# Patient Record
Sex: MALE | Race: WHITE | NOT HISPANIC OR LATINO | Employment: OTHER | URBAN - METROPOLITAN AREA
[De-identification: names, ages, dates, MRNs, and addresses within clinical notes are randomized per-mention and may not be internally consistent; named-entity substitution may affect disease eponyms.]

---

## 2018-03-05 ENCOUNTER — OFFICE VISIT (OUTPATIENT)
Dept: INTERNAL MEDICINE | Facility: CLINIC | Age: 63
End: 2018-03-05
Payer: MEDICARE

## 2018-03-05 ENCOUNTER — HOSPITAL ENCOUNTER (OUTPATIENT)
Dept: RADIOLOGY | Facility: HOSPITAL | Age: 63
Discharge: HOME | End: 2018-03-05
Attending: INTERNAL MEDICINE
Payer: MEDICARE

## 2018-03-05 VITALS
SYSTOLIC BLOOD PRESSURE: 130 MMHG | HEART RATE: 80 BPM | BODY MASS INDEX: 42.41 KG/M2 | WEIGHT: 302.94 LBS | DIASTOLIC BLOOD PRESSURE: 74 MMHG | OXYGEN SATURATION: 95 % | HEIGHT: 71 IN

## 2018-03-05 DIAGNOSIS — Z12.5 SCREENING FOR PROSTATE CANCER: ICD-10-CM

## 2018-03-05 DIAGNOSIS — Z11.59 ENCOUNTER FOR HEPATITIS C SCREENING TEST FOR LOW RISK PATIENT: ICD-10-CM

## 2018-03-05 DIAGNOSIS — R03.0 BLOOD PRESSURE ELEVATED WITHOUT HISTORY OF HTN: ICD-10-CM

## 2018-03-05 DIAGNOSIS — G47.33 OSA (OBSTRUCTIVE SLEEP APNEA): ICD-10-CM

## 2018-03-05 DIAGNOSIS — M25.562 CHRONIC PAIN OF LEFT KNEE: ICD-10-CM

## 2018-03-05 DIAGNOSIS — G89.29 CHRONIC PAIN OF LEFT KNEE: ICD-10-CM

## 2018-03-05 DIAGNOSIS — Z00.00 ANNUAL PHYSICAL EXAM: Primary | ICD-10-CM

## 2018-03-05 LAB
BILIRUB UR QL STRIP: NEGATIVE
CLARITY UR REFRACT.AUTO: ABNORMAL
COLOR UR AUTO: YELLOW
GLUCOSE UR QL STRIP: NEGATIVE
HGB UR QL STRIP: NEGATIVE
KETONES UR QL STRIP: ABNORMAL
LEUKOCYTE ESTERASE UR QL STRIP: NEGATIVE
NITRITE UR QL STRIP: NEGATIVE
PH UR STRIP: 5 [PH] (ref 5–8)
PROT UR QL STRIP: NEGATIVE
SP GR UR STRIP: 1.02 (ref 1–1.03)
URN SPEC COLLECT METH UR: ABNORMAL
UROBILINOGEN UR STRIP-ACNC: NEGATIVE EU/DL

## 2018-03-05 PROCEDURE — 73562 X-RAY EXAM OF KNEE 3: CPT | Mod: TC,LT

## 2018-03-05 PROCEDURE — 81003 URINALYSIS AUTO W/O SCOPE: CPT

## 2018-03-05 PROCEDURE — 99396 PREV VISIT EST AGE 40-64: CPT | Mod: S$GLB,,, | Performed by: INTERNAL MEDICINE

## 2018-03-05 PROCEDURE — 73562 X-RAY EXAM OF KNEE 3: CPT | Mod: 26,LT,, | Performed by: RADIOLOGY

## 2018-03-05 PROCEDURE — 99999 PR PBB SHADOW E&M-EST. PATIENT-LVL IV: CPT | Mod: PBBFAC,,, | Performed by: INTERNAL MEDICINE
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
## 2018-03-24 NOTE — PROGRESS NOTES
Patient, Elías Gay (MRN #[id number]), presented with a recorded BMI of 42.25 kg/m^2 consistent with the definition of morbid obesity (ICD-10 E66.01). The patient's morbid obesity was monitored, evaluated, addressed and/or treated. This addendum to the medical record is made on 03/24/2018.

## 2018-04-09 ENCOUNTER — OFFICE VISIT (OUTPATIENT)
Dept: INTERNAL MEDICINE | Facility: CLINIC | Age: 63
End: 2018-04-09
Payer: MEDICARE

## 2018-04-09 VITALS
BODY MASS INDEX: 43.02 KG/M2 | HEART RATE: 83 BPM | WEIGHT: 300.5 LBS | HEIGHT: 70 IN | OXYGEN SATURATION: 97 % | DIASTOLIC BLOOD PRESSURE: 92 MMHG | SYSTOLIC BLOOD PRESSURE: 144 MMHG

## 2018-04-09 DIAGNOSIS — R03.0 BLOOD PRESSURE ELEVATED WITHOUT HISTORY OF HTN: Primary | ICD-10-CM

## 2018-04-09 PROCEDURE — 99213 OFFICE O/P EST LOW 20 MIN: CPT | Mod: S$GLB,,, | Performed by: INTERNAL MEDICINE

## 2018-04-09 PROCEDURE — 99999 PR PBB SHADOW E&M-EST. PATIENT-LVL III: CPT | Mod: PBBFAC,,, | Performed by: INTERNAL MEDICINE

## 2018-04-09 RX ORDER — GUAIFENESIN 600 MG/1
400 TABLET, EXTENDED RELEASE ORAL 2 TIMES DAILY
COMMUNITY
End: 2018-12-14

## 2018-04-09 NOTE — PROGRESS NOTES
CHIEF COMPLAINT:  Followup of blood pressure.    HISTORY OF PRESENT ILLNESS:  The patient is a 63-year-old gentleman who comes in   today for followup of blood pressure.  He did bring in his home blood pressure   monitor.  He has been checking his pressure almost on a daily basis.  The   readings have ranged anywhere from the 120s to the 140s with the diastolic in   the 70s to 90s.  The patient is losing weight.  He has modified his diet.  He   does report the past month was very difficult because of number of birthdays   involved.  He has not been exercising.    REVIEW OF SYSTEMS:  Please see the patient entered review of systems.  The   patient reports he does have problems with neck pain.  He has apparently seen a   chiropractor for this.  This started many years ago in the past.  He does   complain of knee pain as well.  He has been using Biofreeze, which does help.    In regard to his eyes, he has been doing lid washes as well as using sterile   saline and that has helped.  In regard to constipation, he reports that he hurt   his back after spending the day raking leaves.  During this time, he did not   have a bowel movement.  Now, he is back to baseline.  He is seeing masseuse who   did deep tissue massage, which did help with his back.  In regard to the joint   pain and arthralgias that is in regards to his knee.    PHYSICAL EXAMINATION:  The patient has his home blood pressure monitor with him.    His machine read 149/93 with a pulse of 83.  My reading was 144/92.  GENERAL APPEARANCE:  No acute distress.  HEENT:  Trachea was midline without JVD.  PULMONARY:  Good inspiratory and expiratory breath sounds are heard.  Lungs are   clear to auscultation.  CARDIOVASCULAR:  S1, S2.  EXTREMITIES:  With trace edema.  ABDOMEN:  Nontender, nondistended without hepatosplenomegaly.    COMMENTS:  I did discuss with the patient about diet, weight loss and exercise.    The patient still prefers not to start a medication at  this time.  He feels he   can get his weight down.  His goal is to get his weight to around 213 or   definitely under 220 in one year.  He is doing so by modifying his diet.    ASSESSMENT:  1.  Elevated blood pressure.  2.  Elevated BMI.    PLAN:  Diet, weight loss and exercise.  The patient is to follow up in four   months for reevaluation.      JALEXANDRE/HN  dd: 04/09/2018 07:41:09 (CDT)  td: 04/09/2018 17:17:24 (CDT)  Doc ID   #[id number]  Job ID #[id number]    CC:     Answers for HPI/ROS submitted by the patient on 4/2/2018   activity change: No  unexpected weight change: No  neck pain: Yes  hearing loss: No  rhinorrhea: No  trouble swallowing: No  eye discharge: Yes  visual disturbance: No  chest tightness: No  wheezing: No  chest pain: No  palpitations: No  blood in stool: No  constipation: Yes  vomiting: No  diarrhea: No  polydipsia: No  polyuria: No  difficulty urinating: No  urgency: Yes  hematuria: No  joint swelling: Yes  arthralgias: Yes  headaches: No  weakness: Yes  confusion: No  dysphoric mood: No

## 2018-04-24 ENCOUNTER — HOSPITAL ENCOUNTER (OUTPATIENT)
Dept: CARDIOLOGY | Facility: CLINIC | Age: 63
Discharge: HOME | End: 2018-04-24
Attending: NURSE PRACTITIONER
Payer: MEDICARE

## 2018-04-24 ENCOUNTER — TELEPHONE (OUTPATIENT)
Dept: ADMINISTRATIVE | Facility: HOSPITAL | Age: 63
End: 2018-04-24

## 2018-04-24 ENCOUNTER — OFFICE VISIT (OUTPATIENT)
Dept: SLEEP MEDICINE | Facility: CLINIC | Age: 63
End: 2018-04-24
Payer: MEDICARE

## 2018-04-24 VITALS
WEIGHT: 305.13 LBS | SYSTOLIC BLOOD PRESSURE: 140 MMHG | HEIGHT: 70 IN | BODY MASS INDEX: 43.68 KG/M2 | DIASTOLIC BLOOD PRESSURE: 80 MMHG | HEART RATE: 84 BPM

## 2018-04-24 DIAGNOSIS — I35.9 NONRHEUMATIC AORTIC VALVE DISORDER: ICD-10-CM

## 2018-04-24 DIAGNOSIS — G47.31 COMPLEX SLEEP APNEA SYNDROME: ICD-10-CM

## 2018-04-24 DIAGNOSIS — Z12.12 SCREENING FOR COLORECTAL CANCER: Primary | ICD-10-CM

## 2018-04-24 DIAGNOSIS — Z12.11 SCREENING FOR COLORECTAL CANCER: Primary | ICD-10-CM

## 2018-04-24 DIAGNOSIS — G47.31 COMPLEX SLEEP APNEA SYNDROME: Primary | ICD-10-CM

## 2018-04-24 LAB
DIASTOLIC DYSFUNCTION: NO
RETIRED EF AND QEF - SEE NOTES: 65 (ref 55–65)

## 2018-04-24 PROCEDURE — 93307 TTE W/O DOPPLER COMPLETE: CPT | Mod: S$GLB,,, | Performed by: INTERNAL MEDICINE

## 2018-04-24 PROCEDURE — 99999 PR PBB SHADOW E&M-EST. PATIENT-LVL III: CPT | Mod: PBBFAC,,, | Performed by: NURSE PRACTITIONER

## 2018-04-24 PROCEDURE — 99213 OFFICE O/P EST LOW 20 MIN: CPT | Mod: S$GLB,,, | Performed by: NURSE PRACTITIONER

## 2018-04-24 NOTE — LETTER
April 24, 2018      Kameron Diaz MD  [address]  [facility] [address]           [facility]  [address]  [facility] [address]  Phone: [phone]          Patient: Elías Gay   MR Number: [id number]   YOB: 1955   Date of Visit: 4/24/2018       Dear Dr. Kameron Diaz:    Thank you for referring Elías Gay to me for evaluation. Attached you will find relevant portions of my assessment and plan of care.    If you have questions, please do not hesitate to call me. I look forward to following Elías Gay along with you.    Sincerely,    Dottie Alvarez, NP    Enclosure  CC:  No Recipients    If you would like to receive this communication electronically, please contact [email or web address] or [phone] to request more information on [employer] Link access.    For providers and/or their staff who would like to refer a patient to [facility], please contact us through our one-stop-shop provider referral line, [facility], at [phone].    If you feel you have received this communication in error or would no longer like to receive these types of communications, please e-mail [email or web address]

## 2018-04-24 NOTE — PROGRESS NOTES
"Elías Gay  was seen as a f/u today     Since last seen 2016, he underwent a split-PSG which we reviewed together today. Continued air gasping, snoring. Wakes up q1hr. Sleeping now in chair. Company took over by crooked person. Lot of financial stress. 10# gain.         5/11/16: AHI was 123.2 with an oxygen diane of 77.0%. During the treatment portion of the study, CPAP was explored from 6 to 20 cm of water using a medium Quattro full face mask, chin strap, CFlex at 3, and heated humidification. Titration was started with Airfit nasal mask but was switched to full face due to observed mouth leak. Effective control of sleep disordered breathing was not observed with CPAP or BPAP. Obstructive apnea persisted with maximum CPAP of 20 cm H20 and BPAP of 25/15. Emergence of central apnea was also observed while on PAP.   HISTORY  2016  CHIEF COMPLAINT: Snoring, disrupted sleep    HISTORY OF PRESENT ILLNESS: Elías Gay a 63 y.o. male presents for the evaluation of possible obstructive sleep apnea. He has never had a sleep study. He report symptoms of disruptive snoring, un-refreshing sleep, and excessive daytime sleepiness. Wife reports he stops breathing during sleep, afraid she will find him dead. He eats his stress and gained ~ 140# the last 39 yrs. Nocturia 5-7x. Occasional sinus headaches upon awakening if has slept on left side. Best sleep he gets is sitting in his chair at nighttime. To avoid leg pain/swelling he has to go lie in his bed but after ~ 20min he is clogged up. Prefers to sleep on R side to help avoid this.     +chronic nasal congestion. Hx septoplasty/sinuplasty Dr. Sorensen.   Uses daily sinus flush, occasional Flonase NS, and Mucinex (works best to keep it moist/thin) +hot shower    Reviewed Dr. Diaz noted "He always has problems with his sinuses. He is taking Mucinex for this. He has a terrible snore. He sleeps about an  hour and a half each night before he wakes up, so he is waking up about 4 " "times  a night. He normally sees Dr. Odette Sorensen and ENT in the community."    Denies symptoms of restless legs or kicking during sleep.     On todays [place] Sleepiness Scale the patient scores a 1324.     Sleeps ~ 9p-3-4a    FAMILY HISTORY: Brother +TJ    SOCIAL HISTORY: . [employer] , owns [employer]  REVIEW OF SYSTEMS:  Sleep related symptoms as per HPI; +wgt gain  Sleep Clinic ROS  4/22/2018   Difficulty breathing through the nose?  Yes   Sore throat or dry mouth in the morning? Yes   Irregular or very fast heart beat?  No   Shortness of breath?  Yes   Acid reflux? No   Body aches and pains?  Yes   Morning headaches? Yes   Dizziness? No   Mood changes?  No   Do you exercise?  Sometimes   Do you feel like moving your legs a lot?  No           PHYSICAL EXAM:   BP (!) 140/80   Pulse 84   Ht 5' 10" (1.778 m)   Wt (!) 138.4 kg (305 lb 1.9 oz)   BMI 43.78 kg/m²   GENERAL: morbid obese body habitus, well groomed     No echo on file    ASSESSMENT:   Complex sleep apnea, severe. Needs ASV titration study. Effective pressure undetermined with CPAP and bipap  He has medical comorbidities of morbid obesity.     Nasal turbinate hypertrophy, could be limiting factor potential successful acclimation to PAP     PLAN:   1. OBTAIN Echo ensure EF>40% and OBTAIN ASV titration study, using FFM, plan myochnser results/setup with adherence monitoring   2. Discussed etiology of TJ/central and potential ramifications of untreated sleep apnea, including stroke, heart disease, HTN.  3. The patient was advised to abstain from driving should he feel sleepy or drowsy.   4.Continue sinus hygiene to reduce potential UARS.   5. Encouraged weight loss efforts for potential improvement of TJ and overall health benefits          "

## 2018-05-02 ENCOUNTER — TELEPHONE (OUTPATIENT)
Dept: SLEEP MEDICINE | Facility: OTHER | Age: 63
End: 2018-05-02

## 2018-05-08 ENCOUNTER — TELEPHONE (OUTPATIENT)
Dept: SLEEP MEDICINE | Facility: HOSPITAL | Age: 63
End: 2018-05-08

## 2018-05-14 ENCOUNTER — TELEPHONE (OUTPATIENT)
Dept: SLEEP MEDICINE | Facility: HOSPITAL | Age: 63
End: 2018-05-14

## 2018-05-15 ENCOUNTER — HOSPITAL ENCOUNTER (OUTPATIENT)
Dept: SLEEP MEDICINE | Facility: HOSPITAL | Age: 63
Discharge: HOME | End: 2018-05-15
Attending: NURSE PRACTITIONER
Payer: MEDICARE

## 2018-05-15 DIAGNOSIS — G47.31 COMPLEX SLEEP APNEA SYNDROME: ICD-10-CM

## 2018-05-15 PROCEDURE — 95811 PR POLYSOMNOGRAPHY W/CPAP: ICD-10-PCS | Mod: 26,,, | Performed by: PSYCHIATRY & NEUROLOGY

## 2018-05-15 PROCEDURE — 95811 POLYSOM 6/>YRS CPAP 4/> PARM: CPT | Mod: 26,,, | Performed by: PSYCHIATRY & NEUROLOGY

## 2018-05-15 PROCEDURE — 95811 POLYSOM 6/>YRS CPAP 4/> PARM: CPT

## 2018-06-06 ENCOUNTER — OFFICE VISIT (OUTPATIENT)
Dept: NEUROLOGY | Facility: CLINIC | Age: 63
End: 2018-06-06
Payer: MEDICARE

## 2018-06-06 VITALS
HEART RATE: 76 BPM | SYSTOLIC BLOOD PRESSURE: 140 MMHG | HEIGHT: 70 IN | BODY MASS INDEX: 43.27 KG/M2 | WEIGHT: 302.25 LBS | DIASTOLIC BLOOD PRESSURE: 90 MMHG

## 2018-06-06 DIAGNOSIS — G47.31 COMPLEX SLEEP APNEA SYNDROME: Primary | ICD-10-CM

## 2018-06-06 PROCEDURE — 99999 PR PBB SHADOW E&M-EST. PATIENT-LVL III: CPT | Mod: PBBFAC,,, | Performed by: NURSE PRACTITIONER

## 2018-06-06 PROCEDURE — 99499 UNLISTED E&M SERVICE: CPT | Mod: S$GLB,,, | Performed by: NURSE PRACTITIONER

## 2018-06-07 DIAGNOSIS — G47.31 COMPLEX SLEEP APNEA SYNDROME: Primary | ICD-10-CM

## 2018-06-11 ENCOUNTER — OFFICE VISIT (OUTPATIENT)
Dept: OPTOMETRY | Facility: CLINIC | Age: 63
End: 2018-06-11
Payer: MEDICARE

## 2018-06-11 DIAGNOSIS — H52.13 MYOPIA WITH ASTIGMATISM AND PRESBYOPIA, BILATERAL: ICD-10-CM

## 2018-06-11 DIAGNOSIS — H52.203 MYOPIA WITH ASTIGMATISM AND PRESBYOPIA, BILATERAL: ICD-10-CM

## 2018-06-11 DIAGNOSIS — H25.13 NUCLEAR SCLEROSIS OF BOTH EYES: ICD-10-CM

## 2018-06-11 DIAGNOSIS — Z01.00 ROUTINE EYE EXAM: Primary | ICD-10-CM

## 2018-06-11 DIAGNOSIS — H57.89 EYE DISCHARGE: ICD-10-CM

## 2018-06-11 DIAGNOSIS — H52.4 MYOPIA WITH ASTIGMATISM AND PRESBYOPIA, BILATERAL: ICD-10-CM

## 2018-06-11 PROCEDURE — 99999 PR PBB SHADOW E&M-EST. PATIENT-LVL II: CPT | Mod: PBBFAC,,, | Performed by: OPTOMETRIST

## 2018-06-11 PROCEDURE — 92004 COMPRE OPH EXAM NEW PT 1/>: CPT | Mod: S$GLB,,, | Performed by: OPTOMETRIST

## 2018-06-11 PROCEDURE — 92015 DETERMINE REFRACTIVE STATE: CPT | Mod: S$GLB,,, | Performed by: OPTOMETRIST

## 2018-06-11 NOTE — PROGRESS NOTES
HPI     Last eye exam was approximately 5-6 years ago.  Patient states never liked the trifocal/PAL. Would prefer bifocals or SV   distance. Currently takes glasses off to read.  Occasionally sees floaters OU. Constantly has debris in OU especially   after waking. Has to use warm water and baby shampoo to be able to open   OU.  Patient denies diplopia, headaches, flashes and pain.      Last edited by Genevieve Burch on 6/11/2018 10:32 AM. (History)            Assessment /Plan     For exam results, see Encounter Report.    Routine eye exam    Nuclear sclerosis of both eyes    Eye discharge    Myopia with astigmatism and presbyopia, bilateral            1.  Educated on cataracts and affects on vision.  Early-monitor.  2.  Patient reports discharge upon awakening.  Will start using CPAP machine soon.  Reassured patient there is no infection.  Recommend artificial tears before bed and in the morning.  3.  Bifocal rx given.  Eye health normal OU.          RTC 1 year for routine exam.

## 2018-07-16 ENCOUNTER — OFFICE VISIT (OUTPATIENT)
Dept: DERMATOLOGY | Facility: CLINIC | Age: 63
End: 2018-07-16
Payer: MEDICARE

## 2018-07-16 DIAGNOSIS — Z12.83 SCREENING EXAM FOR SKIN CANCER: ICD-10-CM

## 2018-07-16 DIAGNOSIS — D48.5 NEOPLASM OF UNCERTAIN BEHAVIOR OF SKIN: Primary | ICD-10-CM

## 2018-07-16 DIAGNOSIS — L73.8 SEBACEOUS GLAND HYPERPLASIA: ICD-10-CM

## 2018-07-16 DIAGNOSIS — D22.9 MULTIPLE BENIGN NEVI: ICD-10-CM

## 2018-07-16 DIAGNOSIS — L82.1 SEBORRHEIC KERATOSIS: ICD-10-CM

## 2018-07-16 PROCEDURE — 88305 TISSUE EXAM BY PATHOLOGIST: CPT | Performed by: PATHOLOGY

## 2018-07-16 PROCEDURE — 99999 PR PBB SHADOW E&M-EST. PATIENT-LVL III: CPT | Mod: PBBFAC,,, | Performed by: DERMATOLOGY

## 2018-07-16 PROCEDURE — 11101 PR BIOPSY, EACH ADDED LESION: CPT | Mod: S$GLB,,, | Performed by: DERMATOLOGY

## 2018-07-16 PROCEDURE — 88342 IMHCHEM/IMCYTCHM 1ST ANTB: CPT | Mod: 26,,, | Performed by: PATHOLOGY

## 2018-07-16 PROCEDURE — 99203 OFFICE O/P NEW LOW 30 MIN: CPT | Mod: 25,S$GLB,, | Performed by: DERMATOLOGY

## 2018-07-16 PROCEDURE — 11100 PR BIOPSY OF SKIN LESION: CPT | Mod: S$GLB,,, | Performed by: DERMATOLOGY

## 2018-07-16 NOTE — PATIENT INSTRUCTIONS
Shave Biopsy Wound Care    Your doctor has performed a shave biopsy today.  A band aid and vaseline ointment has been placed over the site.  This should remain in place for 24 hours.  It is recommended that you keep the area dry for the first 24 hours.  After 24 hours, you may remove the band aid and wash the area with warm soap and water and apply Vaseline jelly.  Many patients prefer to use Neosporin or Bacitracin ointment.  This is acceptable; however, know that you can develop an allergy to this medication even if you have used it safely for years.  It is important to keep the area moist.  Letting it dry out and get air slows healing time, and will worsen the scar.  Band aid is optional after first 24 hours.      If you notice increasing redness, tenderness, pain, or yellow drainage at the biopsy site, please notify your doctor.  These are signs of an infection.    If your biopsy site is bleeding, apply firm pressure for 15 minutes straight.  Repeat for another 15 minutes, if it is still bleeding.   If the surgical site continues to bleed, then please contact your doctor.      [facility] [place] [address]/ [phone] FAX/ [email or web address]

## 2018-07-16 NOTE — PROGRESS NOTES
Subjective:       Patient ID:  Elías Gay is a 63 y.o. male who presents for   Chief Complaint   Patient presents with    Skin Check     TBSE 10yrs since last Derm visit      Here for TBSE    No h/o nmsc or mm    Patient complains of lesion(s)  Location: right arm  Duration: 5 years  Symptoms: none - may be growing slightly  Relieving factors/Previous treatments: none          Review of Systems   Skin: Positive for wears hat (for activities only). Negative for daily sunscreen use, activity-related sunscreen use and recent sunburn.   Hematologic/Lymphatic: Does not bruise/bleed easily.        Objective:    Physical Exam   Constitutional: He appears well-developed and well-nourished. He is obese.  No distress.   Genitourinary:         Neurological: He is alert and oriented to person, place, and time. He is not disoriented.   Psychiatric: He has a normal mood and affect.   Skin:   Areas Examined (abnormalities noted in diagram):   Scalp / Hair Palpated and Inspected  Head / Face Inspection Performed  Neck Inspection Performed  Chest / Axilla Inspection Performed  Abdomen Inspection Performed  Genitals / Buttocks / Groin Inspection Performed  Back Inspection Performed  RUE Inspected  LUE Inspection Performed  RLE Inspected  LLE Inspection Performed  Nails and Digits Inspection Performed                   Diagram Legend     Erythematous scaling macule/papule c/w actinic keratosis       Vascular papule c/w angioma      Pigmented verrucoid papule/plaque c/w seborrheic keratosis      Yellow umbilicated papule c/w sebaceous hyperplasia      Irregularly shaped tan macule c/w lentigo     1-2 mm smooth white papules consistent with Milia      Movable subcutaneous cyst with punctum c/w epidermal inclusion cyst      Subcutaneous movable cyst c/w pilar cyst      Firm pink to brown papule c/w dermatofibroma      Pedunculated fleshy papule(s) c/w skin tag(s)      Evenly pigmented macule c/w junctional nevus     Mildly  variegated pigmented, slightly irregular-bordered macule c/w mildly atypical nevus      Flesh colored to evenly pigmented papule c/w intradermal nevus       Pink pearly papule/plaque c/w basal cell carcinoma      Erythematous hyperkeratotic cursted plaque c/w SCC      Surgical scar with no sign of skin cancer recurrence      Open and closed comedones      Inflammatory papules and pustules      Verrucoid papule consistent consistent with wart     Erythematous eczematous patches and plaques     Dystrophic onycholytic nail with subungual debris c/w onychomycosis     Umbilicated papule    Erythematous-base heme-crusted tan verrucoid plaque consistent with inflamed seborrheic keratosis     Erythematous Silvery Scaling Plaque c/w Psoriasis     See annotation                  Assessment / Plan:      Pathology Orders:     Normal Orders This Visit    Tissue Specimen To Pathology, Dermatology     Questions:    Directional Terms:  Other(comment)    Clinical information:  r/o bcc    Specific Site:  left ear helix    Tissue Specimen To Pathology, Dermatology     Questions:    Directional Terms:  Other(comment)    Clinical information:  r/o pigmented sk vs other    Specific Site:  right arm        Neoplasm of uncertain behavior of skin  -     Tissue Specimen To Pathology, Dermatology  -     Tissue Specimen To Pathology, Dermatology    Shave biopsy procedure note:    Shave biopsy x 2 performed after verbal consent including risk of infection, scar, recurrence, need for additional treatment of site. Area prepped with alcohol, anesthetized with approximately 1.0cc of 1% lidocaine with epinephrine. Lesional tissue shaved with razor blade. Hemostasis achieved with application of aluminum chloride followed by hyfrecation. No complications. Dressing applied. Wound care explained.    If ear biopsy positive for malignancy, refer to Dr. Dobbs for Mohs surgery consultation.    Seborrheic keratosis  These are benign inherited growths without  a malignant potential. Reassurance given to patient. No treatment is necessary.       Multiple benign nevi  total body skin examination performed today including at least 12 points as noted in physical examination. No lesions suspicious for malignancy noted.  Reassurance provided.  Instructed patient to observe lesion(s) for changes and follow up in clinic if changes are noted. Discussed ABCDE's of moles and brochure provided.      Sebaceous gland hyperplasia  This is a common condition representing benign enlargement of the sebaceous lobule. It typically occurs in adulthood. Reassurance given to patient.       Screening exam for skin cancer  Total body skin examination performed today including at least 12 points as noted in physical examination. Suspicious lesions noted.               Follow-up in about 6 months (around 1/16/2019).

## 2018-08-16 ENCOUNTER — OFFICE VISIT (OUTPATIENT)
Dept: SURGERY | Facility: CLINIC | Age: 63
End: 2018-08-16
Payer: MEDICARE

## 2018-08-16 VITALS
WEIGHT: 307.63 LBS | DIASTOLIC BLOOD PRESSURE: 60 MMHG | HEIGHT: 70 IN | BODY MASS INDEX: 44.04 KG/M2 | SYSTOLIC BLOOD PRESSURE: 124 MMHG | TEMPERATURE: 98 F | HEART RATE: 92 BPM

## 2018-08-16 DIAGNOSIS — C43.61: Primary | ICD-10-CM

## 2018-08-16 PROCEDURE — 99999 PR PBB SHADOW E&M-EST. PATIENT-LVL III: CPT | Mod: PBBFAC,,, | Performed by: SURGERY

## 2018-08-16 PROCEDURE — 99204 OFFICE O/P NEW MOD 45 MIN: CPT | Mod: S$GLB,,, | Performed by: SURGERY

## 2018-08-16 PROCEDURE — 3008F BODY MASS INDEX DOCD: CPT | Mod: S$GLB,,, | Performed by: SURGERY

## 2018-08-16 NOTE — LETTER
August 18, 2018      Alyson Avitia MD  [address]  [place] [address]           [facility]  [address]  [facility] [address]  Phone: [phone]          Patient: Elías Gay   MR Number: [id number]   YOB: 1955   Date of Visit: 8/16/2018       Dear Dr. Alyson Avitia:    Thank you for referring Elías Gay to me for evaluation. Attached you will find relevant portions of my assessment and plan of care.    If you have questions, please do not hesitate to call me. I look forward to following Elías Gay along with you.    Sincerely,    Mario Nicole MD    Enclosure  CC:  No Recipients    If you would like to receive this communication electronically, please contact [email or web address] or [phone] to request more information on [employer] Link access.    For providers and/or their staff who would like to refer a patient to [facility], please contact us through our one-stop-shop provider referral line, [facility], at [phone].    If you feel you have received this communication in error or would no longer like to receive these types of communications, please e-mail [email or web address]

## 2018-08-16 NOTE — PROGRESS NOTES
History & Physical    SUBJECTIVE:     History of Present Illness:  Patient is a 63 y.o. male with h/o sleep apnea who was referred by Dermatology for melanoma of right upper arm. Patient reports lesion was presents for several years unchanged prior to biopsy. Biopsy showed:  MALIGNANT MELANOMA.  CASE SUMMARY:-MELANOMA BIOPSY  HISTOLOGIC TYPE: Superficial spreading.  SURGICAL MARGIN STATUS: The lesion extends to within one rete ridge of a peripheral.  Edge of the biopsy.  MEASURED THICKNESS IN MM: 0.6 mm.  DERMAL MITOTIC RATE (PER SQUARE MM): No dermal mitoses identified.  ULCERATION: Not identified.  GROWTH PHASE: Vertical.  LEVEL OF INVASION: 3.  MICROSATELLITOSIS: Not identified.  PERINEURAL INVASION: Not identified.  LYMPH-VASCULAR INVASION: Not identified.  TUMOR INFILTRATING LYMPHOCYTES: Brisk.  TUMOR REGRESSION: Not identified.  ASSOCIATED NEVUS: Not identified.    Nonsmoker, nondiabetic. No blood thinners.     Family History:  Father prostate cancer    Chief Complaint   Patient presents with    Consult       Review of patient's allergies indicates:  No Known Allergies    Current Outpatient Medications   Medication Sig Dispense Refill    guaiFENesin (MUCINEX) 600 mg 12 hr tablet Take 400 mg by mouth 2 (two) times daily.       No current facility-administered medications for this visit.        Past Medical History:   Diagnosis Date    Foreign body in conjunctival sac     Hernia, inguinal, right 2002     Past Surgical History:   Procedure Laterality Date    HERNIA REPAIR Left     3 years of age    HERNIA REPAIR N/A     Ventral wall at 3 year of age.    KNEE SURGERY Right 1984    Arthroscopic    NASAL SEPTUM SURGERY N/A 2009     Family History   Problem Relation Age of Onset    Hypertension Mother     Stroke Mother     Aneurysm Mother     Cancer Father 72        Prostate    Cataracts Neg Hx     Glaucoma Neg Hx     Macular degeneration Neg Hx     Melanoma Neg Hx      Social History     Tobacco Use  "   Smoking status: Never Smoker    Smokeless tobacco: Never Used   Substance Use Topics    Alcohol use: Yes     Comment: Rare    Drug use: No        Review of Systems:  Review of Systems   Constitutional: Negative for chills and fever.   Respiratory: Negative for cough and shortness of breath.    Cardiovascular: Negative for chest pain and palpitations.   Gastrointestinal: Negative for abdominal distention and abdominal pain.   Musculoskeletal: Negative for myalgias.   Neurological: Negative for dizziness and headaches.   Psychiatric/Behavioral: Negative for agitation and confusion.       OBJECTIVE:     Vital Signs (Most Recent)  Temp: 98.1 °F (36.7 °C) (08/16/18 1548)  Pulse: 92 (08/16/18 1548)  BP: 124/60 (08/16/18 1548)  5' 10" (1.778 m)  (!) 139.5 kg (307 lb 10.4 oz)     Physical Exam:  Physical Exam   Constitutional: He is oriented to person, place, and time. He appears well-developed and well-nourished. No distress.   Cardiovascular: Normal rate.   Pulmonary/Chest: Effort normal.   Musculoskeletal:   0.5cm biopsy site. Photo in media.   No axillary lymphadenopathy   Neurological: He is alert and oriented to person, place, and time.   Skin: Skin is warm and dry.   Psychiatric: He has a normal mood and affect. His behavior is normal.       ASSESSMENT/PLAN:     Elías Gay is a 63 y.o. male with melanoma on the right upper extremity    PLAN:  Plan for WLE in minors room  No need for SLNB  Consent obtained    Trini Alan MD, PGY-3  General Surgery  [phone]  I have personally taken the history and examined this patient and agree with the resident's note as stated above.  Pt with a thin T1a 0.6 mm melanoma of the RUE just proximal to the lateral elbow region.  Consented and scheduled for a 1 cm WLE in the Minor Procdure room under straight local anesthesia on Friday 9-14-18 at 2 pm..  No role for SLNB.  Will excise so that long axis of ellipse is oriented toward elbow when elbow is flexed. Photo " taken.

## 2018-08-18 PROBLEM — C43.61: Status: ACTIVE | Noted: 2018-08-18

## 2018-08-29 ENCOUNTER — OFFICE VISIT (OUTPATIENT)
Dept: NEUROLOGY | Facility: CLINIC | Age: 63
End: 2018-08-29
Payer: MEDICARE

## 2018-08-29 VITALS
SYSTOLIC BLOOD PRESSURE: 122 MMHG | WEIGHT: 307.13 LBS | BODY MASS INDEX: 43 KG/M2 | DIASTOLIC BLOOD PRESSURE: 82 MMHG | HEIGHT: 71 IN | HEART RATE: 87 BPM

## 2018-08-29 DIAGNOSIS — G47.31 COMPLEX SLEEP APNEA SYNDROME: Primary | ICD-10-CM

## 2018-08-29 PROCEDURE — 99999 PR PBB SHADOW E&M-EST. PATIENT-LVL III: CPT | Mod: PBBFAC,,, | Performed by: NURSE PRACTITIONER

## 2018-08-29 PROCEDURE — 3008F BODY MASS INDEX DOCD: CPT | Mod: S$GLB,,, | Performed by: NURSE PRACTITIONER

## 2018-08-29 PROCEDURE — 99213 OFFICE O/P EST LOW 20 MIN: CPT | Mod: S$GLB,,, | Performed by: NURSE PRACTITIONER

## 2018-08-29 NOTE — PROGRESS NOTES
"Elías Gay  was seen as a f/u today     Since last seen he got setup ASV. Now sleeping in bed instead of chair. Gets burning hands at times. Snoring resolved. Still waking up some, but less often overall. Attributes to mask irritation/dry eyes. Using ASV has been life changing  Interrogation- ahi 22, avg use 6.1h/n. F20 mask. 90% tile epap 10cm. 4-8% periodic        5/11/16: AHI was 123.2 with an oxygen diane of 77.0%. During the treatment portion of the study, CPAP was explored from 6 to 20 cm of water using a medium Quattro full face mask, chin strap, CFlex at 3, and heated humidification. Titration was started with Airfit nasal mask but was switched to full face due to observed mouth leak. Effective control of sleep disordered breathing was not observed with CPAP or BPAP. Obstructive apnea persisted with maximum CPAP of 20 cm H20 and BPAP of 25/15. Emergence of central apnea was also observed while on PAP.   HISTORY  2016  CHIEF COMPLAINT: Snoring, disrupted sleep    HISTORY OF PRESENT ILLNESS: Elías Gay a 63 y.o. male presents for the evaluation of possible obstructive sleep apnea. He has never had a sleep study. He report symptoms of disruptive snoring, un-refreshing sleep, and excessive daytime sleepiness. Wife reports he stops breathing during sleep, afraid she will find him dead. He eats his stress and gained ~ 140# the last 39 yrs. Nocturia 5-7x. Occasional sinus headaches upon awakening if has slept on left side. Best sleep he gets is sitting in his chair at nighttime. To avoid leg pain/swelling he has to go lie in his bed but after ~ 20min he is clogged up. Prefers to sleep on R side to help avoid this.     +chronic nasal congestion. Hx septoplasty/sinuplasty Dr. Sorensen.   Uses daily sinus flush, occasional Flonase NS, and Mucinex (works best to keep it moist/thin) +hot shower    Reviewed Dr. Diaz noted "He always has problems with his sinuses. He is taking Mucinex for this. He has a terrible " "snore. He sleeps about an  hour and a half each night before he wakes up, so he is waking up about 4 times  a night. He normally sees Dr. Odette Sorensen and ENT in the community."    Denies symptoms of restless legs or kicking during sleep.     On todays [place] Sleepiness Scale the patient scores a 1324.     Sleeps ~ 9p-3-4a    FAMILY HISTORY: Brother +TJ    SOCIAL HISTORY: . [employer] , owns [employer]  REVIEW OF SYSTEMS:  Sleep related symptoms as per HPI; wgt stable  Sleep Clinic ROS  4/22/2018   Difficulty breathing through the nose?  Yes   Sore throat or dry mouth in the morning? Yes   Irregular or very fast heart beat?  No   Shortness of breath?  Yes   Acid reflux? No   Body aches and pains?  Yes   Morning headaches? Yes   Dizziness? No   Mood changes?  No   Do you exercise?  Sometimes   Do you feel like moving your legs a lot?  No       PHYSICAL EXAM:   /82   Pulse 87   Ht 5' 11" (1.803 m)   Wt (!) 139.3 kg (307 lb 1.6 oz)   BMI 42.83 kg/m²   GENERAL: morbid obese body habitus, well groomed     No echo on file    ASSESSMENT:   Complex sleep apnea, severe. Needs ASV titration study. Effective pressure undetermined with CPAP and bipap 8/29/18: excellent adherence with ASV, symptoms improved  He has medical comorbidities of morbid obesity.     Nasal turbinate hypertrophy, could be limiting factor potential successful acclimation to PAP     PLAN:   1. Continue ASV settings, increase min epap 6-->7 though   2. Discussed etiology of TJ/central and potential ramifications of untreated sleep apnea, including stroke, heart disease, HTN.  3. The patient was advised to abstain from driving should he feel sleepy or drowsy.   4.Continue sinus hygiene to reduce potential UARS.   5. Encouraged continued weight loss efforts for potential improvement of TJ and overall health benefits          "

## 2018-09-14 ENCOUNTER — PROCEDURE VISIT (OUTPATIENT)
Dept: SURGERY | Facility: CLINIC | Age: 63
End: 2018-09-14
Payer: MEDICARE

## 2018-09-14 VITALS
HEART RATE: 81 BPM | SYSTOLIC BLOOD PRESSURE: 155 MMHG | TEMPERATURE: 98 F | DIASTOLIC BLOOD PRESSURE: 74 MMHG | BODY MASS INDEX: 42.98 KG/M2 | WEIGHT: 307 LBS | HEIGHT: 71 IN

## 2018-09-14 DIAGNOSIS — C43.61 MELANOMA OF UPPER ARM, RIGHT: Primary | ICD-10-CM

## 2018-09-14 DIAGNOSIS — C43.61: ICD-10-CM

## 2018-09-14 PROCEDURE — 88342 IMHCHEM/IMCYTCHM 1ST ANTB: CPT | Performed by: PATHOLOGY

## 2018-09-14 PROCEDURE — 11604 EXC TR-EXT MAL+MARG 3.1-4 CM: CPT | Mod: S$PBB,,, | Performed by: SURGERY

## 2018-09-14 PROCEDURE — 12034 INTMD RPR S/TR/EXT 7.6-12.5: CPT | Mod: 51,S$PBB,, | Performed by: SURGERY

## 2018-09-14 PROCEDURE — 12044 INTMD RPR N-HF/GENIT7.6-12.5: CPT | Mod: PBBFAC | Performed by: SURGERY

## 2018-09-14 PROCEDURE — 88342 IMHCHEM/IMCYTCHM 1ST ANTB: CPT | Mod: 26,,, | Performed by: PATHOLOGY

## 2018-09-14 PROCEDURE — 88305 TISSUE EXAM BY PATHOLOGIST: CPT | Performed by: PATHOLOGY

## 2018-09-14 PROCEDURE — 11604 EXC TR-EXT MAL+MARG 3.1-4 CM: CPT | Mod: PBBFAC | Performed by: SURGERY

## 2018-09-14 PROCEDURE — 88305 TISSUE EXAM BY PATHOLOGIST: CPT | Mod: 26,,, | Performed by: PATHOLOGY

## 2018-09-15 NOTE — PROCEDURES
DATE OF PROCEDURE:  9/14/2018    PREOPERATIVE DIAGNOSIS:  Thin T1a 0.60 mm Breslow depth melanoma of the right   upper extremity.    POSTOPERATIVE DIAGNOSIS:  Thin T1a 0.60 mm Breslow depth melanoma of the right   upper extremity.    PROCEDURE:  A 1-cm wide local excision of T1a melanoma of the right upper   extremity with specimen of resection measuring 3.2 x 8 cm in dimension with   primary closure of defect in multiple layers of sutures with incision length 8   cm.    PROCEDURE IN DETAIL:  The patient underwent informed consent.  The site was   confirmed with the patient and marked on his right lateral elbow region, just   proximal to the elbow.  He was in the minor procedure room.  The area was   prepped and draped in a sterile fashion.  The prior biopsy site diameter   measured approximately 1.2 cm.  We marked a 1 cm margin of resection along this   and this made the width of the anticipated resection 3.2 cm.  The length of the   ellipse to allow for appropriate cosmetic closure measured 8 cm in length.  This   was a longitudinal oblique ellipse just proximal to the elbow running superior   lateral to inferior medial toward the elbow crease.  Local anesthesia was   infiltrated.  The skin was incised sharply.  Full-thickness skin and   subcutaneous tissue was taken down to the underlying muscular fascia, which was   preserved.  The specimen was removed and oriented with a short stitch on the   superior proximal aspect long axis of the ellipse and a long stitch   posterolaterally on the short axis of the ellipse.  The specimen was sent to   Pathology for permanent sectioning.  Hemostasis was achieved.  We were able to   reapproximate the deep dermal and subcutaneous layers with interrupted 2-0   Vicryl suture with intermittent deep three-point fixation down to the posterior   deep fascial layer to obliterate any potential dead space.  The deep dermal   layer was then further reapproximated with interrupted 3-0  Vicryl sutures to   take any tension off the skin.  With no tension on the skin, the skin itself was   then closed with a series of 3-0 nylon interrupted vertical mattress sutures.    Sterile dressing was applied.  Postop wound care and analgesic instructions were   provided.  The patient will follow up in clinic in approximately two weeks for   wound check, pathology review and suture removal.  Estimated blood loss was   minimal.  All needle, instrument and sponge counts were correct.  No drains were   placed.  He tolerated the procedure well without complication.      KEYON/KEITH  dd: 09/15/2018 08:41:24 (CDT)  td: 09/16/2018 00:54:15 (CDT)  Doc ID   #[id number]  Job ID #[id number]    CC:     Job # [id number].

## 2018-09-21 ENCOUNTER — TELEPHONE (OUTPATIENT)
Dept: SURGERY | Facility: CLINIC | Age: 63
End: 2018-09-21

## 2018-09-21 ENCOUNTER — NURSE TRIAGE (OUTPATIENT)
Dept: ADMINISTRATIVE | Facility: CLINIC | Age: 63
End: 2018-09-21

## 2018-09-21 NOTE — TELEPHONE ENCOUNTER
----- Message from Juliane Roberts sent at 9/21/2018  3:34 PM CDT -----  Contact: Self   Pt is requesting a call back in regards to blood coming out of his incision. Pt is very concerned and would like to speak with someone at their earliest convenience.         Pt can be contacted at [phone]

## 2018-09-21 NOTE — TELEPHONE ENCOUNTER
"  Reason for Disposition   Caller has URGENT question and triager unable to answer question    Answer Assessment - Initial Assessment Questions  1. SYMPTOM: "What's the main symptom you're concerned about?" (e.g., redness, pain, drainage)     surg 9/14- sl bleeding from one or two incisions.   2. ONSET: "When did ________  start?"     When over extending arm - bleeding since surg.   3. SURGERY: "What surgery was performed?"     Arm surg   4. DATE of SURGERY: "When was surgery performed?"      9/14  5. INCISION SITE: "Where is the incision located?"     Bend of elbow   6. REDNESS: "Is there any redness at the incision site?" If yes, ask: "How wide across is the redness?" (Inches, centimeters)      No streaking , some redness at spots of blood, using bacitracin on it once a day   7. PAIN: "Is there any pain?" If so, ask: "How bad is it?"  (Scale 1-10; or mild, moderate, severe)     Only when scratching face it hurts other wise nothing. Can extend arm cant retract all the way   8. BLEEDING: "Is there any bleeding?" If so, ask: "How much?" and "Where?"     Couple gtts blood today   9. DRAINAGE: "Is there any drainage from the incision site?" If yes, ask: "What color and how much?" (e.g., red, cloudy, pus; drops, teaspoon)     Stitches look good   10. FEVER: "Do you have a fever?" If so, ask: "What is your temperature, how was it measured, and when did it start?"       afeb   11. OTHER SYMPTOMS: "Do you have any other symptoms?" (e.g., shaking chills, weakness, rash elsewhere on body)      Pt in car now , no rash    Protocols used: ST POST-OP INCISION SYMPTOMS-A-AH    "

## 2018-09-25 ENCOUNTER — TELEPHONE (OUTPATIENT)
Dept: SURGERY | Facility: CLINIC | Age: 63
End: 2018-09-25

## 2018-09-27 ENCOUNTER — OFFICE VISIT (OUTPATIENT)
Dept: SURGERY | Facility: CLINIC | Age: 63
End: 2018-09-27
Payer: MEDICARE

## 2018-09-27 VITALS
TEMPERATURE: 98 F | HEART RATE: 83 BPM | SYSTOLIC BLOOD PRESSURE: 138 MMHG | WEIGHT: 307 LBS | DIASTOLIC BLOOD PRESSURE: 88 MMHG | BODY MASS INDEX: 42.98 KG/M2 | HEIGHT: 71 IN

## 2018-09-27 DIAGNOSIS — C43.61: Primary | ICD-10-CM

## 2018-09-27 PROCEDURE — 99024 POSTOP FOLLOW-UP VISIT: CPT | Mod: ,,, | Performed by: SURGERY

## 2018-09-27 PROCEDURE — 99999 PR PBB SHADOW E&M-EST. PATIENT-LVL III: CPT | Mod: PBBFAC,,, | Performed by: SURGERY

## 2018-09-27 PROCEDURE — 99213 OFFICE O/P EST LOW 20 MIN: CPT | Mod: PBBFAC | Performed by: SURGERY

## 2018-09-27 NOTE — PROGRESS NOTES
GENERAL SURGERY CLINIC NOTE    Patient seen today after wide local excision of T1 melanoma of the LUE.  No residual melanoma on path.  Healing well with nylon sutures in place.      Nylon sutures removed in clinic   F/u PRN      Samuel Soto M.D.  General Surgery PGY3  [phone]     I have personally taken the history and examined this patient and agree with the resident's note as stated above.  S/P 1 cm WLE for T1a 0.60 mm without mitoses with a Breslow depth melanoma at lateral area of RUE just proximal to antecubital fossa.  No residual melanoma on path with the 1 cm WLE.  Nylon sutures removed.  F/U with me prn.  No signs of infection.  Pt will f/u with dermatology with Dr. Avitia as scheduled for routine whole body skin screening and surveillance exams.

## 2018-10-17 ENCOUNTER — OFFICE VISIT (OUTPATIENT)
Dept: INTERNAL MEDICINE | Facility: CLINIC | Age: 63
End: 2018-10-17
Payer: MEDICARE

## 2018-10-17 ENCOUNTER — IMMUNIZATION (OUTPATIENT)
Dept: INTERNAL MEDICINE | Facility: CLINIC | Age: 63
End: 2018-10-17
Payer: MEDICARE

## 2018-10-17 VITALS
SYSTOLIC BLOOD PRESSURE: 124 MMHG | HEIGHT: 71 IN | BODY MASS INDEX: 42.62 KG/M2 | TEMPERATURE: 98 F | WEIGHT: 304.44 LBS | HEART RATE: 78 BPM | DIASTOLIC BLOOD PRESSURE: 86 MMHG | OXYGEN SATURATION: 97 %

## 2018-10-17 DIAGNOSIS — R03.0 TRANSIENT ELEVATED BLOOD PRESSURE: Primary | ICD-10-CM

## 2018-10-17 DIAGNOSIS — Z12.11 ENCOUNTER FOR SCREENING COLONOSCOPY: ICD-10-CM

## 2018-10-17 DIAGNOSIS — N39.41 URGENCY INCONTINENCE: ICD-10-CM

## 2018-10-17 DIAGNOSIS — G47.33 OSA (OBSTRUCTIVE SLEEP APNEA): ICD-10-CM

## 2018-10-17 DIAGNOSIS — N39.41 URGE INCONTINENCE OF URINE: Primary | ICD-10-CM

## 2018-10-17 DIAGNOSIS — R60.0 LOWER EXTREMITY EDEMA: ICD-10-CM

## 2018-10-17 LAB
BILIRUB UR QL STRIP: NEGATIVE
CLARITY UR REFRACT.AUTO: CLEAR
COLOR UR AUTO: YELLOW
GLUCOSE UR QL STRIP: NEGATIVE
HGB UR QL STRIP: NEGATIVE
KETONES UR QL STRIP: NEGATIVE
LEUKOCYTE ESTERASE UR QL STRIP: NEGATIVE
NITRITE UR QL STRIP: NEGATIVE
PH UR STRIP: 7 [PH] (ref 5–8)
PROT UR QL STRIP: NEGATIVE
SP GR UR STRIP: 1.01 (ref 1–1.03)
URN SPEC COLLECT METH UR: NORMAL
UROBILINOGEN UR STRIP-ACNC: NEGATIVE EU/DL

## 2018-10-17 PROCEDURE — 99214 OFFICE O/P EST MOD 30 MIN: CPT | Mod: PBBFAC | Performed by: INTERNAL MEDICINE

## 2018-10-17 PROCEDURE — 81003 URINALYSIS AUTO W/O SCOPE: CPT

## 2018-10-17 PROCEDURE — 99214 OFFICE O/P EST MOD 30 MIN: CPT | Mod: S$PBB,,, | Performed by: INTERNAL MEDICINE

## 2018-10-17 PROCEDURE — 90662 IIV NO PRSV INCREASED AG IM: CPT | Mod: PBBFAC

## 2018-10-17 PROCEDURE — 87086 URINE CULTURE/COLONY COUNT: CPT

## 2018-10-17 PROCEDURE — 3008F BODY MASS INDEX DOCD: CPT | Mod: ,,, | Performed by: INTERNAL MEDICINE

## 2018-10-17 PROCEDURE — 1101F PT FALLS ASSESS-DOCD LE1/YR: CPT | Mod: ,,, | Performed by: INTERNAL MEDICINE

## 2018-10-17 PROCEDURE — 99999 PR PBB SHADOW E&M-EST. PATIENT-LVL IV: CPT | Mod: PBBFAC,,, | Performed by: INTERNAL MEDICINE

## 2018-10-17 NOTE — PROGRESS NOTES
CHIEF COMPLAINT:  Followup.    HISTORY OF PRESENT ILLNESS:  The patient is a 63-year-old gentleman who we see   today for followup of hypertension.  Since we last saw the patient, the patient   did start using his CPAP machine.  He is now resting better.  He is using it for   at least five and half hours at night.  He is feeling much better since   starting CPAP and now that he is sleeping in bed.  He notes that the swelling in   his legs is going down.    REVIEW OF SYSTEMS:  Please see the patient entered review of systems.  The   patient reports having some neck pain.  He is primarily seeing a chiropractor   for this.  He is actually getting better.  He also reports getting a lot of oil   coming out of his eyes in the morning when he first gets up.  He did see   Optometry here as well as an ophthalmologist in the community.  The patient also   reports having some plantar fasciitis.  Symptoms started after he walks on a   treadmill.  It took about three days for it to resolve.  He does have urinary   urgency, has been going on for years.  He did have a PSA done in March.    PHYSICAL EXAMINATION:  GENERAL APPEARANCE:  No acute distress.  PULMONARY:  Good inspiratory and expiratory breath sounds are heard.  Lungs are   clear to auscultation.  CARDIOVASCULAR:  S1 and S2.  EXTREMITIES:  1+ edema.  ABDOMEN:  Nontender, nondistended, without hepatosplenomegaly.    COMMENTS:  Did discuss with the patient about continued weight loss plus   exercise.  Also, discussed with him and would like to check a urine sample   today.  If the urine sample is normal, then refer him to Urology for evaluation   of his urgency.    ASSESSMENT:  1.  History of elevated blood pressure without a diagnosis of hypertension.    Blood pressure today is currently better.  2.  Urinary urgency.  3.  Elevated BMI.  4.  Sleep apnea.  5.  Lower extremity edema.    PLAN:  We will put the patient in for a UA and culture, also put him in for a    colonoscopy as well as a flu shot.  He is to follow up pending results.      JDS/HN  dd: 10/17/2018 12:02:26 (CDT)  td: 10/18/2018 07:25:04 (CDT)  Doc ID   #[id number]  Job ID #[id number]    CC:     Answers for HPI/ROS submitted by the patient on 10/15/2018   activity change: No  unexpected weight change: No  neck pain: Yes  hearing loss: No  rhinorrhea: Yes  trouble swallowing: No  eye discharge: Yes  visual disturbance: No  chest tightness: No  wheezing: No  chest pain: No  palpitations: No  blood in stool: No  constipation: No  vomiting: No  diarrhea: No  polydipsia: No  polyuria: No  difficulty urinating: No  urgency: Yes  hematuria: No  joint swelling: No  arthralgias: No  headaches: No  weakness: No  confusion: No  dysphoric mood: No

## 2018-10-18 ENCOUNTER — TELEPHONE (OUTPATIENT)
Dept: INTERNAL MEDICINE | Facility: CLINIC | Age: 63
End: 2018-10-18

## 2018-10-18 DIAGNOSIS — N39.41 URGE URINARY INCONTINENCE: Primary | ICD-10-CM

## 2018-10-18 LAB — BACTERIA UR CULT: NO GROWTH

## 2018-10-18 NOTE — TELEPHONE ENCOUNTER
----- Message from Kameron Diaz MD sent at 10/17/2018  7:16 PM CDT -----  Please contact patient. His urine sample was normal. I would like for him to see Urology. Referral is in.

## 2018-10-31 ENCOUNTER — OFFICE VISIT (OUTPATIENT)
Dept: UROLOGY | Facility: CLINIC | Age: 63
End: 2018-10-31
Payer: MEDICARE

## 2018-10-31 VITALS
DIASTOLIC BLOOD PRESSURE: 87 MMHG | HEIGHT: 71 IN | WEIGHT: 302 LBS | HEART RATE: 77 BPM | BODY MASS INDEX: 42.28 KG/M2 | SYSTOLIC BLOOD PRESSURE: 143 MMHG

## 2018-10-31 DIAGNOSIS — N13.8 BPH WITH URINARY OBSTRUCTION: Primary | ICD-10-CM

## 2018-10-31 DIAGNOSIS — N40.1 BPH WITH URINARY OBSTRUCTION: Primary | ICD-10-CM

## 2018-10-31 PROCEDURE — 99213 OFFICE O/P EST LOW 20 MIN: CPT | Mod: PBBFAC | Performed by: UROLOGY

## 2018-10-31 PROCEDURE — 1101F PT FALLS ASSESS-DOCD LE1/YR: CPT | Mod: ,,, | Performed by: UROLOGY

## 2018-10-31 PROCEDURE — 99999 PR PBB SHADOW E&M-EST. PATIENT-LVL III: CPT | Mod: PBBFAC,,, | Performed by: UROLOGY

## 2018-10-31 PROCEDURE — 99204 OFFICE O/P NEW MOD 45 MIN: CPT | Mod: S$PBB,,, | Performed by: UROLOGY

## 2018-10-31 PROCEDURE — 3008F BODY MASS INDEX DOCD: CPT | Mod: ,,, | Performed by: UROLOGY

## 2018-10-31 RX ORDER — TAMSULOSIN HYDROCHLORIDE 0.4 MG/1
0.4 CAPSULE ORAL DAILY
Qty: 30 CAPSULE | Refills: 11 | Status: SHIPPED | OUTPATIENT
Start: 2018-10-31 | End: 2018-11-27

## 2018-10-31 NOTE — PROGRESS NOTES
CHIEF COMPLAINT:    Mr. Gay is a 63 y.o. male presenting for a consultation at the request of Dr. Diaz. Patient presents with LUTS.    PRESENTING ILLNESS:    Elías Gay is a 63 y.o. male who c/o LUTS. He has + frequency, + urgency, + intermittency.  He drinks a lot of caffeine in the form of coffee and tea.    He denies ED.    No hematuria.  No dysuria.    REVIEW OF SYSTEMS:    Elías Gay denies headache, blurred vision, fever, nausea, vomiting, chills, abdominal pain, bleeding per rectum, cough, SOB, recent loss of consciousness, recent mental status changes, seizures, dizziness, or upper or lower extremity weakness.    NGUYEN  1. 2  2. 2  3. 4  4. 3  5. 2      PATIENT HISTORY:    Past Medical History:   Diagnosis Date    Foreign body in conjunctival sac     Hernia, inguinal, right 2002       Past Surgical History:   Procedure Laterality Date    HERNIA REPAIR Left     3 years of age    HERNIA REPAIR N/A     Ventral wall at 3 year of age.    KNEE SURGERY Right 1984    Arthroscopic    NASAL SEPTUM SURGERY N/A 2009       Family History   Problem Relation Age of Onset    Hypertension Mother     Stroke Mother     Aneurysm Mother     Cancer Father 72        Prostate    Cataracts Neg Hx     Glaucoma Neg Hx     Macular degeneration Neg Hx     Melanoma Neg Hx        Social History     Socioeconomic History    Marital status:      Spouse name: Not on file    Number of children: Not on file    Years of education: Not on file    Highest education level: Not on file   Social Needs    Financial resource strain: Not on file    Food insecurity - worry: Not on file    Food insecurity - inability: Not on file    Transportation needs - medical: Not on file    Transportation needs - non-medical: Not on file   Occupational History    Not on file   Tobacco Use    Smoking status: Never Smoker    Smokeless tobacco: Never Used   Substance and Sexual Activity    Alcohol use: Yes     Comment: Rare     Drug use: No    Sexual activity: Yes     Partners: Female     Birth control/protection: None   Other Topics Concern    Not on file   Social History Narrative    Not on file       Allergies:  Patient has no known allergies.    Medications:    Current Outpatient Medications:     guaiFENesin (MUCINEX) 600 mg 12 hr tablet, Take 400 mg by mouth 2 (two) times daily., Disp: , Rfl:     PHYSICAL EXAMINATION:    The patient generally appears in good health, is appropriately interactive, and is in no apparent distress.     Eyes: anicteric sclerae, moist conjunctivae; no lid-lag; PERRLA     HENT: Atraumatic; oropharynx clear with moist mucous membranes and no mucosal ulcerations;normal hard and soft palate.  No evidence of lymphadenopathy.    Neck: Trachea midline.  No thyromegaly.    Musculoskeletal: No abnormal gait.    Skin: No lesions.    Mental: Cooperative with normal affect.  Is oriented to time, place, and person.    Neuro: Grossly intact.    Chest: Normal inspiratory effort.   No accessory muscles.  No audible wheezes.  Respirations symmetric on inspiration and expiration.    Heart: Regular rhythm.      Abdomen:  Soft, non-tender. No masses or organomegaly. Bladder is not palpable. No evidence of flank discomfort. No evidence of inguinal hernia.    Genitourinary: The penis has no evidence of plaques or induration. The urethral meatus is normal. The testes, epididymides, and cord structures are normal in size and contour bilaterally. The scrotum is normal in size and contour.    Normal anal sphincter tone. No rectal mass.    The prostate is 30 g. Normal landmarks. Lateral sulci. Median furrow intact.  No nodularity or induration. Seminal vesicles are normal.    Extremities: No clubbing, cyanosis, or edema      LABS:    PVR done immediately after voiding by my nurse is 60 cc.     Lab Results   Component Value Date    PSA 1.5 03/05/2018    PSA 1.3 02/12/2016       IMPRESSION:    Encounter Diagnoses   Name Primary?     BPH with urinary obstruction Yes         PLAN:    1. Recommend he eliminate his caffeine intake to help with his LUTS.  2. Will start flomax as well. Side effects discussed.  A new Rx was given  3. RTC 3 months to re-evaluate.    Copy to:

## 2018-11-06 ENCOUNTER — OFFICE VISIT (OUTPATIENT)
Dept: DERMATOLOGY | Facility: CLINIC | Age: 63
End: 2018-11-06
Payer: MEDICARE

## 2018-11-06 DIAGNOSIS — D48.5 NEOPLASM OF UNCERTAIN BEHAVIOR OF SKIN: Primary | ICD-10-CM

## 2018-11-06 DIAGNOSIS — L73.8 SEBACEOUS GLAND HYPERPLASIA: ICD-10-CM

## 2018-11-06 DIAGNOSIS — D22.9 MULTIPLE BENIGN NEVI: ICD-10-CM

## 2018-11-06 DIAGNOSIS — Z85.820 HISTORY OF MALIGNANT MELANOMA OF SKIN: ICD-10-CM

## 2018-11-06 DIAGNOSIS — L82.1 SEBORRHEIC KERATOSIS: ICD-10-CM

## 2018-11-06 PROCEDURE — 1101F PT FALLS ASSESS-DOCD LE1/YR: CPT | Mod: S$GLB,,, | Performed by: DERMATOLOGY

## 2018-11-06 PROCEDURE — 88305 TISSUE EXAM BY PATHOLOGIST: CPT | Performed by: PATHOLOGY

## 2018-11-06 PROCEDURE — 99999 PR PBB SHADOW E&M-EST. PATIENT-LVL II: CPT | Mod: PBBFAC,,, | Performed by: DERMATOLOGY

## 2018-11-06 PROCEDURE — 99214 OFFICE O/P EST MOD 30 MIN: CPT | Mod: 25,S$GLB,, | Performed by: DERMATOLOGY

## 2018-11-06 NOTE — PATIENT INSTRUCTIONS

## 2018-11-06 NOTE — PROGRESS NOTES
Subjective:       Patient ID:  Elías Gay is a 63 y.o. male who presents for   Chief Complaint   Patient presents with    Skin Check     TBSE     History of Present Illness: The patient presents for follow up of skin check.    The patient was last seen on: 7/16/18 for bx x 2  - left ear helix c/w SK and right arm c/w MM 0.6mm - excised by Corey  This is a high risk patient here to check for the development of new lesions.    Other skin complaints: none          Review of Systems   Constitutional: Negative for fever, chills, weight loss, fatigue, night sweats and malaise.   HENT: Negative for headaches.    Respiratory: Negative for cough and shortness of breath.    Gastrointestinal: Negative for indigestion.   Skin: Negative for daily sunscreen use, activity-related sunscreen use, recent sunburn and wears hat.   Neurological: Negative for headaches.   Hematologic/Lymphatic: Negative for adenopathy. Does not bruise/bleed easily.        Objective:    Physical Exam   Constitutional: He appears well-developed and well-nourished. He is obese.  No distress.   Genitourinary:         Neurological: He is alert and oriented to person, place, and time. He is not disoriented.   Psychiatric: He has a normal mood and affect.   Skin:   Areas Examined (abnormalities noted in diagram):   Scalp / Hair Palpated and Inspected  Head / Face Inspection Performed  Neck Inspection Performed  Chest / Axilla Inspection Performed  Abdomen Inspection Performed  Genitals / Buttocks / Groin Inspection Performed  Back Inspection Performed  RUE Inspected  LUE Inspection Performed  RLE Inspected  LLE Inspection Performed  Nails and Digits Inspection Performed                   Diagram Legend     Erythematous scaling macule/papule c/w actinic keratosis       Vascular papule c/w angioma      Pigmented verrucoid papule/plaque c/w seborrheic keratosis      Yellow umbilicated papule c/w sebaceous hyperplasia      Irregularly shaped tan macule  c/w lentigo     1-2 mm smooth white papules consistent with Milia      Movable subcutaneous cyst with punctum c/w epidermal inclusion cyst      Subcutaneous movable cyst c/w pilar cyst      Firm pink to brown papule c/w dermatofibroma      Pedunculated fleshy papule(s) c/w skin tag(s)      Evenly pigmented macule c/w junctional nevus     Mildly variegated pigmented, slightly irregular-bordered macule c/w mildly atypical nevus      Flesh colored to evenly pigmented papule c/w intradermal nevus       Pink pearly papule/plaque c/w basal cell carcinoma      Erythematous hyperkeratotic cursted plaque c/w SCC      Surgical scar with no sign of skin cancer recurrence      Open and closed comedones      Inflammatory papules and pustules      Verrucoid papule consistent consistent with wart     Erythematous eczematous patches and plaques     Dystrophic onycholytic nail with subungual debris c/w onychomycosis     Umbilicated papule    Erythematous-base heme-crusted tan verrucoid plaque consistent with inflamed seborrheic keratosis     Erythematous Silvery Scaling Plaque c/w Psoriasis     See annotation          Assessment / Plan:      Pathology Orders:     Normal Orders This Visit    Tissue Specimen To Pathology, Dermatology     Questions:    Directional Terms:  Other(comment)    Clinical information:  r/o bcc (prev bx 7/2018 c/w pigmented sk) -- non healing bx site    Specific Site:  left ear helix        Neoplasm of uncertain behavior of skin  -     Tissue Specimen To Pathology, Dermatology    Shave biopsy procedure note:    Shave biopsy performed after verbal consent including risk of infection, scar, recurrence, need for additional treatment of site. Area prepped with alcohol, anesthetized with approximately 1.0cc of 1% lidocaine with epinephrine. Lesional tissue shaved with razor blade. Hemostasis achieved with application of aluminum chloride followed by hyfrecation. No complications. Dressing applied. Wound care  explained.    If biopsy positive for malignancy, refer to Dr. Dobbs for Mohs surgery consultation.      Multiple benign nevi   - stable and chronic      Seborrheic keratosis  These are benign inherited growths without a malignant potential. Reassurance given to patient. No treatment is necessary.       Sebaceous gland hyperplasia   - stable and chronic      History of malignant melanoma of skin  Area of previous melanoma examined. Site well healed with no signs of recurrence.    Total body skin examination performed today including at least 12 points as noted in physical examination. Suspicious lesions noted.               Follow-up in about 3 months (around 2/6/2019). will send to [facility] MM clinic in 6 months

## 2018-11-09 ENCOUNTER — PES CALL (OUTPATIENT)
Dept: ADMINISTRATIVE | Facility: CLINIC | Age: 63
End: 2018-11-09

## 2018-11-19 ENCOUNTER — TELEPHONE (OUTPATIENT)
Dept: DERMATOLOGY | Facility: CLINIC | Age: 63
End: 2018-11-19

## 2018-11-19 NOTE — TELEPHONE ENCOUNTER
----- Message from Avril Gamboa sent at 11/19/2018  2:59 PM CST -----  Contact: patient  Please call above patient wants sooner appointment call [phone]

## 2018-11-27 ENCOUNTER — INITIAL CONSULT (OUTPATIENT)
Dept: DERMATOLOGY | Facility: CLINIC | Age: 63
End: 2018-11-27
Payer: MEDICARE

## 2018-11-27 VITALS
HEART RATE: 82 BPM | DIASTOLIC BLOOD PRESSURE: 101 MMHG | HEIGHT: 71 IN | WEIGHT: 302 LBS | BODY MASS INDEX: 42.28 KG/M2 | SYSTOLIC BLOOD PRESSURE: 163 MMHG

## 2018-11-27 DIAGNOSIS — C44.219 BASAL CELL CARCINOMA (BCC) OF SKIN OF LEFT EAR: Primary | ICD-10-CM

## 2018-11-27 PROCEDURE — 99999 PR PBB SHADOW E&M-EST. PATIENT-LVL III: CPT | Mod: PBBFAC,,, | Performed by: DERMATOLOGY

## 2018-11-27 PROCEDURE — 3008F BODY MASS INDEX DOCD: CPT | Mod: S$GLB,,, | Performed by: DERMATOLOGY

## 2018-11-27 PROCEDURE — 1101F PT FALLS ASSESS-DOCD LE1/YR: CPT | Mod: S$GLB,,, | Performed by: DERMATOLOGY

## 2018-11-27 PROCEDURE — 99214 OFFICE O/P EST MOD 30 MIN: CPT | Mod: S$GLB,,, | Performed by: DERMATOLOGY

## 2018-11-27 NOTE — PROGRESS NOTES
REFERRING MD:  Alyson Avitia M.D.    CHIEF COMPLAINT:  New patient being consulted for Mohs' surgery evaluation.    HISTORY OF PRESENT ILLNESS:  63 y.o. male presents with a 2-4 year(s) history of growth on the L ear helix.  Due to insurance issues, he wasn't able to get it treated earlier.     Negative for scabbing.  Negative for crusting.  Negative for bleeding.  Negative for itching.    Biopsy consistent with basal cell carcinoma.     No prior treatment.    Pacemaker: No  Defibrillator: No  Artificial joints: No  Artificial heart valves: No    PAST MEDICAL HISTORY:  Past Medical History:   Diagnosis Date    Basal cell carcinoma     Foreign body in conjunctival sac     Hernia, inguinal, right 2002    Melanoma 09/14/2018    Right Arm 0.6mm       PAST SURGICAL HISTORY:  Past Surgical History:   Procedure Laterality Date    HERNIA REPAIR Left     3 years of age    HERNIA REPAIR N/A     Ventral wall at 3 year of age.    KNEE SURGERY Right 1984    Arthroscopic    NASAL SEPTUM SURGERY N/A 2009        SOCIAL HISTORY:  Dependencies:  never smoked    PERTINENT MEDICATIONS:  See medications list.    ALLERGIES:  Patient has no known allergies.    ROS:  Skin: See HPI  Constitutional: No fatigue, fever, malaise, weight loss, or night sweats.  Cardiovascular: No chest pain, palpitations, or edema.  Respiratory: No coughing, wheezing, SOB, or sputum production.    Physical Exam   HENT:   Ears:          General: Mood and affect normal. Alert and orient X3. Normal appearance.  Eyelids: no suspicious lesions  Head/Face: L superior helix with a 6 x 8 mm pink pearly papule located 3 cm laterally from the left superior ear attachment and 8 cm superiorly from the left inferior lobe attachment.   Lips/Teeth/Gums:  no suspicious lesions     IMPRESSION:  Biopsy proven superficial and nodular basal cell carcinoma, L ear helix, path# [id number].    PLAN:  The diagnosis and the pathology report were discussed in detail with the  patient. Treatment options were reviewed, including Mohs Micrographic Surgery, radiation, topical therapy, and standard excision.  After careful review of patient's history and physical exam, and after discussion of treatment options, the decision was made to perform Mohs micrographic surgery.    Scheduled patient for Mohs Micrographic Surgery. Risks, benefits, and alternatives of Mohs' surgery discussed with the patient. Discussed repair options including complex closure, skin flap, skin graft and second intention healing with the patient. Pre-operative instructions provided to the patient.     Spent 30 minutes in coordination of care and/or consultation with patient discussing diagnosis, treatment options, risks and benefits of each. All questions answered.  Pt would like to wait until after the new year when his insurance switches to [employer].

## 2018-12-10 ENCOUNTER — OFFICE VISIT (OUTPATIENT)
Dept: INTERNAL MEDICINE | Facility: CLINIC | Age: 63
End: 2018-12-10
Payer: MEDICARE

## 2018-12-10 ENCOUNTER — TELEPHONE (OUTPATIENT)
Dept: INTERNAL MEDICINE | Facility: CLINIC | Age: 63
End: 2018-12-10

## 2018-12-10 VITALS
TEMPERATURE: 98 F | BODY MASS INDEX: 43.24 KG/M2 | DIASTOLIC BLOOD PRESSURE: 80 MMHG | WEIGHT: 308.88 LBS | OXYGEN SATURATION: 97 % | HEIGHT: 71 IN | SYSTOLIC BLOOD PRESSURE: 132 MMHG | HEART RATE: 81 BPM

## 2018-12-10 DIAGNOSIS — N40.1 BPH WITH URINARY OBSTRUCTION: ICD-10-CM

## 2018-12-10 DIAGNOSIS — K40.90 HERNIA, INGUINAL, RIGHT: ICD-10-CM

## 2018-12-10 DIAGNOSIS — N13.8 BPH WITH URINARY OBSTRUCTION: ICD-10-CM

## 2018-12-10 DIAGNOSIS — G47.31 COMPLEX SLEEP APNEA SYNDROME: ICD-10-CM

## 2018-12-10 DIAGNOSIS — E66.01 MORBID OBESITY: ICD-10-CM

## 2018-12-10 DIAGNOSIS — Z85.820 PERSONAL HISTORY OF MALIGNANT MELANOMA: ICD-10-CM

## 2018-12-10 DIAGNOSIS — Z00.00 ENCOUNTER FOR PREVENTIVE HEALTH EXAMINATION: Primary | ICD-10-CM

## 2018-12-10 PROCEDURE — G0402 INITIAL PREVENTIVE EXAM: HCPCS | Mod: S$GLB,,, | Performed by: NURSE PRACTITIONER

## 2018-12-10 PROCEDURE — 99999 PR PBB SHADOW E&M-EST. PATIENT-LVL IV: CPT | Mod: PBBFAC,,, | Performed by: NURSE PRACTITIONER

## 2018-12-10 RX ORDER — CYCLOBENZAPRINE HCL 10 MG
10 TABLET ORAL 3 TIMES DAILY PRN
COMMUNITY
End: 2018-12-14 | Stop reason: SDUPTHER

## 2018-12-10 RX ORDER — METHOCARBAMOL 500 MG/1
500 TABLET, FILM COATED ORAL 4 TIMES DAILY
COMMUNITY
End: 2018-12-14 | Stop reason: ALTCHOICE

## 2018-12-10 NOTE — PROGRESS NOTES
"Elías Gay presented for a  Medicare AWV and comprehensive Health Risk Assessment today. The following components were reviewed and updated:    · Medical history  · Family History  · Social history  · Allergies and Current Medications  · Health Risk Assessment  · Health Maintenance  · Care Team     ** See Completed Assessments for Annual Wellness Visit within the encounter summary.**       The following assessments were completed:  · Living Situation  · CAGE  · Depression Screening  · Timed Get Up and Go  · Whisper Test  · Cognitive Function Screening  ·   ·   · Nutrition Screening  · ADL Screening  · PAQ Screening    Vitals:    12/10/18 1312 12/10/18 1351   BP: (!) 142/78 132/80   Pulse: 81    Temp: 98.2 °F (36.8 °C)    SpO2: 97%    Weight: (!) 140.1 kg (308 lb 13.8 oz)    Height: 5' 11" (1.803 m)      Body mass index is 43.08 kg/m².  Physical Exam   Constitutional: He is oriented to person, place, and time. He appears well-developed and well-nourished.   HENT:   Head: Normocephalic and atraumatic.   Nose: Nose normal.   Eyes: Conjunctivae and EOM are normal.   Cardiovascular: Normal rate, regular rhythm, normal heart sounds and intact distal pulses.   Pulmonary/Chest: Effort normal and breath sounds normal.   Neurological: He is alert and oriented to person, place, and time.   Skin: Skin is warm and dry.   Psychiatric: He has a normal mood and affect. His behavior is normal. Judgment and thought content normal.   Nursing note and vitals reviewed.        Diagnoses and health risks identified today and associated recommendations/orders:    1. Encounter for preventive health examination  Assessment performed. Health maintenance updated. Chart review completed.    2. BPH with urinary obstruction  Chronic. Stable. Followed by Urology.    3. Morbid obesity  Chronic. Discussed exercise. Does report some right side muscle tightness. Was seen by a chiropractor and masseuse in the past. Followed by PCP.    4. Complex " sleep apnea syndrome  Chronic. Stable with CPAP. Followed by Sleep Medicine.    5. Hernia, inguinal, right  Stable.    6. Personal history of malignant melanoma  Stable.  S/P excision.  Followed regularly by Dermatology.      Provided Elías with a 5-10 year written screening schedule and personal prevention plan. Recommendations were developed using the USPSTF age appropriate recommendations. Education, counseling, and referrals were provided as needed. After Visit Summary printed and given to patient which includes a list of additional screenings\tests needed.    Follow-up for Annual Wellness Visit in 1 year, follow up with Primary Care Provider as instructed, ;sooner if prob.    GEOVANNA Gonsales

## 2018-12-10 NOTE — PATIENT INSTRUCTIONS
Counseling and Referral of Other Preventative  (Italic type indicates deductible and co-insurance are waived)    Patient Name: Elías Gay  Today's Date: 12/10/2018    Health Maintenance       Date Due Completion Date    Colonoscopy 03/11/2003 ---    Pneumococcal Vaccine (65+ High/Highest Risk) (2 of 2 - PPSV23) 11/03/2019 1/12/2017    Lipid Panel 03/05/2023 3/5/2018    TETANUS VACCINE 01/11/2027 1/11/2017        No orders of the defined types were placed in this encounter.    The following information is provided to all patients.  This information is to help you find resources for any of the problems found today that may be affecting your health:                Living healthy guide: www.[facility].[place].gov      Understanding Diabetes: www.diabetes.org      Eating healthy: www.cdc.gov/healthyweight      [facility] home safety checklist: www.cdc.gov/steadi/patient.html      Agency on Aging: www.goea.[place].[facility]      Alcoholics anonymous (AA): www.aa.org      Physical Activity: www.[place].nih.gov/wm3ploo      Tobacco use: www.quitwithusla.org

## 2018-12-11 NOTE — TELEPHONE ENCOUNTER
----- Message from Amber Chappell sent at 12/11/2018  8:37 AM CST -----  Contact: [phone]   Patient is returning a phone call.  Who left a message for the patient: Sanna  Does patient know what this is regarding:  no  Comments: please advise, thanks

## 2018-12-11 NOTE — TELEPHONE ENCOUNTER
Patient has not been seen by myself since 2016. He will need an appointment with PCP for this.     Thanks!  ERNA Iraheta

## 2018-12-14 ENCOUNTER — OFFICE VISIT (OUTPATIENT)
Dept: INTERNAL MEDICINE | Facility: CLINIC | Age: 63
End: 2018-12-14
Payer: MEDICARE

## 2018-12-14 VITALS
BODY MASS INDEX: 43.71 KG/M2 | TEMPERATURE: 98 F | OXYGEN SATURATION: 96 % | HEIGHT: 70 IN | SYSTOLIC BLOOD PRESSURE: 134 MMHG | DIASTOLIC BLOOD PRESSURE: 84 MMHG | WEIGHT: 305.31 LBS | HEART RATE: 81 BPM

## 2018-12-14 DIAGNOSIS — M54.50 CHRONIC RIGHT-SIDED LOW BACK PAIN WITHOUT SCIATICA: ICD-10-CM

## 2018-12-14 DIAGNOSIS — G89.29 CHRONIC RIGHT-SIDED LOW BACK PAIN WITHOUT SCIATICA: ICD-10-CM

## 2018-12-14 DIAGNOSIS — R60.0 LOWER EXTREMITY EDEMA: ICD-10-CM

## 2018-12-14 DIAGNOSIS — G47.33 OSA (OBSTRUCTIVE SLEEP APNEA): Primary | ICD-10-CM

## 2018-12-14 PROCEDURE — 3008F BODY MASS INDEX DOCD: CPT | Mod: S$GLB,,, | Performed by: INTERNAL MEDICINE

## 2018-12-14 PROCEDURE — 99213 OFFICE O/P EST LOW 20 MIN: CPT | Mod: S$GLB,,, | Performed by: INTERNAL MEDICINE

## 2018-12-14 PROCEDURE — 99999 PR PBB SHADOW E&M-EST. PATIENT-LVL IV: CPT | Mod: PBBFAC,,, | Performed by: INTERNAL MEDICINE

## 2018-12-14 PROCEDURE — 1101F PT FALLS ASSESS-DOCD LE1/YR: CPT | Mod: S$GLB,,, | Performed by: INTERNAL MEDICINE

## 2018-12-14 RX ORDER — CYCLOBENZAPRINE HCL 10 MG
10 TABLET ORAL 2 TIMES DAILY PRN
Qty: 60 TABLET | Refills: 1 | Status: SHIPPED | OUTPATIENT
Start: 2018-12-14 | End: 2019-04-02
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
## 2019-01-02 ENCOUNTER — INITIAL CONSULT (OUTPATIENT)
Dept: OTOLARYNGOLOGY | Facility: CLINIC | Age: 64
End: 2019-01-02
Payer: MEDICARE

## 2019-01-02 ENCOUNTER — ANESTHESIA EVENT (OUTPATIENT)
Dept: SURGERY | Facility: HOSPITAL | Age: 64
End: 2019-01-02
Payer: MEDICARE

## 2019-01-02 ENCOUNTER — PROCEDURE VISIT (OUTPATIENT)
Dept: DERMATOLOGY | Facility: CLINIC | Age: 64
End: 2019-01-02
Payer: MEDICARE

## 2019-01-02 VITALS
BODY MASS INDEX: 44.1 KG/M2 | TEMPERATURE: 98 F | WEIGHT: 307.31 LBS | SYSTOLIC BLOOD PRESSURE: 131 MMHG | DIASTOLIC BLOOD PRESSURE: 61 MMHG | HEART RATE: 90 BPM

## 2019-01-02 VITALS
WEIGHT: 305 LBS | HEIGHT: 70 IN | DIASTOLIC BLOOD PRESSURE: 86 MMHG | HEART RATE: 91 BPM | SYSTOLIC BLOOD PRESSURE: 141 MMHG | BODY MASS INDEX: 43.67 KG/M2

## 2019-01-02 DIAGNOSIS — H61.112 MOHS DEFECT OF AURICLE OF LEFT EAR: Primary | ICD-10-CM

## 2019-01-02 DIAGNOSIS — C44.219 BASAL CELL CARCINOMA (BCC) OF SKIN OF LEFT EAR: Primary | ICD-10-CM

## 2019-01-02 PROCEDURE — 99999 PR PBB SHADOW E&M-EST. PATIENT-LVL III: CPT | Mod: PBBFAC,,, | Performed by: OTOLARYNGOLOGY

## 2019-01-02 PROCEDURE — 99499 NO LOS: ICD-10-PCS | Mod: S$GLB,,, | Performed by: DERMATOLOGY

## 2019-01-02 PROCEDURE — 17312: ICD-10-PCS | Mod: S$GLB,,, | Performed by: DERMATOLOGY

## 2019-01-02 PROCEDURE — 3008F BODY MASS INDEX DOCD: CPT | Mod: CPTII,S$GLB,, | Performed by: OTOLARYNGOLOGY

## 2019-01-02 PROCEDURE — 3008F PR BODY MASS INDEX (BMI) DOCUMENTED: ICD-10-PCS | Mod: CPTII,S$GLB,, | Performed by: OTOLARYNGOLOGY

## 2019-01-02 PROCEDURE — 99499 UNLISTED E&M SERVICE: CPT | Mod: S$GLB,,, | Performed by: DERMATOLOGY

## 2019-01-02 PROCEDURE — 99204 PR OFFICE/OUTPT VISIT, NEW, LEVL IV, 45-59 MIN: ICD-10-PCS | Mod: S$GLB,,, | Performed by: OTOLARYNGOLOGY

## 2019-01-02 PROCEDURE — 17311 MOHS 1 STAGE H/N/HF/G: CPT | Mod: S$GLB,,, | Performed by: DERMATOLOGY

## 2019-01-02 PROCEDURE — 17311: ICD-10-PCS | Mod: S$GLB,,, | Performed by: DERMATOLOGY

## 2019-01-02 PROCEDURE — 1101F PR PT FALLS ASSESS DOC 0-1 FALLS W/OUT INJ PAST YR: ICD-10-PCS | Mod: CPTII,S$GLB,, | Performed by: OTOLARYNGOLOGY

## 2019-01-02 PROCEDURE — 99999 PR PBB SHADOW E&M-EST. PATIENT-LVL III: ICD-10-PCS | Mod: PBBFAC,,, | Performed by: OTOLARYNGOLOGY

## 2019-01-02 PROCEDURE — 17312 MOHS ADDL STAGE: CPT | Mod: S$GLB,,, | Performed by: DERMATOLOGY

## 2019-01-02 PROCEDURE — 1101F PT FALLS ASSESS-DOCD LE1/YR: CPT | Mod: CPTII,S$GLB,, | Performed by: OTOLARYNGOLOGY

## 2019-01-02 PROCEDURE — 99204 OFFICE O/P NEW MOD 45 MIN: CPT | Mod: S$GLB,,, | Performed by: OTOLARYNGOLOGY

## 2019-01-02 NOTE — PROGRESS NOTES
Head and Neck Surgery Consult    Seen in consultation from Dr. Dobbs    HPI: Elías Gay is a 63 y.o. male presenting with BCC of L helix, S/P Mohs resection today by Dr. Dobbs. He presents here to discuss reconstructive options. He has no prior skin cancers, though does have severe TJ. He has had multiple sinus surgeries in the past and was noted to have extremely labile BP in his last one. He is better in a reclining/beachchair position and has trouble lying flat.    Past Medical History:   Diagnosis Date    Basal cell carcinoma     Foreign body in conjunctival sac     Hernia, inguinal, right 2002    Melanoma 09/14/2018    Right Arm 0.6mm       Past Surgical History:   Procedure Laterality Date    HERNIA REPAIR Left     3 years of age    HERNIA REPAIR N/A     Ventral wall at 3 year of age.    KNEE SURGERY Right 1984    Arthroscopic    NASAL SEPTUM SURGERY N/A 2009         Current Outpatient Medications:     cyclobenzaprine (FLEXERIL) 10 MG tablet, Take 1 tablet (10 mg total) by mouth 2 (two) times daily as needed for Muscle spasms., Disp: 60 tablet, Rfl: 1    Review of patient's allergies indicates:  No Known Allergies    Family History   Problem Relation Age of Onset    Hypertension Mother     Stroke Mother     Aneurysm Mother     Cancer Father 72        Prostate and liver    No Known Problems Daughter     No Known Problems Son     Gout Brother     No Known Problems Daughter     No Known Problems Son     Cataracts Neg Hx     Glaucoma Neg Hx     Macular degeneration Neg Hx     Melanoma Neg Hx        Social History     Socioeconomic History    Marital status:      Spouse name: Not on file    Number of children: Not on file    Years of education: Not on file    Highest education level: Not on file   Social Needs    Financial resource strain: Not on file    Food insecurity - worry: Not on file    Food insecurity - inability: Not on file    Transportation needs - medical: Not  on file    Transportation needs - non-medical: Not on file   Occupational History    Not on file   Tobacco Use    Smoking status: Never Smoker    Smokeless tobacco: Never Used   Substance and Sexual Activity    Alcohol use: Yes     Comment: Rare    Drug use: No    Sexual activity: Yes     Partners: Female     Birth control/protection: None   Other Topics Concern    Not on file   Social History Narrative    Not on file       Review of Systems -  Constitutional: Denies having night sweats, constant fatigue, loss of appetite or recent substantial weight loss.  Eyes: Denies blurred vision or double vision.  Respiratory: Denies symptoms of shortness of breath, noisy breathing, hoarseness or chronic cough.  GI: Denies symptoms of heartburn, acid regurgitation, or the known presence of a hiatal hernia.  The remainder of a 10-point review of systems is negative    REVIEW OF RADIOLOGICAL FILMS AND RECORDS (PERSONALLY REVIEWED):  Photographs reviewed    REVIEW OF LABS (PERSONALLY REVIEWED)  Noncontributory    PHYSICAL EXAM:  Vitals - /61   Pulse 90   Temp 98.2 °F (36.8 °C)   Wt (!) 139.4 kg (307 lb 5.1 oz)   BMI 44.10 kg/m²   Constitutional -      General Appearance: well developed, well nourished, without obvious deformities     Communication: speaks with a normal voice without hoarseness  Head & Face -     Overall: no obvious scars, lesions or masses     Parotid and submandibular glands: no masses or tenderness     Facial strength: normal and equal bilaterally  Eyes -      EOM intact  Ear, Nose, Mouth & Throat -     Ears: both left and right external auditory canals and TM's are normal, no external deformities except the 2x0.5cm helical rim defect     Nasal exam: mucosa is pink, septum is midline, visible turbinates are normal on anterior rhinoscopy     Mastication: teeth appear present     Oral Cavity and oropharynx: mucosa, hard and soft palates, tongue, posterior pharyngeal wall, lips and gums are  without lesions. Tonsils appear minimal  Respiratory:     Breathing unlabored, no stridor  Cardiovascular:     No JVD, capillary refill normal  Larynx: using the mirror for indirect laryngoscopy, the epiglottic, false cords, true cords, and pyriform sinuses are without lesions and the true vocal cords move normally  Neck: appears symmetric, and on palpation is without masses   Endocrine:     Thyroid: no asymmetry, thyromegaly, or thyroid nodules on palpation  Lymphatic:     No cervical lymphadenopathy  Cranial Nerves:      II: Pupillary reflexes normal     III, IV, VI: EOM normal     V: 1,2,3: normal sensation     VII: Normal strength in all divisions     IX, X: Normal voice, palatal elevation and sensation     XI: Shoulder strength normal       XII: Tongue mobility normal  Psychiatric:     Appropriate affect    ASSESSMENT: BCC S/P Mohs excision of L helix    PLAN: Discussed reconstructive options. I will plan on Local/MAC anesthesia given his severe TJ and BP issues for his last general anesthesia. Will plan on repair via helical advancement flaps. This will restore the curve of the auricle but have the net effect of shrinking the overall size of his ear. He agrees and wishes to proceed.       Jeramy Meek

## 2019-01-02 NOTE — ANESTHESIA PREPROCEDURE EVALUATION
01/02/2019  Elías Gay is a 63 y.o., male.    Pre-operative evaluation for Procedure(s) (LRB):  CLOSURE, MOHS PROCEDURE DEFECT LEFT EAR (Left)    Elías Gay is a 63 y.o. male w/ h/o malignant melanoma going for the above procedure.     Prev airway: none in system    Patient Active Problem List   Diagnosis    Hernia, inguinal, right    Complex sleep apnea syndrome    Personal history of malignant melanoma    BPH with urinary obstruction    Morbid obesity       Review of patient's allergies indicates:  No Known Allergies     No current facility-administered medications on file prior to encounter.      Current Outpatient Medications on File Prior to Encounter   Medication Sig Dispense Refill    cyclobenzaprine (FLEXERIL) 10 MG tablet Take 1 tablet (10 mg total) by mouth 2 (two) times daily as needed for Muscle spasms. 60 tablet 1       Past Surgical History:   Procedure Laterality Date    HERNIA REPAIR Left     3 years of age    HERNIA REPAIR N/A     Ventral wall at 3 year of age.    KNEE SURGERY Right 1984    Arthroscopic    NASAL SEPTUM SURGERY N/A 2009       Social History     Socioeconomic History    Marital status:      Spouse name: Not on file    Number of children: Not on file    Years of education: Not on file    Highest education level: Not on file   Social Needs    Financial resource strain: Not on file    Food insecurity - worry: Not on file    Food insecurity - inability: Not on file    Transportation needs - medical: Not on file    Transportation needs - non-medical: Not on file   Occupational History    Not on file   Tobacco Use    Smoking status: Never Smoker    Smokeless tobacco: Never Used   Substance and Sexual Activity    Alcohol use: Yes     Comment: Rare    Drug use: No    Sexual activity: Yes     Partners: Female     Birth control/protection: None    Other Topics Concern    Not on file   Social History Narrative    Not on file         Vital Signs Range (Last 24H):  Pulse:  [91]   BP: (137-141)/(84-86)         Diagnostic Studies:        2D Echo:  CONCLUSIONS     1 - No wall motion abnormalities.     2 - Normal left ventricular systolic function (EF 60-65%).     3 - Concentric remodeling.     4 - Normal left ventricular diastolic function.     5 - Right atrial enlargement.     6 - Normal right ventricular systolic function .             This document has been electronically    SIGNED BY: Sanna Wilkins MD On: 04/24/2018 13:22        Anesthesia Evaluation    I have reviewed the Patient Summary Reports.    I have reviewed the Nursing Notes.   I have reviewed the Medications.     Review of Systems  Anesthesia Hx:  No problems with previous Anesthesia  History of prior surgery of interest to airway management or planning: Previous anesthesia: General Denies Family Hx of Anesthesia complications.   Denies Personal Hx of Anesthesia complications.   Social:  Non-Smoker    Hematology/Oncology:  Hematology Normal   Oncology Normal     EENT/Dental:EENT/Dental Normal   Cardiovascular:  Cardiovascular Normal Exercise tolerance: good     Pulmonary:   Sleep Apnea, CPAP Pt has severe TJ. Cannot sleep flat on back.   Renal/:  Renal/ Normal     Hepatic/GI:  Hepatic/GI Normal    Musculoskeletal:  Musculoskeletal Normal    Neurological:  Neurology Normal    Endocrine:  Endocrine Normal    Dermatological:   Basal cell carcinoma/melanoma   Psych:  Psychiatric Normal           Physical Exam  General:  Well nourished, Morbid Obesity    Airway/Jaw/Neck:  Airway Findings: Mouth Opening: Small, but > 3cm Tongue: Large  General Airway Assessment: Adult, Average  Mallampati: III  Improves to III with phonation.  TM Distance: Normal, at least 6 cm        Eyes/Ears/Nose:  EYES/EARS/NOSE FINDINGS: Normal   Dental:  Dental Findings: In tact, molar caps   Chest/Lungs:  Chest/Lungs  Findings: Clear to auscultation, Normal Respiratory Rate     Heart/Vascular:  Heart Findings: Rate: Normal  Rhythm: Regular Rhythm  Sounds: Normal  Heart murmur: negative Vascular Findings: Normal    Abdomen:  Abdomen Findings: Normal    Musculoskeletal:  Musculoskeletal Findings: Normal   Skin:  Skin Findings: Normal    Mental Status:  Mental Status Findings:  Cooperative, Alert and Oriented         Anesthesia Plan  Type of Anesthesia, risks & benefits discussed:  Anesthesia Type:  MAC  Patient's Preference:   Intra-op Monitoring Plan: standard ASA monitors  Intra-op Monitoring Plan Comments:   Post Op Pain Control Plan:   Post Op Pain Control Plan Comments:   Induction:   IV  Beta Blocker:  Patient is not currently on a Beta-Blocker (No further documentation required).       Informed Consent: Patient understands risks and agrees with Anesthesia plan.  Questions answered. Anesthesia consent signed with patient.  ASA Score: 3     Day of Surgery Review of History & Physical:        Anesthesia Plan Notes: Due to significant TJ issues, will minimize sedatives and will keep pt in beach chair position.        Ready For Surgery From Anesthesia Perspective.

## 2019-01-02 NOTE — LETTER
January 2, 2019      Juana Dobbs MD  [address]  [facility] [address]           [facility]  [address]  [facility] [address]  Phone: [phone]  Fax: [phone]          Patient: Elías Gay   MR Number: [id number]   YOB: 1955   Date of Visit: 1/2/2019       Dear Dr. Juana Dobbs:    Thank you for referring Elías Gay to me for evaluation. Attached you will find relevant portions of my assessment and plan of care.    If you have questions, please do not hesitate to call me. I look forward to following Elías Gay along with you.    Sincerely,    Jeramy Meek MD    Enclosure  CC:  No Recipients    If you would like to receive this communication electronically, please contact [email or web address] or [phone] to request more information on [employer] access.    For providers and/or their staff who would like to refer a patient to [facility], please contact us through our one-stop-shop provider referral line, [facility], at [phone].    If you feel you have received this communication in error or would no longer like to receive these types of communications, please e-mail [email or web address]

## 2019-01-02 NOTE — PROGRESS NOTES
PROCEDURE: Mohs' Micrographic Surgery    INDICATION: Location in mask areas of face including central face, nose, eyelids, eyebrows, lips, chin, preauricular, temple, and ear. Biopsy-proven skin cancer of cosmetically and functionally important areas, including head, neck, genital, hand, foot, or areas known for having difficulty in healing, such as the lower anterior legs. Tumor with ill-defined borders. In patient with proven history of difficult or aggressive skin cancer.    REFERRING MD: Alyson Avitia M.D.    CASE NUMBER:     ANESTHETIC: 6 cc 0.5% Lidocaine with Epi 1:200,000 mixed 1:1 with 0.5% Bupivacaine    SURGICAL PREP: Betadine    SURGEON: Juana Dobbs MD    ASSISTANTS: Jillian Holloway PA-C and Fritz Bell, Surg Tech    PREOPERATIVE DIAGNOSIS: basal cell carcinoma    POSTOPERATIVE DIAGNOSIS: basal cell carcinoma    PATHOLOGIC DIAGNOSIS: basal cell carcinoma- superficial, nodular, infiltrating    HISTOLOGY OF SPECIMENS IN FIRST STAGE:   Tumor Type: Tumor seen. Nodular basal cell carcinoma: Nodular tumor in dermis composed of basaloid cells exhibiting peripheral palisading and retraction artifact.  Infiltrative basal cell carcinoma: Basaloid tumor in dermis characterized by thin elongated strands of basaloid cells infiltrating between dermal collagen bundles.   Depth of Invasion: epidermis, dermis and subcutaneous tissue  Perineural Invasion: No    HISTOLOGY OF SPECIMENS IN SUBSEQUENT STAGES:  Tumor Type: Tumor seen. Same as in first stage.  Superficial basal cell carcinoma: Foci of basaloid cells with peripheral palisading and focal retraction artifact arising along the dermoepidermal junction and extending into the papillary dermis.   Depth of Invasion: epidermis, dermis, subcutaneous tissue and cartilage  Perineural Invasion: No    STAGES OF MOHS' SURGERY PERFORMED: 5    TUMOR-FREE PLANE ACHIEVED: Yes- with cartilage removal.    HEMOSTASIS: electrocoagulation     SPECIMENS: 13 (2 in stage A, 3  "in stage B, 4 in stage C, 2 in stage D and 2 in stage E)    LOCATION: left (superior) ear helix. Patient verified location with hand held mirror.    INITIAL LESION SIZE: 0.4 x 0.5 cm    FINAL DEFECT SIZE: 1.5 x 2.1 cm    WOUND REPAIR/DISPOSITION: The patient tolerated Mohs' Micrographic Surgery for a basal cell carcinoma very well. When the tumor was completely removed, the patient was referred to Dr. Meek in [facility] for repair of the surgical defect. Patient already has pain medication at home.    Vitals:    01/02/19 0733 01/02/19 1322   BP: 137/84 (!) 141/86   BP Location: Right arm Right arm   Patient Position: Sitting Sitting   Pulse: 91    Weight: (!) 138.3 kg (305 lb) (!) 138.3 kg (305 lb)   Height: 5' 10" (1.778 m) 5' 10" (1.778 m)                 "

## 2019-01-03 ENCOUNTER — ANESTHESIA (OUTPATIENT)
Dept: SURGERY | Facility: HOSPITAL | Age: 64
End: 2019-01-03
Payer: MEDICARE

## 2019-01-03 ENCOUNTER — HOSPITAL ENCOUNTER (OUTPATIENT)
Facility: HOSPITAL | Age: 64
Discharge: HOME | End: 2019-01-03
Attending: OTOLARYNGOLOGY | Admitting: OTOLARYNGOLOGY
Payer: MEDICARE

## 2019-01-03 VITALS
BODY MASS INDEX: 42.98 KG/M2 | TEMPERATURE: 98 F | SYSTOLIC BLOOD PRESSURE: 121 MMHG | HEART RATE: 79 BPM | WEIGHT: 307 LBS | HEIGHT: 71 IN | OXYGEN SATURATION: 96 % | RESPIRATION RATE: 16 BRPM | DIASTOLIC BLOOD PRESSURE: 61 MMHG

## 2019-01-03 DIAGNOSIS — H61.112 MOHS DEFECT OF AURICLE OF LEFT EAR: Primary | ICD-10-CM

## 2019-01-03 PROCEDURE — 36000707: Performed by: OTOLARYNGOLOGY

## 2019-01-03 PROCEDURE — 37000009 HC ANESTHESIA EA ADD 15 MINS: Performed by: OTOLARYNGOLOGY

## 2019-01-03 PROCEDURE — 63600175 PHARM REV CODE 636 W HCPCS: Performed by: STUDENT IN AN ORGANIZED HEALTH CARE EDUCATION/TRAINING PROGRAM

## 2019-01-03 PROCEDURE — 15004 PR WND PREP PED, FACE/NCK/HND/FT/GEN 1ST 100 CM: ICD-10-PCS | Mod: ,,, | Performed by: OTOLARYNGOLOGY

## 2019-01-03 PROCEDURE — 15004 WOUND PREP F/N/HF/G: CPT | Mod: ,,, | Performed by: OTOLARYNGOLOGY

## 2019-01-03 PROCEDURE — 71000015 HC POSTOP RECOV 1ST HR: Performed by: OTOLARYNGOLOGY

## 2019-01-03 PROCEDURE — 14061 PR ADJ TISS XFER LID,NOS,EAR 10.1-30 SQCM: ICD-10-PCS | Mod: ,,, | Performed by: OTOLARYNGOLOGY

## 2019-01-03 PROCEDURE — D9220A PRA ANESTHESIA: ICD-10-PCS | Mod: ,,, | Performed by: ANESTHESIOLOGY

## 2019-01-03 PROCEDURE — D9220A PRA ANESTHESIA: Mod: ,,, | Performed by: ANESTHESIOLOGY

## 2019-01-03 PROCEDURE — 37000008 HC ANESTHESIA 1ST 15 MINUTES: Performed by: OTOLARYNGOLOGY

## 2019-01-03 PROCEDURE — 25000003 PHARM REV CODE 250: Performed by: OTOLARYNGOLOGY

## 2019-01-03 PROCEDURE — 25000003 PHARM REV CODE 250: Performed by: STUDENT IN AN ORGANIZED HEALTH CARE EDUCATION/TRAINING PROGRAM

## 2019-01-03 PROCEDURE — 36000706: Performed by: OTOLARYNGOLOGY

## 2019-01-03 PROCEDURE — 14061 TIS TRNFR E/N/E/L10.1-30SQCM: CPT | Mod: ,,, | Performed by: OTOLARYNGOLOGY

## 2019-01-03 RX ORDER — AMOXICILLIN AND CLAVULANATE POTASSIUM 875; 125 MG/1; MG/1
1 TABLET, FILM COATED ORAL 2 TIMES DAILY
COMMUNITY
End: 2019-02-06

## 2019-01-03 RX ORDER — TRAMADOL HYDROCHLORIDE 50 MG/1
50 TABLET ORAL EVERY 8 HOURS PRN
Qty: 20 TABLET | Refills: 0 | Status: SHIPPED | OUTPATIENT
Start: 2019-01-03 | End: 2019-04-02

## 2019-01-03 RX ORDER — ONDANSETRON HYDROCHLORIDE 8 MG/1
8 TABLET, FILM COATED ORAL EVERY 8 HOURS PRN
Qty: 10 TABLET | Refills: 0 | Status: SHIPPED | OUTPATIENT
Start: 2019-01-03 | End: 2019-02-06

## 2019-01-03 RX ORDER — SODIUM CHLORIDE 0.9 % (FLUSH) 0.9 %
3 SYRINGE (ML) INJECTION
Status: DISCONTINUED | OUTPATIENT
Start: 2019-01-03 | End: 2019-01-03 | Stop reason: HOSPADM

## 2019-01-03 RX ORDER — SODIUM CHLORIDE 9 MG/ML
INJECTION, SOLUTION INTRAVENOUS CONTINUOUS PRN
Status: DISCONTINUED | OUTPATIENT
Start: 2019-01-03 | End: 2019-01-04

## 2019-01-03 RX ORDER — LIDOCAINE HCL/PF 100 MG/5ML
SYRINGE (ML) INTRAVENOUS
Status: DISCONTINUED | OUTPATIENT
Start: 2019-01-03 | End: 2019-01-04

## 2019-01-03 RX ORDER — PROPOFOL 10 MG/ML
VIAL (ML) INTRAVENOUS
Status: DISCONTINUED | OUTPATIENT
Start: 2019-01-03 | End: 2019-01-04

## 2019-01-03 RX ORDER — FENTANYL CITRATE 50 UG/ML
INJECTION, SOLUTION INTRAMUSCULAR; INTRAVENOUS
Status: DISCONTINUED | OUTPATIENT
Start: 2019-01-03 | End: 2019-01-04

## 2019-01-03 RX ORDER — ONDANSETRON 2 MG/ML
INJECTION INTRAMUSCULAR; INTRAVENOUS
Status: DISCONTINUED | OUTPATIENT
Start: 2019-01-03 | End: 2019-01-04

## 2019-01-03 RX ORDER — HYDROCODONE BITARTRATE AND ACETAMINOPHEN 7.5; 325 MG/1; MG/1
1 TABLET ORAL EVERY 6 HOURS PRN
Qty: 10 TABLET | Refills: 0 | Status: SHIPPED | OUTPATIENT
Start: 2019-01-03 | End: 2019-01-03 | Stop reason: HOSPADM

## 2019-01-03 RX ORDER — MIDAZOLAM HYDROCHLORIDE 1 MG/ML
INJECTION, SOLUTION INTRAMUSCULAR; INTRAVENOUS
Status: DISCONTINUED | OUTPATIENT
Start: 2019-01-03 | End: 2019-01-04

## 2019-01-03 RX ORDER — LIDOCAINE HYDROCHLORIDE AND EPINEPHRINE 10; 10 MG/ML; UG/ML
INJECTION, SOLUTION INFILTRATION; PERINEURAL
Status: DISCONTINUED | OUTPATIENT
Start: 2019-01-03 | End: 2019-01-03 | Stop reason: HOSPADM

## 2019-01-03 RX ORDER — CEFAZOLIN SODIUM 1 G/3ML
2 INJECTION, POWDER, FOR SOLUTION INTRAMUSCULAR; INTRAVENOUS ONCE
Status: COMPLETED | OUTPATIENT
Start: 2019-01-03 | End: 2019-01-03

## 2019-01-03 RX ORDER — FENTANYL CITRATE 50 UG/ML
25 INJECTION, SOLUTION INTRAMUSCULAR; INTRAVENOUS EVERY 5 MIN PRN
Status: DISCONTINUED | OUTPATIENT
Start: 2019-01-03 | End: 2019-01-03 | Stop reason: HOSPADM

## 2019-01-03 RX ORDER — CIPROFLOXACIN 750 MG/1
750 TABLET, FILM COATED ORAL 2 TIMES DAILY
Qty: 14 TABLET | Refills: 0 | Status: SHIPPED | OUTPATIENT
Start: 2019-01-03 | End: 2019-01-10

## 2019-01-03 RX ORDER — ACETAMINOPHEN 10 MG/ML
1000 INJECTION, SOLUTION INTRAVENOUS ONCE
Status: DISCONTINUED | OUTPATIENT
Start: 2019-01-03 | End: 2019-01-03 | Stop reason: HOSPADM

## 2019-01-03 RX ADMIN — PROPOFOL 20 MG: 10 INJECTION, EMULSION INTRAVENOUS at 12:01

## 2019-01-03 RX ADMIN — ONDANSETRON 4 MG: 2 INJECTION INTRAMUSCULAR; INTRAVENOUS at 01:01

## 2019-01-03 RX ADMIN — LIDOCAINE HYDROCHLORIDE 80 MG: 20 INJECTION, SOLUTION INTRAVENOUS at 12:01

## 2019-01-03 RX ADMIN — FENTANYL CITRATE 25 MCG: 50 INJECTION, SOLUTION INTRAMUSCULAR; INTRAVENOUS at 12:01

## 2019-01-03 RX ADMIN — PROPOFOL 20 MG: 10 INJECTION, EMULSION INTRAVENOUS at 01:01

## 2019-01-03 RX ADMIN — SODIUM CHLORIDE: 0.9 INJECTION, SOLUTION INTRAVENOUS at 12:01

## 2019-01-03 RX ADMIN — CEFAZOLIN 3 G: 330 INJECTION, POWDER, FOR SOLUTION INTRAMUSCULAR; INTRAVENOUS at 12:01

## 2019-01-03 RX ADMIN — MIDAZOLAM HYDROCHLORIDE 2 MG: 1 INJECTION, SOLUTION INTRAMUSCULAR; INTRAVENOUS at 12:01

## 2019-01-03 NOTE — DISCHARGE INSTRUCTIONS
You may shower. Avoid getting the surgical site wet. Keep the surgical area clean and dry. You may place bacitracin (over the counter antibacterial ointment) on the surgical incisions for 5 days. Take pain and nausea medicine as needed. Follow up in clinic in 1 week for suture removal.

## 2019-01-03 NOTE — PLAN OF CARE
Awake and alert. VSS. Denies  nausea. Tolerating liquids well.Pain is tolerable.  DC instructions given to patient and family and they verbalize understanding.

## 2019-01-03 NOTE — BRIEF OP NOTE
[facility]  Brief Operative Note     SUMMARY     Surgery Date: 1/3/2019     Surgeon(s) and Role:     * Jeramy Meek MD - Primary     * Padmini Fermin MD - Resident - Assisting    Pre-op Diagnosis:  Mohs defect of auricle of left ear [H61.112]    Post-op Diagnosis:  Post-Op Diagnosis Codes:     * Mohs defect of auricle of left ear [H61.112]    Procedure(s) (LRB):  CLOSURE, MOHS PROCEDURE DEFECT LEFT EAR (Left)    Anesthesia: General    Description of the findings of the procedure: see op note, advancement flap of helix of left auricle including site prep    Estimated Blood Loss: 3 cc         Specimens:   Specimen (12h ago, onward)    None          Discharge Note    SUMMARY     Admit Date: 1/3/2019    Discharge Date and Time:  01/03/2019 1:32 PM    Hospital Course (synopsis of major diagnoses, care, treatment, and services provided during the course of the hospital stay): 63 y.o. male admitted for above procedures, tolerated well. Patient was transferred to PACU for recovery. After an appropriate period under direct observation, patient will be discharged home with pain medication, zofran, and antibiotics.       Final Diagnosis: Post-Op Diagnosis Codes:     * Mohs defect of auricle of left ear [H61.112]    Disposition: Home or Self Care    Follow Up/Patient Instructions:     Medications:  Reconciled Home Medications:      Medication List      START taking these medications    ciprofloxacin HCl 750 MG tablet  Commonly known as:  CIPRO  Take 1 tablet (750 mg total) by mouth 2 (two) times daily. for 7 days     HYDROcodone-acetaminophen 7.5-325 mg per tablet  Commonly known as:  NORCO  Take 1 tablet by mouth every 6 (six) hours as needed for Pain.     ondansetron 8 MG tablet  Commonly known as:  ZOFRAN  Take 1 tablet (8 mg total) by mouth every 8 (eight) hours as needed for Nausea.        CONTINUE taking these medications    cyclobenzaprine 10 MG tablet  Commonly known as:  FLEXERIL  Take 1 tablet  (10 mg total) by mouth 2 (two) times daily as needed for Muscle spasms.        ASK your doctor about these medications    amoxicillin-clavulanate 875-125mg 875-125 mg per tablet  Commonly known as:  AUGMENTIN  Take 1 tablet by mouth 2 (two) times daily.          Discharge Procedure Orders   Diet Adult Regular     Notify your health care provider if you experience any of the following:  severe uncontrolled pain     Notify your health care provider if you experience any of the following:  redness, tenderness, or signs of infection (pain, swelling, redness, odor or green/yellow discharge around incision site)     Activity as tolerated   Scheduling Instructions: Light activity. No stooping, straining, or lifting > 10 lb for 5 days. No strenuous exercise, but walking is encouraged.     Follow-up Information     Jeramy Meek MD. Schedule an appointment as soon as possible for a visit in 1 week.    Specialty:  Otolaryngology  Why:  Post-op visit, For suture removal  Contact information:  [address]  [facility] [id number] [phone]

## 2019-01-03 NOTE — OP NOTE
DATE OF PROCEDURE:  01/03/2019    SURGEON:  Jeramy Meek M.D.    ASSISTANT SURGEON:  Padmini Fermin.    ANESTHESIA:  Local MAC anesthesia.    PROCEDURES PERFORMED:  1.  Helical advancement flap of the left ear measuring 6.5 x 2.5 cm.  2.  Site preparation for flap inset.    INDICATIONS FOR PROCEDURE AND PREOPERATIVE DIAGNOSES:  This is a 63-year-old   gentleman with severe obstructive sleep apnea who presents with a basal cell   carcinoma of the left helix.  This was resected via Mohs micrographic technique   yesterday and he presents now for definitive reconstruction.    POSTOPERATIVE DIAGNOSES:  This is a 63-year-old gentleman with severe   obstructive sleep apnea who presents with a basal cell carcinoma of the left   helix.  This was resected via Mohs micrographic technique yesterday and he   presents now for definitive reconstruction.    PROCEDURE IN DETAIL:  After obtaining informed consent, the patient was taken to   the Operating Room in the supine position.  Local MAC anesthesia was induced   without difficulty.  The area was prepped and draped in the standard sterile   fashion.  Then, 1% lidocaine with 1:100,000 epinephrine was injected into the   planned bilateral helical rim advancement flaps and sufficient time was allowed   for this to take effect.  Attention was first turned to the site preparation   where the remaining questionably vital cartilage was removed from the defect.  A   transverse wedge excision was then performed in order to freshen the anterior   scaphoid fossa section of the flap and defect.  The postauricular skin was then   back elevated with tenotomy scissors.  A #15 blade was then used to incise in a   through-and-through fashion the superior and inferior helical rims.  The   postauricular skin was then reflected posteriorly.  The flaps were advanced and   secured with 5-0 Monocryl to hold the cartilage in place and prevent cup ear   deformity.  The anterior auricular skin edges  were reapproximated with simple   interrupted 5-0 Monocryl sutures.  The helical rim skin was reapproximated with   simple interrupted and horizontal mattress 5-0 Prolene sutures and the   postauricular skin incisions were closed with simple interrupted 5-0 Prolene   sutures.  This concluded the procedure.  The patient was then rotated back to   anesthesia and awakened in the Operating Room without difficulty.  There were no   complications and estimated blood loss was 7 mL.      RDW/IN  dd: 01/03/2019 13:40:05 (CST)  td: 01/03/2019 15:04:23 (CST)  Doc ID   #[id number]  Job ID #[id number]    CC:

## 2019-01-04 NOTE — ANESTHESIA POSTPROCEDURE EVALUATION
"Anesthesia Post Evaluation    Patient: Elías Gay    Procedure(s) Performed: Procedure(s) (LRB):  CLOSURE, MOHS PROCEDURE DEFECT LEFT EAR (Left)    Final Anesthesia Type: MAC  Patient location during evaluation: PACU  Patient participation: Yes- Able to Participate  Level of consciousness: awake and alert and oriented  Post-procedure vital signs: reviewed and stable  Pain management: adequate  Airway patency: patent  PONV status at discharge: No PONV  Anesthetic complications: no      Cardiovascular status: hemodynamically stable  Respiratory status: unassisted, spontaneous ventilation and room air  Hydration status: euvolemic  Follow-up not needed.        Visit Vitals  /61   Pulse 79   Temp 36.6 °C (97.9 °F) (Temporal)   Resp 16   Ht 5' 11" (1.803 m)   Wt (!) 139.3 kg (307 lb)   SpO2 96%   BMI 42.82 kg/m²       Pain/Karrie Score: No Data Recorded      "

## 2019-01-04 NOTE — TRANSFER OF CARE
"Anesthesia Transfer of Care Note    Patient: Elías Gay    Procedure(s) Performed: Procedure(s) (LRB):  CLOSURE, MOHS PROCEDURE DEFECT LEFT EAR (Left)    Patient location: [facility]    Anesthesia Type: MAC    Transport from OR: Transported from OR on 6-10 L/min O2 by face mask with adequate spontaneous ventilation    Post pain: adequate analgesia    Post assessment: no apparent anesthetic complications    Post vital signs: stable    Level of consciousness: awake, alert and oriented    Nausea/Vomiting: no nausea/vomiting    Complications: none    Transfer of care protocol was followed      Last vitals:   Visit Vitals  /61   Pulse 79   Temp 36.6 °C (97.9 °F) (Temporal)   Resp 16   Ht 5' 11" (1.803 m)   Wt (!) 139.3 kg (307 lb)   SpO2 96%   BMI 42.82 kg/m²     "

## 2019-01-09 ENCOUNTER — OFFICE VISIT (OUTPATIENT)
Dept: OTOLARYNGOLOGY | Facility: CLINIC | Age: 64
End: 2019-01-09
Payer: MEDICARE

## 2019-01-09 VITALS
HEART RATE: 83 BPM | DIASTOLIC BLOOD PRESSURE: 84 MMHG | TEMPERATURE: 99 F | BODY MASS INDEX: 42.68 KG/M2 | WEIGHT: 306 LBS | SYSTOLIC BLOOD PRESSURE: 150 MMHG

## 2019-01-09 DIAGNOSIS — H61.112 MOHS DEFECT OF AURICLE OF LEFT EAR: Primary | ICD-10-CM

## 2019-01-09 PROCEDURE — 99999 PR PBB SHADOW E&M-EST. PATIENT-LVL III: CPT | Mod: PBBFAC,,, | Performed by: OTOLARYNGOLOGY

## 2019-01-09 PROCEDURE — 99024 POSTOP FOLLOW-UP VISIT: CPT | Mod: S$GLB,,, | Performed by: OTOLARYNGOLOGY

## 2019-01-09 PROCEDURE — 99024 PR POST-OP FOLLOW-UP VISIT: ICD-10-PCS | Mod: S$GLB,,, | Performed by: OTOLARYNGOLOGY

## 2019-01-09 PROCEDURE — 99999 PR PBB SHADOW E&M-EST. PATIENT-LVL III: ICD-10-PCS | Mod: PBBFAC,,, | Performed by: OTOLARYNGOLOGY

## 2019-01-09 NOTE — PHYSICIAN QUERY
PT Name: Elías Gay  MR #: [id number]     Physician Query Form - Documentation Clarification      CDS/: Chelsy Gomez               Contact information:[email or web address]    This form is a permanent document in the medical record.     Query Date: January 9, 2019    By submitting this query, we are merely seeking further clarification of documentation. Please utilize your independent clinical judgment when addressing the question(s) below.    The Medical record reflects the following:    Supporting Clinical Findings Location in Medical Record    PROCEDURES PERFORMED:  1.  Helical advancement flap of the left ear  2.  Site preparation for flap inset.               Op report                                                                                      Doctor, Please specify the measurements of the site preparation for flap inset in sq cm's associated with above clinical findings.    Provider Use Only    3.5 x 4.5cm                                                                                                                             [  ] Clinically Undetermined

## 2019-01-09 NOTE — PROGRESS NOTES
FACIAL P[facility] SURGERY CLINIC NOTE    CC: F/U Mohs defect repair    TREATMENT HISTORY:  1. Repair of Mohs defect with helical advancement flaps    INTERVAL HISTORY:Elías Gay returns to the [facility] for follow-up of Mohs repair. No complaints today.Denies dysphagia, odynophagia, throat pain and otalgia.Voice is stable. Does not experience dry mouth. Denies fevers, chills, and nightsweats. There has not been any redness or drainage from the wound.    Exam:  Flap with 100% take  Sutures removed    A/P: Discussed scar care with BID antibiotic ointment for 1 week more, I will remove his final 2 helical sutures next week.  Sunscreen to incision whenever outdoors for the next year

## 2019-01-16 ENCOUNTER — OFFICE VISIT (OUTPATIENT)
Dept: OTOLARYNGOLOGY | Facility: CLINIC | Age: 64
End: 2019-01-16
Payer: MEDICARE

## 2019-01-16 VITALS
WEIGHT: 305.75 LBS | SYSTOLIC BLOOD PRESSURE: 121 MMHG | DIASTOLIC BLOOD PRESSURE: 69 MMHG | BODY MASS INDEX: 42.65 KG/M2 | HEART RATE: 70 BPM

## 2019-01-16 DIAGNOSIS — H61.112 MOHS DEFECT OF AURICLE OF LEFT EAR: Primary | ICD-10-CM

## 2019-01-16 PROCEDURE — 99024 POSTOP FOLLOW-UP VISIT: CPT | Mod: S$GLB,,, | Performed by: OTOLARYNGOLOGY

## 2019-01-16 PROCEDURE — 99024 PR POST-OP FOLLOW-UP VISIT: ICD-10-PCS | Mod: S$GLB,,, | Performed by: OTOLARYNGOLOGY

## 2019-01-16 PROCEDURE — 99999 PR PBB SHADOW E&M-EST. PATIENT-LVL III: CPT | Mod: PBBFAC,,, | Performed by: OTOLARYNGOLOGY

## 2019-01-16 PROCEDURE — 99999 PR PBB SHADOW E&M-EST. PATIENT-LVL III: ICD-10-PCS | Mod: PBBFAC,,, | Performed by: OTOLARYNGOLOGY

## 2019-01-16 NOTE — PROGRESS NOTES
FACIAL PLASTIC SURGERY CLINIC NOTE     CC: F/U Mohs defect repair     TREATMENT HISTORY:  1. Repair of Mohs defect with helical advancement flaps     INTERVAL HISTORY:Elías Gay returns to the [facility] Surgery Clinic for follow-up of Mohs repair. No complaints today.Denies dysphagia, odynophagia, throat pain and otalgia.Voice is stable. Does not experience dry mouth. Denies fevers, chills, and nightsweats. There has not been any redness or drainage from the wound.     Exam:  Flap with 100% take  Final sutures removed     A/P: Doing very well. Continue BID ointment for the next few days. Sunscreen whenever outside for the next year.

## 2019-02-06 ENCOUNTER — OFFICE VISIT (OUTPATIENT)
Dept: DERMATOLOGY | Facility: CLINIC | Age: 64
End: 2019-02-06
Payer: MEDICARE

## 2019-02-06 DIAGNOSIS — C44.219 BASAL CELL CARCINOMA (BCC) OF SKIN OF LEFT EAR: Primary | ICD-10-CM

## 2019-02-06 PROCEDURE — 1101F PR PT FALLS ASSESS DOC 0-1 FALLS W/OUT INJ PAST YR: ICD-10-PCS | Mod: CPTII,S$GLB,, | Performed by: DERMATOLOGY

## 2019-02-06 PROCEDURE — 99999 PR PBB SHADOW E&M-EST. PATIENT-LVL II: ICD-10-PCS | Mod: PBBFAC,,, | Performed by: DERMATOLOGY

## 2019-02-06 PROCEDURE — 99999 PR PBB SHADOW E&M-EST. PATIENT-LVL II: CPT | Mod: PBBFAC,,, | Performed by: DERMATOLOGY

## 2019-02-06 PROCEDURE — 1101F PT FALLS ASSESS-DOCD LE1/YR: CPT | Mod: CPTII,S$GLB,, | Performed by: DERMATOLOGY

## 2019-02-06 PROCEDURE — 99212 PR OFFICE/OUTPT VISIT, EST, LEVL II, 10-19 MIN: ICD-10-PCS | Mod: S$GLB,,, | Performed by: DERMATOLOGY

## 2019-02-06 PROCEDURE — 99212 OFFICE O/P EST SF 10 MIN: CPT | Mod: S$GLB,,, | Performed by: DERMATOLOGY

## 2019-02-06 NOTE — PROGRESS NOTES
63 y.o. male patient is here for wound check after surgery.    Patient reports no problems.    WOUND PE:  The L ear helix flap is well healed. Mild firmness and erythema of scar. No nodularity.  (+)  Volume loss centrally.      IMPRESSION:  Healing operative site from Mohs' surgery, BCC L ear helix s/p Mohs and repair by Dr. Meek with helical advancement flap, postop week #5.    PLAN:    Reassured patient that redness and firmness will fade with time.  Daily SPF.  Regular skin checks.    RTC:  In 3-6 months with Alyson Avitia M.D. for skin check or sooner if new concern arises.

## 2019-03-12 ENCOUNTER — OFFICE VISIT (OUTPATIENT)
Dept: DERMATOLOGY | Facility: CLINIC | Age: 64
End: 2019-03-12
Payer: MEDICARE

## 2019-03-12 DIAGNOSIS — L73.8 SEBACEOUS GLAND HYPERPLASIA: ICD-10-CM

## 2019-03-12 DIAGNOSIS — Z85.820 HISTORY OF MALIGNANT MELANOMA OF SKIN: ICD-10-CM

## 2019-03-12 DIAGNOSIS — L82.1 SEBORRHEIC KERATOSIS: ICD-10-CM

## 2019-03-12 DIAGNOSIS — D22.9 MULTIPLE BENIGN NEVI: Primary | ICD-10-CM

## 2019-03-12 PROCEDURE — 99214 OFFICE O/P EST MOD 30 MIN: CPT | Mod: S$GLB,,, | Performed by: DERMATOLOGY

## 2019-03-12 PROCEDURE — 1101F PT FALLS ASSESS-DOCD LE1/YR: CPT | Mod: CPTII,S$GLB,, | Performed by: DERMATOLOGY

## 2019-03-12 PROCEDURE — 99214 PR OFFICE/OUTPT VISIT, EST, LEVL IV, 30-39 MIN: ICD-10-PCS | Mod: S$GLB,,, | Performed by: DERMATOLOGY

## 2019-03-12 PROCEDURE — 99999 PR PBB SHADOW E&M-EST. PATIENT-LVL II: CPT | Mod: PBBFAC,,, | Performed by: DERMATOLOGY

## 2019-03-12 PROCEDURE — 99999 PR PBB SHADOW E&M-EST. PATIENT-LVL II: ICD-10-PCS | Mod: PBBFAC,,, | Performed by: DERMATOLOGY

## 2019-03-12 PROCEDURE — 1101F PR PT FALLS ASSESS DOC 0-1 FALLS W/OUT INJ PAST YR: ICD-10-PCS | Mod: CPTII,S$GLB,, | Performed by: DERMATOLOGY

## 2019-03-12 NOTE — PROGRESS NOTES
Subjective:       Patient ID:  Elías Gay is a 64 y.o. male who presents for   Chief Complaint   Patient presents with    Skin Check     TBSE, no concerns at this time     History of Present Illness: The patient presents for follow up of skin check.    The patient was last seen on: 11/6/18 for TBSE and bx of bcc left ear helix - excised by SSW  This is a high risk patient here to check for the development of new lesions.    Also with h/o MM - Melanoma 09/14/2018 Right Arm 0.6mm     Other skin complaints: none          Review of Systems   Constitutional: Negative for fever, chills, weight loss, fatigue, night sweats and malaise.   HENT: Negative for headaches.    Respiratory: Negative for cough and shortness of breath.    Gastrointestinal: Negative for indigestion.   Skin: Negative for daily sunscreen use, activity-related sunscreen use, recent sunburn and wears hat.   Neurological: Negative for headaches.   Hematologic/Lymphatic: Negative for adenopathy. Does not bruise/bleed easily.        Objective:    Physical Exam   Constitutional: He appears well-developed and well-nourished. He is obese.  No distress.   Genitourinary:         Neurological: He is alert and oriented to person, place, and time. He is not disoriented.   Psychiatric: He has a normal mood and affect.   Skin:   Areas Examined (abnormalities noted in diagram):   Scalp / Hair Palpated and Inspected  Head / Face Inspection Performed  Neck Inspection Performed  Chest / Axilla Inspection Performed  Abdomen Inspection Performed  Genitals / Buttocks / Groin Inspection Performed  Back Inspection Performed  RUE Inspected  LUE Inspection Performed  RLE Inspected  LLE Inspection Performed  Nails and Digits Inspection Performed                   Diagram Legend     Erythematous scaling macule/papule c/w actinic keratosis       Vascular papule c/w angioma      Pigmented verrucoid papule/plaque c/w seborrheic keratosis      Yellow umbilicated papule c/w  sebaceous hyperplasia      Irregularly shaped tan macule c/w lentigo     1-2 mm smooth white papules consistent with Milia      Movable subcutaneous cyst with punctum c/w epidermal inclusion cyst      Subcutaneous movable cyst c/w pilar cyst      Firm pink to brown papule c/w dermatofibroma      Pedunculated fleshy papule(s) c/w skin tag(s)      Evenly pigmented macule c/w junctional nevus     Mildly variegated pigmented, slightly irregular-bordered macule c/w mildly atypical nevus      Flesh colored to evenly pigmented papule c/w intradermal nevus       Pink pearly papule/plaque c/w basal cell carcinoma      Erythematous hyperkeratotic cursted plaque c/w SCC      Surgical scar with no sign of skin cancer recurrence      Open and closed comedones      Inflammatory papules and pustules      Verrucoid papule consistent consistent with wart     Erythematous eczematous patches and plaques     Dystrophic onycholytic nail with subungual debris c/w onychomycosis     Umbilicated papule    Erythematous-base heme-crusted tan verrucoid plaque consistent with inflamed seborrheic keratosis     Erythematous Silvery Scaling Plaque c/w Psoriasis     See annotation      Assessment / Plan:        Multiple benign nevi   - stable and chronic      Seborrheic keratosis   - stable and chronic      Sebaceous gland hyperplasia   - stable and chronic      History of malignant melanoma of skin  Area of previous melanoma examined. Site well healed with no signs of recurrence.    Total body skin examination performed today including at least 12 points as noted in physical examination. No lesions suspicious for malignancy noted.               Follow-up in about 3 months (around 6/12/2019) for for MM clinic. f/u with me in 6 months

## 2019-04-02 ENCOUNTER — OFFICE VISIT (OUTPATIENT)
Dept: INTERNAL MEDICINE | Facility: CLINIC | Age: 64
End: 2019-04-02
Payer: MEDICARE

## 2019-04-02 VITALS
HEART RATE: 95 BPM | OXYGEN SATURATION: 96 % | HEIGHT: 71 IN | SYSTOLIC BLOOD PRESSURE: 150 MMHG | DIASTOLIC BLOOD PRESSURE: 90 MMHG | TEMPERATURE: 99 F | BODY MASS INDEX: 43.21 KG/M2 | WEIGHT: 308.63 LBS

## 2019-04-02 DIAGNOSIS — E66.01 MORBID OBESITY: ICD-10-CM

## 2019-04-02 DIAGNOSIS — B96.89 ACUTE BACTERIAL BRONCHITIS: Primary | ICD-10-CM

## 2019-04-02 DIAGNOSIS — J20.8 ACUTE BACTERIAL BRONCHITIS: Primary | ICD-10-CM

## 2019-04-02 DIAGNOSIS — Z85.820 PERSONAL HISTORY OF MALIGNANT MELANOMA: ICD-10-CM

## 2019-04-02 PROBLEM — C43.61: Status: ACTIVE | Noted: 2019-04-02

## 2019-04-02 PROCEDURE — 99999 PR PBB SHADOW E&M-EST. PATIENT-LVL III: CPT | Mod: PBBFAC,,, | Performed by: FAMILY MEDICINE

## 2019-04-02 PROCEDURE — 99499 RISK ADDL DX/OHS AUDIT: ICD-10-PCS | Mod: S$GLB,,, | Performed by: FAMILY MEDICINE

## 2019-04-02 PROCEDURE — 99214 PR OFFICE/OUTPT VISIT, EST, LEVL IV, 30-39 MIN: ICD-10-PCS | Mod: 25,S$GLB,, | Performed by: FAMILY MEDICINE

## 2019-04-02 PROCEDURE — 1101F PT FALLS ASSESS-DOCD LE1/YR: CPT | Mod: CPTII,S$GLB,, | Performed by: FAMILY MEDICINE

## 2019-04-02 PROCEDURE — 99499 UNLISTED E&M SERVICE: CPT | Mod: S$GLB,,, | Performed by: FAMILY MEDICINE

## 2019-04-02 PROCEDURE — 99214 OFFICE O/P EST MOD 30 MIN: CPT | Mod: 25,S$GLB,, | Performed by: FAMILY MEDICINE

## 2019-04-02 PROCEDURE — 96372 THER/PROPH/DIAG INJ SC/IM: CPT | Mod: S$GLB,,, | Performed by: FAMILY MEDICINE

## 2019-04-02 PROCEDURE — 1101F PR PT FALLS ASSESS DOC 0-1 FALLS W/OUT INJ PAST YR: ICD-10-PCS | Mod: CPTII,S$GLB,, | Performed by: FAMILY MEDICINE

## 2019-04-02 PROCEDURE — 96372 PR INJECTION,THERAP/PROPH/DIAG2ST, IM OR SUBCUT: ICD-10-PCS | Mod: S$GLB,,, | Performed by: FAMILY MEDICINE

## 2019-04-02 PROCEDURE — 99999 PR PBB SHADOW E&M-EST. PATIENT-LVL III: ICD-10-PCS | Mod: PBBFAC,,, | Performed by: FAMILY MEDICINE

## 2019-04-02 RX ORDER — TRIAMCINOLONE ACETONIDE 40 MG/ML
40 INJECTION, SUSPENSION INTRA-ARTICULAR; INTRAMUSCULAR
Status: COMPLETED | OUTPATIENT
Start: 2019-04-02 | End: 2019-04-02

## 2019-04-02 RX ORDER — CODEINE PHOSPHATE AND GUAIFENESIN 10; 100 MG/5ML; MG/5ML
5 SOLUTION ORAL 3 TIMES DAILY PRN
COMMUNITY
End: 2019-04-02

## 2019-04-02 RX ORDER — FLUTICASONE PROPIONATE 50 MCG
1 SPRAY, SUSPENSION (ML) NASAL DAILY
COMMUNITY
End: 2019-06-18 | Stop reason: ALTCHOICE

## 2019-04-02 RX ORDER — AZITHROMYCIN 250 MG/1
TABLET, FILM COATED ORAL
Qty: 6 TABLET | Refills: 0 | Status: SHIPPED | OUTPATIENT
Start: 2019-04-02 | End: 2019-04-07

## 2019-04-02 RX ORDER — PROMETHAZINE HYDROCHLORIDE AND DEXTROMETHORPHAN HYDROBROMIDE 6.25; 15 MG/5ML; MG/5ML
5 SYRUP ORAL NIGHTLY
Qty: 180 ML | Refills: 1 | Status: SHIPPED | OUTPATIENT
Start: 2019-04-02 | End: 2021-05-17

## 2019-04-02 RX ORDER — PROMETHAZINE HYDROCHLORIDE AND DEXTROMETHORPHAN HYDROBROMIDE 6.25; 15 MG/5ML; MG/5ML
SYRUP ORAL
COMMUNITY
End: 2019-04-02 | Stop reason: SDUPTHER

## 2019-04-02 RX ORDER — METHYLPREDNISOLONE 4 MG/1
TABLET ORAL
Qty: 1 PACKAGE | Refills: 0 | Status: SHIPPED | OUTPATIENT
Start: 2019-04-02 | End: 2019-06-19

## 2019-04-02 RX ORDER — AZELASTINE 1 MG/ML
1 SPRAY, METERED NASAL 2 TIMES DAILY
Qty: 30 ML | Refills: 11 | Status: SHIPPED | OUTPATIENT
Start: 2019-04-02 | End: 2022-07-26 | Stop reason: SDUPTHER

## 2019-04-02 RX ADMIN — TRIAMCINOLONE ACETONIDE 40 MG: 40 INJECTION, SUSPENSION INTRA-ARTICULAR; INTRAMUSCULAR at 02:04

## 2019-04-03 NOTE — PROGRESS NOTES
Subjective:   Patient ID: Elías Gay is a 64 y.o. male.    Chief Complaint: Bronchitis      Cough   This is a new problem. The current episode started 1 to 4 weeks ago. The problem has been gradually worsening. The cough is productive of brown sputum, productive of sputum and productive of purulent sputum. Associated symptoms include chest pain, chills, ear congestion, ear pain, a fever and headaches. Pertinent negatives include no postnasal drip, rash, rhinorrhea, sore throat, shortness of breath or wheezing. The symptoms are aggravated by dust. He has tried nothing for the symptoms. The treatment provided no relief. There is no history of asthma.     63 yo male here for cough  Patient queried and denies any further complaints.    ALLERGIES AND MEDICATIONS: updated and reviewed.  Review of patient's allergies indicates:  No Known Allergies    Current Outpatient Medications:     fluticasone (FLONASE) 50 mcg/actuation nasal spray, 1 spray by Each Nare route once daily., Disp: , Rfl:     promethazine-dextromethorphan (PROMETHAZINE-DM) 6.25-15 mg/5 mL Syrp, Take 5 mLs by mouth every evening., Disp: 180 mL, Rfl: 1    azelastine (ASTELIN) 137 mcg (0.1 %) nasal spray, 1 spray (137 mcg total) by Nasal route 2 (two) times daily., Disp: 30 mL, Rfl: 11    azithromycin (Z-YAHIR) 250 MG tablet, Take 2 tablets by mouth on day 1; Take 1 tablet by mouth on days 2-5, Disp: 6 tablet, Rfl: 0    methylPREDNISolone (MEDROL DOSEPACK) 4 mg tablet, use as directed, Disp: 1 Package, Rfl: 0  No current facility-administered medications for this visit.     Review of Systems   Constitutional: Positive for chills and fever. Negative for activity change, appetite change, diaphoresis, fatigue and unexpected weight change.   HENT: Positive for ear pain. Negative for congestion, ear discharge, postnasal drip, rhinorrhea, sneezing and sore throat.    Eyes: Negative for photophobia and discharge.   Respiratory: Positive for cough. Negative  "for chest tightness, shortness of breath and wheezing.    Cardiovascular: Positive for chest pain. Negative for palpitations.   Gastrointestinal: Negative for abdominal distention, abdominal pain, diarrhea, nausea and vomiting.   Genitourinary: Negative for dysuria.   Musculoskeletal: Negative for arthralgias and neck pain.   Skin: Negative for rash.   Neurological: Positive for headaches.       Objective:     Vitals:    04/02/19 1430   BP: (!) 150/90   Pulse: 95   Temp: 99.2 °F (37.3 °C)   TempSrc: Oral   SpO2: 96%   Weight: (!) 140 kg (308 lb 10.3 oz)   Height: 5' 11" (1.803 m)   PainSc:   2   PainLoc: Throat     Body mass index is 43.05 kg/m².    Physical Exam   Constitutional: He appears well-developed and well-nourished.   HENT:   Head: Normocephalic and atraumatic.   Right Ear: Hearing, tympanic membrane, external ear and ear canal normal. No drainage or swelling. No decreased hearing is noted.   Left Ear: Hearing, tympanic membrane, external ear and ear canal normal. No drainage or swelling. No decreased hearing is noted.   Nose: Nose normal. No rhinorrhea.   Mouth/Throat: Oropharynx is clear and moist. No oropharyngeal exudate, posterior oropharyngeal edema or posterior oropharyngeal erythema.   Eyes: Pupils are equal, round, and reactive to light. Conjunctivae, EOM and lids are normal. Right eye exhibits no discharge and no exudate. Left eye exhibits no discharge and no exudate. Right conjunctiva is not injected. Left conjunctiva is not injected.   Neck: Trachea normal and full passive range of motion without pain. Normal carotid pulses, no hepatojugular reflux and no JVD present. Carotid bruit is not present. No neck rigidity. No edema and no erythema present. No thyroid mass and no thyromegaly present.   Cardiovascular: Normal rate, regular rhythm and normal heart sounds.   Pulmonary/Chest: Effort normal. No respiratory distress.   Abdominal: Soft. Normal appearance and bowel sounds are normal. There is " no tenderness. There is negative Lees's sign.   Lymphadenopathy:     He has no cervical adenopathy.   Neurological: He is alert.   Skin: Skin is warm and dry.   Psychiatric: He has a normal mood and affect. His speech is normal and behavior is normal.   Nursing note and vitals reviewed.      Assessment and Plan:   Elías was seen today for bronchitis.    Diagnoses and all orders for this visit:    Acute bacterial bronchitis    Morbid obesity    Personal history of malignant melanoma    Other orders  -     promethazine-dextromethorphan (PROMETHAZINE-DM) 6.25-15 mg/5 mL Syrp; Take 5 mLs by mouth every evening.  -     azelastine (ASTELIN) 137 mcg (0.1 %) nasal spray; 1 spray (137 mcg total) by Nasal route 2 (two) times daily.  -     methylPREDNISolone (MEDROL DOSEPACK) 4 mg tablet; use as directed  -     azithromycin (Z-YAHIR) 250 MG tablet; Take 2 tablets by mouth on day 1; Take 1 tablet by mouth on days 2-5  -     triamcinolone acetonide injection 40 mg    Hydrate, rest, OTC Mucinex Expectorant as directed, Nasal saline as needed.  OTC Zyrtec as directed.      Follow up in about 2 weeks (around 4/16/2019), or if symptoms worsen or fail to improve.    THIS NOTE WILL BE SHARED WITH THE PATIENT.

## 2019-04-04 ENCOUNTER — TELEPHONE (OUTPATIENT)
Dept: DERMATOLOGY | Facility: CLINIC | Age: 64
End: 2019-04-04

## 2019-06-04 ENCOUNTER — PATIENT MESSAGE (OUTPATIENT)
Dept: ENDOSCOPY | Facility: HOSPITAL | Age: 64
End: 2019-06-04

## 2019-06-05 ENCOUNTER — TELEPHONE (OUTPATIENT)
Dept: ADMINISTRATIVE | Facility: HOSPITAL | Age: 64
End: 2019-06-05

## 2019-06-05 ENCOUNTER — PATIENT OUTREACH (OUTPATIENT)
Dept: ADMINISTRATIVE | Facility: HOSPITAL | Age: 64
End: 2019-06-05

## 2019-06-05 DIAGNOSIS — Z12.5 SCREENING FOR PROSTATE CANCER: ICD-10-CM

## 2019-06-05 DIAGNOSIS — Z00.00 ANNUAL PHYSICAL EXAM: Primary | ICD-10-CM

## 2019-06-05 DIAGNOSIS — E78.00 ELEVATED LDL CHOLESTEROL LEVEL: ICD-10-CM

## 2019-06-05 NOTE — PROGRESS NOTES
Health Maintenance Due   Topic Date Due    Colonoscopy  03/11/2003     Pre-visit chart check complete.    Attempted to contact pt to inquire if he is agreeable to colonoscopy or fit kit.  Voice message left requesting a return call.

## 2019-06-07 ENCOUNTER — LAB VISIT (OUTPATIENT)
Dept: LAB | Facility: HOSPITAL | Age: 64
End: 2019-06-07
Attending: INTERNAL MEDICINE
Payer: MEDICARE

## 2019-06-07 DIAGNOSIS — E78.00 ELEVATED LDL CHOLESTEROL LEVEL: ICD-10-CM

## 2019-06-07 DIAGNOSIS — Z00.00 ANNUAL PHYSICAL EXAM: ICD-10-CM

## 2019-06-07 DIAGNOSIS — Z12.5 SCREENING FOR PROSTATE CANCER: ICD-10-CM

## 2019-06-07 LAB
BASOPHILS # BLD AUTO: 0.03 K/UL (ref 0–0.2)
BASOPHILS NFR BLD: 0.4 % (ref 0–1.9)
CHOLEST SERPL-MCNC: 179 MG/DL (ref 120–199)
CHOLEST/HDLC SERPL: 3.7 {RATIO} (ref 2–5)
COMPLEXED PSA SERPL-MCNC: 1.3 NG/ML (ref 0–4)
DIFFERENTIAL METHOD: ABNORMAL
EOSINOPHIL # BLD AUTO: 0.2 K/UL (ref 0–0.5)
EOSINOPHIL NFR BLD: 2.4 % (ref 0–8)
ERYTHROCYTE [DISTWIDTH] IN BLOOD BY AUTOMATED COUNT: 13.8 % (ref 11.5–14.5)
HCT VFR BLD AUTO: 46.8 % (ref 40–54)
HDLC SERPL-MCNC: 49 MG/DL (ref 40–75)
HDLC SERPL: 27.4 % (ref 20–50)
HGB BLD-MCNC: 15.1 G/DL (ref 14–18)
LDLC SERPL CALC-MCNC: 115.2 MG/DL (ref 63–159)
LYMPHOCYTES # BLD AUTO: 1.7 K/UL (ref 1–4.8)
LYMPHOCYTES NFR BLD: 22.7 % (ref 18–48)
MCH RBC QN AUTO: 32 PG (ref 27–31)
MCHC RBC AUTO-ENTMCNC: 32.3 G/DL (ref 32–36)
MCV RBC AUTO: 99 FL (ref 82–98)
MONOCYTES # BLD AUTO: 0.7 K/UL (ref 0.3–1)
MONOCYTES NFR BLD: 8.7 % (ref 4–15)
NEUTROPHILS # BLD AUTO: 5 K/UL (ref 1.8–7.7)
NEUTROPHILS NFR BLD: 65.7 % (ref 38–73)
NONHDLC SERPL-MCNC: 130 MG/DL
PLATELET # BLD AUTO: 279 K/UL (ref 150–350)
PMV BLD AUTO: 9.6 FL (ref 9.2–12.9)
RBC # BLD AUTO: 4.72 M/UL (ref 4.6–6.2)
TRIGL SERPL-MCNC: 74 MG/DL (ref 30–150)
WBC # BLD AUTO: 7.58 K/UL (ref 3.9–12.7)

## 2019-06-07 PROCEDURE — 80061 LIPID PANEL: CPT

## 2019-06-07 PROCEDURE — 84153 ASSAY OF PSA TOTAL: CPT

## 2019-06-07 PROCEDURE — 85025 COMPLETE CBC W/AUTO DIFF WBC: CPT

## 2019-06-07 PROCEDURE — 36415 COLL VENOUS BLD VENIPUNCTURE: CPT

## 2019-06-13 ENCOUNTER — LAB VISIT (OUTPATIENT)
Dept: LAB | Facility: HOSPITAL | Age: 64
End: 2019-06-13
Attending: INTERNAL MEDICINE
Payer: MEDICARE

## 2019-06-13 DIAGNOSIS — Z00.00 ANNUAL PHYSICAL EXAM: ICD-10-CM

## 2019-06-13 LAB
ALBUMIN SERPL BCP-MCNC: 3.6 G/DL (ref 3.5–5.2)
ALP SERPL-CCNC: 94 U/L (ref 55–135)
ALT SERPL W/O P-5'-P-CCNC: 44 U/L (ref 10–44)
ANION GAP SERPL CALC-SCNC: 11 MMOL/L (ref 8–16)
AST SERPL-CCNC: 40 U/L (ref 10–40)
BILIRUB SERPL-MCNC: 0.4 MG/DL (ref 0.1–1)
BUN SERPL-MCNC: 13 MG/DL (ref 8–23)
CALCIUM SERPL-MCNC: 9.8 MG/DL (ref 8.7–10.5)
CHLORIDE SERPL-SCNC: 106 MMOL/L (ref 95–110)
CO2 SERPL-SCNC: 25 MMOL/L (ref 23–29)
CREAT SERPL-MCNC: 0.8 MG/DL (ref 0.5–1.4)
EST. GFR  (AFRICAN AMERICAN): >60 ML/MIN/1.73 M^2
EST. GFR  (NON AFRICAN AMERICAN): >60 ML/MIN/1.73 M^2
GLUCOSE SERPL-MCNC: 85 MG/DL (ref 70–110)
POTASSIUM SERPL-SCNC: 4.3 MMOL/L (ref 3.5–5.1)
PROT SERPL-MCNC: 7.1 G/DL (ref 6–8.4)
SODIUM SERPL-SCNC: 142 MMOL/L (ref 136–145)

## 2019-06-13 PROCEDURE — 80053 COMPREHEN METABOLIC PANEL: CPT

## 2019-06-13 PROCEDURE — 36415 COLL VENOUS BLD VENIPUNCTURE: CPT | Mod: PO

## 2019-06-18 ENCOUNTER — OFFICE VISIT (OUTPATIENT)
Dept: INTERNAL MEDICINE | Facility: CLINIC | Age: 64
End: 2019-06-18
Payer: MEDICARE

## 2019-06-18 VITALS
SYSTOLIC BLOOD PRESSURE: 130 MMHG | TEMPERATURE: 98 F | DIASTOLIC BLOOD PRESSURE: 80 MMHG | WEIGHT: 304.44 LBS | HEIGHT: 71 IN | BODY MASS INDEX: 42.62 KG/M2 | OXYGEN SATURATION: 96 % | HEART RATE: 78 BPM

## 2019-06-18 DIAGNOSIS — C43.61: ICD-10-CM

## 2019-06-18 DIAGNOSIS — Z00.00 ANNUAL PHYSICAL EXAM: Primary | ICD-10-CM

## 2019-06-18 DIAGNOSIS — Z12.11 COLON CANCER SCREENING: ICD-10-CM

## 2019-06-18 DIAGNOSIS — R60.0 LOWER EXTREMITY EDEMA: ICD-10-CM

## 2019-06-18 DIAGNOSIS — G47.33 OSA (OBSTRUCTIVE SLEEP APNEA): ICD-10-CM

## 2019-06-18 PROCEDURE — 99999 PR PBB SHADOW E&M-EST. PATIENT-LVL IV: ICD-10-PCS | Mod: PBBFAC,,, | Performed by: INTERNAL MEDICINE

## 2019-06-18 PROCEDURE — 99999 PR PBB SHADOW E&M-EST. PATIENT-LVL IV: CPT | Mod: PBBFAC,,, | Performed by: INTERNAL MEDICINE

## 2019-06-18 PROCEDURE — 99213 OFFICE O/P EST LOW 20 MIN: CPT | Mod: S$GLB,,, | Performed by: INTERNAL MEDICINE

## 2019-06-18 PROCEDURE — 99213 PR OFFICE/OUTPT VISIT, EST, LEVL III, 20-29 MIN: ICD-10-PCS | Mod: S$GLB,,, | Performed by: INTERNAL MEDICINE

## 2019-06-18 NOTE — PROGRESS NOTES
CC:  Annual exam.    HPI:  The patient is a 64-year-old gentleman with a history of basal cell cancer removed from the left ear, melanoma removed from the right arm and obstructive sleep apnea who comes in today for annual physical exam.  Patient reports he is doing much better since he started auto E Pap.  This is the best he has felt in 40 years.  He is going to follow up with Dr. Alvarez next month as [facility].  Patient also reports having eye discharge that has cleared up.    ROS:Answers for HPI/ROS submitted by the patient on 6/15/2019   activity change: No  unexpected weight change: No  neck pain: Yes  hearing loss: No  rhinorrhea: No  trouble swallowing: No  eye discharge: Yes  visual disturbance: No  chest tightness: No  wheezing: No  chest pain: No  palpitations: No  blood in stool: No  constipation: No  vomiting: No  diarrhea: No  polydipsia: No  polyuria: No  difficulty urinating: No  urgency: Yes  hematuria: No  joint swelling: No  arthralgias: Yes  headaches: No  weakness: No  confusion: No  dysphoric mood: No  In addition the patient reports he goes to [facility] under regular basis.  He does 0.5 hr of exercise machines, 0.5 hr of swimming, and 0.5 hr in the sauna.  He does report having some back pain. This is really more the sides of his back.  Aside seems to help with this the best .Patient also reports never having a colonoscopy and would like to have 1.  He reports multiple family members with colon polyps.  Did have a prostate exam with Dr. Calderon in October 2018.    Physical exam:  HEENT:  No acute distress.  Conjunctiva is clear.  He does have bilateral cataracts.  TMs are clear.  Nasal septum is midline without discharge. Oropharynx is without erythema.  Trachea is midline without JVD. Trachea without thyromegaly.  Pulmonary:  Good inspiratory, expiratory breath sounds are heard.  Lungs are clear to auscultation.  Cardiovascular:  S1-S2.  2+ carotid pulses without  bruits.  Extremities:  1+ edema.  GI:  Abdomen was protuberant but soft.  He had no hepatosplenomegaly.  He had no rebound or guarding.  Comments:  The patient reports having nail fungus.  It sounds like he did try Lamisil as well as Penlac without success.  I did discuss with him about seeing Podiatry.    Assessment:  1.  Obstructive sleep apnea currently doing well on auto E-PAP  2.  Elevated BMI  3.  History of melanoma and basal cell cancer.    Plan:  1.  Will schedule screening colonoscopy  2.  Patient has an appointment to see Dr. Eddy for a skin check.

## 2019-06-19 ENCOUNTER — OFFICE VISIT (OUTPATIENT)
Dept: DERMATOLOGY | Facility: CLINIC | Age: 64
End: 2019-06-19
Payer: MEDICARE

## 2019-06-19 DIAGNOSIS — Z85.820 HISTORY OF MALIGNANT MELANOMA: ICD-10-CM

## 2019-06-19 DIAGNOSIS — L72.0 MILIA: ICD-10-CM

## 2019-06-19 DIAGNOSIS — D22.9 NEVUS: ICD-10-CM

## 2019-06-19 DIAGNOSIS — L57.0 AK (ACTINIC KERATOSIS): ICD-10-CM

## 2019-06-19 DIAGNOSIS — L91.8 SKIN TAG: ICD-10-CM

## 2019-06-19 DIAGNOSIS — L81.4 LENTIGO: Primary | ICD-10-CM

## 2019-06-19 DIAGNOSIS — L82.1 SK (SEBORRHEIC KERATOSIS): ICD-10-CM

## 2019-06-19 DIAGNOSIS — L90.5 SCAR: ICD-10-CM

## 2019-06-19 DIAGNOSIS — D18.00 ANGIOMA: ICD-10-CM

## 2019-06-19 DIAGNOSIS — C43.61: ICD-10-CM

## 2019-06-19 PROCEDURE — 1101F PR PT FALLS ASSESS DOC 0-1 FALLS W/OUT INJ PAST YR: ICD-10-PCS | Mod: CPTII,S$GLB,, | Performed by: DERMATOLOGY

## 2019-06-19 PROCEDURE — 17000 PR DESTRUCTION(LASER SURGERY,CRYOSURGERY,CHEMOSURGERY),PREMALIGNANT LESIONS,FIRST LESION: ICD-10-PCS | Mod: S$GLB,,, | Performed by: DERMATOLOGY

## 2019-06-19 PROCEDURE — 17000 DESTRUCT PREMALG LESION: CPT | Mod: S$GLB,,, | Performed by: DERMATOLOGY

## 2019-06-19 PROCEDURE — 1101F PT FALLS ASSESS-DOCD LE1/YR: CPT | Mod: CPTII,S$GLB,, | Performed by: DERMATOLOGY

## 2019-06-19 PROCEDURE — 99214 OFFICE O/P EST MOD 30 MIN: CPT | Mod: 25,S$GLB,, | Performed by: DERMATOLOGY

## 2019-06-19 PROCEDURE — 99999 PR PBB SHADOW E&M-EST. PATIENT-LVL II: CPT | Mod: PBBFAC,,, | Performed by: DERMATOLOGY

## 2019-06-19 PROCEDURE — 99214 PR OFFICE/OUTPT VISIT, EST, LEVL IV, 30-39 MIN: ICD-10-PCS | Mod: 25,S$GLB,, | Performed by: DERMATOLOGY

## 2019-06-19 PROCEDURE — 99999 PR PBB SHADOW E&M-EST. PATIENT-LVL II: ICD-10-PCS | Mod: PBBFAC,,, | Performed by: DERMATOLOGY

## 2019-06-19 RX ORDER — GUAIFENESIN 600 MG/1
1200 TABLET, EXTENDED RELEASE ORAL 2 TIMES DAILY
COMMUNITY

## 2019-06-19 NOTE — PROGRESS NOTES
Subjective:       Patient ID:  Elías Gay is a 64 y.o. male who presents for   Chief Complaint   Patient presents with    Skin Check     Date of biopsy: 7-16-18  Martha test  n/a  Location: right upper arm   Depth: 0.6 mm  LN: neg  Date of excision: 9-14-18    Pt has a history of  significant sun exposure in the past.   Pt recalls several blistering sunburns in the past:  yes  Pt has history of tanning bed use: no  Pt has had atypical moles removed in the past: yes  Pt has personal history of breast cancer: no  Pt has history of melanoma in first degree relatives:  no  Pt has family history of pancreatic cancer: no  Pt is currently immunosuppressed: no    Swain Type: 1    Last dental exam: 4-2018  Last eye exam: 6-2018    C/o lesion on inside of nose. Present for years.  No tx.  Not tender.  Bleeds with manipulation. Did have nose surgery in the past.   This is a high risk patient here to check for the development of new lesions.        Review of Systems   Constitutional: Negative for fever, chills, weight loss, fatigue, night sweats and malaise.   HENT: Negative for headaches.    Respiratory: Negative for cough and shortness of breath.    Gastrointestinal: Negative for indigestion.   Skin: Positive for activity-related sunscreen use. Negative for daily sunscreen use, recent sunburn and wears hat.   Neurological: Negative for headaches.   Hematologic/Lymphatic: Negative for adenopathy. Does not bruise/bleed easily.        Objective:    Physical Exam   Constitutional: He appears well-developed and well-nourished. He is obese.  No distress.   Genitourinary:         Lymphadenopathy: No supraclavicular adenopathy is present.     He has no cervical adenopathy.     He has no axillary adenopathy.   Neurological: He is alert and oriented to person, place, and time. He is not disoriented.   Psychiatric: He has a normal mood and affect.   Skin:   Areas Examined (abnormalities noted in diagram):   Scalp / Hair  Palpated and Inspected  Head / Face Inspection Performed  Neck Inspection Performed  Chest / Axilla Inspection Performed  Abdomen Inspection Performed  Genitals / Buttocks / Groin Inspection Performed  Back Inspection Performed  RUE Inspected  LUE Inspection Performed  RLE Inspected  LLE Inspection Performed  Nails and Digits Inspection Performed                       Diagram Legend     Erythematous scaling macule/papule c/w actinic keratosis       Vascular papule c/w angioma      Pigmented verrucoid papule/plaque c/w seborrheic keratosis      Yellow umbilicated papule c/w sebaceous hyperplasia      Irregularly shaped tan macule c/w lentigo     1-2 mm smooth white papules consistent with Milia      Movable subcutaneous cyst with punctum c/w epidermal inclusion cyst      Subcutaneous movable cyst c/w pilar cyst      Firm pink to brown papule c/w dermatofibroma      Pedunculated fleshy papule(s) c/w skin tag(s)      Evenly pigmented macule c/w junctional nevus     Mildly variegated pigmented, slightly irregular-bordered macule c/w mildly atypical nevus      Flesh colored to evenly pigmented papule c/w intradermal nevus       Pink pearly papule/plaque c/w basal cell carcinoma      Erythematous hyperkeratotic cursted plaque c/w SCC      Surgical scar with no sign of skin cancer recurrence      Open and closed comedones      Inflammatory papules and pustules      Verrucoid papule consistent consistent with wart     Erythematous eczematous patches and plaques     Dystrophic onycholytic nail with subungual debris c/w onychomycosis     Umbilicated papule    Erythematous-base heme-crusted tan verrucoid plaque consistent with inflamed seborrheic keratosis     Erythematous Silvery Scaling Plaque c/w Psoriasis     See annotation      Assessment / Plan:        Lentigo  This is a benign hyperpigmented sun induced lesion. Daily sun protection will reduce the number of new lesions. Treatment of these benign lesions are considered  cosmetic.  The nature of sun-induced photo-aging and skin cancers is discussed.  Sun avoidance, protective clothing, and the use of 30-SPF sunscreens is advised. Observe closely for skin damage/changes, and call if such occurs.    Angioma  These are benign vascular lesions that are inherited.  Treatment is not necessary.    Nevus  Discussed ABCDE's of nevi.  Monitor for new mole or moles that are becoming bigger, darker, irritated, or developing irregular borders. Brochure provided.    SK (seborrheic keratosis)  These are benign inherited growths without a malignant potential. Reassurance given to patient. No treatment is necessary.     Skin tag  Reassurance given to patient. No treatment is necessary.   Treatment of benign, asymptomatic lesions may be considered cosmetic.    History of malignant melanoma- 0.6 mm 7/2018  Scar    Area of previous melanoma examined. Site well healed with no signs of recurrence.  Stage 1A     Total body skin examination performed today including at least 12 points as noted in physical examination. No lesions suspicious for malignancy noted.      Patient health maintenance:     Monthly Self Skin examinations   Regular dermatologic examinations   Need for sunscreen and sun protection- SPF 30 +, sun protective clothing,           regular use of broad brim hat for outdoor exposure, avoidance of sun in peak     hours   Family member screening/risk for 1st degree relatives   Need for yearly checks by dentist and ophthamology   Need for radiologic follow up no      Referral to : no  Referral to hematology/oncology: no      F/U for TBSE 3 months    Milia  Reassurance given to patient. No treatment is necessary.   Treatment of benign, asymptomatic lesions may be considered cosmetic.    AK (actinic keratosis)  Cryosurgery Procedure Note    Verbal consent from the patient is obtained including, but not limited to, risk of hypopigmentation/hyperpigmentation, scar, recurrence of  lesion. The patient is aware of the precancerous quality and need for treatment of these lesions. Liquid nitrogen cryosurgery is applied to the 1 actinic keratoses, as detailed in the physical exam, to produce a freeze injury. The patient is aware that blisters may form and is instructed on wound care with gentle cleansing and use of vaseline ointment to keep moist until healed. The patient is supplied a handout on cryosurgery and is instructed to call if lesions do not completely resolve.             Follow up in about 3 months (around 9/19/2019).

## 2019-06-29 ENCOUNTER — PATIENT MESSAGE (OUTPATIENT)
Dept: ENDOSCOPY | Facility: HOSPITAL | Age: 64
End: 2019-06-29

## 2019-07-03 ENCOUNTER — TELEPHONE (OUTPATIENT)
Dept: DERMATOLOGY | Facility: CLINIC | Age: 64
End: 2019-07-03

## 2019-07-03 ENCOUNTER — TELEPHONE (OUTPATIENT)
Dept: ENDOSCOPY | Facility: HOSPITAL | Age: 64
End: 2019-07-03

## 2019-07-03 DIAGNOSIS — Z12.11 SPECIAL SCREENING FOR MALIGNANT NEOPLASMS, COLON: Primary | ICD-10-CM

## 2019-07-03 RX ORDER — POLYETHYLENE GLYCOL 3350, SODIUM SULFATE ANHYDROUS, SODIUM BICARBONATE, SODIUM CHLORIDE, POTASSIUM CHLORIDE 236; 22.74; 6.74; 5.86; 2.97 G/4L; G/4L; G/4L; G/4L; G/4L
4 POWDER, FOR SOLUTION ORAL ONCE
Qty: 4000 ML | Refills: 0 | Status: SHIPPED | OUTPATIENT
Start: 2019-07-03 | End: 2019-07-03

## 2019-07-09 ENCOUNTER — PATIENT OUTREACH (OUTPATIENT)
Dept: ADMINISTRATIVE | Facility: OTHER | Age: 64
End: 2019-07-09

## 2019-08-09 ENCOUNTER — TELEPHONE (OUTPATIENT)
Dept: SPORTS MEDICINE | Facility: CLINIC | Age: 64
End: 2019-08-09

## 2019-08-09 DIAGNOSIS — M25.551 RIGHT HIP PAIN: Primary | ICD-10-CM

## 2019-08-09 NOTE — TELEPHONE ENCOUNTER
Spoke with patient to let him know to arrive at 11 for x-rays prior to appt with Dr. White on 8/15.

## 2019-08-14 ENCOUNTER — TELEPHONE (OUTPATIENT)
Dept: ORTHOPEDICS | Facility: CLINIC | Age: 64
End: 2019-08-14

## 2019-08-14 NOTE — TELEPHONE ENCOUNTER
LM with pt to see if he knows his current body weight. He is scheduled for an xray at the [place] location which has a weight limit of 300 lbs. Last weight was 304 on 6/2019.If pt currently weighs under 300lbs he can have the xray at [place]. If not, we can set him up to have the XR at [place].     Juliane Griggs  Clinical Assistant to Dr. Abigail White

## 2019-08-14 NOTE — TELEPHONE ENCOUNTER
----- Message from [employer]ida sent at 8/14/2019  4:39 PM CDT -----  Contact: Self  Needs Advice    Reason for call: Pt returning call from Juliane gomez current weight is 296.          Communication Preference: [phone] (home)      Additional Information:

## 2019-08-14 NOTE — TELEPHONE ENCOUNTER
Confirmed pt's weight and he is good to go for XR at [place] tomorrow.    Juliane Griggs  Clinical Assistant to Dr. Abigail White

## 2019-08-15 ENCOUNTER — HOSPITAL ENCOUNTER (OUTPATIENT)
Dept: RADIOLOGY | Facility: HOSPITAL | Age: 64
Discharge: HOME | End: 2019-08-15
Attending: NEUROMUSCULOSKELETAL MEDICINE & OMM
Payer: MEDICARE

## 2019-08-15 ENCOUNTER — OFFICE VISIT (OUTPATIENT)
Dept: ORTHOPEDICS | Facility: CLINIC | Age: 64
End: 2019-08-15
Payer: MEDICARE

## 2019-08-15 VITALS
BODY MASS INDEX: 41.44 KG/M2 | DIASTOLIC BLOOD PRESSURE: 92 MMHG | SYSTOLIC BLOOD PRESSURE: 150 MMHG | WEIGHT: 296 LBS | HEIGHT: 71 IN

## 2019-08-15 DIAGNOSIS — M99.05 SOMATIC DYSFUNCTION OF PELVIC REGION: ICD-10-CM

## 2019-08-15 DIAGNOSIS — M21.42 PES PLANUS OF BOTH FEET: ICD-10-CM

## 2019-08-15 DIAGNOSIS — M21.41 PES PLANUS OF BOTH FEET: ICD-10-CM

## 2019-08-15 DIAGNOSIS — M99.04 SACRAL REGION SOMATIC DYSFUNCTION: ICD-10-CM

## 2019-08-15 DIAGNOSIS — M99.03 SOMATIC DYSFUNCTION OF LUMBAR REGION: ICD-10-CM

## 2019-08-15 DIAGNOSIS — M99.08 SOMATIC DYSFUNCTION OF RIB CAGE REGION: ICD-10-CM

## 2019-08-15 DIAGNOSIS — M25.551 POSTERIOR PAIN OF HIP, RIGHT: ICD-10-CM

## 2019-08-15 DIAGNOSIS — M25.551 RIGHT HIP PAIN: ICD-10-CM

## 2019-08-15 DIAGNOSIS — M54.50 ACUTE RIGHT-SIDED LOW BACK PAIN WITHOUT SCIATICA: Primary | ICD-10-CM

## 2019-08-15 DIAGNOSIS — M79.10 MYALGIA: ICD-10-CM

## 2019-08-15 PROCEDURE — 1101F PT FALLS ASSESS-DOCD LE1/YR: CPT | Mod: CPTII,S$GLB,, | Performed by: NEUROMUSCULOSKELETAL MEDICINE & OMM

## 2019-08-15 PROCEDURE — 73502 X-RAY EXAM HIP UNI 2-3 VIEWS: CPT | Mod: TC,PO,RT

## 2019-08-15 PROCEDURE — 98926 OSTEOPATH MANJ 3-4 REGIONS: CPT | Mod: S$GLB,,, | Performed by: NEUROMUSCULOSKELETAL MEDICINE & OMM

## 2019-08-15 PROCEDURE — 99204 OFFICE O/P NEW MOD 45 MIN: CPT | Mod: 25,S$GLB,, | Performed by: NEUROMUSCULOSKELETAL MEDICINE & OMM

## 2019-08-15 PROCEDURE — 99204 PR OFFICE/OUTPT VISIT, NEW, LEVL IV, 45-59 MIN: ICD-10-PCS | Mod: 25,S$GLB,, | Performed by: NEUROMUSCULOSKELETAL MEDICINE & OMM

## 2019-08-15 PROCEDURE — 99999 PR PBB SHADOW E&M-EST. PATIENT-LVL III: ICD-10-PCS | Mod: PBBFAC,,, | Performed by: NEUROMUSCULOSKELETAL MEDICINE & OMM

## 2019-08-15 PROCEDURE — 73502 X-RAY EXAM HIP UNI 2-3 VIEWS: CPT | Mod: 26,RT,, | Performed by: RADIOLOGY

## 2019-08-15 PROCEDURE — 99999 PR PBB SHADOW E&M-EST. PATIENT-LVL III: CPT | Mod: PBBFAC,,, | Performed by: NEUROMUSCULOSKELETAL MEDICINE & OMM

## 2019-08-15 PROCEDURE — 1101F PR PT FALLS ASSESS DOC 0-1 FALLS W/OUT INJ PAST YR: ICD-10-PCS | Mod: CPTII,S$GLB,, | Performed by: NEUROMUSCULOSKELETAL MEDICINE & OMM

## 2019-08-15 PROCEDURE — 73502 XR HIP 2 VIEW RIGHT: ICD-10-PCS | Mod: 26,RT,, | Performed by: RADIOLOGY

## 2019-08-15 PROCEDURE — 98926 PR OSTEOPATHIC MANIP,3-4 BODY REGN: ICD-10-PCS | Mod: S$GLB,,, | Performed by: NEUROMUSCULOSKELETAL MEDICINE & OMM

## 2019-08-15 NOTE — PROGRESS NOTES
"Subjective:     Elías Gay     Chief Complaint   Patient presents with    Right Hip - Pain    Spine - Pain       HPI    Elías is a 64 y.o. male coming in today for right hip pain that began "years" ago, referred by self. Pt. describes the pain as a 6-8/10 achy pain that does radiate. There was not a fall/injury/ or trauma associated with the onset of symptoms. He has had pain in his posterior right hip and back for years, with intermittent flare ups. The most recent flare up occurred when pt was installing his  1 week ago. The pain is better with heat, vibrating massager and worse with bending (putting on shoes, etc). Pt is working on losing weight and is a member at the [employer]. Pt. Denies any other musculoskeletal complaints at this time. He did PT years ago for sciatica and found this very helpful- still does his exercises at home prn.     Joint instability? no  Mechanical locking/clicking? no  Affecting ADL's? yes  Affecting sleep? yes    Occupation: retired, does a lot of home projects    Review of Systems   Constitutional: Negative for chills and fever.   HENT: Negative for hearing loss and tinnitus.    Eyes: Negative for blurred vision and photophobia.   Respiratory: Negative for cough and shortness of breath.    Cardiovascular: Negative for chest pain and leg swelling.   Gastrointestinal: Negative for abdominal pain, heartburn, nausea and vomiting.   Genitourinary: Negative for dysuria and hematuria.   Musculoskeletal: Positive for back pain, joint pain and myalgias. Negative for falls and neck pain.   Skin: Negative for rash.   Neurological: Negative for dizziness, tingling, focal weakness, weakness and headaches.   Endo/Heme/Allergies: Positive for environmental allergies. Does not bruise/bleed easily.   Psychiatric/Behavioral: Negative for depression. The patient is not nervous/anxious.        PAST MEDICAL HISTORY:   Past Medical History:   Diagnosis Date    Basal cell " carcinoma 11/06/2018    left ear helix    Foreign body in conjunctival sac     Hernia, inguinal, right 2002    Melanoma 09/14/2018    Right Arm 0.6mm    Severe sleep apnea      PAST SURGICAL HISTORY:   Past Surgical History:   Procedure Laterality Date    CLOSURE, MOHS PROCEDURE DEFECT LEFT EAR Left 1/3/2019    [facility] at [facility]    HERNIA REPAIR Left     3 years of age    HERNIA REPAIR N/A     Ventral wall at 3 year of age.    KNEE SURGERY Right 1984    Arthroscopic    NASAL SEPTUM SURGERY N/A 2009     FAMILY HISTORY:   Family History   Problem Relation Age of Onset    Hypertension Mother     Stroke Mother     Aneurysm Mother     Cancer Father 72        Prostate and liver    No Known Problems Daughter     No Known Problems Son     Gout Brother     No Known Problems Daughter     No Known Problems Son     Cataracts Neg Hx     Glaucoma Neg Hx     Macular degeneration Neg Hx     Melanoma Neg Hx     Eczema Neg Hx     Lupus Neg Hx     Psoriasis Neg Hx      SOCIAL HISTORY:   Social History     Socioeconomic History    Marital status:      Spouse name: Not on file    Number of children: Not on file    Years of education: Not on file    Highest education level: Not on file   Occupational History    Not on file   Social Needs    Financial resource strain: Not very hard    Food insecurity:     Worry: Never true     Inability: Never true    Transportation needs:     Medical: No     Non-medical: No   Tobacco Use    Smoking status: Never Smoker    Smokeless tobacco: Never Used   Substance and Sexual Activity    Alcohol use: Yes     Frequency: Monthly or less     Drinks per session: 1 or 2     Binge frequency: Never     Comment: Rare    Drug use: No    Sexual activity: Yes     Partners: Female     Birth control/protection: None   Lifestyle    Physical activity:     Days per week: 3 days     Minutes per session: 120 min    Stress: Not at all  "  Relationships    Social connections:     Talks on phone: More than three times a week     Gets together: Three times a week     Attends [employer]ism service: Not on file     Active member of club or organization: Yes     Attends meetings of clubs or organizations: More than 4 times per year     Relationship status:    Other Topics Concern    Not on file   Social History Narrative    Not on file       MEDICATIONS:   Current Outpatient Medications:     guaiFENesin (MUCINEX) 600 mg 12 hr tablet, Take 1,200 mg by mouth 2 (two) times daily., Disp: , Rfl:     azelastine (ASTELIN) 137 mcg (0.1 %) nasal spray, 1 spray (137 mcg total) by Nasal route 2 (two) times daily., Disp: 30 mL, Rfl: 11    promethazine-dextromethorphan (PROMETHAZINE-DM) 6.25-15 mg/5 mL Syrp, Take 5 mLs by mouth every evening., Disp: 180 mL, Rfl: 1  ALLERGIES: Review of patient's allergies indicates:  No Known Allergies    Objective:     VITAL SIGNS: BP (!) 150/92 (BP Location: Left arm, Patient Position: Sitting, BP Method: Large (Manual))   Ht 5' 11" (1.803 m)   Wt 134.3 kg (296 lb)   BMI 41.28 kg/m²    General    Nursing note and vitals reviewed.  Constitutional: He is oriented to person, place, and time. He appears well-developed and well-nourished.   HENT:   Head: Normocephalic and atraumatic.   no nasal discharge, no external ear redness or discharge   Eyes:   EOM is full and smooth  No eye redness or discharge   Neck: Neck supple. No tracheal deviation present.   Cardiovascular: Normal rate.    2+ Radial pulse bilaterally  2+ Dorsalis Pedis pulse bilaterally  No LE edema appreciated   Pulmonary/Chest: Effort normal. No respiratory distress.   Abdominal: He exhibits no distension.   No rigidity   Neurological: He is alert and oriented to person, place, and time. He exhibits normal muscle tone. Coordination normal.   See details below   Psychiatric: He has a normal mood and affect. His behavior is normal.               MUSCULOSKELETAL " EXAM  HIP: right HIP  The affected hip is compared to the contralateral hip.    Observation:    There is no edema, erythema, or ecchymosis in the lumbosacral region.   There is no Trendelenburg sign on either side  No obvious pelvic obliquity while standing.    No thoracolumbar scoliosis observed.    No midline skin abnormalities.  No atrophy noted in the lower limbs.  + pes planus bilaterally  + posterior pelvic tilt with increased thoracic kyphosis  Poor bilateral pelvic stability with bilateral hip hiking compensatory pattern noted with single leg raise      ROM (* = with pain):  Passive hip flexion to 120° on left and 120° on right  Passive hip internal rotation to 45° on left and 45° on right  Passive hip external rotation to 45° on left and 45° on right* (posterior hip pain)   Passive hip abduction to 45° on left and 45° on right    Tenderness To Palpation:  No tenderness at the ASIS, AIIS, PSIS, PIIS, iliac crest, pubic bones, ischial tuberosity.  No tenderness throughout the lumbar spine, iliolumbar region, or posterior pelvis.  + tenderness at the thoracolumbar junction paraspinal musculature  No tenderness throughout the sacrum or piriformis  No tenderness over the greater trochanteric bursa or greater/lesser trochanters.  No tenderness at the glut attachments on the greater trochanter  No tenderness over proximal IT band or hip flexor musculature.    Strength Testing (* = with pain):  Hip flexion - 5/5 on left and 5/5 on right  Hip extension - 5/5 on left and 5/5 on right  Hip adduction - 5/5 on left and 5/5 on right  Hip abduction - 5/5 on left and 5/5 on right  Knee flexion - 5/5 on left and 5/5 on right  Knee extension - 5/5 on left and 5/5 on right  Glutaeus medius - 4-/5 on left and 4-/5 on right    Special Tests:  Standing Trendelenburg test - negative    Seated straight leg raise - negative  Supine straight leg raise - negative  Hamstring flexibility symmetric    Log roll - negative  VALENCIA -  positive for posterior hip pain  FADIR - negative  Scour test - negative  No pain with posterior hip capsule compression    ASIS compression test - negative  SI drawer test - negative   Thigh thrust test - negative     Piriformis test (Bonnet's) - negative  Quadriceps flexibility symmetric.  Jaylon test - positive bilaterally  Jerry compression test - negative    Leg lengths symmetric.     Neurovascular Exam:  Normal gait without Trendelenburg or antalgia.  2+ femoral, DP, and PT pulses BL.  No skin changes, no abnormal hair distribution.  Sensation intact to light touch throughout the obturator and medial/lateral/posterior femoral cutaneous nerves.  2+/4 reflexes at L4 and S1 dermatomes  Capillary refill intact to <2 seconds in all lower limb digits.    TART (Tissue texture abnormality, Asymmetry,  Restriction of motion and/or Tenderness) changes:     Thoracic Spine   T1 Neutral   T2 Neutral   T3 Neutral   T4 Neutral   T5 Neutral   T6 Neutral   T7 Neutral   T8 Neutral   T9 Neutral   T10 FRS LEFT   T11 Neutral   T12 TTA RIGHT     Rib cage: Neutral     Lumbar Spine   L1 TTA RIGHT   L2 Neutral   L3 Neutral   L4 FRS LEFT   L5 FRS LEFT       Pelvis:  · Innominate:Right anterior rotation  · Pubic bone:Right inferior pubic shear    Sacrum:Right on Left sacral torsion      Key   F= Flexed   E = Extended   R = Rotated   S = Sidebent   TTA = tissue texture abnormality     IMAGIN. X-ray ordered due to right hip pain. (AP pelvis and frogleg lateral  right views) taken today.   2. X-ray images were reviewed personally by me and then directly with patient.  3. FINDINGS: X-ray images obtained demonstrate Mild DJD with hip joint space narrowing bilaterally.  No fracture or dislocation.  No bone destruction identified.  4. IMPRESSION: Mild DJD with hip joint space narrowing bilaterally.      Assessment:      Encounter Diagnoses   Name Primary?    Acute right-sided low back pain without sciatica Yes    Posterior pain of hip,  right     Pes planus of both feet     Myalgia     Somatic dysfunction of rib cage region     Somatic dysfunction of lumbar region     Sacral region somatic dysfunction     Somatic dysfunction of pelvic region           Plan:   1.  Acute on chronic posterior right hip and back pain secondary to poor pelvic stability and compensatory muscle firing patterns.  Associated bilateral pes planus also contributing to biomechanical restrictions of the lower kinetic chain.   - OMT performed today to address associated biomechanical restrictions  and HEP started.   - Recommend OTC NSAIDs prn for pain control  - Referral to outpatient PT ([facility] ) for increased pelvic stability and NM retraining. Emphasized importance of maintaining HEP after completion of PT.   - OTC medial arch support recommended for bilateral pes planus. Contact information for [employer] [facility] given.   - X-ray images of right hip taken today (AP pelvis and frogleg lateral  right views) showed mild DJD with hip joint space narrowing bilaterally. Images were personally reviewed with patient.    2. OMT 3-4 regions. Oral consent obtained.  Reviewed benefits and potential side effects.   - OMT indicated today due to signs and symptoms as well as local and remote somatic dysfunction findings and their related neurokinetic, lymphatic, fascial and/or arteriovenous body connections.   - OMT techniques used: Myofascial Release and Muscle Energy   - Treatment was tolerated well. Improvement noted in segmental mobility post-treatment in dysfunctional regions. There were no adverse events and no complications immediately following treatment.     3. Pt. Given the following HEP:  A)  Pelvic clock exercises given to do from the 6-12 o'clock positions:10-15 reps, twice daily. Hand out of exercise also given.     The patient was taught a homegoing physical therapy regimen as described above. The patient demonstrated understanding of the exercises and  proper technique of their execution.     4. Follow-up upon completion of PT if pain persist or deteriorates    5. Patient agreeable to today's plan and all questions were answered    This note is dictated using the M*Modal Fluency Direct word recognition program. There are word recognition mistakes that are occasionally missed on review.

## 2019-08-21 ENCOUNTER — OFFICE VISIT (OUTPATIENT)
Dept: ORTHOPEDICS | Facility: CLINIC | Age: 64
End: 2019-08-21
Payer: MEDICARE

## 2019-08-21 VITALS
SYSTOLIC BLOOD PRESSURE: 130 MMHG | HEIGHT: 71 IN | DIASTOLIC BLOOD PRESSURE: 78 MMHG | WEIGHT: 296.06 LBS | BODY MASS INDEX: 41.45 KG/M2

## 2019-08-21 DIAGNOSIS — M99.04 SACRAL REGION SOMATIC DYSFUNCTION: ICD-10-CM

## 2019-08-21 DIAGNOSIS — M79.10 MYALGIA: ICD-10-CM

## 2019-08-21 DIAGNOSIS — M21.42 PES PLANUS OF BOTH FEET: ICD-10-CM

## 2019-08-21 DIAGNOSIS — M21.41 PES PLANUS OF BOTH FEET: ICD-10-CM

## 2019-08-21 DIAGNOSIS — M25.551 POSTERIOR PAIN OF HIP, RIGHT: ICD-10-CM

## 2019-08-21 DIAGNOSIS — M99.03 SOMATIC DYSFUNCTION OF LUMBAR REGION: ICD-10-CM

## 2019-08-21 DIAGNOSIS — M54.50 ACUTE RIGHT-SIDED LOW BACK PAIN WITHOUT SCIATICA: Primary | ICD-10-CM

## 2019-08-21 DIAGNOSIS — M99.05 SOMATIC DYSFUNCTION OF PELVIC REGION: ICD-10-CM

## 2019-08-21 PROCEDURE — 1101F PT FALLS ASSESS-DOCD LE1/YR: CPT | Mod: CPTII,S$GLB,, | Performed by: NEUROMUSCULOSKELETAL MEDICINE & OMM

## 2019-08-21 PROCEDURE — 1101F PR PT FALLS ASSESS DOC 0-1 FALLS W/OUT INJ PAST YR: ICD-10-PCS | Mod: CPTII,S$GLB,, | Performed by: NEUROMUSCULOSKELETAL MEDICINE & OMM

## 2019-08-21 PROCEDURE — 99213 PR OFFICE/OUTPT VISIT, EST, LEVL III, 20-29 MIN: ICD-10-PCS | Mod: 25,S$GLB,, | Performed by: NEUROMUSCULOSKELETAL MEDICINE & OMM

## 2019-08-21 PROCEDURE — 98926 OSTEOPATH MANJ 3-4 REGIONS: CPT | Mod: S$GLB,,, | Performed by: NEUROMUSCULOSKELETAL MEDICINE & OMM

## 2019-08-21 PROCEDURE — 99999 PR PBB SHADOW E&M-EST. PATIENT-LVL III: ICD-10-PCS | Mod: PBBFAC,,, | Performed by: NEUROMUSCULOSKELETAL MEDICINE & OMM

## 2019-08-21 PROCEDURE — 99213 OFFICE O/P EST LOW 20 MIN: CPT | Mod: 25,S$GLB,, | Performed by: NEUROMUSCULOSKELETAL MEDICINE & OMM

## 2019-08-21 PROCEDURE — 98926 PR OSTEOPATHIC MANIP,3-4 BODY REGN: ICD-10-PCS | Mod: S$GLB,,, | Performed by: NEUROMUSCULOSKELETAL MEDICINE & OMM

## 2019-08-21 PROCEDURE — 99999 PR PBB SHADOW E&M-EST. PATIENT-LVL III: CPT | Mod: PBBFAC,,, | Performed by: NEUROMUSCULOSKELETAL MEDICINE & OMM

## 2019-08-21 RX ORDER — CYCLOBENZAPRINE HCL 10 MG
10 TABLET ORAL 3 TIMES DAILY PRN
COMMUNITY
End: 2021-05-17

## 2019-08-21 NOTE — PROGRESS NOTES
Subjective:     Elías Gay     Chief Complaint   Patient presents with    Follow-up     R hip/low back       HPI      Elías is a 64 y.o. male coming in today for R hip pain. Since last visit the pain has Improved. He did have a flare up of right posterior hip pain Thursday night when he attempted to get off the floor after his HEP. He took a flexeril that night and felt better. Feeling well today. The pain is better with rest, HEP, OMT and worse with activity, sitting. Pt notes he is losing weight with weight watchers and would like to get back into the gym as well. Pt. describes the pain as a 2/10 dull pain that does not radiate. There has not been any new a fall/injury/ or traumas since last visit.  Pt. denies any new musculoskeletal complaints at this time.     Office note from 8/15/19 reviewed    Review of Systems   Constitutional: Negative for chills and fever.   Musculoskeletal: Positive for joint pain. Negative for back pain, falls, myalgias and neck pain.   Neurological: Negative for dizziness, tingling, focal weakness, weakness and headaches.       PAST MEDICAL HISTORY:   Past Medical History:   Diagnosis Date    Basal cell carcinoma 11/06/2018    left ear helix    Foreign body in conjunctival sac     Hernia, inguinal, right 2002    Melanoma 09/14/2018    Right Arm 0.6mm    Severe sleep apnea      PAST SURGICAL HISTORY:   Past Surgical History:   Procedure Laterality Date    CLOSURE, MOHS PROCEDURE DEFECT LEFT EAR Left 1/3/2019    [facility]    HERNIA REPAIR Left     3 years of age    HERNIA REPAIR N/A     Ventral wall at 3 year of age.    KNEE SURGERY Right 1984    Arthroscopic    NASAL SEPTUM SURGERY N/A 2009     FAMILY HISTORY:   Family History   Problem Relation Age of Onset    Hypertension Mother     Stroke Mother     Aneurysm Mother     Cancer Father 72        Prostate and liver    No Known Problems Daughter     No Known Problems Son      Gout Brother     No Known Problems Daughter     No Known Problems Son     Cataracts Neg Hx     Glaucoma Neg Hx     Macular degeneration Neg Hx     Melanoma Neg Hx     Eczema Neg Hx     Lupus Neg Hx     Psoriasis Neg Hx      SOCIAL HISTORY:   Social History     Socioeconomic History    Marital status:      Spouse name: Not on file    Number of children: Not on file    Years of education: Not on file    Highest education level: Not on file   Occupational History    Not on file   Social Needs    Financial resource strain: Not very hard    Food insecurity:     Worry: Never true     Inability: Never true    Transportation needs:     Medical: No     Non-medical: No   Tobacco Use    Smoking status: Never Smoker    Smokeless tobacco: Never Used   Substance and Sexual Activity    Alcohol use: Yes     Frequency: Monthly or less     Drinks per session: 1 or 2     Binge frequency: Never     Comment: Rare    Drug use: No    Sexual activity: Yes     Partners: Female     Birth control/protection: None   Lifestyle    Physical activity:     Days per week: 3 days     Minutes per session: 120 min    Stress: Not at all   Relationships    Social connections:     Talks on phone: More than three times a week     Gets together: Three times a week     Attends Sikh service: Not on file     Active member of club or organization: Yes     Attends meetings of clubs or organizations: More than 4 times per year     Relationship status:    Other Topics Concern    Not on file   Social History Narrative    Not on file       MEDICATIONS:   Current Outpatient Medications:     azelastine (ASTELIN) 137 mcg (0.1 %) nasal spray, 1 spray (137 mcg total) by Nasal route 2 (two) times daily., Disp: 30 mL, Rfl: 11    cyclobenzaprine (FLEXERIL) 10 MG tablet, Take 10 mg by mouth 3 (three) times daily as needed for Muscle spasms., Disp: , Rfl:     guaiFENesin (MUCINEX) 600 mg 12 hr tablet, Take 1,200 mg by mouth 2  "(two) times daily., Disp: , Rfl:     promethazine-dextromethorphan (PROMETHAZINE-DM) 6.25-15 mg/5 mL Syrp, Take 5 mLs by mouth every evening., Disp: 180 mL, Rfl: 1  ALLERGIES: Review of patient's allergies indicates:  No Known Allergies     IMAGIN. X-ray ordered due to right hip pain. (AP pelvis and frogleg lateral  right views) taken 8/15/19.   2. X-ray images were reviewed personally by me  3. FINDINGS: X-ray images obtained demonstrate Mild DJD with hip joint space narrowing bilaterally.  No fracture or dislocation.  No bone destruction identified.  4. IMPRESSION: Mild DJD with hip joint space narrowing bilaterally.    Objective:     VITAL SIGNS: /78 (BP Location: Right arm, Patient Position: Sitting, BP Method: Large (Manual))   Ht 5' 11" (1.803 m)   Wt 134.3 kg (296 lb 1.2 oz)   BMI 41.29 kg/m²    General    Vitals reviewed.  Constitutional: He is oriented to person, place, and time. He appears well-developed and well-nourished.   Neurological: He is alert and oriented to person, place, and time.   Psychiatric: He has a normal mood and affect. His behavior is normal.               MUSCULOSKELETAL EXAM  HIP: right HIP  The affected hip is compared to the contralateral hip.    Observation:    There is no edema, erythema, or ecchymosis in the lumbosacral region.   There is no Trendelenburg sign on either side  No obvious pelvic obliquity while standing.    No thoracolumbar scoliosis observed.    No midline skin abnormalities.  No atrophy noted in the lower limbs.  + pes planus bilaterally  + posterior pelvic tilt with increased thoracic kyphosis  Poor bilateral pelvic stability with bilateral hip hiking compensatory pattern noted with single leg raise      ROM (* = with pain):  Passive hip flexion to 120° on left and 120° on right  Passive hip internal rotation to 45° on left and 45° on right  Passive hip external rotation to 45° on left and 45° on right  Passive hip abduction to 45° on left and " 45° on right    Tenderness To Palpation:  No tenderness at the ASIS, AIIS, PSIS, PIIS, iliac crest, pubic bones, ischial tuberosity.  No tenderness throughout the lumbar spine, iliolumbar region, or posterior pelvis.  No tenderness at the thoracolumbar junction paraspinal musculature  No tenderness throughout the sacrum or piriformis  No tenderness over the greater trochanteric bursa or greater/lesser trochanters.  No tenderness at the glut attachments on the greater trochanter  No tenderness over proximal IT band or hip flexor musculature.    Strength Testing (* = with pain):  Hip flexion - 5/5 on left and 5/5 on right  Hip extension - 5/5 on left and 5/5 on right  Hip adduction - 5/5 on left and 5/5 on right  Hip abduction - 5/5 on left and 5/5 on right  Knee flexion - 5/5 on left and 5/5 on right  Knee extension - 5/5 on left and 5/5 on right  Glutaeus medius - 4-/5 on left and 4-/5 on right    Special Tests:  Standing Trendelenburg test - negative    Seated straight leg raise - negative  Supine straight leg raise - negative  Hamstring flexibility symmetric    Log roll - negative  VALENCIA - negative  FADIR - negative  Scour test - negative  No pain with posterior hip capsule compression    ASIS compression test - negative  SI drawer test - negative   Thigh thrust test - negative     Piriformis test (Bonnet's) - negative  Quadriceps flexibility symmetric.  Jaylon test - positive bilaterally  Jerry compression test - negative    Leg lengths symmetric.     Neurovascular Exam:  Normal gait without Trendelenburg or antalgia.  2+ femoral, DP, and PT pulses BL.  No skin changes, no abnormal hair distribution.  Sensation intact to light touch throughout the obturator and medial/lateral/posterior femoral cutaneous nerves.  2+/4 reflexes at L4 and S1 dermatomes  Capillary refill intact to <2 seconds in all lower limb digits.    TART (Tissue texture abnormality, Asymmetry,  Restriction of motion and/or Tenderness) changes:      Thoracic Spine   T1 Neutral   T2 Neutral   T3 Neutral   T4 Neutral   T5 Neutral   T6 Neutral   T7 Neutral   T8 Neutral   T9 Neutral   T10 Neutral   T11 Neutral   T12 Neutral     Rib cage: Neutral     Lumbar Spine   L1 Neutral   L2 Neutral   L3 Neutral   L4 FRS RIGHT   L5 FRS RIGHT       Pelvis:  · Innominate:Neutral  · Pubic bone:Right inferior pubic shear    Sacrum:Right unilateral extension      Key   F= Flexed   E = Extended   R = Rotated   S = Sidebent   TTA = tissue texture abnormality           Assessment:      Encounter Diagnoses   Name Primary?    Acute right-sided low back pain without sciatica Yes    Posterior pain of hip, right     Pes planus of both feet     BMI 40.0-44.9, adult     Myalgia     Somatic dysfunction of lumbar region     Sacral region somatic dysfunction     Somatic dysfunction of pelvic region           Plan:   1.  Acute on chronic posterior right hip and back pain secondary to poor pelvic stability and compensatory muscle firing patterns- improved after recent deterioration.  Associated bilateral pes planus also contributing to biomechanical restrictions of the lower kinetic chain.   - OMT performed again today to address associated biomechanical restrictions and HEP reviewed   - Recommend OTC NSAIDs prn for pain control  - Referral to outpatient PT ([facility] ) for increased pelvic stability and NM retraining. Emphasized importance of maintaining HEP after completion of PT.   - Continue OTC medial arch support wear for bilateral pes planus.    - X-ray images of right hip taken 8/15/19 (AP pelvis and frogleg lateral  right views) showed mild DJD with hip joint space narrowing bilaterally. Images were personally reviewed with patient.    2.  High BMI at 41.29:  Discussed aerobic fitness exercises for weight loss including low impact cardio exercises and circuit training weightlifting exercises    3. OMT 3-4 regions. Oral consent obtained.  Reviewed benefits and potential side  effects.   - OMT indicated today due to signs and symptoms as well as local and remote somatic dysfunction findings and their related neurokinetic, lymphatic, fascial and/or arteriovenous body connections.   - OMT techniques used: Myofascial Release and Muscle Energy   - Treatment was tolerated well. Improvement noted in segmental mobility post-treatment in dysfunctional regions. There were no adverse events and no complications immediately following treatment.     4. Pt. Given the following HEP:  A)  Pelvic clock exercises given to do from the 6-12 o'clock positions:10-15 reps, twice daily. Hand out of exercise also given.     The patient was taught a homegoing physical therapy regimen as described above. The patient demonstrated understanding of the exercises and proper technique of their execution.     5. Follow-up upon completion of PT if pain persist or deteriorates    6. Patient agreeable to today's plan and all questions were answered    This note is dictated using the M*Modal Fluency Direct word recognition program. There are word recognition mistakes that are occasionally missed on review.

## 2019-08-27 ENCOUNTER — PATIENT OUTREACH (OUTPATIENT)
Dept: ADMINISTRATIVE | Facility: OTHER | Age: 64
End: 2019-08-27

## 2019-08-28 ENCOUNTER — OFFICE VISIT (OUTPATIENT)
Dept: NEUROLOGY | Facility: CLINIC | Age: 64
End: 2019-08-28
Payer: MEDICARE

## 2019-08-28 VITALS
WEIGHT: 294.75 LBS | HEIGHT: 71 IN | BODY MASS INDEX: 41.27 KG/M2 | HEART RATE: 69 BPM | SYSTOLIC BLOOD PRESSURE: 136 MMHG | DIASTOLIC BLOOD PRESSURE: 86 MMHG

## 2019-08-28 DIAGNOSIS — G47.31 COMPLEX SLEEP APNEA SYNDROME: Primary | ICD-10-CM

## 2019-08-28 PROCEDURE — 99999 PR PBB SHADOW E&M-EST. PATIENT-LVL III: CPT | Mod: PBBFAC,,, | Performed by: NURSE PRACTITIONER

## 2019-08-28 PROCEDURE — 1101F PR PT FALLS ASSESS DOC 0-1 FALLS W/OUT INJ PAST YR: ICD-10-PCS | Mod: CPTII,S$GLB,, | Performed by: NURSE PRACTITIONER

## 2019-08-28 PROCEDURE — 99213 PR OFFICE/OUTPT VISIT, EST, LEVL III, 20-29 MIN: ICD-10-PCS | Mod: S$GLB,,, | Performed by: NURSE PRACTITIONER

## 2019-08-28 PROCEDURE — 99999 PR PBB SHADOW E&M-EST. PATIENT-LVL III: ICD-10-PCS | Mod: PBBFAC,,, | Performed by: NURSE PRACTITIONER

## 2019-08-28 PROCEDURE — 99213 OFFICE O/P EST LOW 20 MIN: CPT | Mod: S$GLB,,, | Performed by: NURSE PRACTITIONER

## 2019-08-28 PROCEDURE — 1101F PT FALLS ASSESS-DOCD LE1/YR: CPT | Mod: CPTII,S$GLB,, | Performed by: NURSE PRACTITIONER

## 2019-08-28 NOTE — PROGRESS NOTES
"Elías Gay  was seen as a f/u today mgt of complex sleep apnea    Continues to use ASV therapy. 13# loss on [employer]  90% epap 9.4, avg use 6-7.3h/night. [id number]. ahi 8-13, 2-3% periodic. 93% mask fit  Sleeping mostly undisturbed, bad past 2-mos due to back pain/attending PT next 8 wks, snoring and air gasping resolved. Daytime sleepiness remains improved overall      HISTORY  2016  CHIEF COMPLAINT: Snoring, disrupted sleep    HISTORY OF PRESENT ILLNESS: Elías Gay a 64 y.o. male presents for the evaluation of possible obstructive sleep apnea. He has never had a sleep study. He report symptoms of disruptive snoring, un-refreshing sleep, and excessive daytime sleepiness. Wife reports he stops breathing during sleep, afraid she will find him dead. He eats his stress and gained ~ 140# the last 39 yrs. Nocturia 5-7x. Occasional sinus headaches upon awakening if has slept on left side. Best sleep he gets is sitting in his chair at nighttime. To avoid leg pain/swelling he has to go lie in his bed but after ~ 20min he is clogged up. Prefers to sleep on R side to help avoid this.     +chronic nasal congestion. Hx septoplasty/sinuplasty Dr. Sorensen.   Uses daily sinus flush, occasional Flonase NS, and Mucinex (works best to keep it moist/thin) +hot shower    Reviewed Dr. Diaz noted "He always has problems with his sinuses. He is taking Mucinex for this. He has a terrible snore. He sleeps about an  hour and a half each night before he wakes up, so he is waking up about 4 times  a night. He normally sees Dr. Odette Sorensen and ENT in the community."    Denies symptoms of restless legs or kicking during sleep.     On todays [place] Sleepiness Scale the patient scores a 1324.     Sleeps ~ 9p-3-4a    8/2018:  Since last seen he got setup ASV. Now sleeping in bed instead of chair. Gets burning hands at times. Snoring resolved. Still waking up some, but less often overall. Attributes to mask irritation/dry eyes. Using ASV " "has been life changing  Interrogation- ahi 22, avg use 6.1h/n. F20 mask. 90% tile epap 10cm. 4-8% periodic        5/11/16: AHI was 123.2 with an oxygen diane of 77.0%. During the treatment portion of the study, CPAP was explored from 6 to 20 cm of water using a medium Quattro full face mask, chin strap, CFlex at 3, and heated humidification. Titration was started with Airfit nasal mask but was switched to full face due to observed mouth leak. Effective control of sleep disordered breathing was not observed with CPAP or BPAP. Obstructive apnea persisted with maximum CPAP of 20 cm H20 and BPAP of 25/15. Emergence of central apnea was also observed while on PAP.     FAMILY HISTORY: Brother +TJ  SOCIAL HISTORY: . [employer] , owns [employer]      REVIEW OF SYSTEMS: 8/28/19: Sleep related symptoms as per HPI, lost wgt  Difficulty breathing through the nose?  Yes   Sore throat or dry mouth in the morning? Yes   Irregular or very fast heart beat?  No   Shortness of breath?  Yes   Acid reflux? No   Body aches and pains?  Yes   Morning headaches? Yes   Dizziness? No   Mood changes?  No   Do you exercise?  Sometimes   Do you feel like moving your legs a lot?  No       PHYSICAL EXAM:   /86 (BP Location: Right arm, Patient Position: Sitting, BP Method: Large (Automatic))   Pulse 69   Ht 5' 11" (1.803 m)   Wt 133.7 kg (294 lb 12.1 oz)   BMI 41.11 kg/m²   GENERAL: morbid obese body habitus, well groomed     2018 EF 60-65% Echo    ASSESSMENT:   Complex sleep apnea, severe. Needs ASV titration study. Effective pressure undetermined with CPAP and bipap 8/29/18 and 8/28/19: Excellent adherence with ASV, symptoms improved  He has medical comorbidities of morbid obesity.     Nasal turbinate hypertrophy, could be limiting factor potential successful acclimation to PAP     PLAN:   1. Continue ASV settings, epap at 7   2. Discussed etiology of TJ/central and potential ramifications of untreated sleep apnea, " including stroke, heart disease, HTN.  3. The patient was advised to abstain from driving should he feel sleepy or drowsy.   4. Continue sinus hygiene to reduce potential UARS/Astelin  5. Encouraged continued weight loss efforts for potential improvement of TJ and overall health benefits

## 2019-09-12 ENCOUNTER — OFFICE VISIT (OUTPATIENT)
Dept: FAMILY MEDICINE | Facility: CLINIC | Age: 64
End: 2019-09-12
Payer: MEDICARE

## 2019-09-12 VITALS
DIASTOLIC BLOOD PRESSURE: 80 MMHG | OXYGEN SATURATION: 98 % | BODY MASS INDEX: 40.43 KG/M2 | HEIGHT: 71 IN | SYSTOLIC BLOOD PRESSURE: 140 MMHG | HEART RATE: 88 BPM | TEMPERATURE: 99 F | WEIGHT: 288.81 LBS

## 2019-09-12 DIAGNOSIS — R03.0 ELEVATED BLOOD PRESSURE READING WITHOUT DIAGNOSIS OF HYPERTENSION: ICD-10-CM

## 2019-09-12 DIAGNOSIS — N39.0 COMPLICATED UTI (URINARY TRACT INFECTION): Primary | ICD-10-CM

## 2019-09-12 DIAGNOSIS — N13.8 BPH WITH URINARY OBSTRUCTION: ICD-10-CM

## 2019-09-12 DIAGNOSIS — N40.1 BPH WITH URINARY OBSTRUCTION: ICD-10-CM

## 2019-09-12 LAB
BILIRUB SERPL-MCNC: NORMAL MG/DL
BLOOD URINE, POC: 250
COLOR, POC UA: NORMAL
GLUCOSE UR QL STRIP: NORMAL
KETONES UR QL STRIP: NORMAL
LEUKOCYTE ESTERASE URINE, POC: NORMAL
NITRITE, POC UA: NORMAL
PH, POC UA: 9
PROTEIN, POC: NORMAL
SPECIFIC GRAVITY, POC UA: 1000
UROBILINOGEN, POC UA: NORMAL

## 2019-09-12 PROCEDURE — 1101F PR PT FALLS ASSESS DOC 0-1 FALLS W/OUT INJ PAST YR: ICD-10-PCS | Mod: CPTII,S$GLB,, | Performed by: INTERNAL MEDICINE

## 2019-09-12 PROCEDURE — 81001 POCT URINALYSIS, DIPSTICK OR TABLET REAGENT, AUTOMATED, WITH MICROSCOP: ICD-10-PCS | Mod: S$GLB,,, | Performed by: INTERNAL MEDICINE

## 2019-09-12 PROCEDURE — 99999 PR PBB SHADOW E&M-EST. PATIENT-LVL III: ICD-10-PCS | Mod: PBBFAC,,, | Performed by: INTERNAL MEDICINE

## 2019-09-12 PROCEDURE — 99999 PR PBB SHADOW E&M-EST. PATIENT-LVL III: CPT | Mod: PBBFAC,,, | Performed by: INTERNAL MEDICINE

## 2019-09-12 PROCEDURE — 1101F PT FALLS ASSESS-DOCD LE1/YR: CPT | Mod: CPTII,S$GLB,, | Performed by: INTERNAL MEDICINE

## 2019-09-12 PROCEDURE — 81001 URINALYSIS AUTO W/SCOPE: CPT | Mod: S$GLB,,, | Performed by: INTERNAL MEDICINE

## 2019-09-12 PROCEDURE — 99214 OFFICE O/P EST MOD 30 MIN: CPT | Mod: 25,S$GLB,, | Performed by: INTERNAL MEDICINE

## 2019-09-12 PROCEDURE — 99214 PR OFFICE/OUTPT VISIT, EST, LEVL IV, 30-39 MIN: ICD-10-PCS | Mod: 25,S$GLB,, | Performed by: INTERNAL MEDICINE

## 2019-09-12 RX ORDER — TAMSULOSIN HYDROCHLORIDE 0.4 MG/1
0.4 CAPSULE ORAL DAILY
Qty: 90 CAPSULE | Refills: 0 | Status: SHIPPED | OUTPATIENT
Start: 2019-09-12 | End: 2021-05-17

## 2019-09-12 RX ORDER — SULFAMETHOXAZOLE AND TRIMETHOPRIM 800; 160 MG/1; MG/1
1 TABLET ORAL 2 TIMES DAILY
Qty: 28 TABLET | Refills: 0 | Status: SHIPPED | OUTPATIENT
Start: 2019-09-12 | End: 2019-09-26

## 2019-09-12 NOTE — ASSESSMENT & PLAN NOTE
Counseled on Flomax for prevention.  I have discussed the common side effects of this medication and black box warnings (if applicable to this medication) with the patient and answered all of the questions they had at the time of this visit regarding this medication.

## 2019-09-12 NOTE — PROGRESS NOTES
Assessment & Plan (all problems are new to me)  Problem List Items Addressed This Visit        Renal/    BPH with urinary obstruction    Current Assessment & Plan     Counseled on Flomax for prevention.  I have discussed the common side effects of this medication and black box warnings (if applicable to this medication) with the patient and answered all of the questions they had at the time of this visit regarding this medication.            Other Visit Diagnoses     Complicated UTI    -  Primary  -    Complicated since male patient.  Start tamsulosin and Bactrim.  Will attempt 2 weeks of Bactrim and stop.  May need to extend treatment.     Relevant Medications    sulfamethoxazole-trimethoprim 800-160mg (BACTRIM DS) 800-160 mg Tab    tamsulosin (FLOMAX) 0.4 mg Cap    Other Relevant Orders    POCT urinalysis, dipstick or tablet reag (Completed)    Elevated blood pressure reading without diagnosis of hypertension    -  Monitor             Health Maintenance reviewed, deferred.    Follow-up: Follow up if symptoms worsen or fail to improve.    _____________________________________________________________________    Chief Complaint  Chief Complaint   Patient presents with    Dysuria       HPI  Elías Gay is a 64 y.o. male with multiple medical diagnoses as listed in the medical history and problem list that presents for dysuria x 1-2 days.  Pt is new to me; followed primarily by Simon Cordero PCP.      Has some urgency with his BPH at baseline.  Now having dysuria, obstructive symptoms, urgency.  No hematuria.  Feels feverish but no temp.  No chills, night seats.  He had severe symptoms yesterday tried to hydrate which increased urination but still lots of dysuria.  No new CVA symptoms.  He seen by urology 11 months ago and was prescribed Flomax but never took it.        PAST MEDICAL HISTORY:  Past Medical History:   Diagnosis Date    Basal cell carcinoma 11/06/2018    left ear helix    Foreign body in conjunctival  sac     Hernia, inguinal, right 2002    Melanoma 09/14/2018    Right Arm 0.6mm    Severe sleep apnea        PAST SURGICAL HISTORY:  Past Surgical History:   Procedure Laterality Date    CLOSURE, MOHS PROCEDURE DEFECT LEFT EAR Left 1/3/2019    [facility]    HERNIA REPAIR Left     3 years of age    HERNIA REPAIR N/A     Ventral wall at 3 year of age.    KNEE SURGERY Right 1984    Arthroscopic    NASAL SEPTUM SURGERY N/A 2009       SOCIAL HISTORY:  Social History     Socioeconomic History    Marital status:      Spouse name: Not on file    Number of children: Not on file    Years of education: Not on file    Highest education level: Not on file   Occupational History    Not on file   Social Needs    Financial resource strain: Not very hard    Food insecurity:     Worry: Never true     Inability: Never true    Transportation needs:     Medical: No     Non-medical: No   Tobacco Use    Smoking status: Never Smoker    Smokeless tobacco: Never Used   Substance and Sexual Activity    Alcohol use: Yes     Frequency: Monthly or less     Drinks per session: 1 or 2     Binge frequency: Never     Comment: Rare    Drug use: No    Sexual activity: Yes     Partners: Female     Birth control/protection: None   Lifestyle    Physical activity:     Days per week: 3 days     Minutes per session: 120 min    Stress: Not at all   Relationships    Social connections:     Talks on phone: More than three times a week     Gets together: Three times a week     Attends [employer] service: Not on file     Active member of club or organization: Yes     Attends meetings of clubs or organizations: More than 4 times per year     Relationship status:    Other Topics Concern    Not on file   Social History Narrative    Not on file       FAMILY HISTORY:  Family History   Problem Relation Age of Onset    Hypertension Mother     Stroke Mother     Aneurysm Mother     Cancer  Father 72        Prostate and liver    No Known Problems Daughter     No Known Problems Son     Gout Brother     No Known Problems Daughter     No Known Problems Son     Cataracts Neg Hx     Glaucoma Neg Hx     Macular degeneration Neg Hx     Melanoma Neg Hx     Eczema Neg Hx     Lupus Neg Hx     Psoriasis Neg Hx        ALLERGIES AND MEDICATIONS: updated and reviewed.  Review of patient's allergies indicates:  No Known Allergies  Current Outpatient Medications   Medication Sig Dispense Refill    guaiFENesin (MUCINEX) 600 mg 12 hr tablet Take 1,200 mg by mouth 2 (two) times daily.      azelastine (ASTELIN) 137 mcg (0.1 %) nasal spray 1 spray (137 mcg total) by Nasal route 2 (two) times daily. 30 mL 11    cyclobenzaprine (FLEXERIL) 10 MG tablet Take 10 mg by mouth 3 (three) times daily as needed for Muscle spasms.      promethazine-dextromethorphan (PROMETHAZINE-DM) 6.25-15 mg/5 mL Syrp Take 5 mLs by mouth every evening. 180 mL 1    sulfamethoxazole-trimethoprim 800-160mg (BACTRIM DS) 800-160 mg Tab Take 1 tablet by mouth 2 (two) times daily. for 14 days 28 tablet 0    tamsulosin (FLOMAX) 0.4 mg Cap Take 1 capsule (0.4 mg total) by mouth once daily. 90 capsule 0     No current facility-administered medications for this visit.          ROS  Review of Systems   Constitutional: Negative for chills, fever and unexpected weight change.   Respiratory: Negative for cough, chest tightness, shortness of breath and wheezing.    Cardiovascular: Negative for chest pain, palpitations and leg swelling.   Gastrointestinal: Negative for abdominal pain, constipation, diarrhea, nausea and vomiting.   Genitourinary: Positive for difficulty urinating, dysuria, frequency and urgency. Negative for decreased urine volume, flank pain and hematuria.   Neurological: Negative for dizziness, weakness, light-headedness and headaches.         Physical Exam  Vitals:    09/12/19 0856   BP: (!) 140/80   Pulse: 88   Temp: 98.7 °F  "(37.1 °C)   SpO2: 98%   Weight: 131 kg (288 lb 12.8 oz)   Height: 5' 11" (1.803 m)    Body mass index is 40.28 kg/m².  Weight: 131 kg (288 lb 12.8 oz)   Height: 5' 11" (180.3 cm)   Physical Exam   Constitutional: He is oriented to person, place, and time. He appears well-developed and well-nourished. No distress.   HENT:   Head: Normocephalic and atraumatic.   Eyes: Pupils are equal, round, and reactive to light. Conjunctivae, EOM and lids are normal. No scleral icterus.   Neck: Full passive range of motion without pain. Carotid bruit is not present.   Cardiovascular: Normal rate, regular rhythm, normal heart sounds and intact distal pulses. Exam reveals no S3, no S4 and no friction rub.   No murmur heard.  Pulmonary/Chest: Effort normal and breath sounds normal. He has no wheezes. He has no rhonchi. He has no rales.   Abdominal: Soft. Bowel sounds are normal. There is no tenderness.   Genitourinary: Prostate is enlarged. Prostate is not tender.   Genitourinary Comments: Patient was offered a chaperone for this exam but declined   Musculoskeletal: He exhibits no edema or tenderness.   Neurological: He is alert and oriented to person, place, and time.   Motor grossly intact.  Sensory grossly intact.  Symmetric facial movements palate elevated symmetrically tongue midline   Skin: Skin is warm and dry. No rash noted.   Psychiatric: He has a normal mood and affect. His speech is normal and behavior is normal. Thought content normal.           Health Maintenance       Date Due Completion Date    Colonoscopy 03/11/2003 ---    Shingles Vaccine (2 of 3) 12/29/2014 11/3/2014    Influenza Vaccine (1) 09/01/2019 10/17/2018    Pneumococcal Vaccine (65+ High/Highest Risk) (2 of 2 - PPSV23) 11/03/2019 1/12/2017    Lipid Panel 06/07/2024 6/7/2019    TETANUS VACCINE 01/11/2027 1/11/2017            "

## 2019-09-12 NOTE — PATIENT INSTRUCTIONS
We are going to try for a shorter course of antibiotics since we are catching this early.  If your symptoms have not resolved at 2 weeks, please let me know.     Your urine results confirmed infection.     You can also take over the counter AZO as needed for bladder spasms.  This will cause your urine to turn orange just FYI

## 2019-09-27 ENCOUNTER — PATIENT OUTREACH (OUTPATIENT)
Dept: ADMINISTRATIVE | Facility: OTHER | Age: 64
End: 2019-09-27

## 2019-09-27 DIAGNOSIS — Z12.11 SCREEN FOR COLON CANCER: Primary | ICD-10-CM

## 2019-09-30 ENCOUNTER — OFFICE VISIT (OUTPATIENT)
Dept: DERMATOLOGY | Facility: CLINIC | Age: 64
End: 2019-09-30
Payer: MEDICARE

## 2019-09-30 DIAGNOSIS — D18.00 ANGIOMA: ICD-10-CM

## 2019-09-30 DIAGNOSIS — L02.93 CARBUNCLE: Primary | ICD-10-CM

## 2019-09-30 DIAGNOSIS — Z85.820 HISTORY OF MALIGNANT MELANOMA: ICD-10-CM

## 2019-09-30 DIAGNOSIS — L81.4 LENTIGO: ICD-10-CM

## 2019-09-30 DIAGNOSIS — L82.1 SK (SEBORRHEIC KERATOSIS): ICD-10-CM

## 2019-09-30 PROCEDURE — 99214 OFFICE O/P EST MOD 30 MIN: CPT | Mod: 25,S$GLB,, | Performed by: DERMATOLOGY

## 2019-09-30 PROCEDURE — 1101F PR PT FALLS ASSESS DOC 0-1 FALLS W/OUT INJ PAST YR: ICD-10-PCS | Mod: CPTII,S$GLB,, | Performed by: DERMATOLOGY

## 2019-09-30 PROCEDURE — 99999 PR PBB SHADOW E&M-EST. PATIENT-LVL II: CPT | Mod: PBBFAC,,, | Performed by: DERMATOLOGY

## 2019-09-30 PROCEDURE — 1101F PT FALLS ASSESS-DOCD LE1/YR: CPT | Mod: CPTII,S$GLB,, | Performed by: DERMATOLOGY

## 2019-09-30 PROCEDURE — 99214 PR OFFICE/OUTPT VISIT, EST, LEVL IV, 30-39 MIN: ICD-10-PCS | Mod: 25,S$GLB,, | Performed by: DERMATOLOGY

## 2019-09-30 PROCEDURE — 10060 PR DRAIN SKIN ABSCESS SIMPLE: ICD-10-PCS | Mod: S$GLB,,, | Performed by: DERMATOLOGY

## 2019-09-30 PROCEDURE — 99999 PR PBB SHADOW E&M-EST. PATIENT-LVL II: ICD-10-PCS | Mod: PBBFAC,,, | Performed by: DERMATOLOGY

## 2019-09-30 PROCEDURE — 87070 CULTURE OTHR SPECIMN AEROBIC: CPT

## 2019-09-30 PROCEDURE — 10060 I&D ABSCESS SIMPLE/SINGLE: CPT | Mod: S$GLB,,, | Performed by: DERMATOLOGY

## 2019-09-30 RX ORDER — DOXYCYCLINE 100 MG/1
TABLET ORAL
Qty: 14 TABLET | Refills: 0 | Status: SHIPPED | OUTPATIENT
Start: 2019-09-30 | End: 2021-05-17

## 2019-09-30 NOTE — PROGRESS NOTES
Subjective:       Patient ID:  Elías Gay is a 64 y.o. male who presents for   Chief Complaint   Patient presents with    Skin Check     TBSE      Here for TBSE    History of Present Illness: The patient presents for follow up of skin check.    The patient was last seen on: 6/19/19 by MANDO for TBSE ([facility]) and for cryosurgery to actinic keratoses which have resolved.     H/o MM 7/16/18 0.6mm right upper arm neg LN and h/o nmsc    This is a high risk patient here to check for the development of new lesions.      Other skin complaints:   Patient complains of lesion(s)  Location: right lower abdomen  Duration: 4 days  Symptoms: tender and red  Relieving factors/Previous treatments: none          Review of Systems   Constitutional: Positive for weight loss (30# over 3 months (intentional)). Negative for fever, chills, fatigue, night sweats and malaise.   HENT: Negative for headaches.    Respiratory: Negative for cough and shortness of breath.    Gastrointestinal: Negative for indigestion.   Skin: Positive for activity-related sunscreen use. Negative for daily sunscreen use, recent sunburn and wears hat.   Neurological: Negative for headaches.   Hematologic/Lymphatic: Negative for adenopathy. Does not bruise/bleed easily.        Objective:    Physical Exam   Constitutional: He appears well-developed and well-nourished. He is obese.  No distress.   Genitourinary:         Lymphadenopathy: No supraclavicular adenopathy is present.     He has no cervical adenopathy.     He has no axillary adenopathy.   Neurological: He is alert and oriented to person, place, and time. He is not disoriented.   Psychiatric: He has a normal mood and affect.   Skin:   Areas Examined (abnormalities noted in diagram):   Scalp / Hair Palpated and Inspected  Head / Face Inspection Performed  Neck Inspection Performed  Chest / Axilla Inspection Performed  Abdomen Inspection Performed  Genitals / Buttocks / Groin Inspection Performed  Back  Inspection Performed  RUE Inspected  LUE Inspection Performed  RLE Inspected  LLE Inspection Performed  Nails and Digits Inspection Performed                           Diagram Legend     Erythematous scaling macule/papule c/w actinic keratosis       Vascular papule c/w angioma      Pigmented verrucoid papule/plaque c/w seborrheic keratosis      Yellow umbilicated papule c/w sebaceous hyperplasia      Irregularly shaped tan macule c/w lentigo     1-2 mm smooth white papules consistent with Milia      Movable subcutaneous cyst with punctum c/w epidermal inclusion cyst      Subcutaneous movable cyst c/w pilar cyst      Firm pink to brown papule c/w dermatofibroma      Pedunculated fleshy papule(s) c/w skin tag(s)      Evenly pigmented macule c/w junctional nevus     Mildly variegated pigmented, slightly irregular-bordered macule c/w mildly atypical nevus      Flesh colored to evenly pigmented papule c/w intradermal nevus       Pink pearly papule/plaque c/w basal cell carcinoma      Erythematous hyperkeratotic cursted plaque c/w SCC      Surgical scar with no sign of skin cancer recurrence      Open and closed comedones      Inflammatory papules and pustules      Verrucoid papule consistent consistent with wart     Erythematous eczematous patches and plaques     Dystrophic onycholytic nail with subungual debris c/w onychomycosis     Umbilicated papule    Erythematous-base heme-crusted tan verrucoid plaque consistent with inflamed seborrheic keratosis     Erythematous Silvery Scaling Plaque c/w Psoriasis     See annotation      Assessment / Plan:        Carbuncle - right lower abdomen  Lesion incised with #11 blade and drained on today's date. Bacterial culture performed.    Patient instructed to perform warm compresses 2 - 3 times/daily.    -     Aerobic culture  -     doxycycline monohydrate 100 mg Tab; Take 1 po bid  Dispense: 14 tablet; Refill: 0    SK (seborrheic keratosis)  These are benign inherited growths  without a malignant potential. Reassurance given to patient. No treatment is necessary.       Angiomas  This is a benign vascular lesion. Reassurance given. No treatment required.       Lentigo   - stable and chronic      History of malignant melanoma  Area of previous melanoma examined. Site well healed with no signs of recurrence.    Total body skin examination performed today including at least 12 points as noted in physical examination. No lesions suspicious for malignancy noted.               Follow up in about 6 months (around 3/30/2020).

## 2019-10-03 LAB — BACTERIA SPEC AEROBE CULT: NORMAL

## 2019-10-06 ENCOUNTER — LAB VISIT (OUTPATIENT)
Dept: LAB | Facility: HOSPITAL | Age: 64
End: 2019-10-06
Attending: INTERNAL MEDICINE
Payer: MEDICARE

## 2019-10-06 DIAGNOSIS — Z12.11 SCREEN FOR COLON CANCER: ICD-10-CM

## 2019-10-06 PROCEDURE — 82274 ASSAY TEST FOR BLOOD FECAL: CPT

## 2019-10-11 LAB — HEMOCCULT STL QL IA: NEGATIVE

## 2019-11-06 ENCOUNTER — TELEPHONE (OUTPATIENT)
Dept: ENDOSCOPY | Facility: HOSPITAL | Age: 64
End: 2019-11-06

## 2020-01-02 ENCOUNTER — TELEPHONE (OUTPATIENT)
Dept: ENDOSCOPY | Facility: HOSPITAL | Age: 65
End: 2020-01-02

## 2020-02-11 ENCOUNTER — PATIENT OUTREACH (OUTPATIENT)
Dept: ADMINISTRATIVE | Facility: OTHER | Age: 65
End: 2020-02-11

## 2020-02-13 ENCOUNTER — OFFICE VISIT (OUTPATIENT)
Dept: DERMATOLOGY | Facility: CLINIC | Age: 65
End: 2020-02-13
Payer: MEDICARE

## 2020-02-13 ENCOUNTER — OFFICE VISIT (OUTPATIENT)
Dept: ORTHOPEDICS | Facility: CLINIC | Age: 65
End: 2020-02-13
Payer: MEDICARE

## 2020-02-13 VITALS
BODY MASS INDEX: 40.43 KG/M2 | DIASTOLIC BLOOD PRESSURE: 88 MMHG | WEIGHT: 288.81 LBS | HEIGHT: 71 IN | SYSTOLIC BLOOD PRESSURE: 150 MMHG

## 2020-02-13 DIAGNOSIS — B35.3 TINEA PEDIS OF BOTH FEET: ICD-10-CM

## 2020-02-13 DIAGNOSIS — M99.07 UPPER EXTREMITY SOMATIC DYSFUNCTION: ICD-10-CM

## 2020-02-13 DIAGNOSIS — L90.5 SCAR: ICD-10-CM

## 2020-02-13 DIAGNOSIS — M99.08 SOMATIC DYSFUNCTION OF RIB CAGE REGION: ICD-10-CM

## 2020-02-13 DIAGNOSIS — M99.02 SOMATIC DYSFUNCTION OF THORACIC REGION: ICD-10-CM

## 2020-02-13 DIAGNOSIS — G89.29 CHRONIC BILATERAL THORACIC BACK PAIN: Primary | ICD-10-CM

## 2020-02-13 DIAGNOSIS — B07.8 COMMON WART: ICD-10-CM

## 2020-02-13 DIAGNOSIS — M79.10 MYALGIA: ICD-10-CM

## 2020-02-13 DIAGNOSIS — R29.3 POOR POSTURE: ICD-10-CM

## 2020-02-13 DIAGNOSIS — Z85.820 HISTORY OF MALIGNANT MELANOMA: ICD-10-CM

## 2020-02-13 DIAGNOSIS — L82.1 SK (SEBORRHEIC KERATOSIS): ICD-10-CM

## 2020-02-13 DIAGNOSIS — L91.8 SKIN TAG: ICD-10-CM

## 2020-02-13 DIAGNOSIS — M99.00 SOMATIC DYSFUNCTION OF HEAD REGION: ICD-10-CM

## 2020-02-13 DIAGNOSIS — D22.9 NEVUS: ICD-10-CM

## 2020-02-13 DIAGNOSIS — M99.01 CERVICAL (NECK) REGION SOMATIC DYSFUNCTION: ICD-10-CM

## 2020-02-13 DIAGNOSIS — M54.6 CHRONIC BILATERAL THORACIC BACK PAIN: Primary | ICD-10-CM

## 2020-02-13 DIAGNOSIS — B35.1 ONYCHOMYCOSIS: ICD-10-CM

## 2020-02-13 DIAGNOSIS — L81.4 LENTIGO: ICD-10-CM

## 2020-02-13 DIAGNOSIS — Z85.828 PERSONAL HISTORY OF SKIN CANCER: Primary | ICD-10-CM

## 2020-02-13 PROCEDURE — 1125F PR PAIN SEVERITY QUANTIFIED, PAIN PRESENT: ICD-10-PCS | Mod: S$GLB,,, | Performed by: DERMATOLOGY

## 2020-02-13 PROCEDURE — 1159F PR MEDICATION LIST DOCUMENTED IN MEDICAL RECORD: ICD-10-PCS | Mod: S$GLB,,, | Performed by: NEUROMUSCULOSKELETAL MEDICINE & OMM

## 2020-02-13 PROCEDURE — 17110 DESTRUCTION B9 LES UP TO 14: CPT | Mod: S$GLB,,, | Performed by: DERMATOLOGY

## 2020-02-13 PROCEDURE — 99999 PR PBB SHADOW E&M-EST. PATIENT-LVL III: ICD-10-PCS | Mod: PBBFAC,,, | Performed by: NEUROMUSCULOSKELETAL MEDICINE & OMM

## 2020-02-13 PROCEDURE — 99213 OFFICE O/P EST LOW 20 MIN: CPT | Mod: 25,S$GLB,, | Performed by: NEUROMUSCULOSKELETAL MEDICINE & OMM

## 2020-02-13 PROCEDURE — 98927 PR OSTEOPATHIC MANIP,5-6 BODY REGN: ICD-10-PCS | Mod: S$GLB,,, | Performed by: NEUROMUSCULOSKELETAL MEDICINE & OMM

## 2020-02-13 PROCEDURE — 1159F PR MEDICATION LIST DOCUMENTED IN MEDICAL RECORD: ICD-10-PCS | Mod: S$GLB,,, | Performed by: DERMATOLOGY

## 2020-02-13 PROCEDURE — 1101F PT FALLS ASSESS-DOCD LE1/YR: CPT | Mod: CPTII,S$GLB,, | Performed by: DERMATOLOGY

## 2020-02-13 PROCEDURE — 1101F PR PT FALLS ASSESS DOC 0-1 FALLS W/OUT INJ PAST YR: ICD-10-PCS | Mod: CPTII,S$GLB,, | Performed by: DERMATOLOGY

## 2020-02-13 PROCEDURE — 98927 OSTEOPATH MANJ 5-6 REGIONS: CPT | Mod: S$GLB,,, | Performed by: NEUROMUSCULOSKELETAL MEDICINE & OMM

## 2020-02-13 PROCEDURE — 99999 PR PBB SHADOW E&M-EST. PATIENT-LVL II: CPT | Mod: PBBFAC,,, | Performed by: DERMATOLOGY

## 2020-02-13 PROCEDURE — 1159F MED LIST DOCD IN RCRD: CPT | Mod: S$GLB,,, | Performed by: NEUROMUSCULOSKELETAL MEDICINE & OMM

## 2020-02-13 PROCEDURE — 1101F PR PT FALLS ASSESS DOC 0-1 FALLS W/OUT INJ PAST YR: ICD-10-PCS | Mod: CPTII,S$GLB,, | Performed by: NEUROMUSCULOSKELETAL MEDICINE & OMM

## 2020-02-13 PROCEDURE — 99214 PR OFFICE/OUTPT VISIT, EST, LEVL IV, 30-39 MIN: ICD-10-PCS | Mod: 25,S$GLB,, | Performed by: DERMATOLOGY

## 2020-02-13 PROCEDURE — 99214 OFFICE O/P EST MOD 30 MIN: CPT | Mod: 25,S$GLB,, | Performed by: DERMATOLOGY

## 2020-02-13 PROCEDURE — 99999 PR PBB SHADOW E&M-EST. PATIENT-LVL III: CPT | Mod: PBBFAC,,, | Performed by: NEUROMUSCULOSKELETAL MEDICINE & OMM

## 2020-02-13 PROCEDURE — 1159F MED LIST DOCD IN RCRD: CPT | Mod: S$GLB,,, | Performed by: DERMATOLOGY

## 2020-02-13 PROCEDURE — 17110 PR DESTRUCTION BENIGN LESIONS UP TO 14: ICD-10-PCS | Mod: S$GLB,,, | Performed by: DERMATOLOGY

## 2020-02-13 PROCEDURE — 99999 PR PBB SHADOW E&M-EST. PATIENT-LVL II: ICD-10-PCS | Mod: PBBFAC,,, | Performed by: DERMATOLOGY

## 2020-02-13 PROCEDURE — 99213 PR OFFICE/OUTPT VISIT, EST, LEVL III, 20-29 MIN: ICD-10-PCS | Mod: 25,S$GLB,, | Performed by: NEUROMUSCULOSKELETAL MEDICINE & OMM

## 2020-02-13 PROCEDURE — 1125F AMNT PAIN NOTED PAIN PRSNT: CPT | Mod: S$GLB,,, | Performed by: DERMATOLOGY

## 2020-02-13 PROCEDURE — 1101F PT FALLS ASSESS-DOCD LE1/YR: CPT | Mod: CPTII,S$GLB,, | Performed by: NEUROMUSCULOSKELETAL MEDICINE & OMM

## 2020-02-13 RX ORDER — CICLOPIROX 80 MG/ML
SOLUTION TOPICAL
Qty: 1 BOTTLE | Refills: 5 | Status: SHIPPED | OUTPATIENT
Start: 2020-02-13 | End: 2021-08-16 | Stop reason: SDUPTHER

## 2020-02-13 RX ORDER — KETOCONAZOLE 20 MG/G
CREAM TOPICAL
Qty: 60 G | Refills: 3 | Status: SHIPPED | OUTPATIENT
Start: 2020-02-13 | End: 2021-08-16 | Stop reason: SDUPTHER

## 2020-02-13 NOTE — PROGRESS NOTES
"Subjective:     Elías Gay     Chief Complaint   Patient presents with    Cervical Spine Pain (C-spine)       HPI      Elías is a 64 y.o. male coming in today for back pain. Since last visit the pain has Improved. His lower back is feeling great following OMT but he would like to address upper back pain he has had since he was in his 20's. The pain is better with rest, HEP, OMT and worse with activity, sitting. His wife notes a spot on his back is always cold. Pt c/o his upper back pain preventing him from sleeping- he wakes up and "my hands are on fire". Notes trigger finger in digits 3-5 on the left. Pt. describes the pain as a 3/10 dull pain that does radiate. Pt c/o numbness in his hands. There has not been any new a fall/injury/ or traumas since last visit.  Pt. denies any new musculoskeletal complaints at this time.     Office note from 8/21/19 reviewed    Review of Systems   Constitutional: Negative for chills and fever.   Musculoskeletal: Positive for back pain, joint pain, myalgias and neck pain. Negative for falls.   Neurological: Positive for tingling. Negative for dizziness, focal weakness, weakness and headaches.       PAST MEDICAL HISTORY:   Past Medical History:   Diagnosis Date    Arthritis 1971    Bone Spurs in feet    Basal cell carcinoma 11/06/2018    left ear helix    Foreign body in conjunctival sac     Hernia, inguinal, right 2002    Joint pain 1971    Bones of feet    Keloid cicatrix 1958 2010 2018 2019    Hernia, Knee, Arm, Ear    Melanoma 09/14/2018    Right Arm 0.6mm    Severe sleep apnea      PAST SURGICAL HISTORY:   Past Surgical History:   Procedure Laterality Date    CLOSURE OF DEFECT OF MOHS PROCEDURE Left 1/3/2019    Procedure: CLOSURE, MOHS PROCEDURE DEFECT LEFT EAR;  Surgeon: Jeramy Meek MD;  Location: [facility] OR [address] [place] [address];  Service: Plastics;  Laterality: Left;  plastics set    HERNIA REPAIR Left     3 years of age    HERNIA REPAIR N/A     Ventral wall at 3 year of " age.    KNEE SURGERY Right 1984    Arthroscopic    NASAL SEPTUM SURGERY N/A 2009    SKIN BIOPSY  several over time     FAMILY HISTORY:   Family History   Problem Relation Age of Onset    Hypertension Mother     Stroke Mother     Aneurysm Mother     Cancer Father 72        Prostate and liver    No Known Problems Daughter     No Known Problems Son     Gout Brother     Eczema Brother     Psoriasis Brother     No Known Problems Daughter     No Known Problems Son     Cataracts Neg Hx     Glaucoma Neg Hx     Macular degeneration Neg Hx     Melanoma Neg Hx     Eczema Neg Hx     Lupus Neg Hx     Psoriasis Neg Hx      SOCIAL HISTORY:   Social History     Socioeconomic History    Marital status:      Spouse name: Not on file    Number of children: Not on file    Years of education: Not on file    Highest education level: Not on file   Occupational History    Not on file   Social Needs    Financial resource strain: Not very hard    Food insecurity:     Worry: Never true     Inability: Never true    Transportation needs:     Medical: No     Non-medical: No   Tobacco Use    Smoking status: Never Smoker    Smokeless tobacco: Never Used    Tobacco comment: Second hand smoke from mother. Cigarrette smoke inflames my sinuses.   Substance and Sexual Activity    Alcohol use: Not Currently     Alcohol/week: 1.0 standard drinks     Types: 1 Glasses of wine per week     Frequency: Monthly or less     Drinks per session: 1 or 2     Binge frequency: Never     Comment: Rare    Drug use: No    Sexual activity: Yes     Partners: Female     Birth control/protection: None   Lifestyle    Physical activity:     Days per week: 3 days     Minutes per session: 120 min    Stress: Not at all   Relationships    Social connections:     Talks on phone: More than three times a week     Gets together: Three times a week     Attends Yazidism service: Not on file     Active member of club or organization: Yes      "Attends meetings of clubs or organizations: More than 4 times per year     Relationship status:    Other Topics Concern    Not on file   Social History Narrative    Not on file       MEDICATIONS:   Current Outpatient Medications:     azelastine (ASTELIN) 137 mcg (0.1 %) nasal spray, 1 spray (137 mcg total) by Nasal route 2 (two) times daily., Disp: 30 mL, Rfl: 11    ciclopirox (PENLAC) 8 % Soln, Apply daily to affected nail. Must remove and restart weekly, Disp: 1 Bottle, Rfl: 5    cyclobenzaprine (FLEXERIL) 10 MG tablet, Take 10 mg by mouth 3 (three) times daily as needed for Muscle spasms., Disp: , Rfl:     doxycycline monohydrate 100 mg Tab, Take 1 po bid, Disp: 14 tablet, Rfl: 0    guaiFENesin (MUCINEX) 600 mg 12 hr tablet, Take 1,200 mg by mouth 2 (two) times daily., Disp: , Rfl:     ketoconazole (NIZORAL) 2 % cream, AAA bid to both feet x 3 weeks, Disp: 60 g, Rfl: 3    promethazine-dextromethorphan (PROMETHAZINE-DM) 6.25-15 mg/5 mL Syrp, Take 5 mLs by mouth every evening., Disp: 180 mL, Rfl: 1    tamsulosin (FLOMAX) 0.4 mg Cap, Take 1 capsule (0.4 mg total) by mouth once daily., Disp: 90 capsule, Rfl: 0  ALLERGIES: Review of patient's allergies indicates:  No Known Allergies     IMAGIN. X-ray ordered due to right hip pain. (AP pelvis and frogleg lateral  right views) taken 8/15/19.   2. X-ray images were reviewed personally by me  3. FINDINGS: X-ray images obtained demonstrate Mild DJD with hip joint space narrowing bilaterally.  No fracture or dislocation.  No bone destruction identified.  4. IMPRESSION: Mild DJD with hip joint space narrowing bilaterally.    Objective:     VITAL SIGNS: BP (!) 150/88   Ht 5' 11" (1.803 m)   Wt 131 kg (288 lb 12.8 oz)   BMI 40.28 kg/m²    General    Vitals reviewed.  Constitutional: He is oriented to person, place, and time. He appears well-developed and well-nourished.   Neurological: He is alert and oriented to person, place, and time. "   Psychiatric: He has a normal mood and affect. His behavior is normal.               MUSCULOSKELETAL EXAM  Cervical Spine: bilateral cervical region    Observation:    Posture:  Increased thoracic kyphosis and Posterior pelvis tilt with loss of lumbar lordosis  No obvious shoulder un-leveling while standing.    No edema, erythema, or ecchymosis noted in cervical and thoraic spine regions.    No midline skin abnormalities.    No atrophy of upper limb musculature.  Gait: Non-antalgic     Tenderness:  No tenderness throughout the cervical spine upon spinous process palpation  No tenderness over the base of the occiput  No bony deformities or step-offs palpated.   No trigger point tenderness of levator scapulae, upper trapezius, or parascapular musculature     Range of Motion (* = with pain):  CERVICAL SPINE  Decreased AROM in flexion, extension, sidebending, and rotation     SHOULDER  Active flexion to 180° on left and 180° on right.  Active abduction to 180° on left and 180° on right.  No scapular dyskinesia or winging.    Strength Testing (* = with pain):  Sternocleidomastoid - 5/5 on left and 5/5 on right  Upper trapezius-  5/5 on left and 5/5 on right  Deltoid - 5/5 on left and 5/5 on right  Biceps - 5/5 on left and 5/5 on right  Triceps - 5/5 on left and 5/5 on right  Wrist extension - 5/5 on left and 5/5 on right  Wrist flexion - 5/5 on left and 5/5 on right   - 5/5 on left and 5/5 on right  Finger extension - 5/5 on left and 5/5 on right  Finger abduction - 5/5 on left and 5/5 on right    Structural Exam:  TART (Tissue texture abnormality, Asymmetry,  Restriction of motion and/or Tenderness) changes:    Head: Occipitoatlantal (OA) Joint ES-left, R-right     Cervical Spine   C1 TTA RIGHT   C2 Neutral   C3 Neutral   C4 Neutral   C5 FRS LEFT   C6 Neutral   C7 FRS LEFT      Thoracic Spine   T1 FRS RIGHT   T2 Neutral   T3 Neutral   T4 Neutral   T5 FRS RIGHT   T6 FRS RIGHT   T7 FRS RIGHT   T8 FRS RIGHT   T9 FRS  RIGHT   T10 Neutral   T11 Neutral   T12 Neutral     Rib cage: R1-2 inhaled on left, R5 external torsion on right    Upper extremity: left thoracic outlet TTA    Key   F= Flexed   E = Extended   R = Rotated   S = Sidebent   TTA = tissue texture abnormality       Neurovascular Exam:  Spurling's - negative on left and negative on right  Seated straight leg raise - negative on left and negative on right  Reflexes +2/4 and symmetric at the biceps, brachioradialis, and triceps bilaterally   Sensation intact to light touch in the C5-T1 dermatomes bilaterally.   Intact and symmetric radial pulses bilaterally.      Assessment:      Encounter Diagnoses   Name Primary?    Chronic bilateral thoracic back pain Yes    Myalgia     Somatic dysfunction of head region     Cervical (neck) region somatic dysfunction     Somatic dysfunction of thoracic region     Somatic dysfunction of rib cage region     Upper extremity somatic dysfunction           Plan:   1.  Chronic bilateral upper back pain likely secondary to poor posture with associated biomechanical restrictions.  No cervical radicular symptoms appreciated on physical exam today.   - OMT performed today to address associated biomechanical restrictions  and HEP started.     2. OMT 5-6 regions. Oral consent obtained.  Reviewed benefits and potential side effects.   - OMT indicated today due to signs and symptoms as well as local and remote somatic dysfunction findings and their related neurokinetic, lymphatic, fascial and/or arteriovenous body connections.   - OMT techniques used: Soft Tissue, Myofascial Release, Muscle Energy and Still's Technique   - Treatment was tolerated well. Improvement noted in segmental mobility post-treatment in dysfunctional regions. There were no adverse events and no complications immediately following treatment.     3. Pt. Given the following HEP:  A)   Supine Thoracic extension exercise: 10-15 reps, twice daily. Handout also given.   B)  Cervicothoracic junction mobilization exercise: 10-15 reps, twice daily. Handout also given.   C) Scalene, upper trapezius, and levator scapulae stretches : hold neck sidebending stretch for 30 seconds in each plane, bilaterally, twice daily. Hand out given.   D)  Seated anterior pelvic tilt exercise: rotate pelvis forward to sit on ischial tuberosities while maintaining neutral shoulder positioning. Repeat frequently throughout the day.     The patient was taught a homegoing physical therapy regimen as described above. The patient demonstrated understanding of the exercises and proper technique of their execution.     4. Follow-up in 2-3 weeks for reevaluation    5. Patient agreeable to today's plan and all questions were answered    This note is dictated using the M*Modal Fluency Direct word recognition program. There are word recognition mistakes that are occasionally missed on review.

## 2020-02-13 NOTE — PROGRESS NOTES
Subjective:       Patient ID:  Elías Gay is a 64 y.o. male who presents for   Chief Complaint   Patient presents with    Skin Check    Lesion     Pt here today for tbse. Pt with hx of melanoma 7/16/2018 0.6mm r upper arm      Pt c/o lesion left middle finger; present for years.  Small.  Painful.  No tx.  Has several others on right index finger.  Getting irritated with friction.    C/o new freckle on left 4th toe. Present for a few weeks. Not tender or painful. No tx.   Pt c/o scaling on B feet and abnormal toenails for years. Nails are getting worse and feet are occ itching. He would like topical treatment for nails.        Review of Systems   Constitutional: Negative for fever, chills, fatigue and malaise.   Skin: Positive for daily sunscreen use, activity-related sunscreen use and wears hat. Negative for sun sensitivity and recent sunburn.   Hematologic/Lymphatic: Does not bruise/bleed easily.        Objective:    Physical Exam   Constitutional: He appears well-developed and well-nourished. He is obese.  No distress.   Lymphadenopathy: No supraclavicular adenopathy is present.     He has no cervical adenopathy.     He has no axillary adenopathy.   Neurological: He is alert and oriented to person, place, and time. He is not disoriented.   Psychiatric: He has a normal mood and affect.   Skin:   Areas Examined (abnormalities noted in diagram):   Scalp / Hair Palpated and Inspected  Head / Face Inspection Performed  Neck Inspection Performed  Chest / Axilla Inspection Performed  Abdomen Inspection Performed  Genitals / Buttocks / Groin Inspection Performed  Back Inspection Performed  RUE Inspected  LUE Inspection Performed  RLE Inspected  LLE Inspection Performed  Nails and Digits Inspection Performed                           Diagram Legend     Erythematous scaling macule/papule c/w actinic keratosis       Vascular papule c/w angioma      Pigmented verrucoid papule/plaque c/w seborrheic keratosis       Yellow umbilicated papule c/w sebaceous hyperplasia      Irregularly shaped tan macule c/w lentigo     1-2 mm smooth white papules consistent with Milia      Movable subcutaneous cyst with punctum c/w epidermal inclusion cyst      Subcutaneous movable cyst c/w pilar cyst      Firm pink to brown papule c/w dermatofibroma      Pedunculated fleshy papule(s) c/w skin tag(s)      Evenly pigmented macule c/w junctional nevus     Mildly variegated pigmented, slightly irregular-bordered macule c/w mildly atypical nevus      Flesh colored to evenly pigmented papule c/w intradermal nevus       Pink pearly papule/plaque c/w basal cell carcinoma      Erythematous hyperkeratotic cursted plaque c/w SCC      Surgical scar with no sign of skin cancer recurrence      Open and closed comedones      Inflammatory papules and pustules      Verrucoid papule consistent consistent with wart     Erythematous eczematous patches and plaques     Dystrophic onycholytic nail with subungual debris c/w onychomycosis     Umbilicated papule    Erythematous-base heme-crusted tan verrucoid plaque consistent with inflamed seborrheic keratosis     Erythematous Silvery Scaling Plaque c/w Psoriasis     See annotation      Assessment / Plan:        Personal history of skin cancer  History of malignant melanoma- r upper arm 0.6 mm 7/2018  Scar    Onychomycosis  -     ciclopirox (PENLAC) 8 % Soln; Apply daily to affected nail. Must remove and restart weekly  Dispense: 1 Bottle; Refill: 5    Tinea pedis of both feet  -     ketoconazole (NIZORAL) 2 % cream; AAA bid to both feet x 3 weeks  Dispense: 60 g; Refill: 3    SK (seborrheic keratosis)  These are benign inherited growths without a malignant potential. Reassurance given to patient. No treatment is necessary.     Common wart  Cryosurgery procedure note:    Verbal consent from the patient is obtained including, but not limited to, risk of hypopigmentation/hyperpigmentation, scar, recurrence of lesion.  Liquid nitrogen cryosurgery is applied to 3 lesions to produce a freeze injury. The patient is aware that blisters may form and is instructed on wound care with gentle cleansing and use of vaseline ointment to keep moist until healed. The patient is supplied a handout on cryosurgery and is instructed to call if lesions do not completely resolve.    Lentigo  This is a benign hyperpigmented sun induced lesion. Daily sun protection will reduce the number of new lesions. Treatment of these benign lesions are considered cosmetic.  The nature of sun-induced photo-aging and skin cancers is discussed.  Sun avoidance, protective clothing, and the use of 30-SPF sunscreens is advised. Observe closely for skin damage/changes, and call if such occurs.    Nevus  Discussed ABCDE's of nevi.  Monitor for new mole or moles that are becoming bigger, darker, irritated, or developing irregular borders. Brochure provided.      Skin tag  Reassurance given to patient. No treatment is necessary.   Treatment of benign, asymptomatic lesions may be considered cosmetic.             Follow up in about 6 months (around 8/13/2020). pt states he would like to alternate visits with dr frankel and [place]

## 2020-06-04 ENCOUNTER — PATIENT OUTREACH (OUTPATIENT)
Dept: ADMINISTRATIVE | Facility: HOSPITAL | Age: 65
End: 2020-06-04

## 2020-10-05 ENCOUNTER — PATIENT MESSAGE (OUTPATIENT)
Dept: ADMINISTRATIVE | Facility: HOSPITAL | Age: 65
End: 2020-10-05

## 2020-10-30 ENCOUNTER — PATIENT MESSAGE (OUTPATIENT)
Dept: ADMINISTRATIVE | Facility: HOSPITAL | Age: 65
End: 2020-10-30

## 2021-01-04 ENCOUNTER — PATIENT MESSAGE (OUTPATIENT)
Dept: ADMINISTRATIVE | Facility: HOSPITAL | Age: 66
End: 2021-01-04

## 2021-02-06 ENCOUNTER — PATIENT MESSAGE (OUTPATIENT)
Dept: ADMINISTRATIVE | Facility: HOSPITAL | Age: 66
End: 2021-02-06

## 2021-02-27 ENCOUNTER — PATIENT MESSAGE (OUTPATIENT)
Dept: INTERNAL MEDICINE | Facility: CLINIC | Age: 66
End: 2021-02-27

## 2021-02-27 DIAGNOSIS — Z00.00 ANNUAL PHYSICAL EXAM: Primary | ICD-10-CM

## 2021-02-27 DIAGNOSIS — Z12.5 PROSTATE CANCER SCREENING: ICD-10-CM

## 2021-02-27 DIAGNOSIS — E66.01 MORBID OBESITY: ICD-10-CM

## 2021-02-27 DIAGNOSIS — Z12.11 COLON CANCER SCREENING: ICD-10-CM

## 2021-02-27 DIAGNOSIS — G47.33 OSA (OBSTRUCTIVE SLEEP APNEA): ICD-10-CM

## 2021-03-05 ENCOUNTER — PATIENT MESSAGE (OUTPATIENT)
Dept: OPTOMETRY | Facility: CLINIC | Age: 66
End: 2021-03-05

## 2021-03-08 ENCOUNTER — LAB VISIT (OUTPATIENT)
Dept: LAB | Facility: HOSPITAL | Age: 66
End: 2021-03-08
Attending: INTERNAL MEDICINE
Payer: MEDICARE

## 2021-03-08 DIAGNOSIS — Z12.11 COLON CANCER SCREENING: ICD-10-CM

## 2021-03-08 PROCEDURE — 82274 ASSAY TEST FOR BLOOD FECAL: CPT | Performed by: INTERNAL MEDICINE

## 2021-03-15 ENCOUNTER — PATIENT OUTREACH (OUTPATIENT)
Dept: ADMINISTRATIVE | Facility: HOSPITAL | Age: 66
End: 2021-03-15

## 2021-03-18 ENCOUNTER — PATIENT MESSAGE (OUTPATIENT)
Dept: INTERNAL MEDICINE | Facility: CLINIC | Age: 66
End: 2021-03-18

## 2021-03-18 LAB — HEMOCCULT STL QL IA: NEGATIVE

## 2021-03-22 ENCOUNTER — LAB VISIT (OUTPATIENT)
Dept: LAB | Facility: HOSPITAL | Age: 66
End: 2021-03-22
Attending: INTERNAL MEDICINE
Payer: MEDICARE

## 2021-03-22 DIAGNOSIS — Z12.5 PROSTATE CANCER SCREENING: ICD-10-CM

## 2021-03-22 DIAGNOSIS — E66.01 MORBID OBESITY: ICD-10-CM

## 2021-03-22 DIAGNOSIS — Z00.00 ANNUAL PHYSICAL EXAM: ICD-10-CM

## 2021-03-22 DIAGNOSIS — G47.33 OSA (OBSTRUCTIVE SLEEP APNEA): ICD-10-CM

## 2021-03-22 LAB
ALBUMIN SERPL BCP-MCNC: 3.9 G/DL (ref 3.5–5.2)
ALP SERPL-CCNC: 102 U/L (ref 55–135)
ALT SERPL W/O P-5'-P-CCNC: 36 U/L (ref 10–44)
ANION GAP SERPL CALC-SCNC: 8 MMOL/L (ref 8–16)
AST SERPL-CCNC: 35 U/L (ref 10–40)
BASOPHILS # BLD AUTO: 0.05 K/UL (ref 0–0.2)
BASOPHILS NFR BLD: 0.6 % (ref 0–1.9)
BILIRUB SERPL-MCNC: 0.6 MG/DL (ref 0.1–1)
BUN SERPL-MCNC: 12 MG/DL (ref 8–23)
CALCIUM SERPL-MCNC: 8.8 MG/DL (ref 8.7–10.5)
CHLORIDE SERPL-SCNC: 106 MMOL/L (ref 95–110)
CHOLEST SERPL-MCNC: 152 MG/DL (ref 120–199)
CHOLEST/HDLC SERPL: 4.2 {RATIO} (ref 2–5)
CO2 SERPL-SCNC: 29 MMOL/L (ref 23–29)
COMPLEXED PSA SERPL-MCNC: 1.7 NG/ML (ref 0–4)
CREAT SERPL-MCNC: 0.9 MG/DL (ref 0.5–1.4)
DIFFERENTIAL METHOD: ABNORMAL
EOSINOPHIL # BLD AUTO: 0.2 K/UL (ref 0–0.5)
EOSINOPHIL NFR BLD: 1.9 % (ref 0–8)
ERYTHROCYTE [DISTWIDTH] IN BLOOD BY AUTOMATED COUNT: 13 % (ref 11.5–14.5)
EST. GFR  (AFRICAN AMERICAN): >60 ML/MIN/1.73 M^2
EST. GFR  (NON AFRICAN AMERICAN): >60 ML/MIN/1.73 M^2
GLUCOSE SERPL-MCNC: 84 MG/DL (ref 70–110)
HCT VFR BLD AUTO: 47.3 % (ref 40–54)
HDLC SERPL-MCNC: 36 MG/DL (ref 40–75)
HDLC SERPL: 23.7 % (ref 20–50)
HGB BLD-MCNC: 15.2 G/DL (ref 14–18)
IMM GRANULOCYTES # BLD AUTO: 0.03 K/UL (ref 0–0.04)
IMM GRANULOCYTES NFR BLD AUTO: 0.4 % (ref 0–0.5)
LDLC SERPL CALC-MCNC: 101 MG/DL (ref 63–159)
LYMPHOCYTES # BLD AUTO: 1.9 K/UL (ref 1–4.8)
LYMPHOCYTES NFR BLD: 21.8 % (ref 18–48)
MCH RBC QN AUTO: 31.3 PG (ref 27–31)
MCHC RBC AUTO-ENTMCNC: 32.1 G/DL (ref 32–36)
MCV RBC AUTO: 97 FL (ref 82–98)
MONOCYTES # BLD AUTO: 0.6 K/UL (ref 0.3–1)
MONOCYTES NFR BLD: 7.1 % (ref 4–15)
NEUTROPHILS # BLD AUTO: 5.9 K/UL (ref 1.8–7.7)
NEUTROPHILS NFR BLD: 68.2 % (ref 38–73)
NONHDLC SERPL-MCNC: 116 MG/DL
NRBC BLD-RTO: 0 /100 WBC
PLATELET # BLD AUTO: 280 K/UL (ref 150–350)
PMV BLD AUTO: 10.7 FL (ref 9.2–12.9)
POTASSIUM SERPL-SCNC: 4.2 MMOL/L (ref 3.5–5.1)
PROT SERPL-MCNC: 7.4 G/DL (ref 6–8.4)
RBC # BLD AUTO: 4.86 M/UL (ref 4.6–6.2)
SODIUM SERPL-SCNC: 143 MMOL/L (ref 136–145)
TRIGL SERPL-MCNC: 75 MG/DL (ref 30–150)
WBC # BLD AUTO: 8.57 K/UL (ref 3.9–12.7)

## 2021-03-22 PROCEDURE — 80053 COMPREHEN METABOLIC PANEL: CPT | Performed by: INTERNAL MEDICINE

## 2021-03-22 PROCEDURE — 85025 COMPLETE CBC W/AUTO DIFF WBC: CPT | Performed by: INTERNAL MEDICINE

## 2021-03-22 PROCEDURE — 84153 ASSAY OF PSA TOTAL: CPT | Performed by: INTERNAL MEDICINE

## 2021-03-22 PROCEDURE — 80061 LIPID PANEL: CPT | Performed by: INTERNAL MEDICINE

## 2021-03-22 PROCEDURE — 36415 COLL VENOUS BLD VENIPUNCTURE: CPT | Performed by: INTERNAL MEDICINE

## 2021-03-23 ENCOUNTER — PATIENT MESSAGE (OUTPATIENT)
Dept: INTERNAL MEDICINE | Facility: CLINIC | Age: 66
End: 2021-03-23

## 2021-03-23 ENCOUNTER — TELEPHONE (OUTPATIENT)
Dept: INTERNAL MEDICINE | Facility: CLINIC | Age: 66
End: 2021-03-23

## 2021-03-30 ENCOUNTER — OFFICE VISIT (OUTPATIENT)
Dept: DERMATOLOGY | Facility: CLINIC | Age: 66
End: 2021-03-30
Payer: MEDICARE

## 2021-03-30 DIAGNOSIS — L72.0 MILIA: ICD-10-CM

## 2021-03-30 DIAGNOSIS — L82.1 SK (SEBORRHEIC KERATOSIS): Primary | ICD-10-CM

## 2021-03-30 DIAGNOSIS — Z85.820 HISTORY OF MALIGNANT MELANOMA: ICD-10-CM

## 2021-03-30 DIAGNOSIS — L91.8 SKIN TAG: ICD-10-CM

## 2021-03-30 DIAGNOSIS — Z85.828 PERSONAL HISTORY OF SKIN CANCER: ICD-10-CM

## 2021-03-30 DIAGNOSIS — D22.9 MULTIPLE BENIGN NEVI: ICD-10-CM

## 2021-03-30 PROCEDURE — 3288F PR FALLS RISK ASSESSMENT DOCUMENTED: ICD-10-PCS | Mod: CPTII,S$GLB,, | Performed by: DERMATOLOGY

## 2021-03-30 PROCEDURE — 99999 PR PBB SHADOW E&M-EST. PATIENT-LVL III: ICD-10-PCS | Mod: PBBFAC,,, | Performed by: DERMATOLOGY

## 2021-03-30 PROCEDURE — 1126F PR PAIN SEVERITY QUANTIFIED, NO PAIN PRESENT: ICD-10-PCS | Mod: S$GLB,,, | Performed by: DERMATOLOGY

## 2021-03-30 PROCEDURE — 3288F FALL RISK ASSESSMENT DOCD: CPT | Mod: CPTII,S$GLB,, | Performed by: DERMATOLOGY

## 2021-03-30 PROCEDURE — 1101F PR PT FALLS ASSESS DOC 0-1 FALLS W/OUT INJ PAST YR: ICD-10-PCS | Mod: CPTII,S$GLB,, | Performed by: DERMATOLOGY

## 2021-03-30 PROCEDURE — 1159F MED LIST DOCD IN RCRD: CPT | Mod: S$GLB,,, | Performed by: DERMATOLOGY

## 2021-03-30 PROCEDURE — 1101F PT FALLS ASSESS-DOCD LE1/YR: CPT | Mod: CPTII,S$GLB,, | Performed by: DERMATOLOGY

## 2021-03-30 PROCEDURE — 1159F PR MEDICATION LIST DOCUMENTED IN MEDICAL RECORD: ICD-10-PCS | Mod: S$GLB,,, | Performed by: DERMATOLOGY

## 2021-03-30 PROCEDURE — 1126F AMNT PAIN NOTED NONE PRSNT: CPT | Mod: S$GLB,,, | Performed by: DERMATOLOGY

## 2021-03-30 PROCEDURE — 99999 PR PBB SHADOW E&M-EST. PATIENT-LVL III: CPT | Mod: PBBFAC,,, | Performed by: DERMATOLOGY

## 2021-03-30 PROCEDURE — 99213 OFFICE O/P EST LOW 20 MIN: CPT | Mod: S$GLB,,, | Performed by: DERMATOLOGY

## 2021-03-30 PROCEDURE — 99213 PR OFFICE/OUTPT VISIT, EST, LEVL III, 20-29 MIN: ICD-10-PCS | Mod: S$GLB,,, | Performed by: DERMATOLOGY

## 2021-03-31 ENCOUNTER — OFFICE VISIT (OUTPATIENT)
Dept: SPORTS MEDICINE | Facility: CLINIC | Age: 66
End: 2021-03-31
Payer: MEDICARE

## 2021-03-31 VITALS
BODY MASS INDEX: 42.98 KG/M2 | SYSTOLIC BLOOD PRESSURE: 130 MMHG | DIASTOLIC BLOOD PRESSURE: 80 MMHG | HEIGHT: 71 IN | WEIGHT: 307 LBS

## 2021-03-31 DIAGNOSIS — M16.0 BILATERAL PRIMARY OSTEOARTHRITIS OF HIP: ICD-10-CM

## 2021-03-31 DIAGNOSIS — M21.41 PES PLANUS OF BOTH FEET: ICD-10-CM

## 2021-03-31 DIAGNOSIS — M79.10 MYALGIA: ICD-10-CM

## 2021-03-31 DIAGNOSIS — M99.04 SACRAL REGION SOMATIC DYSFUNCTION: ICD-10-CM

## 2021-03-31 DIAGNOSIS — M99.02 SOMATIC DYSFUNCTION OF THORACIC REGION: ICD-10-CM

## 2021-03-31 DIAGNOSIS — M99.06 SOMATIC DYSFUNCTION OF LOWER EXTREMITY: ICD-10-CM

## 2021-03-31 DIAGNOSIS — M21.42 PES PLANUS OF BOTH FEET: ICD-10-CM

## 2021-03-31 DIAGNOSIS — M99.05 SOMATIC DYSFUNCTION OF PELVIC REGION: ICD-10-CM

## 2021-03-31 DIAGNOSIS — M54.59 MECHANICAL LOW BACK PAIN: Primary | ICD-10-CM

## 2021-03-31 DIAGNOSIS — M99.03 SOMATIC DYSFUNCTION OF LUMBAR REGION: ICD-10-CM

## 2021-03-31 PROCEDURE — 99499 RISK ADDL DX/OHS AUDIT: ICD-10-PCS | Mod: S$GLB,,, | Performed by: NEUROMUSCULOSKELETAL MEDICINE & OMM

## 2021-03-31 PROCEDURE — 3008F PR BODY MASS INDEX (BMI) DOCUMENTED: ICD-10-PCS | Mod: CPTII,S$GLB,, | Performed by: NEUROMUSCULOSKELETAL MEDICINE & OMM

## 2021-03-31 PROCEDURE — 1159F PR MEDICATION LIST DOCUMENTED IN MEDICAL RECORD: ICD-10-PCS | Mod: S$GLB,,, | Performed by: NEUROMUSCULOSKELETAL MEDICINE & OMM

## 2021-03-31 PROCEDURE — 97110 PR THERAPEUTIC EXERCISES: ICD-10-PCS | Mod: GP,S$GLB,, | Performed by: NEUROMUSCULOSKELETAL MEDICINE & OMM

## 2021-03-31 PROCEDURE — 3288F FALL RISK ASSESSMENT DOCD: CPT | Mod: CPTII,S$GLB,, | Performed by: NEUROMUSCULOSKELETAL MEDICINE & OMM

## 2021-03-31 PROCEDURE — 3288F PR FALLS RISK ASSESSMENT DOCUMENTED: ICD-10-PCS | Mod: CPTII,S$GLB,, | Performed by: NEUROMUSCULOSKELETAL MEDICINE & OMM

## 2021-03-31 PROCEDURE — 1159F MED LIST DOCD IN RCRD: CPT | Mod: S$GLB,,, | Performed by: NEUROMUSCULOSKELETAL MEDICINE & OMM

## 2021-03-31 PROCEDURE — 99214 OFFICE O/P EST MOD 30 MIN: CPT | Mod: 25,S$GLB,, | Performed by: NEUROMUSCULOSKELETAL MEDICINE & OMM

## 2021-03-31 PROCEDURE — 1101F PT FALLS ASSESS-DOCD LE1/YR: CPT | Mod: CPTII,S$GLB,, | Performed by: NEUROMUSCULOSKELETAL MEDICINE & OMM

## 2021-03-31 PROCEDURE — 99999 PR PBB SHADOW E&M-EST. PATIENT-LVL III: CPT | Mod: PBBFAC,,, | Performed by: NEUROMUSCULOSKELETAL MEDICINE & OMM

## 2021-03-31 PROCEDURE — 99999 PR PBB SHADOW E&M-EST. PATIENT-LVL III: ICD-10-PCS | Mod: PBBFAC,,, | Performed by: NEUROMUSCULOSKELETAL MEDICINE & OMM

## 2021-03-31 PROCEDURE — 97110 THERAPEUTIC EXERCISES: CPT | Mod: GP,S$GLB,, | Performed by: NEUROMUSCULOSKELETAL MEDICINE & OMM

## 2021-03-31 PROCEDURE — 99499 UNLISTED E&M SERVICE: CPT | Mod: S$GLB,,, | Performed by: NEUROMUSCULOSKELETAL MEDICINE & OMM

## 2021-03-31 PROCEDURE — 1101F PR PT FALLS ASSESS DOC 0-1 FALLS W/OUT INJ PAST YR: ICD-10-PCS | Mod: CPTII,S$GLB,, | Performed by: NEUROMUSCULOSKELETAL MEDICINE & OMM

## 2021-03-31 PROCEDURE — 3008F BODY MASS INDEX DOCD: CPT | Mod: CPTII,S$GLB,, | Performed by: NEUROMUSCULOSKELETAL MEDICINE & OMM

## 2021-03-31 PROCEDURE — 98927 PR OSTEOPATHIC MANIP,5-6 BODY REGN: ICD-10-PCS | Mod: S$GLB,,, | Performed by: NEUROMUSCULOSKELETAL MEDICINE & OMM

## 2021-03-31 PROCEDURE — 99214 PR OFFICE/OUTPT VISIT, EST, LEVL IV, 30-39 MIN: ICD-10-PCS | Mod: 25,S$GLB,, | Performed by: NEUROMUSCULOSKELETAL MEDICINE & OMM

## 2021-03-31 PROCEDURE — 98927 OSTEOPATH MANJ 5-6 REGIONS: CPT | Mod: S$GLB,,, | Performed by: NEUROMUSCULOSKELETAL MEDICINE & OMM

## 2021-04-06 ENCOUNTER — OFFICE VISIT (OUTPATIENT)
Dept: OPTOMETRY | Facility: CLINIC | Age: 66
End: 2021-04-06
Payer: MEDICARE

## 2021-04-06 DIAGNOSIS — H52.7 REFRACTIVE ERROR: ICD-10-CM

## 2021-04-06 DIAGNOSIS — H18.529 ABMD (ANTERIOR BASEMENT MEMBRANE DYSTROPHY): ICD-10-CM

## 2021-04-06 DIAGNOSIS — H25.13 NUCLEAR SCLEROSIS OF BOTH EYES: ICD-10-CM

## 2021-04-06 DIAGNOSIS — H35.363 RETINAL DRUSEN OF BOTH EYES: ICD-10-CM

## 2021-04-06 DIAGNOSIS — H02.889 MGD (MEIBOMIAN GLAND DYSFUNCTION): Primary | ICD-10-CM

## 2021-04-06 PROCEDURE — 1101F PT FALLS ASSESS-DOCD LE1/YR: CPT | Mod: CPTII,S$GLB,, | Performed by: OPTOMETRIST

## 2021-04-06 PROCEDURE — 92015 PR REFRACTION: ICD-10-PCS | Mod: S$GLB,,, | Performed by: OPTOMETRIST

## 2021-04-06 PROCEDURE — 1126F PR PAIN SEVERITY QUANTIFIED, NO PAIN PRESENT: ICD-10-PCS | Mod: S$GLB,,, | Performed by: OPTOMETRIST

## 2021-04-06 PROCEDURE — 92014 COMPRE OPH EXAM EST PT 1/>: CPT | Mod: S$GLB,,, | Performed by: OPTOMETRIST

## 2021-04-06 PROCEDURE — 3288F FALL RISK ASSESSMENT DOCD: CPT | Mod: CPTII,S$GLB,, | Performed by: OPTOMETRIST

## 2021-04-06 PROCEDURE — 1101F PR PT FALLS ASSESS DOC 0-1 FALLS W/OUT INJ PAST YR: ICD-10-PCS | Mod: CPTII,S$GLB,, | Performed by: OPTOMETRIST

## 2021-04-06 PROCEDURE — 3288F PR FALLS RISK ASSESSMENT DOCUMENTED: ICD-10-PCS | Mod: CPTII,S$GLB,, | Performed by: OPTOMETRIST

## 2021-04-06 PROCEDURE — 1126F AMNT PAIN NOTED NONE PRSNT: CPT | Mod: S$GLB,,, | Performed by: OPTOMETRIST

## 2021-04-06 PROCEDURE — 99999 PR PBB SHADOW E&M-EST. PATIENT-LVL III: CPT | Mod: PBBFAC,,, | Performed by: OPTOMETRIST

## 2021-04-06 PROCEDURE — 92015 DETERMINE REFRACTIVE STATE: CPT | Mod: S$GLB,,, | Performed by: OPTOMETRIST

## 2021-04-06 PROCEDURE — 99999 PR PBB SHADOW E&M-EST. PATIENT-LVL III: ICD-10-PCS | Mod: PBBFAC,,, | Performed by: OPTOMETRIST

## 2021-04-06 PROCEDURE — 92014 PR EYE EXAM, EST PATIENT,COMPREHESV: ICD-10-PCS | Mod: S$GLB,,, | Performed by: OPTOMETRIST

## 2021-05-07 ENCOUNTER — TELEPHONE (OUTPATIENT)
Dept: INTERNAL MEDICINE | Facility: CLINIC | Age: 66
End: 2021-05-07

## 2021-05-17 ENCOUNTER — OFFICE VISIT (OUTPATIENT)
Dept: INTERNAL MEDICINE | Facility: CLINIC | Age: 66
End: 2021-05-17
Payer: MEDICARE

## 2021-05-17 VITALS
SYSTOLIC BLOOD PRESSURE: 138 MMHG | OXYGEN SATURATION: 95 % | BODY MASS INDEX: 42.84 KG/M2 | HEIGHT: 71 IN | HEART RATE: 76 BPM | DIASTOLIC BLOOD PRESSURE: 76 MMHG | WEIGHT: 306 LBS

## 2021-05-17 DIAGNOSIS — C43.61: ICD-10-CM

## 2021-05-17 DIAGNOSIS — Z12.11 COLON CANCER SCREENING: ICD-10-CM

## 2021-05-17 DIAGNOSIS — H91.90 HEARING LOSS, UNSPECIFIED HEARING LOSS TYPE, UNSPECIFIED LATERALITY: ICD-10-CM

## 2021-05-17 DIAGNOSIS — R60.0 LOWER EXTREMITY EDEMA: ICD-10-CM

## 2021-05-17 DIAGNOSIS — G47.33 OSA (OBSTRUCTIVE SLEEP APNEA): ICD-10-CM

## 2021-05-17 DIAGNOSIS — Z00.00 ANNUAL PHYSICAL EXAM: Primary | ICD-10-CM

## 2021-05-17 PROCEDURE — 1125F PR PAIN SEVERITY QUANTIFIED, PAIN PRESENT: ICD-10-PCS | Mod: S$GLB,,, | Performed by: INTERNAL MEDICINE

## 2021-05-17 PROCEDURE — 3288F PR FALLS RISK ASSESSMENT DOCUMENTED: ICD-10-PCS | Mod: CPTII,S$GLB,, | Performed by: INTERNAL MEDICINE

## 2021-05-17 PROCEDURE — 99999 PR PBB SHADOW E&M-EST. PATIENT-LVL IV: ICD-10-PCS | Mod: PBBFAC,,, | Performed by: INTERNAL MEDICINE

## 2021-05-17 PROCEDURE — 3008F BODY MASS INDEX DOCD: CPT | Mod: CPTII,S$GLB,, | Performed by: INTERNAL MEDICINE

## 2021-05-17 PROCEDURE — 3008F PR BODY MASS INDEX (BMI) DOCUMENTED: ICD-10-PCS | Mod: CPTII,S$GLB,, | Performed by: INTERNAL MEDICINE

## 2021-05-17 PROCEDURE — 1101F PT FALLS ASSESS-DOCD LE1/YR: CPT | Mod: CPTII,S$GLB,, | Performed by: INTERNAL MEDICINE

## 2021-05-17 PROCEDURE — 1101F PR PT FALLS ASSESS DOC 0-1 FALLS W/OUT INJ PAST YR: ICD-10-PCS | Mod: CPTII,S$GLB,, | Performed by: INTERNAL MEDICINE

## 2021-05-17 PROCEDURE — 1125F AMNT PAIN NOTED PAIN PRSNT: CPT | Mod: S$GLB,,, | Performed by: INTERNAL MEDICINE

## 2021-05-17 PROCEDURE — 99999 PR PBB SHADOW E&M-EST. PATIENT-LVL IV: CPT | Mod: PBBFAC,,, | Performed by: INTERNAL MEDICINE

## 2021-05-17 PROCEDURE — 3288F FALL RISK ASSESSMENT DOCD: CPT | Mod: CPTII,S$GLB,, | Performed by: INTERNAL MEDICINE

## 2021-05-17 PROCEDURE — 99213 OFFICE O/P EST LOW 20 MIN: CPT | Mod: S$GLB,,, | Performed by: INTERNAL MEDICINE

## 2021-05-17 PROCEDURE — 99213 PR OFFICE/OUTPT VISIT, EST, LEVL III, 20-29 MIN: ICD-10-PCS | Mod: S$GLB,,, | Performed by: INTERNAL MEDICINE

## 2021-06-07 ENCOUNTER — TELEPHONE (OUTPATIENT)
Dept: ENDOSCOPY | Facility: HOSPITAL | Age: 66
End: 2021-06-07

## 2021-06-07 DIAGNOSIS — Z12.11 COLON CANCER SCREENING: Primary | ICD-10-CM

## 2021-06-07 RX ORDER — SODIUM, POTASSIUM,MAG SULFATES 17.5-3.13G
1 SOLUTION, RECONSTITUTED, ORAL ORAL ONCE
Qty: 1 BOTTLE | Refills: 0 | Status: SHIPPED | OUTPATIENT
Start: 2021-06-07 | End: 2021-06-07

## 2021-06-17 ENCOUNTER — OFFICE VISIT (OUTPATIENT)
Dept: OTOLARYNGOLOGY | Facility: CLINIC | Age: 66
End: 2021-06-17
Payer: MEDICARE

## 2021-06-17 ENCOUNTER — CLINICAL SUPPORT (OUTPATIENT)
Dept: AUDIOLOGY | Facility: CLINIC | Age: 66
End: 2021-06-17
Payer: MEDICARE

## 2021-06-17 VITALS
HEART RATE: 62 BPM | WEIGHT: 311.06 LBS | BODY MASS INDEX: 43.39 KG/M2 | DIASTOLIC BLOOD PRESSURE: 81 MMHG | SYSTOLIC BLOOD PRESSURE: 132 MMHG

## 2021-06-17 DIAGNOSIS — H90.42 SENSORINEURAL HEARING LOSS (SNHL) OF LEFT EAR WITH UNRESTRICTED HEARING OF RIGHT EAR: Primary | ICD-10-CM

## 2021-06-17 DIAGNOSIS — H91.90 HEARING LOSS, UNSPECIFIED HEARING LOSS TYPE, UNSPECIFIED LATERALITY: ICD-10-CM

## 2021-06-17 PROCEDURE — 99999 PR PBB SHADOW E&M-EST. PATIENT-LVL III: CPT | Mod: PBBFAC,,, | Performed by: OTOLARYNGOLOGY

## 2021-06-17 PROCEDURE — 1126F AMNT PAIN NOTED NONE PRSNT: CPT | Mod: S$GLB,,, | Performed by: OTOLARYNGOLOGY

## 2021-06-17 PROCEDURE — 99999 PR PBB SHADOW E&M-EST. PATIENT-LVL I: ICD-10-PCS | Mod: PBBFAC,,, | Performed by: AUDIOLOGIST

## 2021-06-17 PROCEDURE — 3288F PR FALLS RISK ASSESSMENT DOCUMENTED: ICD-10-PCS | Mod: CPTII,S$GLB,, | Performed by: OTOLARYNGOLOGY

## 2021-06-17 PROCEDURE — 1101F PT FALLS ASSESS-DOCD LE1/YR: CPT | Mod: CPTII,S$GLB,, | Performed by: OTOLARYNGOLOGY

## 2021-06-17 PROCEDURE — 3288F FALL RISK ASSESSMENT DOCD: CPT | Mod: CPTII,S$GLB,, | Performed by: OTOLARYNGOLOGY

## 2021-06-17 PROCEDURE — 1101F PR PT FALLS ASSESS DOC 0-1 FALLS W/OUT INJ PAST YR: ICD-10-PCS | Mod: CPTII,S$GLB,, | Performed by: OTOLARYNGOLOGY

## 2021-06-17 PROCEDURE — 92567 PR TYMPA2METRY: ICD-10-PCS | Mod: S$GLB,,, | Performed by: AUDIOLOGIST

## 2021-06-17 PROCEDURE — 3008F PR BODY MASS INDEX (BMI) DOCUMENTED: ICD-10-PCS | Mod: CPTII,S$GLB,, | Performed by: OTOLARYNGOLOGY

## 2021-06-17 PROCEDURE — 99213 PR OFFICE/OUTPT VISIT, EST, LEVL III, 20-29 MIN: ICD-10-PCS | Mod: S$GLB,,, | Performed by: OTOLARYNGOLOGY

## 2021-06-17 PROCEDURE — 99999 PR PBB SHADOW E&M-EST. PATIENT-LVL III: ICD-10-PCS | Mod: PBBFAC,,, | Performed by: OTOLARYNGOLOGY

## 2021-06-17 PROCEDURE — 1159F MED LIST DOCD IN RCRD: CPT | Mod: S$GLB,,, | Performed by: OTOLARYNGOLOGY

## 2021-06-17 PROCEDURE — 92567 TYMPANOMETRY: CPT | Mod: S$GLB,,, | Performed by: AUDIOLOGIST

## 2021-06-17 PROCEDURE — 99999 PR PBB SHADOW E&M-EST. PATIENT-LVL I: CPT | Mod: PBBFAC,,, | Performed by: AUDIOLOGIST

## 2021-06-17 PROCEDURE — 99213 OFFICE O/P EST LOW 20 MIN: CPT | Mod: S$GLB,,, | Performed by: OTOLARYNGOLOGY

## 2021-06-17 PROCEDURE — 92557 COMPREHENSIVE HEARING TEST: CPT | Mod: S$GLB,,, | Performed by: AUDIOLOGIST

## 2021-06-17 PROCEDURE — 92557 PR COMPREHENSIVE HEARING TEST: ICD-10-PCS | Mod: S$GLB,,, | Performed by: AUDIOLOGIST

## 2021-06-17 PROCEDURE — 3008F BODY MASS INDEX DOCD: CPT | Mod: CPTII,S$GLB,, | Performed by: OTOLARYNGOLOGY

## 2021-06-17 PROCEDURE — 1126F PR PAIN SEVERITY QUANTIFIED, NO PAIN PRESENT: ICD-10-PCS | Mod: S$GLB,,, | Performed by: OTOLARYNGOLOGY

## 2021-06-17 PROCEDURE — 1159F PR MEDICATION LIST DOCUMENTED IN MEDICAL RECORD: ICD-10-PCS | Mod: S$GLB,,, | Performed by: OTOLARYNGOLOGY

## 2021-06-29 ENCOUNTER — ANESTHESIA EVENT (OUTPATIENT)
Dept: ENDOSCOPY | Facility: HOSPITAL | Age: 66
End: 2021-06-29
Payer: MEDICARE

## 2021-06-30 ENCOUNTER — ANESTHESIA (OUTPATIENT)
Dept: ENDOSCOPY | Facility: HOSPITAL | Age: 66
End: 2021-06-30
Payer: MEDICARE

## 2021-06-30 ENCOUNTER — HOSPITAL ENCOUNTER (OUTPATIENT)
Facility: HOSPITAL | Age: 66
Discharge: HOME | End: 2021-06-30
Attending: INTERNAL MEDICINE | Admitting: INTERNAL MEDICINE
Payer: MEDICARE

## 2021-06-30 VITALS
HEART RATE: 71 BPM | TEMPERATURE: 98 F | BODY MASS INDEX: 42.42 KG/M2 | DIASTOLIC BLOOD PRESSURE: 72 MMHG | OXYGEN SATURATION: 96 % | RESPIRATION RATE: 18 BRPM | HEIGHT: 71 IN | SYSTOLIC BLOOD PRESSURE: 144 MMHG | WEIGHT: 303 LBS

## 2021-06-30 DIAGNOSIS — Z12.11 COLON CANCER SCREENING: Primary | ICD-10-CM

## 2021-06-30 PROCEDURE — D9220A PRA ANESTHESIA: ICD-10-PCS | Mod: PT,ANES,, | Performed by: STUDENT IN AN ORGANIZED HEALTH CARE EDUCATION/TRAINING PROGRAM

## 2021-06-30 PROCEDURE — 45385 COLONOSCOPY W/LESION REMOVAL: CPT | Mod: PT,,, | Performed by: INTERNAL MEDICINE

## 2021-06-30 PROCEDURE — 37000009 HC ANESTHESIA EA ADD 15 MINS: Performed by: INTERNAL MEDICINE

## 2021-06-30 PROCEDURE — 88305 TISSUE EXAM BY PATHOLOGIST: CPT | Mod: 59 | Performed by: PATHOLOGY

## 2021-06-30 PROCEDURE — 63600175 PHARM REV CODE 636 W HCPCS: Performed by: NURSE ANESTHETIST, CERTIFIED REGISTERED

## 2021-06-30 PROCEDURE — 27201089 HC SNARE, DISP (ANY): Performed by: INTERNAL MEDICINE

## 2021-06-30 PROCEDURE — 88305 TISSUE EXAM BY PATHOLOGIST: CPT | Mod: 26,,, | Performed by: PATHOLOGY

## 2021-06-30 PROCEDURE — 37000008 HC ANESTHESIA 1ST 15 MINUTES: Performed by: INTERNAL MEDICINE

## 2021-06-30 PROCEDURE — 88305 TISSUE EXAM BY PATHOLOGIST: ICD-10-PCS | Mod: 26,,, | Performed by: PATHOLOGY

## 2021-06-30 PROCEDURE — 45385 COLONOSCOPY W/LESION REMOVAL: CPT | Performed by: INTERNAL MEDICINE

## 2021-06-30 PROCEDURE — 25000003 PHARM REV CODE 250: Performed by: INTERNAL MEDICINE

## 2021-06-30 PROCEDURE — 45385 PR COLONOSCOPY,REMV LESN,SNARE: ICD-10-PCS | Mod: PT,,, | Performed by: INTERNAL MEDICINE

## 2021-06-30 PROCEDURE — D9220A PRA ANESTHESIA: Mod: PT,CRNA,, | Performed by: NURSE ANESTHETIST, CERTIFIED REGISTERED

## 2021-06-30 PROCEDURE — D9220A PRA ANESTHESIA: ICD-10-PCS | Mod: PT,CRNA,, | Performed by: NURSE ANESTHETIST, CERTIFIED REGISTERED

## 2021-06-30 PROCEDURE — D9220A PRA ANESTHESIA: Mod: PT,ANES,, | Performed by: STUDENT IN AN ORGANIZED HEALTH CARE EDUCATION/TRAINING PROGRAM

## 2021-06-30 PROCEDURE — 25000003 PHARM REV CODE 250: Performed by: NURSE ANESTHETIST, CERTIFIED REGISTERED

## 2021-06-30 RX ORDER — PROPOFOL 10 MG/ML
VIAL (ML) INTRAVENOUS CONTINUOUS PRN
Status: DISCONTINUED | OUTPATIENT
Start: 2021-06-30 | End: 2021-06-30

## 2021-06-30 RX ORDER — SODIUM CHLORIDE 0.9 % (FLUSH) 0.9 %
10 SYRINGE (ML) INJECTION
Status: DISCONTINUED | OUTPATIENT
Start: 2021-06-30 | End: 2021-06-30 | Stop reason: HOSPADM

## 2021-06-30 RX ORDER — LIDOCAINE HYDROCHLORIDE 20 MG/ML
INJECTION, SOLUTION EPIDURAL; INFILTRATION; INTRACAUDAL; PERINEURAL
Status: DISCONTINUED | OUTPATIENT
Start: 2021-06-30 | End: 2021-06-30

## 2021-06-30 RX ORDER — PROPOFOL 10 MG/ML
VIAL (ML) INTRAVENOUS
Status: DISCONTINUED | OUTPATIENT
Start: 2021-06-30 | End: 2021-06-30

## 2021-06-30 RX ORDER — KETAMINE HCL IN 0.9 % NACL 50 MG/5 ML
SYRINGE (ML) INTRAVENOUS
Status: DISCONTINUED | OUTPATIENT
Start: 2021-06-30 | End: 2021-06-30

## 2021-06-30 RX ORDER — SODIUM CHLORIDE 9 MG/ML
INJECTION, SOLUTION INTRAVENOUS CONTINUOUS
Status: DISCONTINUED | OUTPATIENT
Start: 2021-06-30 | End: 2021-06-30 | Stop reason: HOSPADM

## 2021-06-30 RX ORDER — SODIUM CHLORIDE 0.9 % (FLUSH) 0.9 %
10 SYRINGE (ML) INJECTION
Status: CANCELLED | OUTPATIENT
Start: 2021-06-30

## 2021-06-30 RX ORDER — MIDAZOLAM HYDROCHLORIDE 1 MG/ML
INJECTION, SOLUTION INTRAMUSCULAR; INTRAVENOUS
Status: DISCONTINUED | OUTPATIENT
Start: 2021-06-30 | End: 2021-06-30

## 2021-06-30 RX ADMIN — SODIUM CHLORIDE: 0.9 INJECTION, SOLUTION INTRAVENOUS at 12:06

## 2021-06-30 RX ADMIN — MIDAZOLAM 2 MG: 1 INJECTION INTRAMUSCULAR; INTRAVENOUS at 01:06

## 2021-06-30 RX ADMIN — Medication 20 MG: at 01:06

## 2021-06-30 RX ADMIN — LIDOCAINE HYDROCHLORIDE 100 MG: 20 INJECTION, SOLUTION EPIDURAL; INFILTRATION; INTRACAUDAL at 01:06

## 2021-06-30 RX ADMIN — PROPOFOL 50 MG: 10 INJECTION, EMULSION INTRAVENOUS at 01:06

## 2021-06-30 RX ADMIN — PROPOFOL 75 MCG/KG/MIN: 10 INJECTION, EMULSION INTRAVENOUS at 01:06

## 2021-06-30 NOTE — TELEPHONE ENCOUNTER
----- Message from Rhea Mccullough sent at 7/3/2019 10:52 AM CDT -----  Contact: pt at [phone]  Patient Returning Call from [facility]    Who Left Message for Patient: Myah  Communication Preference:call  Additional Information:  Missed a call in ref to an appt   Occupational Therapy  Facility/Department: [address]  Daily Treatment Note  NAME: Alana Velazquez  : 1965  MRN: [id number]    Date of Service: 2021    Discharge Recommendations:  Alana Velazquez scored a 14/24 on the AM-PAC ADL Inpatient form. Current research shows that an AM-PAC score of 17 or less is typically not associated with a discharge to the patient's home setting. Based on the patient's AM-PAC score and their current ADL deficits, it is recommended that the patient have 3-5 sessions per week of Occupational Therapy at d/c to increase the patient's independence. Please see assessment section for further patient specific details. If patient discharges prior to next session this note will serve as a discharge summary. Please see below for the latest assessment towards goals. OT Equipment Recommendations  Other: defer to next level of care    Assessment   Performance deficits / Impairments: Decreased functional mobility ; Decreased ADL status; Decreased endurance;Decreased safe awareness  Assessment: Pt participated in transfer, bed mobility, and simple grooming/hygiene tasks in bed. She is unable to adhere to L LE NWB with transfers/mobility, and would be unable to pivot transfer. Maxi move is safest transfer for nursing at this time. Recommend ongoing inpatient OT at discharge to maximize function. Treatment Diagnosis: decreased independence with ADLs and fx mobility secondary to NWB status on LLE  OT Education: Precautions;Transfer Training;OT Role  Patient Education: needs reinforcement  REQUIRES OT FOLLOW UP: Yes  Activity Tolerance  Activity Tolerance: unable to adhere to L LE NWB prec  Safety Devices  Safety Devices in place: Yes  Type of devices: Nurse notified; Bed alarm in place;Call light within reach; Left in bed         Patient Diagnosis(es): There were no encounter diagnoses.       has a past medical history of Acute blood loss anemia, Acute kidney injury ([address] Acute kidney injury superimposed on CKD ([address] Acute on chronic combined systolic and diastolic congestive heart failure ([address] Acute on chronic right-sided heart failure ([address] Alcohol dependence ([address] Arthritis, Asthma, CAD (coronary artery disease), Cellulitis, COPD (chronic obstructive pulmonary disease) ([address] Diabetic ulcer of left foot associated with type 2 diabetes mellitus, limited to breakdown of skin ([address] Diabetic ulcer of toe of right foot associated with type 2 diabetes mellitus ([address] Left renal artery stenosis ([address] MI (myocardial infarction) ([address] Positive FIT (fecal immunochemical test), Stenosis of left subclavian artery with coronary steal syndrome, and Traumatic perinephric hematoma, left, initial encounter. has a past surgical history that includes Tonsillectomy; Cholecystectomy; tumor excision; Upper gastrointestinal endoscopy (4/25/2013); Upper gastrointestinal endoscopy (12/10/2015); Upper gastrointestinal endoscopy (01/06/2017); Arterial bypass surgry (Left, 01/06/2016); Cardiac catheterization (03/27/2018); Coronary angioplasty with stent (03/16/2018); Rotator cuff repair (Left, 04/22/2016); Coronary angioplasty with stent (01/03/2018); Insertable Cardiac Monitor (02/14/2019); femoral bypass (Right, 5/16/2019); transluminal angioplasty (Left, 10/08/2019); Cardiac catheterization (01/20/2020); Coronary artery bypass graft (N/A, 1/27/2020); vascular surgery (Left, 12/08/2015); transluminal angioplasty (Left, 04/29/2020); vascular surgery (Bilateral, 07/07/2020); Coronary angioplasty with stent (10/07/2019); transesophageal echocardiogram (01/26/2020); vascular surgery (Left, 08/04/2020); vascular surgery (Left, 08/05/2020); and vascular surgery (Left, 09/01/2020).     Restrictions  Restrictions/Precautions  Restrictions/Precautions: Weight Bearing  Lower Extremity Weight Bearing Restrictions  Left Lower Extremity Weight Bearing: Non Weight Bearing  Position Activity Restriction  Other position/activity restrictions: NWB L leg; up with assist  Subjective   General  Chart Reviewed: Yes  Patient assessed for rehabilitation services?: Yes  Additional Pertinent Hx: 60 y.o. female with a PMHx of CAD s/p PCI to LAD and circumflex 2018, s/p CABG x1 vessel (LIMA to the LAD, 01/2020), [facility], s/p CardioMEMS implant (02/2019), moderate MR, PAD s/p RA stent, CKD, DM, COPD, chronic hyponatremia, chronic leg wounds. Pt now NWB on LLE. Family / Caregiver Present: No  Referring Practitioner: Funmilayo Alejandre DPM  Diagnosis: heart failure  Subjective  Subjective: Pt in chair, reporting she cannot keep weight off of her L foot. Orientation  Orientation  Overall Orientation Status: Within Functional Limits  Objective    ADL  Grooming: Setup; Independent (supine in bed with head of bed elevated, after set up:  pt indep brushing teeth, combing hair,applying deodorant,Diana washing L underarm)        Functional Mobility  Functional Mobility Comments: Functional mobility with rolling walker, cues for L LE NWB, Diana of 1 and mod A of 1 ~a few steps chair to bed. Pt unable to adhere to L LE NWB. Toilet Transfers  Toilet Transfers Comments: OT demonstrated to patient how to do pivot transfer to bedside commode-she stated she did not think she could do this method    Bed mobility  Sit to Supine: Minimal assistance (with LE, increased time/effort)  Scooting: Stand by assistance (scooting to head of bed in supine, with cues)  Comment: Pt unable to scoot back on bed when seated. She scooted to the L , unable to adhere to L LE NWB, increased time/effort. Transfers  Sit to stand: Moderate assistance (cues for L LE NWB and hand placement, with use of walker)  Stand to sit: Moderate assistance (with walker, cues for positioning, assist with safe walker placement)                       Cognition  Overall Cognitive Status: Exceptions  Following Commands:  Follows one step commands consistently  Safety Judgement: Decreased awareness of need for assistance;Decreased awareness of need for safety  Insights: Decreased awareness of deficits  Initiation: Requires cues for some  Sequencing: Requires cues for all  Cognition Comment: Pt intermittently referring to going home, cues to remind her of SNF d/c plan.                     Type of ROM/Therapeutic Exercise  Type of ROM/Therapeutic Exercise: AROM  Comment: supine in bed, head of bed elevated: 10-12 reps finger flex/ext, supination/pronation, elbow flex/ext, L shoulder flex, L overhead elbow flex/ext, R UE AAROM to ~30 degrees flexion                    Plan   Plan  Times per week: 2-5  Current Treatment Recommendations: Balance Training, Functional Mobility Training, Endurance Training, Self-Care / ADL, ROM  G-Code     OutComes Score                                                  AM-PAC Score        AM-[facility] Inpatient Daily Activity Raw Score: 14 (06/30/21 1108)  AM-PAC Inpatient ADL T-Scale Score : 33.39 (06/30/21 1108)  ADL Inpatient CMS 0-100% Score: 59.67 (06/30/21 1108)  ADL Inpatient CMS G-Code Modifier : CK (06/30/21 1108)    Goals  Short term goals  Time Frame for Short term goals: by discharge  Short term goal 1: Pt will perform sit-stand transfer while following NWB restriction on LLE with no more than CGA- 6/30 goal not met  Short term goal 2: Pt will perform LB dressing via adaptive technique while following NWB status with set-up- 6/30  goal not addressed  Short term goal 3: Pt will perform toileting assessment-6/30 goal not addressed  Short term goal 4: new goal 6/30-pt to perform 10-12 reps B UE AAROM/AROM exerc to increase activity tolerance/transfers  Patient Goals   Patient goals : return home       Therapy Time   Individual Concurrent Group Co-treatment   Time In 0930         Time Out 1008         Minutes 38              Timed Code Treatment Minutes:  38    Total Treatment Minutes:  VEDA Pollard/L [address]

## 2021-07-08 LAB
FINAL PATHOLOGIC DIAGNOSIS: NORMAL
Lab: NORMAL

## 2021-07-09 ENCOUNTER — TELEPHONE (OUTPATIENT)
Dept: ENDOSCOPY | Facility: HOSPITAL | Age: 66
End: 2021-07-09

## 2021-11-08 ENCOUNTER — PES CALL (OUTPATIENT)
Dept: ADMINISTRATIVE | Facility: CLINIC | Age: 66
End: 2021-11-08
Payer: MEDICARE

## 2021-11-10 ENCOUNTER — TELEPHONE (OUTPATIENT)
Dept: INTERNAL MEDICINE | Facility: CLINIC | Age: 66
End: 2021-11-10
Payer: MEDICARE

## 2021-11-11 ENCOUNTER — OFFICE VISIT (OUTPATIENT)
Dept: FAMILY MEDICINE | Facility: CLINIC | Age: 66
End: 2021-11-11
Payer: MEDICARE

## 2021-11-11 VITALS
OXYGEN SATURATION: 95 % | HEIGHT: 71 IN | DIASTOLIC BLOOD PRESSURE: 60 MMHG | HEART RATE: 118 BPM | SYSTOLIC BLOOD PRESSURE: 138 MMHG | TEMPERATURE: 98 F | BODY MASS INDEX: 44.1 KG/M2 | WEIGHT: 315 LBS

## 2021-11-11 DIAGNOSIS — N40.1 BPH WITH URINARY OBSTRUCTION: Chronic | ICD-10-CM

## 2021-11-11 DIAGNOSIS — N13.8 BPH WITH URINARY OBSTRUCTION: Chronic | ICD-10-CM

## 2021-11-11 DIAGNOSIS — R30.0 DYSURIA: Primary | ICD-10-CM

## 2021-11-11 PROCEDURE — 1160F RVW MEDS BY RX/DR IN RCRD: CPT | Mod: CPTII,S$GLB,, | Performed by: INTERNAL MEDICINE

## 2021-11-11 PROCEDURE — 99999 PR PBB SHADOW E&M-EST. PATIENT-LVL III: CPT | Mod: PBBFAC,,, | Performed by: INTERNAL MEDICINE

## 2021-11-11 PROCEDURE — 3075F PR MOST RECENT SYSTOLIC BLOOD PRESS GE 130-139MM HG: ICD-10-PCS | Mod: CPTII,S$GLB,, | Performed by: INTERNAL MEDICINE

## 2021-11-11 PROCEDURE — 99999 PR PBB SHADOW E&M-EST. PATIENT-LVL III: ICD-10-PCS | Mod: PBBFAC,,, | Performed by: INTERNAL MEDICINE

## 2021-11-11 PROCEDURE — 3288F FALL RISK ASSESSMENT DOCD: CPT | Mod: CPTII,S$GLB,, | Performed by: INTERNAL MEDICINE

## 2021-11-11 PROCEDURE — 3008F BODY MASS INDEX DOCD: CPT | Mod: CPTII,S$GLB,, | Performed by: INTERNAL MEDICINE

## 2021-11-11 PROCEDURE — 1125F PR PAIN SEVERITY QUANTIFIED, PAIN PRESENT: ICD-10-PCS | Mod: CPTII,S$GLB,, | Performed by: INTERNAL MEDICINE

## 2021-11-11 PROCEDURE — 3078F DIAST BP <80 MM HG: CPT | Mod: CPTII,S$GLB,, | Performed by: INTERNAL MEDICINE

## 2021-11-11 PROCEDURE — 1125F AMNT PAIN NOTED PAIN PRSNT: CPT | Mod: CPTII,S$GLB,, | Performed by: INTERNAL MEDICINE

## 2021-11-11 PROCEDURE — 3288F PR FALLS RISK ASSESSMENT DOCUMENTED: ICD-10-PCS | Mod: CPTII,S$GLB,, | Performed by: INTERNAL MEDICINE

## 2021-11-11 PROCEDURE — 1160F PR REVIEW ALL MEDS BY PRESCRIBER/CLIN PHARMACIST DOCUMENTED: ICD-10-PCS | Mod: CPTII,S$GLB,, | Performed by: INTERNAL MEDICINE

## 2021-11-11 PROCEDURE — 1159F PR MEDICATION LIST DOCUMENTED IN MEDICAL RECORD: ICD-10-PCS | Mod: CPTII,S$GLB,, | Performed by: INTERNAL MEDICINE

## 2021-11-11 PROCEDURE — 99214 PR OFFICE/OUTPT VISIT, EST, LEVL IV, 30-39 MIN: ICD-10-PCS | Mod: S$GLB,,, | Performed by: INTERNAL MEDICINE

## 2021-11-11 PROCEDURE — 3008F PR BODY MASS INDEX (BMI) DOCUMENTED: ICD-10-PCS | Mod: CPTII,S$GLB,, | Performed by: INTERNAL MEDICINE

## 2021-11-11 PROCEDURE — 1159F MED LIST DOCD IN RCRD: CPT | Mod: CPTII,S$GLB,, | Performed by: INTERNAL MEDICINE

## 2021-11-11 PROCEDURE — 3075F SYST BP GE 130 - 139MM HG: CPT | Mod: CPTII,S$GLB,, | Performed by: INTERNAL MEDICINE

## 2021-11-11 PROCEDURE — 1101F PR PT FALLS ASSESS DOC 0-1 FALLS W/OUT INJ PAST YR: ICD-10-PCS | Mod: CPTII,S$GLB,, | Performed by: INTERNAL MEDICINE

## 2021-11-11 PROCEDURE — 99214 OFFICE O/P EST MOD 30 MIN: CPT | Mod: S$GLB,,, | Performed by: INTERNAL MEDICINE

## 2021-11-11 PROCEDURE — 1101F PT FALLS ASSESS-DOCD LE1/YR: CPT | Mod: CPTII,S$GLB,, | Performed by: INTERNAL MEDICINE

## 2021-11-11 PROCEDURE — 3078F PR MOST RECENT DIASTOLIC BLOOD PRESSURE < 80 MM HG: ICD-10-PCS | Mod: CPTII,S$GLB,, | Performed by: INTERNAL MEDICINE

## 2021-11-11 RX ORDER — TAMSULOSIN HYDROCHLORIDE 0.4 MG/1
0.4 CAPSULE ORAL DAILY
Qty: 90 CAPSULE | Refills: 1 | Status: SHIPPED | OUTPATIENT
Start: 2021-11-11 | End: 2022-05-05

## 2021-11-11 RX ORDER — SULFAMETHOXAZOLE AND TRIMETHOPRIM 800; 160 MG/1; MG/1
1 TABLET ORAL 2 TIMES DAILY
Qty: 20 TABLET | Refills: 0 | Status: SHIPPED | OUTPATIENT
Start: 2021-11-11 | End: 2021-11-25

## 2021-11-24 ENCOUNTER — OFFICE VISIT (OUTPATIENT)
Dept: DERMATOLOGY | Facility: CLINIC | Age: 66
End: 2021-11-24
Payer: MEDICARE

## 2021-11-24 DIAGNOSIS — L82.1 SK (SEBORRHEIC KERATOSIS): ICD-10-CM

## 2021-11-24 DIAGNOSIS — D22.9 NEVUS: ICD-10-CM

## 2021-11-24 DIAGNOSIS — L91.8 SKIN TAG: ICD-10-CM

## 2021-11-24 DIAGNOSIS — D18.01 CHERRY ANGIOMA: ICD-10-CM

## 2021-11-24 DIAGNOSIS — Z85.828 PERSONAL HISTORY OF SKIN CANCER: ICD-10-CM

## 2021-11-24 DIAGNOSIS — C43.61: ICD-10-CM

## 2021-11-24 DIAGNOSIS — L90.5 SCAR: Primary | ICD-10-CM

## 2021-11-24 PROCEDURE — 99999 PR PBB SHADOW E&M-EST. PATIENT-LVL II: ICD-10-PCS | Mod: PBBFAC,,, | Performed by: DERMATOLOGY

## 2021-11-24 PROCEDURE — 99999 PR PBB SHADOW E&M-EST. PATIENT-LVL II: CPT | Mod: PBBFAC,,, | Performed by: DERMATOLOGY

## 2021-11-24 PROCEDURE — 99213 OFFICE O/P EST LOW 20 MIN: CPT | Mod: S$GLB,,, | Performed by: DERMATOLOGY

## 2021-11-24 PROCEDURE — 99213 PR OFFICE/OUTPT VISIT, EST, LEVL III, 20-29 MIN: ICD-10-PCS | Mod: S$GLB,,, | Performed by: DERMATOLOGY

## 2022-02-15 ENCOUNTER — PES CALL (OUTPATIENT)
Dept: ADMINISTRATIVE | Facility: CLINIC | Age: 67
End: 2022-02-15
Payer: MEDICARE

## 2022-03-16 ENCOUNTER — OFFICE VISIT (OUTPATIENT)
Dept: SPORTS MEDICINE | Facility: CLINIC | Age: 67
End: 2022-03-16
Payer: MEDICARE

## 2022-03-16 VITALS
WEIGHT: 305 LBS | SYSTOLIC BLOOD PRESSURE: 148 MMHG | HEIGHT: 71 IN | BODY MASS INDEX: 42.7 KG/M2 | DIASTOLIC BLOOD PRESSURE: 83 MMHG

## 2022-03-16 DIAGNOSIS — M54.59 MECHANICAL LOW BACK PAIN: Primary | ICD-10-CM

## 2022-03-16 DIAGNOSIS — E66.01 MORBID OBESITY: ICD-10-CM

## 2022-03-16 DIAGNOSIS — M99.04 SACRAL REGION SOMATIC DYSFUNCTION: ICD-10-CM

## 2022-03-16 DIAGNOSIS — M99.03 SOMATIC DYSFUNCTION OF LUMBAR REGION: ICD-10-CM

## 2022-03-16 DIAGNOSIS — M99.05 SOMATIC DYSFUNCTION OF PELVIC REGION: ICD-10-CM

## 2022-03-16 DIAGNOSIS — M79.10 MYALGIA: ICD-10-CM

## 2022-03-16 PROCEDURE — 99499 RISK ADDL DX/OHS AUDIT: ICD-10-PCS | Mod: S$GLB,,, | Performed by: NEUROMUSCULOSKELETAL MEDICINE & OMM

## 2022-03-16 PROCEDURE — 1159F PR MEDICATION LIST DOCUMENTED IN MEDICAL RECORD: ICD-10-PCS | Mod: CPTII,S$GLB,, | Performed by: NEUROMUSCULOSKELETAL MEDICINE & OMM

## 2022-03-16 PROCEDURE — 97110 PR THERAPEUTIC EXERCISES: ICD-10-PCS | Mod: GP,S$GLB,, | Performed by: NEUROMUSCULOSKELETAL MEDICINE & OMM

## 2022-03-16 PROCEDURE — 1101F PR PT FALLS ASSESS DOC 0-1 FALLS W/OUT INJ PAST YR: ICD-10-PCS | Mod: CPTII,S$GLB,, | Performed by: NEUROMUSCULOSKELETAL MEDICINE & OMM

## 2022-03-16 PROCEDURE — 1159F MED LIST DOCD IN RCRD: CPT | Mod: CPTII,S$GLB,, | Performed by: NEUROMUSCULOSKELETAL MEDICINE & OMM

## 2022-03-16 PROCEDURE — 3077F PR MOST RECENT SYSTOLIC BLOOD PRESSURE >= 140 MM HG: ICD-10-PCS | Mod: CPTII,S$GLB,, | Performed by: NEUROMUSCULOSKELETAL MEDICINE & OMM

## 2022-03-16 PROCEDURE — 3079F DIAST BP 80-89 MM HG: CPT | Mod: CPTII,S$GLB,, | Performed by: NEUROMUSCULOSKELETAL MEDICINE & OMM

## 2022-03-16 PROCEDURE — 3288F PR FALLS RISK ASSESSMENT DOCUMENTED: ICD-10-PCS | Mod: CPTII,S$GLB,, | Performed by: NEUROMUSCULOSKELETAL MEDICINE & OMM

## 2022-03-16 PROCEDURE — 1101F PT FALLS ASSESS-DOCD LE1/YR: CPT | Mod: CPTII,S$GLB,, | Performed by: NEUROMUSCULOSKELETAL MEDICINE & OMM

## 2022-03-16 PROCEDURE — 3079F PR MOST RECENT DIASTOLIC BLOOD PRESSURE 80-89 MM HG: ICD-10-PCS | Mod: CPTII,S$GLB,, | Performed by: NEUROMUSCULOSKELETAL MEDICINE & OMM

## 2022-03-16 PROCEDURE — 3008F BODY MASS INDEX DOCD: CPT | Mod: CPTII,S$GLB,, | Performed by: NEUROMUSCULOSKELETAL MEDICINE & OMM

## 2022-03-16 PROCEDURE — 3288F FALL RISK ASSESSMENT DOCD: CPT | Mod: CPTII,S$GLB,, | Performed by: NEUROMUSCULOSKELETAL MEDICINE & OMM

## 2022-03-16 PROCEDURE — 1160F RVW MEDS BY RX/DR IN RCRD: CPT | Mod: CPTII,S$GLB,, | Performed by: NEUROMUSCULOSKELETAL MEDICINE & OMM

## 2022-03-16 PROCEDURE — 99999 PR PBB SHADOW E&M-EST. PATIENT-LVL III: ICD-10-PCS | Mod: PBBFAC,,, | Performed by: NEUROMUSCULOSKELETAL MEDICINE & OMM

## 2022-03-16 PROCEDURE — 3077F SYST BP >= 140 MM HG: CPT | Mod: CPTII,S$GLB,, | Performed by: NEUROMUSCULOSKELETAL MEDICINE & OMM

## 2022-03-16 PROCEDURE — 98926 PR OSTEOPATHIC MANIP,3-4 BODY REGN: ICD-10-PCS | Mod: S$GLB,,, | Performed by: NEUROMUSCULOSKELETAL MEDICINE & OMM

## 2022-03-16 PROCEDURE — 99499 UNLISTED E&M SERVICE: CPT | Mod: S$GLB,,, | Performed by: NEUROMUSCULOSKELETAL MEDICINE & OMM

## 2022-03-16 PROCEDURE — 1160F PR REVIEW ALL MEDS BY PRESCRIBER/CLIN PHARMACIST DOCUMENTED: ICD-10-PCS | Mod: CPTII,S$GLB,, | Performed by: NEUROMUSCULOSKELETAL MEDICINE & OMM

## 2022-03-16 PROCEDURE — 97110 THERAPEUTIC EXERCISES: CPT | Mod: GP,S$GLB,, | Performed by: NEUROMUSCULOSKELETAL MEDICINE & OMM

## 2022-03-16 PROCEDURE — 99214 OFFICE O/P EST MOD 30 MIN: CPT | Mod: 25,S$GLB,, | Performed by: NEUROMUSCULOSKELETAL MEDICINE & OMM

## 2022-03-16 PROCEDURE — 99214 PR OFFICE/OUTPT VISIT, EST, LEVL IV, 30-39 MIN: ICD-10-PCS | Mod: 25,S$GLB,, | Performed by: NEUROMUSCULOSKELETAL MEDICINE & OMM

## 2022-03-16 PROCEDURE — 3008F PR BODY MASS INDEX (BMI) DOCUMENTED: ICD-10-PCS | Mod: CPTII,S$GLB,, | Performed by: NEUROMUSCULOSKELETAL MEDICINE & OMM

## 2022-03-16 PROCEDURE — 99999 PR PBB SHADOW E&M-EST. PATIENT-LVL III: CPT | Mod: PBBFAC,,, | Performed by: NEUROMUSCULOSKELETAL MEDICINE & OMM

## 2022-03-16 PROCEDURE — 98926 OSTEOPATH MANJ 3-4 REGIONS: CPT | Mod: S$GLB,,, | Performed by: NEUROMUSCULOSKELETAL MEDICINE & OMM

## 2022-03-17 ENCOUNTER — PES CALL (OUTPATIENT)
Dept: ADMINISTRATIVE | Facility: CLINIC | Age: 67
End: 2022-03-17
Payer: MEDICARE

## 2022-03-17 ENCOUNTER — TELEPHONE (OUTPATIENT)
Dept: BARIATRICS | Facility: CLINIC | Age: 67
End: 2022-03-17
Payer: MEDICARE

## 2022-03-18 ENCOUNTER — TELEPHONE (OUTPATIENT)
Dept: BARIATRICS | Facility: CLINIC | Age: 67
End: 2022-03-18
Payer: MEDICARE

## 2022-03-29 ENCOUNTER — TELEPHONE (OUTPATIENT)
Dept: BARIATRICS | Facility: CLINIC | Age: 67
End: 2022-03-29
Payer: MEDICARE

## 2022-03-31 ENCOUNTER — PATIENT MESSAGE (OUTPATIENT)
Dept: SPORTS MEDICINE | Facility: CLINIC | Age: 67
End: 2022-03-31
Payer: MEDICARE

## 2022-03-31 NOTE — PROGRESS NOTES
Subjective:     Elías Gay     Chief Complaint   Patient presents with    Lower Back - Pain       Follow-up  Associated symptoms include myalgias. Pertinent negatives include no chills, fever, headaches, neck pain or weakness.         Elías is a 67 y.o. male coming in today for back pain. Since last visit the pain has Improved. The pain is better with rest, HEP, OMT and worse with activity, sitting, lateral flexion. Pt. describes the pain as a 3/10 dull pain that does not radiate. He reports more pain on his right side.There has not been any new a fall/injury/ or traumas since last visit. Pt. denies any new musculoskeletal complaints at this time.      Office note from 3/16/22 reviewed     Review of Systems   Constitutional: Negative for chills and fever.   Musculoskeletal: Positive for back pain and myalgias. Negative for falls, joint pain and neck pain.   Neurological: Negative for dizziness, tingling, focal weakness, weakness and headaches.     Review of Systems   Constitutional: Negative for chills and fever.   Musculoskeletal: Positive for back pain and myalgias. Negative for falls, joint pain and neck pain.   Neurological: Negative for dizziness, tingling, focal weakness, weakness and headaches.       PAST MEDICAL HISTORY:   Past Medical History:   Diagnosis Date    Arthritis 1971    Bone Spurs in feet    Basal cell carcinoma 11/06/2018    left ear helix    Foreign body in conjunctival sac     Hernia, inguinal, right 2002    Joint pain 1971    Bones of feet    Keloid cicatrix 1958 2010 2018 2019    Hernia, Knee, Arm, Ear    Melanoma 09/14/2018    Right Arm 0.6mm    Severe sleep apnea      PAST SURGICAL HISTORY:   Past Surgical History:   Procedure Laterality Date    CLOSURE OF DEFECT OF MOHS PROCEDURE Left 1/3/2019    Procedure: CLOSURE, MOHS PROCEDURE DEFECT LEFT EAR;  Surgeon: Jeramy Meek MD;  Location: [facility] OR [address] [place] [address];  Service: Plastics;  Laterality: Left;  plastics set    COLONOSCOPY  N/A 6/30/2021    Procedure: COLONOSCOPY;  Surgeon: Juliano Santoyo MD;  Location: [facility] ([address] [place] [address]);  Service: Endoscopy;  Laterality: N/A;  severe sleep apnea     fully vaccinated-GT    HERNIA REPAIR Left     3 years of age    HERNIA REPAIR N/A     Ventral wall at 3 year of age.    KNEE SURGERY Right 1984    Arthroscopic    NASAL SEPTUM SURGERY N/A 2009    SKIN BIOPSY  several over time     FAMILY HISTORY:   Family History   Problem Relation Age of Onset    Hypertension Mother     Stroke Mother     Aneurysm Mother     Cancer Father 72        Prostate and liver    No Known Problems Daughter     No Known Problems Son     Gout Brother     Eczema Brother     Psoriasis Brother     No Known Problems Daughter     No Known Problems Son     Cataracts Neg Hx     Glaucoma Neg Hx     Macular degeneration Neg Hx     Melanoma Neg Hx     Eczema Neg Hx     Lupus Neg Hx     Psoriasis Neg Hx      SOCIAL HISTORY:   Social History     Socioeconomic History    Marital status:    Tobacco Use    Smoking status: Never Smoker    Smokeless tobacco: Never Used    Tobacco comment: Second hand smoke from mother. Cigarrette smoke inflames my sinuses.   Substance and Sexual Activity    Alcohol use: Not Currently     Alcohol/week: 1.0 standard drink     Types: 1 Glasses of wine per week     Comment: Rare    Drug use: No    Sexual activity: Yes     Partners: Female     Birth control/protection: None     Social Determinants of Health     Financial Resource Strain: Unknown    Difficulty of Paying Living Expenses: Patient refused   Food Insecurity: Unknown    Worried About Running Out of Food in the Last Year: Patient refused    Ran Out of Food in the Last Year: Patient refused   Transportation Needs: Unknown    Lack of Transportation (Medical): Patient refused    Lack of Transportation (Non-Medical): Patient refused   Physical Activity: Unknown    Days of Exercise per Week: Patient refused    Minutes  "of Exercise per Session: Patient refused   Stress: Unknown    Feeling of Stress : Patient refused   Social Connections: Unknown    Frequency of Communication with Friends and Family: Patient refused    Frequency of Social Gatherings with Friends and Family: Patient refused    Active Member of Clubs or Organizations: Patient refused    Attends Club or Organization Meetings: Patient refused    Marital Status: Patient refused   Housing Stability: Unknown    Unable to Pay for Housing in the Last Year: Patient refused    Number of Places Lived in the Last Year: 1    Unstable Housing in the Last Year: Patient refused       MEDICATIONS:   Current Outpatient Medications:     ciclopirox (PENLAC) 8 % Soln, Apply daily to affected nail. Must remove and restart weekly, Disp: 1 Bottle, Rfl: 5    guaiFENesin (MUCINEX) 600 mg 12 hr tablet, Take 1,200 mg by mouth 2 (two) times daily., Disp: , Rfl:     ketoconazole (NIZORAL) 2 % cream, AAA bid to both feet x 3 weeks, Disp: 60 g, Rfl: 3    tamsulosin (FLOMAX) 0.4 mg Cap, Take 1 capsule (0.4 mg total) by mouth once daily., Disp: 90 capsule, Rfl: 1    azelastine (ASTELIN) 137 mcg (0.1 %) nasal spray, 1 spray (137 mcg total) by Nasal route 2 (two) times daily., Disp: 30 mL, Rfl: 11  ALLERGIES: Review of patient's allergies indicates:  No Known Allergies       Objective:     VITAL SIGNS: BP (!) 148/92   Pulse 86   Ht 5' 11" (1.803 m)   Wt (!) 138.3 kg (305 lb)   BMI 42.54 kg/m²    General    Vitals reviewed.  Constitutional: He is oriented to person, place, and time. He appears well-developed and well-nourished.   Neurological: He is alert and oriented to person, place, and time.   Psychiatric: He has a normal mood and affect. His behavior is normal.             MSK Exam:  Lumbar Spine: bilateral lumbar region    Observation:    Normal cervicothoracolumbar curves.    No obvious pelvic obliquity while standing.    No edema, erythema, or ecchymosis noted in lumbosacral " region.    No midline skin abnormalities.    No atrophy of lower limb musculature.  Leg lengths symmetric.  Posture:  Posterior pelvis tilt with loss of lumbar lordosis    Tenderness:  + tenderness throughout the lumbar spine, iliolumbar region, posterior pelvis.  + right QL tenderness  No tenderness over the sacrum, piriformis, greater/lesser trochanters.  No bony deformities or step-offs palpated.     Range of Motion (* = with pain):  Active flexion to 60°.  Active extension to 25°.   Active rotation to 30° on left and 30° on right.  Active sidebending to 25° on left and 25° on right.   Passive hip flexion to 135° on left and 135° on right.    Passive hip internal rotation to 25° on left and 25° on right.   Passive hip external rotation to 45° on left and 45° on right.     Strength Testing (* = with pain):  Hip flexion - 5/5 on left and 5/5 on right  Hip extension - 5/5 on left and 5/5 on right  Knee flexion - 5/5 on left and 5/5 on right  Knee extension - 5/5 on left and 5/5 on right  Dorsiflexion - 5/5 on left and 5/5 on right  Plantarflexion - 5/5 on left and 5/5 on right  Great toe extension - 5/5 on left and 5/5 on right    Special Tests:  Seated straight leg raise - negative on left and negative on right  Supine straight leg raise - negative on left and negative on right   Slump test - negative on left and negative on right  Provocation maneuvers exhibit no worsening of symptoms.    VALENCIA test - negative  FADIR test - negative  Log roll test - negative    Jaylon's test- positive bilaterally    Structural Exam:  TART (Tissue texture abnormality, Asymmetry,  Restriction of motion and/or Tenderness) changes:     Thoracic Spine   T1 Neutral   T2 Neutral   T3 Neutral   T4 Neutral   T5 Neutral   T6 Neutral   T7 Neutral   T8 Neutral   T9 Neutral   T10 FRS LEFT   T11 FRS LEFT   T12 Neutral   + right QL TTA    Rib cage: neutral     Lumbar Spine   L1 Neutral   L2 Neutral   L3 Neutral   L4 FRS LEFT   L5 FRS LEFT   Right  iliolumbar Herniated trigger point (HTP) fascial distortion     Pelvis:  · Innominate:Right anterior rotation  · Pubic bone:Right inferior pubic shear    Sacrum:Right on Left sacral torsion     Key   F= Flexed   E = Extended   R = Rotated   S = Sidebent   TTA = tissue texture abnormality       Neurovascular Exam:  Sensation intact to light touch in the L2-S2 dermatomes bilaterally.   No pretibial edema or abnormal hair pattern of the shin.    Intact and symmetric DP and PT pulses bilaterally.  Normal gait without trendelenberg, heel walking, toe walking, and tandem walking.      Assessment:      Encounter Diagnoses   Name Primary?    Mechanical low back pain Yes    Strain of muscle, fascia and tendon of lower back, subsequent encounter     Myalgia     Morbid obesity     Somatic dysfunction of lumbar region     Sacral region somatic dysfunction     Somatic dysfunction of pelvic region     Somatic dysfunction of thoracic region           Plan:   1.  Acute flair of chronic bilateral low back pain likely secondary to poor pelvic stability and compensatory muscle firing patterns with associated QL strain- improved.  Associated bilateral pes planus, underlying mild hip DJD, and obesity also contributing to biomechanical restrictions of the lower kinetic chain.   - OMT performed again today to address associated biomechanical restrictions  and HEP restarted.   - Recommend OTC NSAIDs prn for pain control  - Recommend restarting low load aerobic exercise with stationary biking  -  continue OTC medial arch support recommended for bilateral pes planus   - encouraged continued weight loss with diet and low load exercise.  Referral previously placed to bariatric clinic for further evaluation.  - discussed proper lifting mechanics  -  Discussed option of formal physical therapy referral if symptoms persist  - X-ray images of right hip taken 08/15/2019 (AP pelvis and frogleg lateral  right views) showed mild DJD with hip  joint space narrowing bilaterally.     2. OMT 3-4 regions. Oral consent obtained.  Reviewed benefits and potential side effects.   - OMT indicated today due to signs and symptoms as well as local and remote somatic dysfunction findings and their related neurokinetic, lymphatic, fascial and/or arteriovenous body connections.   - OMT techniques used: Myofascial Release and Muscle Energy   - Treatment was tolerated well. Improvement noted in segmental mobility post-treatment in dysfunctional regions. There were no adverse events and no complications immediately following treatment.     3.  Reviewed with patient the following HEP:  Continue:  A)    Pelvic clock exercises given to do from the 6-12 o'clock positions:10-15 reps, twice daily. Hand out of exercise also given.   B) IT band rolling: recommend using rolling stick or foam roller to roll out IT band tension bilaterally, 1-2 times a day.   C) Quadratus lumborum self-stretch while standing: hold stretch for 30 seconds, repeating 2-3 times on each side. Do stretch twice daily. Hand-out also given.   ADD  D) Seated torso rotation exercise:  Gently rotate torso in the seated position, hold bind for 5-10 second, rotating further into stretch as tolerated with deep breath exhalation.  Repeat stretch bilaterally, 2-3 times a day.  Handout of exercise also given.    E) Pt. Given prone prop exercise to stretch psoas and anterior hip capsule. Hold stretch for 30 sec, repeat 2-3 times, twice daily. Handout given.     [id number] HOME EXERCISE PROGRAM (HEP):  The patient was taught a homegoing physical therapy regimen as described above. The patient demonstrated understanding of the exercises and proper technique of their execution. This interaction took 15 minutes.     4. Follow-up in 4 weeks for reevaluation    5. Patient agreeable to today's plan and all questions were answered    This note is dictated using the M*Modal Fluency Direct word recognition program. There are word  recognition mistakes that are occasionally missed on review.

## 2022-04-01 ENCOUNTER — OFFICE VISIT (OUTPATIENT)
Dept: SPORTS MEDICINE | Facility: CLINIC | Age: 67
End: 2022-04-01
Payer: MEDICARE

## 2022-04-01 VITALS
HEART RATE: 86 BPM | SYSTOLIC BLOOD PRESSURE: 148 MMHG | BODY MASS INDEX: 42.7 KG/M2 | HEIGHT: 71 IN | DIASTOLIC BLOOD PRESSURE: 92 MMHG | WEIGHT: 305 LBS

## 2022-04-01 DIAGNOSIS — S39.012D STRAIN OF MUSCLE, FASCIA AND TENDON OF LOWER BACK, SUBSEQUENT ENCOUNTER: ICD-10-CM

## 2022-04-01 DIAGNOSIS — M54.59 MECHANICAL LOW BACK PAIN: Primary | ICD-10-CM

## 2022-04-01 DIAGNOSIS — M99.05 SOMATIC DYSFUNCTION OF PELVIC REGION: ICD-10-CM

## 2022-04-01 DIAGNOSIS — M79.10 MYALGIA: ICD-10-CM

## 2022-04-01 DIAGNOSIS — M99.02 SOMATIC DYSFUNCTION OF THORACIC REGION: ICD-10-CM

## 2022-04-01 DIAGNOSIS — E66.01 MORBID OBESITY: ICD-10-CM

## 2022-04-01 DIAGNOSIS — M99.04 SACRAL REGION SOMATIC DYSFUNCTION: ICD-10-CM

## 2022-04-01 DIAGNOSIS — M99.03 SOMATIC DYSFUNCTION OF LUMBAR REGION: ICD-10-CM

## 2022-04-01 PROCEDURE — 1126F PR PAIN SEVERITY QUANTIFIED, NO PAIN PRESENT: ICD-10-PCS | Mod: CPTII,S$GLB,, | Performed by: NEUROMUSCULOSKELETAL MEDICINE & OMM

## 2022-04-01 PROCEDURE — 1159F PR MEDICATION LIST DOCUMENTED IN MEDICAL RECORD: ICD-10-PCS | Mod: CPTII,S$GLB,, | Performed by: NEUROMUSCULOSKELETAL MEDICINE & OMM

## 2022-04-01 PROCEDURE — 1159F MED LIST DOCD IN RCRD: CPT | Mod: CPTII,S$GLB,, | Performed by: NEUROMUSCULOSKELETAL MEDICINE & OMM

## 2022-04-01 PROCEDURE — 1160F RVW MEDS BY RX/DR IN RCRD: CPT | Mod: CPTII,S$GLB,, | Performed by: NEUROMUSCULOSKELETAL MEDICINE & OMM

## 2022-04-01 PROCEDURE — 1126F AMNT PAIN NOTED NONE PRSNT: CPT | Mod: CPTII,S$GLB,, | Performed by: NEUROMUSCULOSKELETAL MEDICINE & OMM

## 2022-04-01 PROCEDURE — 3008F BODY MASS INDEX DOCD: CPT | Mod: CPTII,S$GLB,, | Performed by: NEUROMUSCULOSKELETAL MEDICINE & OMM

## 2022-04-01 PROCEDURE — 97110 THERAPEUTIC EXERCISES: CPT | Mod: GP,S$GLB,, | Performed by: NEUROMUSCULOSKELETAL MEDICINE & OMM

## 2022-04-01 PROCEDURE — 99213 PR OFFICE/OUTPT VISIT, EST, LEVL III, 20-29 MIN: ICD-10-PCS | Mod: 25,S$GLB,, | Performed by: NEUROMUSCULOSKELETAL MEDICINE & OMM

## 2022-04-01 PROCEDURE — 99999 PR PBB SHADOW E&M-EST. PATIENT-LVL III: CPT | Mod: PBBFAC,,, | Performed by: NEUROMUSCULOSKELETAL MEDICINE & OMM

## 2022-04-01 PROCEDURE — 3080F DIAST BP >= 90 MM HG: CPT | Mod: CPTII,S$GLB,, | Performed by: NEUROMUSCULOSKELETAL MEDICINE & OMM

## 2022-04-01 PROCEDURE — 98926 PR OSTEOPATHIC MANIP,3-4 BODY REGN: ICD-10-PCS | Mod: S$GLB,,, | Performed by: NEUROMUSCULOSKELETAL MEDICINE & OMM

## 2022-04-01 PROCEDURE — 1101F PT FALLS ASSESS-DOCD LE1/YR: CPT | Mod: CPTII,S$GLB,, | Performed by: NEUROMUSCULOSKELETAL MEDICINE & OMM

## 2022-04-01 PROCEDURE — 1160F PR REVIEW ALL MEDS BY PRESCRIBER/CLIN PHARMACIST DOCUMENTED: ICD-10-PCS | Mod: CPTII,S$GLB,, | Performed by: NEUROMUSCULOSKELETAL MEDICINE & OMM

## 2022-04-01 PROCEDURE — 98926 OSTEOPATH MANJ 3-4 REGIONS: CPT | Mod: S$GLB,,, | Performed by: NEUROMUSCULOSKELETAL MEDICINE & OMM

## 2022-04-01 PROCEDURE — 1101F PR PT FALLS ASSESS DOC 0-1 FALLS W/OUT INJ PAST YR: ICD-10-PCS | Mod: CPTII,S$GLB,, | Performed by: NEUROMUSCULOSKELETAL MEDICINE & OMM

## 2022-04-01 PROCEDURE — 97110 PR THERAPEUTIC EXERCISES: ICD-10-PCS | Mod: GP,S$GLB,, | Performed by: NEUROMUSCULOSKELETAL MEDICINE & OMM

## 2022-04-01 PROCEDURE — 99999 PR PBB SHADOW E&M-EST. PATIENT-LVL III: ICD-10-PCS | Mod: PBBFAC,,, | Performed by: NEUROMUSCULOSKELETAL MEDICINE & OMM

## 2022-04-01 PROCEDURE — 3288F PR FALLS RISK ASSESSMENT DOCUMENTED: ICD-10-PCS | Mod: CPTII,S$GLB,, | Performed by: NEUROMUSCULOSKELETAL MEDICINE & OMM

## 2022-04-01 PROCEDURE — 3080F PR MOST RECENT DIASTOLIC BLOOD PRESSURE >= 90 MM HG: ICD-10-PCS | Mod: CPTII,S$GLB,, | Performed by: NEUROMUSCULOSKELETAL MEDICINE & OMM

## 2022-04-01 PROCEDURE — 3077F SYST BP >= 140 MM HG: CPT | Mod: CPTII,S$GLB,, | Performed by: NEUROMUSCULOSKELETAL MEDICINE & OMM

## 2022-04-01 PROCEDURE — 3008F PR BODY MASS INDEX (BMI) DOCUMENTED: ICD-10-PCS | Mod: CPTII,S$GLB,, | Performed by: NEUROMUSCULOSKELETAL MEDICINE & OMM

## 2022-04-01 PROCEDURE — 99213 OFFICE O/P EST LOW 20 MIN: CPT | Mod: 25,S$GLB,, | Performed by: NEUROMUSCULOSKELETAL MEDICINE & OMM

## 2022-04-01 PROCEDURE — 3077F PR MOST RECENT SYSTOLIC BLOOD PRESSURE >= 140 MM HG: ICD-10-PCS | Mod: CPTII,S$GLB,, | Performed by: NEUROMUSCULOSKELETAL MEDICINE & OMM

## 2022-04-01 PROCEDURE — 3288F FALL RISK ASSESSMENT DOCD: CPT | Mod: CPTII,S$GLB,, | Performed by: NEUROMUSCULOSKELETAL MEDICINE & OMM

## 2022-04-07 ENCOUNTER — IMMUNIZATION (OUTPATIENT)
Dept: PHARMACY | Facility: CLINIC | Age: 67
End: 2022-04-07
Payer: MEDICARE

## 2022-04-07 DIAGNOSIS — Z23 NEED FOR VACCINATION: Primary | ICD-10-CM

## 2022-04-29 ENCOUNTER — OFFICE VISIT (OUTPATIENT)
Dept: SPORTS MEDICINE | Facility: CLINIC | Age: 67
End: 2022-04-29
Payer: MEDICARE

## 2022-04-29 VITALS
DIASTOLIC BLOOD PRESSURE: 82 MMHG | HEIGHT: 71 IN | SYSTOLIC BLOOD PRESSURE: 131 MMHG | HEART RATE: 78 BPM | WEIGHT: 302 LBS | BODY MASS INDEX: 42.28 KG/M2

## 2022-04-29 DIAGNOSIS — M99.04 SACRAL REGION SOMATIC DYSFUNCTION: ICD-10-CM

## 2022-04-29 DIAGNOSIS — M79.10 MYALGIA: ICD-10-CM

## 2022-04-29 DIAGNOSIS — M99.03 SOMATIC DYSFUNCTION OF LUMBAR REGION: ICD-10-CM

## 2022-04-29 DIAGNOSIS — M99.06 SOMATIC DYSFUNCTION OF LOWER EXTREMITY: ICD-10-CM

## 2022-04-29 DIAGNOSIS — M99.05 SOMATIC DYSFUNCTION OF PELVIC REGION: ICD-10-CM

## 2022-04-29 DIAGNOSIS — M16.0 BILATERAL PRIMARY OSTEOARTHRITIS OF HIP: ICD-10-CM

## 2022-04-29 DIAGNOSIS — M62.838 MUSCLE SPASM: ICD-10-CM

## 2022-04-29 DIAGNOSIS — M99.02 SOMATIC DYSFUNCTION OF THORACIC REGION: ICD-10-CM

## 2022-04-29 DIAGNOSIS — M76.12 PSOAS TENDONITIS OF LEFT SIDE: Primary | ICD-10-CM

## 2022-04-29 PROCEDURE — 3288F PR FALLS RISK ASSESSMENT DOCUMENTED: ICD-10-PCS | Mod: CPTII,S$GLB,, | Performed by: NEUROMUSCULOSKELETAL MEDICINE & OMM

## 2022-04-29 PROCEDURE — 1125F PR PAIN SEVERITY QUANTIFIED, PAIN PRESENT: ICD-10-PCS | Mod: CPTII,S$GLB,, | Performed by: NEUROMUSCULOSKELETAL MEDICINE & OMM

## 2022-04-29 PROCEDURE — 1159F MED LIST DOCD IN RCRD: CPT | Mod: CPTII,S$GLB,, | Performed by: NEUROMUSCULOSKELETAL MEDICINE & OMM

## 2022-04-29 PROCEDURE — 99999 PR PBB SHADOW E&M-EST. PATIENT-LVL III: ICD-10-PCS | Mod: PBBFAC,,, | Performed by: NEUROMUSCULOSKELETAL MEDICINE & OMM

## 2022-04-29 PROCEDURE — 1160F RVW MEDS BY RX/DR IN RCRD: CPT | Mod: CPTII,S$GLB,, | Performed by: NEUROMUSCULOSKELETAL MEDICINE & OMM

## 2022-04-29 PROCEDURE — 1101F PT FALLS ASSESS-DOCD LE1/YR: CPT | Mod: CPTII,S$GLB,, | Performed by: NEUROMUSCULOSKELETAL MEDICINE & OMM

## 2022-04-29 PROCEDURE — 98927 OSTEOPATH MANJ 5-6 REGIONS: CPT | Mod: S$GLB,,, | Performed by: NEUROMUSCULOSKELETAL MEDICINE & OMM

## 2022-04-29 PROCEDURE — 97110 THERAPEUTIC EXERCISES: CPT | Mod: GP,S$GLB,, | Performed by: NEUROMUSCULOSKELETAL MEDICINE & OMM

## 2022-04-29 PROCEDURE — 98927 PR OSTEOPATHIC MANIP,5-6 BODY REGN: ICD-10-PCS | Mod: S$GLB,,, | Performed by: NEUROMUSCULOSKELETAL MEDICINE & OMM

## 2022-04-29 PROCEDURE — 3075F SYST BP GE 130 - 139MM HG: CPT | Mod: CPTII,S$GLB,, | Performed by: NEUROMUSCULOSKELETAL MEDICINE & OMM

## 2022-04-29 PROCEDURE — 3008F BODY MASS INDEX DOCD: CPT | Mod: CPTII,S$GLB,, | Performed by: NEUROMUSCULOSKELETAL MEDICINE & OMM

## 2022-04-29 PROCEDURE — 3079F PR MOST RECENT DIASTOLIC BLOOD PRESSURE 80-89 MM HG: ICD-10-PCS | Mod: CPTII,S$GLB,, | Performed by: NEUROMUSCULOSKELETAL MEDICINE & OMM

## 2022-04-29 PROCEDURE — 1159F PR MEDICATION LIST DOCUMENTED IN MEDICAL RECORD: ICD-10-PCS | Mod: CPTII,S$GLB,, | Performed by: NEUROMUSCULOSKELETAL MEDICINE & OMM

## 2022-04-29 PROCEDURE — 3008F PR BODY MASS INDEX (BMI) DOCUMENTED: ICD-10-PCS | Mod: CPTII,S$GLB,, | Performed by: NEUROMUSCULOSKELETAL MEDICINE & OMM

## 2022-04-29 PROCEDURE — 99214 OFFICE O/P EST MOD 30 MIN: CPT | Mod: 25,S$GLB,, | Performed by: NEUROMUSCULOSKELETAL MEDICINE & OMM

## 2022-04-29 PROCEDURE — 3288F FALL RISK ASSESSMENT DOCD: CPT | Mod: CPTII,S$GLB,, | Performed by: NEUROMUSCULOSKELETAL MEDICINE & OMM

## 2022-04-29 PROCEDURE — 97110 PR THERAPEUTIC EXERCISES: ICD-10-PCS | Mod: GP,S$GLB,, | Performed by: NEUROMUSCULOSKELETAL MEDICINE & OMM

## 2022-04-29 PROCEDURE — 1125F AMNT PAIN NOTED PAIN PRSNT: CPT | Mod: CPTII,S$GLB,, | Performed by: NEUROMUSCULOSKELETAL MEDICINE & OMM

## 2022-04-29 PROCEDURE — 3079F DIAST BP 80-89 MM HG: CPT | Mod: CPTII,S$GLB,, | Performed by: NEUROMUSCULOSKELETAL MEDICINE & OMM

## 2022-04-29 PROCEDURE — 3075F PR MOST RECENT SYSTOLIC BLOOD PRESS GE 130-139MM HG: ICD-10-PCS | Mod: CPTII,S$GLB,, | Performed by: NEUROMUSCULOSKELETAL MEDICINE & OMM

## 2022-04-29 PROCEDURE — 99999 PR PBB SHADOW E&M-EST. PATIENT-LVL III: CPT | Mod: PBBFAC,,, | Performed by: NEUROMUSCULOSKELETAL MEDICINE & OMM

## 2022-04-29 PROCEDURE — 99214 PR OFFICE/OUTPT VISIT, EST, LEVL IV, 30-39 MIN: ICD-10-PCS | Mod: 25,S$GLB,, | Performed by: NEUROMUSCULOSKELETAL MEDICINE & OMM

## 2022-04-29 PROCEDURE — 1101F PR PT FALLS ASSESS DOC 0-1 FALLS W/OUT INJ PAST YR: ICD-10-PCS | Mod: CPTII,S$GLB,, | Performed by: NEUROMUSCULOSKELETAL MEDICINE & OMM

## 2022-04-29 PROCEDURE — 1160F PR REVIEW ALL MEDS BY PRESCRIBER/CLIN PHARMACIST DOCUMENTED: ICD-10-PCS | Mod: CPTII,S$GLB,, | Performed by: NEUROMUSCULOSKELETAL MEDICINE & OMM

## 2022-04-29 RX ORDER — MELOXICAM 15 MG/1
15 TABLET ORAL DAILY
Qty: 30 TABLET | Refills: 0 | Status: SHIPPED | OUTPATIENT
Start: 2022-04-29 | End: 2022-08-30

## 2022-04-29 RX ORDER — TIZANIDINE 4 MG/1
4 TABLET ORAL NIGHTLY PRN
Qty: 10 TABLET | Refills: 0 | Status: SHIPPED | OUTPATIENT
Start: 2022-04-29 | End: 2022-05-09

## 2022-04-29 NOTE — PROGRESS NOTES
Subjective:     Elísa Gay     Chief Complaint   Patient presents with    Left Hip - Pain       Follow-up  Associated symptoms include myalgias. Pertinent negatives include no chills, fever, headaches, neck pain or weakness.   Pain  Associated symptoms include myalgias. Pertinent negatives include no chills, fever, headaches, neck pain or weakness.         Elías is a 67 y.o. male coming in today for left hip pain. Since last visit the pain in the lower back has Improved. Patient reports pain in the anterior and posterior left hip. This has occurred approximately over past two weeks with no known trauma.  Pt. describes the pain as a 3/10 dull pain that does not radiate. He reports more pain on his right side.There has not been any new a fall/injury/ or traumas since last visit. Pt. denies any new musculoskeletal complaints at this time.      Office note from 4/1/22 reviewed       Review of Systems   Constitutional: Negative for chills and fever.   Musculoskeletal: Positive for back pain and myalgias. Negative for falls, joint pain and neck pain.   Neurological: Negative for dizziness, tingling, focal weakness, weakness and headaches.       PAST MEDICAL HISTORY:   Past Medical History:   Diagnosis Date    Arthritis 1971    Bone Spurs in feet    Basal cell carcinoma 11/06/2018    left ear helix    Foreign body in conjunctival sac     Hernia, inguinal, right 2002    Joint pain 1971    Bones of feet    Keloid cicatrix 1958 2010 2018 2019    Hernia, Knee, Arm, Ear    Melanoma 09/14/2018    Right Arm 0.6mm    Severe sleep apnea      PAST SURGICAL HISTORY:   Past Surgical History:   Procedure Laterality Date    CLOSURE OF DEFECT OF MOHS PROCEDURE Left 1/3/2019    Procedure: CLOSURE, MOHS PROCEDURE DEFECT LEFT EAR;  Surgeon: Jeramy Meek MD;  Location: [facility] OR [address] [place] [address];  Service: Plastics;  Laterality: Left;  plastics set    COLONOSCOPY N/A 6/30/2021    Procedure: COLONOSCOPY;  Surgeon: Juliano Santoyo,  MD;  Location: [facility] ([address] [place] [address]);  Service: Endoscopy;  Laterality: N/A;  severe sleep apnea     fully vaccinated-GT    HERNIA REPAIR Left     3 years of age    HERNIA REPAIR N/A     Ventral wall at 3 year of age.    KNEE SURGERY Right 1984    Arthroscopic    NASAL SEPTUM SURGERY N/A 2009    SKIN BIOPSY  several over time     FAMILY HISTORY:   Family History   Problem Relation Age of Onset    Hypertension Mother     Stroke Mother     Aneurysm Mother     Cancer Father 72        Prostate and liver    No Known Problems Daughter     No Known Problems Son     Gout Brother     Eczema Brother     Psoriasis Brother     No Known Problems Daughter     No Known Problems Son     Cataracts Neg Hx     Glaucoma Neg Hx     Macular degeneration Neg Hx     Melanoma Neg Hx     Eczema Neg Hx     Lupus Neg Hx     Psoriasis Neg Hx      SOCIAL HISTORY:   Social History     Socioeconomic History    Marital status:    Tobacco Use    Smoking status: Never Smoker    Smokeless tobacco: Never Used    Tobacco comment: Second hand smoke from mother. Cigarrette smoke inflames my sinuses.   Substance and Sexual Activity    Alcohol use: Not Currently     Alcohol/week: 1.0 standard drink     Types: 1 Glasses of wine per week     Comment: Rare    Drug use: No    Sexual activity: Yes     Partners: Female     Birth control/protection: None     Social Determinants of Health     Financial Resource Strain: Unknown    Difficulty of Paying Living Expenses: Patient refused   Food Insecurity: Unknown    Worried About Running Out of Food in the Last Year: Patient refused    Ran Out of Food in the Last Year: Patient refused   Transportation Needs: Unknown    Lack of Transportation (Medical): Patient refused    Lack of Transportation (Non-Medical): Patient refused   Physical Activity: Unknown    Days of Exercise per Week: Patient refused    Minutes of Exercise per Session: Patient refused   Stress: Unknown    Feeling  "of Stress : Patient refused   Social Connections: Unknown    Frequency of Communication with Friends and Family: Patient refused    Frequency of Social Gatherings with Friends and Family: Patient refused    Active Member of Clubs or Organizations: Patient refused    Attends Club or Organization Meetings: Patient refused    Marital Status: Patient refused   Housing Stability: Unknown    Unable to Pay for Housing in the Last Year: Patient refused    Number of Places Lived in the Last Year: 1    Unstable Housing in the Last Year: Patient refused       MEDICATIONS:   Current Outpatient Medications:     ciclopirox (PENLAC) 8 % Soln, Apply daily to affected nail. Must remove and restart weekly, Disp: 1 Bottle, Rfl: 5    guaiFENesin (MUCINEX) 600 mg 12 hr tablet, Take 1,200 mg by mouth 2 (two) times daily., Disp: , Rfl:     ketoconazole (NIZORAL) 2 % cream, AAA bid to both feet x 3 weeks, Disp: 60 g, Rfl: 3    tamsulosin (FLOMAX) 0.4 mg Cap, Take 1 capsule (0.4 mg total) by mouth once daily., Disp: 90 capsule, Rfl: 1    azelastine (ASTELIN) 137 mcg (0.1 %) nasal spray, 1 spray (137 mcg total) by Nasal route 2 (two) times daily., Disp: 30 mL, Rfl: 11    meloxicam (MOBIC) 15 MG tablet, Take 1 tablet (15 mg total) by mouth once daily., Disp: 30 tablet, Rfl: 0    tiZANidine (ZANAFLEX) 4 MG tablet, Take 1 tablet (4 mg total) by mouth nightly as needed (muscle spams)., Disp: 10 tablet, Rfl: 0  ALLERGIES: Review of patient's allergies indicates:  No Known Allergies       Objective:     VITAL SIGNS: /82   Pulse 78   Ht 5' 11" (1.803 m)   Wt (!) 137 kg (302 lb)   BMI 42.12 kg/m²    General    Vitals reviewed.  Constitutional: He is oriented to person, place, and time. He appears well-developed and well-nourished.   Neurological: He is alert and oriented to person, place, and time.   Psychiatric: He has a normal mood and affect. His behavior is normal.             MSK Exam:  Lumbar Spine: bilateral lumbar " region    Observation:    Normal cervicothoracolumbar curves.    No obvious pelvic obliquity while standing.    No edema, erythema, or ecchymosis noted in lumbosacral region.    No midline skin abnormalities.    No atrophy of lower limb musculature.  Leg lengths symmetric.  Posture:  Posterior pelvis tilt with loss of lumbar lordosis    Tenderness:  No tenderness throughout the lumbar spine, iliolumbar region, posterior pelvis.  No tenderness over the sacrum, piriformis, greater/lesser trochanters.  No bony deformities or step-offs palpated.   + left psoas tendon tenderness    Range of Motion (* = with pain):  Active flexion to 60°.  Active extension to 25°.   Active rotation to 30° on left and 30° on right.  Active sidebending to 25° on left and 25° on right.   Passive hip flexion to 135° on left* and 135° on right.    Passive hip internal rotation to 25° on left* and 25° on right.   Passive hip external rotation to 45° on left and 45° on right.     Strength Testing (* = with pain):  Hip flexion - 5/5 on left* and 5/5 on right  Hip extension - 5/5 on left and 5/5 on right  Knee flexion - 5/5 on left and 5/5 on right  Knee extension - 5/5 on left and 5/5 on right  Dorsiflexion - 5/5 on left and 5/5 on right  Plantarflexion - 5/5 on left and 5/5 on right  Great toe extension - 5/5 on left and 5/5 on right    Special Tests:  Seated straight leg raise - negative on left and negative on right  Supine straight leg raise - negative on left and negative on right   Slump test - negative on left and negative on right  Provocation maneuvers exhibit no worsening of symptoms.    VALENCIA test - negative  FADIR test - positive  Log roll test - negative    Jaylon's test- positive bilaterally    Structural Exam:  TART (Tissue texture abnormality, Asymmetry,  Restriction of motion and/or Tenderness) changes:     Thoracic Spine   T1 Neutral   T2 Neutral   T3 Neutral   T4 Neutral   T5 Neutral   T6 Neutral   T7 Neutral   T8 Neutral   T9  Neutral   T10 Neutral   T11 FRS LEFT   T12 FRS LEFT     Rib cage: neutral     Lumbar Spine   L1 FRS LEFT   L2 Neutral   L3 Neutral   L4 FRS RIGHT   L5 FRS RIGHT     Pelvis:  · Innominate:Left superior shear  · Pubic bone:Left superior pubic shear    Sacrum:Left on Right sacral torsion     Lower extremity: left psoas anterior tender point    Key   F= Flexed   E = Extended   R = Rotated   S = Sidebent   TTA = tissue texture abnormality       Neurovascular Exam:  Sensation intact to light touch in the L2-S2 dermatomes bilaterally.   No pretibial edema or abnormal hair pattern of the shin.    Intact and symmetric DP and PT pulses bilaterally.  Normal gait without trendelenberg, heel walking, toe walking, and tandem walking.      Assessment:      Encounter Diagnoses   Name Primary?    Psoas tendonitis of left side Yes    Muscle spasm     Myalgia     Bilateral primary osteoarthritis of hip     Somatic dysfunction of lumbar region     Sacral region somatic dysfunction     Somatic dysfunction of pelvic region     Somatic dysfunction of thoracic region     Somatic dysfunction of lower extremity           Plan:   1.  Acute left psoas tendinitis with associated psoas muscle spasms.  Underlying bilateral hip DJD changes on previous x-ray as well.  - OMT performed  today to address associated biomechanical restrictions  and HEP rreviewed  - Rx given for Meloxicam 15 mg po qday x 14 days then prn for pain control. Pt. Advised to avoid all other NSAIDS while on this medication.  - prescription given for Zanaflex 4 mg p.o. q.h.s. as needed for associated muscle spasms  - Referral to outpatient PT ([facility]) for increase pelvic stability, neuromuscular retraining, and functional movement assessment with possible dry needling for left psoas musculature  -  continue shoe orthotic wear for bilateral pes planus   - encouraged continued weight loss with diet and low load exercise.  Referral previously placed to  bariatric clinic for further evaluation.  - discussed proper lifting mechanics  - X-ray images of right hip taken 08/15/2019 (AP pelvis and frogleg lateral  right views) showed mild to moderate DJD with hip joint space narrowing bilaterally.     2. OMT 5-6 regions. Oral consent obtained.  Reviewed benefits and potential side effects.   - OMT indicated today due to signs and symptoms as well as local and remote somatic dysfunction findings and their related neurokinetic, lymphatic, fascial and/or arteriovenous body connections.   - OMT techniques used: Myofascial Release, Muscle Energy, Counterstrain and Articulatory   - Treatment was tolerated well. Improvement noted in segmental mobility post-treatment in dysfunctional regions. There were no adverse events and no complications immediately following treatment.     3.  Reviewed with patient the following HEP:  Continue:   A)    Pelvic clock exercises given to do from the 6-12 o'clock positions:10-15 reps, twice daily. Hand out of exercise also given.   B) IT band rolling: recommend using rolling stick or foam roller to roll out IT band tension bilaterally, 1-2 times a day.   C) Quadratus lumborum self-stretch while standing: hold stretch for 30 seconds, repeating 2-3 times on each side. Do stretch twice daily. Hand-out also given.   D) Seated torso rotation exercise:  Gently rotate torso in the seated position, hold bind for 5-10 second, rotating further into stretch as tolerated with deep breath exhalation.  Repeat stretch bilaterally, 2-3 times a day.  Handout of exercise also given.    E) Pt. Given prone prop exercise to stretch psoas and anterior hip capsule. Hold stretch for 30 sec, repeat 2-3 times, twice daily. Handout given.   ADD  F) Bilateral Psoas lunge stretch: hold stretch for 30 seconds, repeat 2-3 times, twice daily    [address] HOME EXERCISE PROGRAM (HEP):  The patient was taught a homegoing physical therapy regimen as described above. The patient  demonstrated understanding of the exercises and proper technique of their execution. This interaction took 15 minutes.     4. Follow-up upon completion of PT if pain persist or deteriorates    5. Patient agreeable to today's plan and all questions were answered    This note is dictated using the M*Modal Fluency Direct word recognition program. There are word recognition mistakes that are occasionally missed on review.

## 2022-05-04 DIAGNOSIS — N40.1 BPH WITH URINARY OBSTRUCTION: Chronic | ICD-10-CM

## 2022-05-04 DIAGNOSIS — N13.8 BPH WITH URINARY OBSTRUCTION: Chronic | ICD-10-CM

## 2022-05-04 NOTE — TELEPHONE ENCOUNTER
No new care gaps identified.  [facility] Embedded Care Gaps. Reference number: [id number]. 5/04/2022   12:35:47 AM GAILT

## 2022-05-04 NOTE — TELEPHONE ENCOUNTER
Refill Routing Note   Medication(s) are not appropriate for processing by [facility] for the following reason(s):      - Patient has been seen in the ED/Hospital since the last PCP visit    [facility] action(s):  Defer          Medication reconciliation completed: No     Appointments  past 12m or future 3m with PCP    Date Provider   Last Visit   5/17/2021 Kameron Diaz MD   Next Visit   5/30/2022 Kameron Diaz MD   ED visits in past 90 days: 0        Note composed:7:31 AM 05/04/2022

## 2022-05-05 RX ORDER — TAMSULOSIN HYDROCHLORIDE 0.4 MG/1
CAPSULE ORAL
Qty: 90 CAPSULE | Refills: 1 | Status: ON HOLD | OUTPATIENT
Start: 2022-05-05 | End: 2022-10-29

## 2022-05-25 ENCOUNTER — CLINICAL SUPPORT (OUTPATIENT)
Dept: REHABILITATION | Facility: HOSPITAL | Age: 67
End: 2022-05-25
Payer: MEDICARE

## 2022-05-25 DIAGNOSIS — R26.89 ANTALGIC GAIT: ICD-10-CM

## 2022-05-25 DIAGNOSIS — M76.12 PSOAS TENDONITIS OF LEFT SIDE: ICD-10-CM

## 2022-05-25 DIAGNOSIS — R29.898 WEAKNESS OF BOTH LOWER EXTREMITIES: ICD-10-CM

## 2022-05-25 DIAGNOSIS — M25.652 DECREASED RANGE OF LEFT HIP MOVEMENT: ICD-10-CM

## 2022-05-25 PROBLEM — M25.659 DECREASED RANGE OF MOTION OF HIP: Status: ACTIVE | Noted: 2022-05-25

## 2022-05-25 PROCEDURE — 97161 PT EVAL LOW COMPLEX 20 MIN: CPT | Mod: PN

## 2022-05-25 PROCEDURE — 97110 THERAPEUTIC EXERCISES: CPT | Mod: PN
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
## 2022-06-01 NOTE — PROGRESS NOTES
"  Physical Therapy Daily Treatment Note     Name: Elías SALTER [facility] Number: [id number]    Therapy Diagnosis:   Encounter Diagnoses   Name Primary?    Decreased range of hip movement, unspecified laterality Yes    Antalgic gait     Weakness of lower extremity, unspecified laterality      Physician: Abigail White DO    Visit Date: 6/2/2022    Physician Orders: PT Eval and Treat  (Increased pelvic stability, NM retraining, functional movement assessment, and HEP needed with possible left psoas dry needling)  Medical Diagnosis from Referral: Psoas tendonitis of left side   Evaluation Date: 5/25/2022  Authorization Period Expiration: 06/22/2022  Plan of Care Expiration: 08/03/2022  Progress Note Due: 05/25/2022  Visit # / Visits authorized: 1/ 1   FOTO: 1/3     Precautions: Standard       Time In: 1046AM  Time Out: 1131PM   Total Billable Time: 45 minutes    Subjective     Pt reports: He is very tight. Everything is very tight. Last night was a rough night because of pain.   He was somewhat  compliant with home exercise program.  Response to previous treatment: Eval  Functional change: Ongoing    Pain: 0/10  Location: bilateral hip flexors      Patients goals: To be able to move better and improve his mobility     Objective     Elías received the treatments listed below:       therapeutic exercises to develop ROM and flexibility for 45 minutes including:    Nustep lvl 2 x 6'  SKTC stretch w/ towel 3x30 sec   Supine Hip ER stretch (figure 4) 3x30 sec  Lower trunk rotations x 3 minutes  Windshield wipers 1x20  +Bridges 3 x 10  +Standing extension 10 x 5"  +Seated ball squeeze with IR 2 x 10   +Supine hip flexor stretch (no strap) (not tolerable due to P!)      PROM bilateral hips IR/ER x 07 mins        Home Exercises Provided and Patient Education Provided     Education provided:   - HEP compliance for therapeutic gain    Written Home Exercises Provided: Additional exercies added to HEP.  Exercises were " reviewed and Elías was able to demonstrate them prior to the end of the session.  Elías demonstrated good  understanding of the education provided.     See EMR under Patient Instructions for exercises provided prior visit.    Assessment   Elías tolerated his first treatment visit well. Reviewed HEP with pt demonstration good recall and performance. Attempted supine hip flexor stretch with very limited range and pt was unable to perform due to pain. Added glut bridges for reciprocal inhibition and added standing extension to further improve hip and low back mobility with good tolerance. Added both exercises to HEP at pts request. Benefits from continued skilled PT to address ongoing difficulties with pain, ROM, and strength to allow for return to PLOF.       Elías Is progressing well towards his goals.   Pt prognosis is Fair.     Pt will continue to benefit from skilled outpatient physical therapy to address the deficits listed in the problem list box on initial evaluation, provide pt/family education and to maximize pt's level of independence in the home and community environment.     Pt's spiritual, cultural and educational needs considered and pt agreeable to plan of care and goals.     Anticipated Barriers for therapy: Current pain limiting function.       GOALS: Short Term Goals:  5 weeks  1.Report decreased L hip pain  < / =  8/10  to increase tolerance for transitioning from STS, walking, getting in and out the tub, negotiating stairs, and bending over  2. Increase L hip ROM by 10-15 percent in order to perform ADLs without difficulty.  3. Increase strength by 1/3 MMT grade in LEs  to increase tolerance for ADL and work activities.  4. Pt to tolerate HEP to improve ROM and independence with ADL's     Long Term Goals: 10 weeks  1.Report decreased L hip pain < / = 4/10  to increase tolerance for transitioning from STS, walking, getting in and out the tub, negotiating stairs, and bending over  2.Patient goal:  Patient will be able to walk without AD with min to no pain and demonstrated improve gait biomechanics by 10 weeks  3.Increase strength to 4+/5 in  LEs  to increase tolerance for ADL and work activities.  4. Pt to be Independent with HEP to improve ROM and independence with ADL's.      Plan   Plan of care Certification: 5/25/2022 to 08/03/2022.     Outpatient Physical Therapy 2 times weekly for 10 weeks to include the following interventions: Electrical Stimulation ., Gait Training, Manual Therapy, Moist Heat/ Ice, Neuromuscular Re-ed, Patient Education, Therapeutic Activities and Therapeutic Exercise      Pelonbina Rouse, MICHELLE   06/02/2022

## 2022-06-02 ENCOUNTER — CLINICAL SUPPORT (OUTPATIENT)
Dept: REHABILITATION | Facility: HOSPITAL | Age: 67
End: 2022-06-02
Payer: MEDICARE

## 2022-06-02 DIAGNOSIS — M25.659 DECREASED RANGE OF HIP MOVEMENT, UNSPECIFIED LATERALITY: Primary | ICD-10-CM

## 2022-06-02 DIAGNOSIS — R26.89 ANTALGIC GAIT: ICD-10-CM

## 2022-06-02 DIAGNOSIS — R29.898 WEAKNESS OF LOWER EXTREMITY, UNSPECIFIED LATERALITY: ICD-10-CM

## 2022-06-02 PROCEDURE — 97110 THERAPEUTIC EXERCISES: CPT | Mod: PN,CQ

## 2022-06-27 ENCOUNTER — CLINICAL SUPPORT (OUTPATIENT)
Dept: REHABILITATION | Facility: HOSPITAL | Age: 67
End: 2022-06-27
Payer: MEDICARE

## 2022-06-27 DIAGNOSIS — M25.651 DECREASED RANGE OF MOTION OF BOTH HIPS: Primary | ICD-10-CM

## 2022-06-27 DIAGNOSIS — R29.898 WEAKNESS OF BOTH LOWER EXTREMITIES: ICD-10-CM

## 2022-06-27 DIAGNOSIS — R26.89 ANTALGIC GAIT: ICD-10-CM

## 2022-06-27 DIAGNOSIS — M25.652 DECREASED RANGE OF MOTION OF BOTH HIPS: Primary | ICD-10-CM

## 2022-06-27 PROCEDURE — 97110 THERAPEUTIC EXERCISES: CPT | Mod: PN

## 2022-06-27 NOTE — PROGRESS NOTES
Physical Therapy Progress Note     Name: Elías Gya  [facility] Number: [id number]    Therapy Diagnosis:   Encounter Diagnoses   Name Primary?    Decreased range of motion of both hips Yes    Antalgic gait     Weakness of both lower extremities      Physician: Abigail White DO    Visit Date: 6/27/2022    Physician Orders: PT Eval and Treat  (Increased pelvic stability, NM retraining, functional movement assessment, and HEP needed with possible left psoas dry needling)  Medical Diagnosis from Referral: Psoas tendonitis of left side   Evaluation Date: 5/25/2022  Authorization Period Expiration: 06/22/2022  Plan of Care Expiration: 08/03/2022  Progress Note Due: 07/27/2022  Visit # / Visits authorized: 2/ 23+1   FOTO: 1/3     Precautions: Standard       Time In: 1215 PM  Time Out:0100 PM   Total Billable Time: 45 minutes    Subjective     Pt reports: Pain has shifted from L hip to R hip. Reports tightness and pain in lateral R hip. Patient reports difficulty sleeping at night due to not finding a comfortable place  He is not  compliant with home exercise program.  Response to previous treatment: Good  Functional change: Ongoing    Pain: 0/10  Location: bilateral hip flexors      Patients goals: To be able to move better and improve his mobility     Objective   Time taken to complete: 10 minutes    Observation: Pleasant and cooperative     Patient presented ambulating with a single point cane on R UE. Demonstrates decreased step length on L LE.  Antalgic gait favoring R LE     Posture: FHP and rounded shoulder posture     Hip Range of Motion:    Right AROM/PROM Left AROM/PROM   Flexion 90 degrees 100 degrees   Abduction 30 degrees P! 35 degrees P!   Extension NT NT    Ext. Rotation WNL WNL   Int. Rotation 5 degrees 5 degrees  P!         Lower Extremity Strength  Right LE   Left LE     Knee extension: 5/5 Knee extension: 4+/5   Knee flexion: 5/5 Knee flexion: 4+/5   Hip flexion: 3/5 P! Hip flexion: 4+/5   Hip  "Internal Rotation:  4-/5    Hip Internal Rotation: 4-/5   P!   Hip External Rotation: 4-/5    Hip External Rotation: 4-/5   P!   Hip abduction: 4/5 Hip abduction: 4-/5       Special Tests:       Flexibility: Decreased hamstring flexibility and hypomobility of bilateral hip joints (IR<ER)     Joint Mobility: Hypomobility of bilateral hip joints. Pt apprehensive with hip movement due to pain (increase guarding)     Palpation: No TTP noted     Sensation: Intact     Edema: None noted        Elías received the treatments listed below:       therapeutic exercises to develop ROM and flexibility for 35 minutes including:    Nustep lvl 2 x 7'  Manual stretching (by PT) B hips x 15 minutes  Hip adduction ball squeezes in supine 1x20 holding for 5 "  Lower trunk rotations x 3 minutes  SKTC stretch w/ towel 3 minutes          Supine Hip ER stretch (figure 4) 3x30 sec  Windshield wipers 1x20  +Bridges 3 x 10  +Standing extension 10 x 5"  +Seated ball squeeze with IR 2 x 10   +Supine hip flexor stretch (no strap) (not tolerable due to P!)      PROM bilateral hips IR/ER x 07 mins        Home Exercises Provided and Patient Education Provided     Education provided:   - HEP compliance for therapeutic gain    Written Home Exercises Provided: Additional exercies added to HEP.  Exercises were reviewed and Elías was able to demonstrate them prior to the end of the session.  Elías demonstrated good  understanding of the education provided.     See EMR under Patient Instructions for exercises provided prior visit.    Assessment     Pt tolerated tx well. Emphasis on B hip stretching and mobility today.  Therapist provided verbal/tactile cues to maintain proper form. Pt reported no adverse effects to exercises. Pt would benefit from continued skilled physical therapy in order to reach pt's goals.     Patient was reassessed today. Patient demonstrates increase in LE strength, however continues having limited ROM of B hips. Patient has " recurrent B hip pain R>L at this time, he reports pain fluctuates day to day. Patient has met 2/4 STG at this time. Continues ambulating with SPC and decreased step length bilaterally. Patient would benefit from continued skilled PT to address lack of hip mobility in order to improve function and ADLs.       Elías Is progressing well towards his goals.   Pt prognosis is Fair.     Pt will continue to benefit from skilled outpatient physical therapy to address the deficits listed in the problem list box on initial evaluation, provide pt/family education and to maximize pt's level of independence in the home and community environment.     Pt's spiritual, cultural and educational needs considered and pt agreeable to plan of care and goals.     Anticipated Barriers for therapy: Current pain limiting function.       GOALS: Short Term Goals:  5 weeks  1.Report decreased L hip pain  < / =  8/10  to increase tolerance for transitioning from STS, walking, getting in and out the tub, negotiating stairs, and bending over Met 06/27/2022  2. Increase L hip ROM by 10-15 percent in order to perform ADLs without difficulty. Ongoing  3. Increase strength by 1/3 MMT grade in LEs  to increase tolerance for ADL and work activities. Met 06/27/2022  4. Pt to tolerate HEP to improve ROM and independence with ADL's Ongoing     Long Term Goals: 10 weeks  1.Report decreased L hip pain < / = 4/10  to increase tolerance for transitioning from STS, walking, getting in and out the tub, negotiating stairs, and bending over Ongoing  2.Patient goal: Patient will be able to walk without AD with min to no pain and demonstrated improve gait biomechanics by 10 weeks Ongoing   3.Increase strength to 4+/5 in  LEs  to increase tolerance for ADL and work activities. Ongoing  4. Pt to be Independent with HEP to improve ROM and independence with ADL's. Ongoing      Plan   Plan of care Certification: 5/25/2022 to 08/03/2022.     Outpatient Physical Therapy 2  times weekly for 10 weeks to include the following interventions: Electrical Stimulation ., Gait Training, Manual Therapy, Moist Heat/ Ice, Neuromuscular Re-ed, Patient Education, Therapeutic Activities and Therapeutic Exercise      Tien Casey, PT   06/27/2022

## 2022-06-30 ENCOUNTER — CLINICAL SUPPORT (OUTPATIENT)
Dept: REHABILITATION | Facility: HOSPITAL | Age: 67
End: 2022-06-30
Payer: MEDICARE

## 2022-06-30 DIAGNOSIS — R26.89 ANTALGIC GAIT: ICD-10-CM

## 2022-06-30 DIAGNOSIS — R29.898 WEAKNESS OF BOTH LOWER EXTREMITIES: ICD-10-CM

## 2022-06-30 DIAGNOSIS — M25.652 DECREASED RANGE OF LEFT HIP MOVEMENT: Primary | ICD-10-CM

## 2022-06-30 PROCEDURE — 97140 MANUAL THERAPY 1/> REGIONS: CPT | Mod: PN

## 2022-06-30 PROCEDURE — 97110 THERAPEUTIC EXERCISES: CPT | Mod: PN

## 2022-06-30 NOTE — PROGRESS NOTES
"  Physical Therapy Progress Note     Name: Elías SALTER [facility] Number: [id number]    Therapy Diagnosis:   Encounter Diagnoses   Name Primary?    Decreased range of left hip movement Yes    Antalgic gait     Weakness of both lower extremities      Physician: Abigail White DO    Visit Date: 6/30/2022    Physician Orders: PT Eval and Treat  (Increased pelvic stability, NM retraining, functional movement assessment, and HEP needed with possible left psoas dry needling)  Medical Diagnosis from Referral: Psoas tendonitis of left side   Evaluation Date: 5/25/2022  Authorization Period Expiration: 06/22/2022  Plan of Care Expiration: 08/03/2022  Progress Note Due: 07/27/2022  Visit # / Visits authorized: 2/ 23+1   FOTO: 1/3     Precautions: Standard       Time In: 1215 PM  Time Out :0100 PM   Total Billable Time: 45 minutes    Subjective     Pt reports: Increase R hip over the past few days which he attributes to previous therapy session  He is not  compliant with home exercise program.  Response to previous treatment: Good  Functional change: Ongoing    Pain: 4/10 R hip 2/10 L hip  Location: bilateral hip flexors      Patients goals: To be able to move better and improve his mobility     Objective   None at this time      Elías received the treatments listed below:       therapeutic exercises to develop ROM and flexibility for 15 minutes including:      DKTC w/ PB x 4 minutes (warmup)   LTR with LEs on PB x 2 minutes  Hip adduction ball squeezes in supine 1x20 holding for 5 "  Seated ball squeeze with IR 2 x 10   Bridges 3 x 10        Nustep lvl 2 x 7'  Manual stretching (by PT) B hips x 15 minutes    Lower trunk rotations x 3 minutes  SKTC stretch w/ towel 3 minutes          Supine Hip ER stretch (figure 4) 3x30 sec  makerSQRield wipers 1x20    +Standing extension 10 x 5"    +Supine hip flexor stretch (no strap) (not tolerable due to P!)      PROM bilateral hips IR/ER x 07 mins    Elías  received the following " "manual therapy techniques: PROM/Stretchng B hips were applied to the: B hips  for 30 minutes, including:    B hip PROM and stretching (Adductor/abductors, hamstring, IR/ER, Hip flexors) x 25 minutes  Rolling with "the stick" on R IT band and glut med (abductors) x 5 minutes      Home Exercises Provided and Patient Education Provided     Education provided:   - HEP compliance for therapeutic gain    Written Home Exercises Provided: Additional exercies added to HEP.  Exercises were reviewed and Elías was able to demonstrate them prior to the end of the session.  Elías demonstrated good  understanding of the education provided.     See EMR under Patient Instructions for exercises provided prior visit.    Assessment     Pt tolerated tx well. Presented with increase R hip pain and therefore gentle mobility exercises performed today. Majority of treatment time devoted to PROM of B hips to address ROM limitations. Some strengthening exercises done at the end with positive response from patient.  Continue addressing hip flexor tightness gradually with next treatment sessions. Therapist provided verbal/tactile cues to maintain proper form. Pt reported no adverse effects to exercises. Pt would benefit from continued skilled physical therapy in order to reach pt's goals.           Elías Is progressing well towards his goals.   Pt prognosis is Fair.     Pt will continue to benefit from skilled outpatient physical therapy to address the deficits listed in the problem list box on initial evaluation, provide pt/family education and to maximize pt's level of independence in the home and community environment.     Pt's spiritual, cultural and educational needs considered and pt agreeable to plan of care and goals.     Anticipated Barriers for therapy: Current pain limiting function.       GOALS: Short Term Goals:  5 weeks  1.Report decreased L hip pain  < / =  8/10  to increase tolerance for transitioning from STS, walking, " getting in and out the tub, negotiating stairs, and bending over Met 06/27/2022  2. Increase L hip ROM by 10-15 percent in order to perform ADLs without difficulty. Ongoing  3. Increase strength by 1/3 MMT grade in LEs  to increase tolerance for ADL and work activities. Met 06/27/2022  4. Pt to tolerate HEP to improve ROM and independence with ADL's Ongoing     Long Term Goals: 10 weeks  1.Report decreased L hip pain < / = 4/10  to increase tolerance for transitioning from STS, walking, getting in and out the tub, negotiating stairs, and bending over Ongoing  2.Patient goal: Patient will be able to walk without AD with min to no pain and demonstrated improve gait biomechanics by 10 weeks Ongoing   3.Increase strength to 4+/5 in  LEs  to increase tolerance for ADL and work activities. Ongoing  4. Pt to be Independent with HEP to improve ROM and independence with ADL's. Ongoing      Plan   Plan of care Certification: 5/25/2022 to 08/03/2022.     Outpatient Physical Therapy 2 times weekly for 10 weeks to include the following interventions: Electrical Stimulation ., Gait Training, Manual Therapy, Moist Heat/ Ice, Neuromuscular Re-ed, Patient Education, Therapeutic Activities and Therapeutic Exercise      Tien Casey, PT   06/30/2022

## 2022-07-03 ENCOUNTER — OFFICE VISIT (OUTPATIENT)
Dept: URGENT CARE | Facility: CLINIC | Age: 67
End: 2022-07-03
Payer: MEDICARE

## 2022-07-03 VITALS
TEMPERATURE: 99 F | DIASTOLIC BLOOD PRESSURE: 79 MMHG | HEIGHT: 71 IN | SYSTOLIC BLOOD PRESSURE: 130 MMHG | BODY MASS INDEX: 42.28 KG/M2 | WEIGHT: 302 LBS | HEART RATE: 92 BPM | RESPIRATION RATE: 17 BRPM | OXYGEN SATURATION: 97 %

## 2022-07-03 DIAGNOSIS — R80.9 PROTEINURIA, UNSPECIFIED TYPE: ICD-10-CM

## 2022-07-03 DIAGNOSIS — N30.01 ACUTE CYSTITIS WITH HEMATURIA: Primary | ICD-10-CM

## 2022-07-03 LAB
BILIRUB UR QL STRIP: NEGATIVE
GLUCOSE UR QL STRIP: NEGATIVE
KETONES UR QL STRIP: POSITIVE
LEUKOCYTE ESTERASE UR QL STRIP: POSITIVE
PH, POC UA: 5
POC BLOOD, URINE: POSITIVE
POC NITRATES, URINE: NEGATIVE
PROT UR QL STRIP: POSITIVE
SP GR UR STRIP: 1.02 (ref 1–1.03)
UROBILINOGEN UR STRIP-ACNC: ABNORMAL (ref 0.3–2.2)

## 2022-07-03 PROCEDURE — 1160F PR REVIEW ALL MEDS BY PRESCRIBER/CLIN PHARMACIST DOCUMENTED: ICD-10-PCS | Mod: CPTII,S$GLB,, | Performed by: FAMILY MEDICINE

## 2022-07-03 PROCEDURE — 81003 POCT URINALYSIS, DIPSTICK, AUTOMATED, W/O SCOPE: ICD-10-PCS | Mod: QW,S$GLB,, | Performed by: FAMILY MEDICINE

## 2022-07-03 PROCEDURE — 87077 CULTURE AEROBIC IDENTIFY: CPT | Performed by: FAMILY MEDICINE

## 2022-07-03 PROCEDURE — 3078F PR MOST RECENT DIASTOLIC BLOOD PRESSURE < 80 MM HG: ICD-10-PCS | Mod: CPTII,S$GLB,, | Performed by: FAMILY MEDICINE

## 2022-07-03 PROCEDURE — 3075F PR MOST RECENT SYSTOLIC BLOOD PRESS GE 130-139MM HG: ICD-10-PCS | Mod: CPTII,S$GLB,, | Performed by: FAMILY MEDICINE

## 2022-07-03 PROCEDURE — 87186 SC STD MICRODIL/AGAR DIL: CPT | Performed by: FAMILY MEDICINE

## 2022-07-03 PROCEDURE — 3078F DIAST BP <80 MM HG: CPT | Mod: CPTII,S$GLB,, | Performed by: FAMILY MEDICINE

## 2022-07-03 PROCEDURE — 99203 PR OFFICE/OUTPT VISIT, NEW, LEVL III, 30-44 MIN: ICD-10-PCS | Mod: S$GLB,,, | Performed by: FAMILY MEDICINE

## 2022-07-03 PROCEDURE — 3075F SYST BP GE 130 - 139MM HG: CPT | Mod: CPTII,S$GLB,, | Performed by: FAMILY MEDICINE

## 2022-07-03 PROCEDURE — 99203 OFFICE O/P NEW LOW 30 MIN: CPT | Mod: S$GLB,,, | Performed by: FAMILY MEDICINE

## 2022-07-03 PROCEDURE — 81003 URINALYSIS AUTO W/O SCOPE: CPT | Mod: QW,S$GLB,, | Performed by: FAMILY MEDICINE

## 2022-07-03 PROCEDURE — 1125F PR PAIN SEVERITY QUANTIFIED, PAIN PRESENT: ICD-10-PCS | Mod: CPTII,S$GLB,, | Performed by: FAMILY MEDICINE

## 2022-07-03 PROCEDURE — 87086 URINE CULTURE/COLONY COUNT: CPT | Performed by: FAMILY MEDICINE

## 2022-07-03 PROCEDURE — 1159F PR MEDICATION LIST DOCUMENTED IN MEDICAL RECORD: ICD-10-PCS | Mod: CPTII,S$GLB,, | Performed by: FAMILY MEDICINE

## 2022-07-03 PROCEDURE — 1159F MED LIST DOCD IN RCRD: CPT | Mod: CPTII,S$GLB,, | Performed by: FAMILY MEDICINE

## 2022-07-03 PROCEDURE — 3008F PR BODY MASS INDEX (BMI) DOCUMENTED: ICD-10-PCS | Mod: CPTII,S$GLB,, | Performed by: FAMILY MEDICINE

## 2022-07-03 PROCEDURE — 87088 URINE BACTERIA CULTURE: CPT | Performed by: FAMILY MEDICINE

## 2022-07-03 PROCEDURE — 1125F AMNT PAIN NOTED PAIN PRSNT: CPT | Mod: CPTII,S$GLB,, | Performed by: FAMILY MEDICINE

## 2022-07-03 PROCEDURE — 1160F RVW MEDS BY RX/DR IN RCRD: CPT | Mod: CPTII,S$GLB,, | Performed by: FAMILY MEDICINE

## 2022-07-03 PROCEDURE — 3008F BODY MASS INDEX DOCD: CPT | Mod: CPTII,S$GLB,, | Performed by: FAMILY MEDICINE

## 2022-07-03 RX ORDER — SULFAMETHOXAZOLE AND TRIMETHOPRIM 800; 160 MG/1; MG/1
1 TABLET ORAL 2 TIMES DAILY
Qty: 20 TABLET | Refills: 0 | Status: SHIPPED | OUTPATIENT
Start: 2022-07-03 | End: 2022-07-03

## 2022-07-03 RX ORDER — SULFAMETHOXAZOLE AND TRIMETHOPRIM 800; 160 MG/1; MG/1
1 TABLET ORAL 2 TIMES DAILY
Qty: 20 TABLET | Refills: 0 | Status: SHIPPED | OUTPATIENT
Start: 2022-07-03 | End: 2022-07-13

## 2022-07-03 NOTE — PATIENT INSTRUCTIONS
General Discharge Instructions   PLEASE READ YOUR DISCHARGE INSTRUCTIONS ENTIRELY AS IT CONTAINS IMPORTANT INFORMATION.  If you were prescribed a narcotic or controlled medication, do not drive or operate heavy equipment or machinery while taking these medications.  If you were prescribed antibiotics, please take them to completion.  You must understand that you've received an Urgent Care treatment only and that you may be released before all your medical problems are known or treated. You, the patient, will arrange for follow up care as instructed.    OVER THE COUNTER RECOMMENDATIONS/SUGGESTIONS.    Make sure to stay well hydrated.    Use Nasal Saline to mechanically move any post nasal drip from your eustachian tube or from the back of your throat.    Use warm salt water gargles to ease your throat pain. Warm salt water gargles as needed for sore throat- 1/2 tsp salt to 1 cup warm water, gargle as desired.    Use an antihistamine such as Claritin, Zyrtec or Allegra to dry you out.    Use pseudoephedrine (behind the counter) to decongest. Pseudoephedrine 30 mg up to 240 mg /day. It can raise your blood pressure and give you palpitations.    Use mucinex (guaifenesin) to break up mucous up to 2400mg/day to loosen any mucous.    The mucinex DM pill has a cough suppressant that can be sedating. It can be used at night to stop the tickle at the back of your throat.    You can use Mucinex D (it has guaifenesin and a high dose of pseudoephedrine) in the mornings to help decongest.    Use Afrin in each nare for no longer than 3 days, as it is addictive. It can also dry out your mucous membranes and cause elevated blood pressure. This is especially useful if you are flying.    Use Flonase 1-2 sprays/nostril per day. It is a local acting steroid nasal spray, if you develop a bloody nose, stop using the medication immediately.    Sometimes Nyquil at night is beneficial to help you get some rest, however it is sedating and it  does have an antihistamine, and tylenol.    Honey is a natural cough suppressant that can be used.    Tylenol up to 4,000 mg a day is safe for short periods and can be used for body aches, pain, and fever. However in high doses and prolonged use it can cause liver irritation.    Ibuprofen is a non-steroidal anti-inflammatory that can be used for body aches, pain, and fever.However it can also cause stomach irritation if over used.     Follow up with your PCP or specialty clinic as instructed in the next 2-3 days if not improved or as needed. You can call [phone] to schedule an appointment with appropriate provider.      If you condition worsens, we recommend that you receive another evaluation at the emergency room immediately or contact your primary medical clinic's after hours call service to discuss your concerns.      Please return here or go to the Emergency Department for any concerns or worsening condition.   You can also call [phone] to schedule an appointment with the appropriate provider.    Please return here or go to the Emergency Department for any concerns or worsening of condition.    Thank you for choosing [facility]!    Our goal in the Urgent Care is to always provide outstanding medical care. You may receive a survey by mail or e-mail in the next week regarding your experience today. We would greatly appreciate you completing and returning the survey. Your feedback provides us with a way to recognize our staff who provide very good care, and it helps us learn how to improve when your experience was below our aspiration of excellence.      We appreciate you trusting us with your medical care. We hope you feel better soon. We will be happy to take care of you for all of your future medical needs.    Sincerely,    CRISTEL Collazo

## 2022-07-03 NOTE — PROGRESS NOTES
"Subjective:       Patient ID: Elías Gay is a 67 y.o. male.    Vitals:  height is 5' 11" (1.803 m) and weight is 137 kg (302 lb) (abnormal). His temperature is 99.1 °F (37.3 °C). His blood pressure is 130/79 and his pulse is 92. His respiration is 17 and oxygen saturation is 97%.     Chief Complaint: Urinary Tract Infection    Pt states that he is having uti symptoms that started 1 week ago . Pt is having burning , frequent and fever . Pt is taking tylenol and azo .   Provider note begins below:  Pt reports he "is pissing fire," x 2 days he is not circ, notes hx of BPH, taking flomax from urology, he says he knows what caused it, he was eating tomatoes and the acid caused the problem this time. He is trying to lose weight, and on an 1800 breana diet, he has lost 30 pound so far. He also currently in PT for his hips. His last UTI was in November, he did well with bactrim. Temp yesterday 103, took tylenol and had relief, he was having chills as well. Over the week ago he started with irritation with voiding, and getting worse since then. He improved since yesterday after taking the AZO and ES tylenol. He took home cv test yesterday and was neg.     Urinary Tract Infection   This is a new problem. The current episode started in the past 7 days. The problem occurs every urination. The problem has been unchanged. The quality of the pain is described as burning. The pain is at a severity of 4/10. The pain is mild. There has been no fever. Associated symptoms include chills (x1 yesterday, resolved.), frequency and urgency. Pertinent negatives include no behavior changes, discharge, flank pain, hematuria, hesitancy, nausea, possible pregnancy, sweats, vomiting, weight loss, bubble bath use, constipation, rash or withholding. He has tried acetaminophen for the symptoms. The treatment provided mild relief.       Constitution: Positive for chills (x1 yesterday, resolved.) and fever (x 1 yesterday, resolved). Negative for " activity change, appetite change, sweating and fatigue.   Gastrointestinal: Negative for nausea, vomiting and constipation.   Genitourinary: Positive for dysuria, frequency and urgency. Negative for urine decreased, flank pain, bladder incontinence, bed wetting, hematuria, history of kidney stones, genital trauma, painful intercourse, genital sore, penile discharge, painful ejaculation, penile pain, penile swelling, scrotal swelling, testicular pain and pelvic pain.   Skin: Negative for rash.       Objective:      Physical Exam   Constitutional: He is oriented to person, place, and time. He appears well-developed.  Non-toxic appearance. He does not appear ill. No distress.      Comments:Wife present.   Uses cane for gait assist.    obesity  HENT:   Head: Normocephalic and atraumatic.   Ears:   Right Ear: External ear normal.   Left Ear: External ear normal.   Nose: Nose normal.   Mouth/Throat: Oropharynx is clear and moist.   Eyes: Conjunctivae, EOM and lids are normal. Pupils are equal, round, and reactive to light.   Neck: Trachea normal and phonation normal. Neck supple.   Abdominal: Soft. There is no abdominal tenderness.   Musculoskeletal: Normal range of motion.         General: Normal range of motion.   Neurological: He is alert and oriented to person, place, and time.   Skin: Skin is warm, dry, intact and not diaphoretic.   Psychiatric: His speech is normal and behavior is normal. Judgment and thought content normal.   Nursing note and vitals reviewed.        Assessment:       1. Acute cystitis with hematuria    2. Proteinuria, unspecified type        Results for orders placed or performed in visit on 07/03/22   POCT Urinalysis, Dipstick, Automated, W/O Scope   Result Value Ref Range    POC Blood, Urine Positive (A) Negative    POC Bilirubin, Urine Negative Negative    POC Urobilinogen, Urine norm 0.3 - 2.2    POC Ketones, Urine Positive (A) Negative    POC Protein, Urine Positive (A) Negative    POC  Nitrates, Urine Negative Negative    POC Glucose, Urine Negative Negative    pH, UA 5.0     POC Specific Gravity, Urine 1.025 1.003 - 1.029    POC Leukocytes, Urine Positive (A) Negative      Plan:       Took bactrim in the past and did well with that, hydrate, fu with pcp and urology.     Discussed results/diagnosis/plan with patient in clinic. Strict precautions given to patient to monitor for worsening signs and symptoms. Advised to follow up with PCP or specialist.    Explained side effects of medications prescribed with patient and informed him/her to discontinue use if he/she has any side effects and to inform UC or PCP if this occurs. All questions answered. Strict ED verses clinic return precautions stressed and given in depth. Advised if symptoms worsens of fail to improve he/she should go to the Emergency Room. Discharge and follow-up instructions given verbally/printed with the patient who expressed understanding and willingness to comply with my recommendations. Patient voiced understanding and in agreement with current treatment plan. Patient exits the exam room in no acute distress. Conversant and engaged during discharge discussion, verbalized understanding.      Acute cystitis with hematuria  -     POCT Urinalysis, Dipstick, Automated, W/O Scope  -     Discontinue: sulfamethoxazole-trimethoprim 800-160mg (BACTRIM DS) 800-160 mg Tab; Take 1 tablet by mouth 2 (two) times daily. for 10 days  Dispense: 20 tablet; Refill: 0  -     sulfamethoxazole-trimethoprim 800-160mg (BACTRIM DS) 800-160 mg Tab; Take 1 tablet by mouth 2 (two) times daily. for 10 days  Dispense: 20 tablet; Refill: 0  -     CULTURE, URINE    Proteinuria, unspecified type  Comments:  advised to fu with pcpalok fu 7/26  Orders:  -     CULTURE, URINE           Medical Decision Making:   History:   Old Medical Records: I decided to obtain old medical records.  Old Records Summarized: records from clinic visits and other records.  Clinical  Tests:   Lab Tests: Ordered and Reviewed  Urgent Care Management:  After complete evaluation, including thorough history and physical exam, presentation is most consistent with uncomplicated UTI.  The patient has no severe flank pain or systemic symptoms to suggest pyelonephritis or sepsis. Physical exam is inconsistent with nephrolithiasis or acute intra-abdominal infection.  Patient is tolerating PO and is stable for D/C with PO antibiotics.  The patient was informed of findings, and recommended to follow-up with PCP for further management and urine re-check in 3-5 days.         Patient Instructions   General Discharge Instructions   PLEASE READ YOUR DISCHARGE INSTRUCTIONS ENTIRELY AS IT CONTAINS IMPORTANT INFORMATION.  If you were prescribed a narcotic or controlled medication, do not drive or operate heavy equipment or machinery while taking these medications.  If you were prescribed antibiotics, please take them to completion.  You must understand that you've received an Urgent Care treatment only and that you may be released before all your medical problems are known or treated. You, the patient, will arrange for follow up care as instructed.    OVER THE COUNTER RECOMMENDATIONS/SUGGESTIONS.    Make sure to stay well hydrated.    Use Nasal Saline to mechanically move any post nasal drip from your eustachian tube or from the back of your throat.    Use warm salt water gargles to ease your throat pain. Warm salt water gargles as needed for sore throat- 1/2 tsp salt to 1 cup warm water, gargle as desired.    Use an antihistamine such as Claritin, Zyrtec or Allegra to dry you out.    Use pseudoephedrine (behind the counter) to decongest. Pseudoephedrine 30 mg up to 240 mg /day. It can raise your blood pressure and give you palpitations.    Use mucinex (guaifenesin) to break up mucous up to 2400mg/day to loosen any mucous.    The mucinex DM pill has a cough suppressant that can be sedating. It can be used at night  to stop the tickle at the back of your throat.    You can use Mucinex D (it has guaifenesin and a high dose of pseudoephedrine) in the mornings to help decongest.    Use Afrin in each nare for no longer than 3 days, as it is addictive. It can also dry out your mucous membranes and cause elevated blood pressure. This is especially useful if you are flying.    Use Flonase 1-2 sprays/nostril per day. It is a local acting steroid nasal spray, if you develop a bloody nose, stop using the medication immediately.    Sometimes Nyquil at night is beneficial to help you get some rest, however it is sedating and it does have an antihistamine, and tylenol.    Honey is a natural cough suppressant that can be used.    Tylenol up to 4,000 mg a day is safe for short periods and can be used for body aches, pain, and fever. However in high doses and prolonged use it can cause liver irritation.    Ibuprofen is a non-steroidal anti-inflammatory that can be used for body aches, pain, and fever.However it can also cause stomach irritation if over used.     Follow up with your PCP or specialty clinic as instructed in the next 2-3 days if not improved or as needed. You can call [phone] to schedule an appointment with appropriate provider.      If you condition worsens, we recommend that you receive another evaluation at the emergency room immediately or contact your primary medical clinic's after hours call service to discuss your concerns.      Please return here or go to the Emergency Department for any concerns or worsening condition.   You can also call [phone] to schedule an appointment with the appropriate provider.    Please return here or go to the Emergency Department for any concerns or worsening of condition.    Thank you for choosing [facility]!    Our goal in the Urgent Care is to always provide outstanding medical care. You may receive a survey by mail or e-mail in the next week regarding your  experience today. We would greatly appreciate you completing and returning the survey. Your feedback provides us with a way to recognize our staff who provide very good care, and it helps us learn how to improve when your experience was below our aspiration of excellence.      We appreciate you trusting us with your medical care. We hope you feel better soon. We will be happy to take care of you for all of your future medical needs.    Sincerely,    CRISTEL Collazo

## 2022-07-05 ENCOUNTER — TELEPHONE (OUTPATIENT)
Dept: INTERNAL MEDICINE | Facility: CLINIC | Age: 67
End: 2022-07-05
Payer: MEDICARE

## 2022-07-05 ENCOUNTER — CLINICAL SUPPORT (OUTPATIENT)
Dept: REHABILITATION | Facility: HOSPITAL | Age: 67
End: 2022-07-05
Payer: MEDICARE

## 2022-07-05 ENCOUNTER — TELEPHONE (OUTPATIENT)
Dept: FAMILY MEDICINE | Facility: CLINIC | Age: 67
End: 2022-07-05
Payer: MEDICARE

## 2022-07-05 DIAGNOSIS — R26.89 ANTALGIC GAIT: ICD-10-CM

## 2022-07-05 DIAGNOSIS — M25.652 DECREASED RANGE OF LEFT HIP MOVEMENT: Primary | ICD-10-CM

## 2022-07-05 DIAGNOSIS — R29.898 WEAKNESS OF BOTH LOWER EXTREMITIES: ICD-10-CM

## 2022-07-05 LAB — BACTERIA UR CULT: ABNORMAL

## 2022-07-05 PROCEDURE — 97140 MANUAL THERAPY 1/> REGIONS: CPT | Mod: PN

## 2022-07-05 PROCEDURE — 97110 THERAPEUTIC EXERCISES: CPT | Mod: PN

## 2022-07-05 NOTE — TELEPHONE ENCOUNTER
----- Message from Marcy Calero sent at 7/2/2022 11:28 AM CDT -----  Name Of Caller: Elías    Provider Name: Danelle Pearce     Does patient feel the need to be seen today? No     Relationship to the Pt?: pt    Contact Preference?: [phone]    What is the nature of the call?:Pt called and said his UTI has come back and want to see if you can prescribe him what you prescribe him last time     The medication is sulfamethoxazole-trimethoprim 800-160mg (BACTRIM DS) 800-160 mg Tab       [id number] - [place], [place] [address] [address]

## 2022-07-05 NOTE — TELEPHONE ENCOUNTER
----- Message from Zayra Grey sent at 7/5/2022  8:17 AM CDT -----   Type:  Patient Returning Call    Who Called:VARUN BESS     Who Left Message for Patient:Emily Reynolds LPN     Does the patient know what this is regarding?:    Best Call Back Number:[phone]    Additional Information:

## 2022-07-05 NOTE — PROGRESS NOTES
"  Physical Therapy treatment Note     Name: Elías SALTER [facility] Number: [id number]    Therapy Diagnosis:   Encounter Diagnoses   Name Primary?    Decreased range of left hip movement Yes    Antalgic gait     Weakness of both lower extremities      Physician: Abigail Whtie DO    Visit Date: 7/5/2022    Physician Orders: PT Eval and Treat  (Increased pelvic stability, NM retraining, functional movement assessment, and HEP needed with possible left psoas dry needling)  Medical Diagnosis from Referral: Psoas tendonitis of left side   Evaluation Date: 5/25/2022  Authorization Period Expiration: 06/22/2022  Plan of Care Expiration: 08/03/2022  Progress Note Due: 07/27/2022  Visit # / Visits authorized: 4/ 23+1   FOTO: 1/3     Precautions: Standard       Time In: 25090 AM  Time Out :40630 PM  Total Billable Time: 45 minutes    Subjective     Pt reports: Had to go to urgent care on Sunday morning due to a recent UTI. Patient is currently on day 3 antibiotics and feels like he is back to normal. B hips feel much better and reports stretches have helped a lot.  He is not  compliant with home exercise program.  Response to previous treatment: Good  Functional change: Ongoing    Pain: 3/ 10 R hip 1 /10 L hip  Location: bilateral hip flexors      Patients goals: To be able to move better and improve his mobility     Objective   None at this time      Elías received the treatments listed below:       therapeutic exercises to develop ROM and flexibility for 35 minutes including:      Nustep lvl 2 x 7'  LTRx 2 minutes  DKTC w/ PB x 2 minutes   Hip adduction ball squeezes in supine 1x20 holding for 5 "  Bridges 3 x 10  Supine LE marching 2x20   Hamstring stretch w/ strap 3x30 sec each side                    Manual stretching (by PT) B hips x 15 minutes    Lower trunk rotations x 3 minutes  SKTC stretch w/ towel 3 minutes          Supine Hip ER stretch (figure 4) 3x30 sec  Windshield wipers 1x20    +Standing extension 10 x " "5"    +Supine hip flexor stretch (no strap) (not tolerable due to P!)      PROM bilateral hips IR/ER x 07 mins    Elías  received the following manual therapy techniques: PROM/Stretchng B hips were applied to the: B hips  for 10  minutes, including:    B hip PROM and stretching /Adductor/abductors, hamstring, IR/ER, Hip flexors) x 15 minutes        Home Exercises Provided and Patient Education Provided     Education provided:   - HEP compliance for therapeutic gain    Written Home Exercises Provided: Additional exercies added to HEP.  Exercises were reviewed and Elías was able to demonstrate them prior to the end of the session.  Elías demonstrated good  understanding of the education provided.     See EMR under Patient Instructions for exercises provided prior visit.    Assessment     Pt tolerated tx well.  B hip pain has diminished and patient reports stretches and exercises have helped. Continue with emphasis on hip mobility and stretching to tolerance. Therapist provided verbal/tactile cues to maintain proper form. Pt reported no adverse effects to exercises. Pt would benefit from continued skilled physical therapy in order to reach pt's goals. Appropriate fatigue level achieved at end of session  Progress patient to standing exercises and introduce him to STS and step ups next visit.       Elías Is progressing well towards his goals.   Pt prognosis is Fair.     Pt will continue to benefit from skilled outpatient physical therapy to address the deficits listed in the problem list box on initial evaluation, provide pt/family education and to maximize pt's level of independence in the home and community environment.     Pt's spiritual, cultural and educational needs considered and pt agreeable to plan of care and goals.     Anticipated Barriers for therapy: Current pain limiting function.       GOALS: Short Term Goals:  5 weeks  1.Report decreased L hip pain  < / =  8/10  to increase tolerance for " transitioning from STS, walking, getting in and out the tub, negotiating stairs, and bending over Met 06/27/2022  2. Increase L hip ROM by 10-15 percent in order to perform ADLs without difficulty. Ongoing  3. Increase strength by 1/3 MMT grade in LEs  to increase tolerance for ADL and work activities. Met 06/27/2022  4. Pt to tolerate HEP to improve ROM and independence with ADL's Ongoing     Long Term Goals: 10 weeks  1.Report decreased L hip pain < / = 4/10  to increase tolerance for transitioning from STS, walking, getting in and out the tub, negotiating stairs, and bending over Ongoing  2.Patient goal: Patient will be able to walk without AD with min to no pain and demonstrated improve gait biomechanics by 10 weeks Ongoing   3.Increase strength to 4+/5 in  LEs  to increase tolerance for ADL and work activities. Ongoing  4. Pt to be Independent with HEP to improve ROM and independence with ADL's. Ongoing      Plan   Plan of care Certification: 5/25/2022 to 08/03/2022.     Outpatient Physical Therapy 2 times weekly for 10 weeks to include the following interventions: Electrical Stimulation ., Gait Training, Manual Therapy, Moist Heat/ Ice, Neuromuscular Re-ed, Patient Education, Therapeutic Activities and Therapeutic Exercise      Tien Casey, PT   07/05/2022

## 2022-07-06 ENCOUNTER — TELEPHONE (OUTPATIENT)
Dept: URGENT CARE | Facility: CLINIC | Age: 67
End: 2022-07-06
Payer: MEDICARE

## 2022-07-06 NOTE — TELEPHONE ENCOUNTER
Call back - urine culture - Spoke with patient and gave him cx results. Pt said he is feeling better.    ----- Message from Rachel Guillen PA-C sent at 7/6/2022  8:44 AM CDT -----  Please call the patient about his positive test result(s).  He was treated appropriately with Bactrim.

## 2022-07-07 ENCOUNTER — CLINICAL SUPPORT (OUTPATIENT)
Dept: REHABILITATION | Facility: HOSPITAL | Age: 67
End: 2022-07-07
Payer: MEDICARE

## 2022-07-07 DIAGNOSIS — M25.652 DECREASED RANGE OF LEFT HIP MOVEMENT: Primary | ICD-10-CM

## 2022-07-07 DIAGNOSIS — R29.898 WEAKNESS OF BOTH LOWER EXTREMITIES: ICD-10-CM

## 2022-07-07 DIAGNOSIS — R26.89 ANTALGIC GAIT: ICD-10-CM

## 2022-07-07 PROCEDURE — 97110 THERAPEUTIC EXERCISES: CPT | Mod: PN

## 2022-07-07 PROCEDURE — 97140 MANUAL THERAPY 1/> REGIONS: CPT | Mod: PN

## 2022-07-07 NOTE — PROGRESS NOTES
"  Physical Therapy treatment Note     Name: Elías Gay  [facility] Number: [id number]    Therapy Diagnosis:   No diagnosis found.  Physician: Abigail White DO    Visit Date: 7/7/2022    Physician Orders: PT Eval and Treat  (Increased pelvic stability, NM retraining, functional movement assessment, and HEP needed with possible left psoas dry needling)  Medical Diagnosis from Referral: Psoas tendonitis of left side   Evaluation Date: 5/25/2022  Authorization Period Expiration: 06/22/2022  Plan of Care Expiration: 08/03/2022  Progress Note Due: 07/27/2022  Visit # / Visits authorized: 5/ 23+1   FOTO: 1/3     Precautions: Standard       Time In: 1047 AM  Time Out :1130   Total Billable Time: 43  minutes    Subjective     Pt reports: Reports slowly getting his enery back after finishing the antibiotic treatment. Reports stretches are helping and he is now able to maintain a straight standing posture for longer compared to before.  He is not  compliant with home exercise program.  Response to previous treatment: Good  Functional change: Ongoing    Pain: 3/ 10 R hip 1 /10 L hip  Location: bilateral hip flexors      Patients goals: To be able to move better and improve his mobility     Objective   None at this time      Elías received the treatments listed below:       therapeutic exercises to develop ROM and flexibility for 35 minutes including:    LTRx 2 minutes  DKTC w/ PB x 2 minutes  Hip adduction ball squeezes in supine 1x20 holding for 5 "  Bridges 3 x 10  Hamstring stretch w/ strap 3x30 sec each side  Standing hip flexor stretch (stagger stance) 3x30 sec  Standing marches w/ UE support 2x10 each  STS on 12 inch box w/ foam 3x10 (emphasis on hip extension at stance)      NP:    Supine LE marching 2x20   Nustep lvl 2 x 7'  Manual stretching (by PT) B hips x 15 minutes  Lower trunk rotations x 3 minutes  SKTC stretch w/ towel 3 minutes  Supine Hip ER stretch (figure 4) 3x30 sec  Windshield wipers 1x20  +Standing " "extension 10 x 5"  +Supine hip flexor stretch (no strap) (not tolerable due to P!)  PROM bilateral hips IR/ER x 07 mins    Elías  received the following manual therapy techniques: PROM/Stretchng B hips were applied to the: B hips  for  8 minutes, including:    B hip PROM and stretching /Adductor/abductors, hamstring, IR/ER, Hip flexors) x 15 minutes        Home Exercises Provided and Patient Education Provided     Education provided:   - HEP compliance for therapeutic gain    Written Home Exercises Provided: Additional exercies added to HEP.  Exercises were reviewed and Elías was able to demonstrate them prior to the end of the session.  Elías demonstrated good  understanding of the education provided.     See EMR under Patient Instructions for exercises provided prior visit.    Assessment     Pt tolerated tx well.. Continue with emphasis on hip mobility and stretching to tolerance, patient now demonstrates better standing posture. Patient was introduced to standing hip flexor stretching and STS with emphasis on hip extension at stance. Therapist provided verbal/tactile cues to maintain proper form. Pt reported no adverse effects to exercises. Pt would benefit from continued skilled physical therapy in order to reach pt's goals. Appropriate fatigue level achieved at end of session .      Elías Is progressing well towards his goals.   Pt prognosis is Fair.     Pt will continue to benefit from skilled outpatient physical therapy to address the deficits listed in the problem list box on initial evaluation, provide pt/family education and to maximize pt's level of independence in the home and community environment.     Pt's spiritual, cultural and educational needs considered and pt agreeable to plan of care and goals.     Anticipated Barriers for therapy: Current pain limiting function.       GOALS: Short Term Goals:  5 weeks  1.Report decreased L hip pain  < / =  8/10  to increase tolerance for transitioning from " STS, walking, getting in and out the tub, negotiating stairs, and bending over Met 06/27/2022  2. Increase L hip ROM by 10-15 percent in order to perform ADLs without difficulty. Ongoing  3. Increase strength by 1/3 MMT grade in LEs  to increase tolerance for ADL and work activities. Met 06/27/2022  4. Pt to tolerate HEP to improve ROM and independence with ADL's Ongoing     Long Term Goals: 10 weeks  1.Report decreased L hip pain < / = 4/10  to increase tolerance for transitioning from STS, walking, getting in and out the tub, negotiating stairs, and bending over Ongoing  2.Patient goal: Patient will be able to walk without AD with min to no pain and demonstrated improve gait biomechanics by 10 weeks Ongoing   3.Increase strength to 4+/5 in  LEs  to increase tolerance for ADL and work activities. Ongoing  4. Pt to be Independent with HEP to improve ROM and independence with ADL's. Ongoing      Plan   Plan of care Certification: 5/25/2022 to 08/03/2022.     Outpatient Physical Therapy 2 times weekly for 10 weeks to include the following interventions: Electrical Stimulation ., Gait Training, Manual Therapy, Moist Heat/ Ice, Neuromuscular Re-ed, Patient Education, Therapeutic Activities and Therapeutic Exercise      Tien Casey, PT   07/07/2022

## 2022-07-12 ENCOUNTER — CLINICAL SUPPORT (OUTPATIENT)
Dept: REHABILITATION | Facility: HOSPITAL | Age: 67
End: 2022-07-12
Payer: MEDICARE

## 2022-07-12 DIAGNOSIS — R29.898 WEAKNESS OF BOTH LOWER EXTREMITIES: ICD-10-CM

## 2022-07-12 DIAGNOSIS — R26.89 ANTALGIC GAIT: ICD-10-CM

## 2022-07-12 DIAGNOSIS — M25.652 DECREASED RANGE OF LEFT HIP MOVEMENT: Primary | ICD-10-CM

## 2022-07-12 PROCEDURE — 97110 THERAPEUTIC EXERCISES: CPT | Mod: PN

## 2022-07-12 NOTE — PROGRESS NOTES
Physical Therapy treatment Note     Name: Elías Gay  [facility] Number: [id number]    Therapy Diagnosis:   Encounter Diagnoses   Name Primary?    Decreased range of left hip movement Yes    Antalgic gait     Weakness of both lower extremities      Physician: Abigail White DO    Visit Date: 7/12/2022    Physician Orders: PT Eval and Treat  (Increased pelvic stability, NM retraining, functional movement assessment, and HEP needed with possible left psoas dry needling)  Medical Diagnosis from Referral: Psoas tendonitis of left side   Evaluation Date: 5/25/2022  Authorization Period Expiration: 06/22/2022  Plan of Care Expiration: 08/03/2022  Progress Note Due: 07/27/2022  Visit # / Visits authorized: 6/ 23+1   FOTO: 1/3     Precautions: Standard       Time In: 42171 AM  Time Out :97683  PM  Total Billable Time: 51  minutes    Subjective     Pt reports: Patient continues reporting improvement with physical therapy. He continues having good and bad days. Patient reports R pain increases during the night  He is not  compliant with home exercise program.  Response to previous treatment: Good  Functional change: Ongoing    Pain: 0/ 10 R hip 0 /10 L hip  Location: bilateral hip flexors      Patients goals: To be able to move better and improve his mobility     Objective   None at this time      Elías received the treatments listed below:       therapeutic exercises to develop ROM and flexibility for  51  minutes including:    Nu-Step at lvl 3 for 6 minutes  Heel raises with UE support 3x10  Lunge on 6 inch step w/ UE support (holding 5 seconds) stretching contralateral hip flexor  Standing marches w/ UE support 2x10 each  Standing hip flexor stretch (stagger stance) 3x30 sec  STS on 12 inch box w/ foam 3x10 (emphasis on hip extension at stance)  LTRx 2 minutes  Bridges 3 x 10  Hamstring stretch w/ strap 2x30 sec each side  Hip ER stretch in HL position 2x30   Hip flexor stretch assisted by PT 2x30 sec each  "side                  Hip adduction ball squeezes in supine 1x20 holding for 5 "            NP:    Supine LE marching 2x20   Nustep lvl 2 x 7'  Manual stretching (by PT) B hips x 15 minutes  Lower trunk rotations x 3 minutes  SKTC stretch w/ towel 3 minutes  Supine Hip ER stretch (figure 4) 3x30 sec  Windshield wipers 1x20  +Standing extension 10 x 5"  +Supine hip flexor stretch (no strap) (not tolerable due to P!)  PROM bilateral hips IR/ER x 07 mins  DKTC w/ PB x 2 minutes    Elías  received the following manual therapy techniques: PROM/Stretchng B hips were applied to the: B hips  for  00minutes, including:    B hip PROM and stretching /Adductor/abductors, hamstring, IR/ER, Hip flexors) x 00 minutes        Home Exercises Provided and Patient Education Provided     Education provided:   - HEP compliance for therapeutic gain    Written Home Exercises Provided: Additional exercies added to HEP.  Exercises were reviewed and Elías was able to demonstrate them prior to the end of the session.  Elías demonstrated good  understanding of the education provided.     See EMR under Patient Instructions for exercises provided prior visit.    Assessment     Pt tolerated tx well. Emphasis on LE strengthening today. Introduced to lunges on 6 inch step with good response, patient reported deep stretching on contralateral leg during stair lunges. Extended rest breaks needed between sets. Post sessions stretching provided some relief. Therapist provided verbal/tactile cues to maintain proper form. Pt reported no adverse effects to exercises. Pt would benefit from continued skilled physical therapy in order to reach pt's goals. Appropriate fatigue level achieved at end of session .      Elías Is progressing well towards his goals.   Pt prognosis is Fair.     Pt will continue to benefit from skilled outpatient physical therapy to address the deficits listed in the problem list box on initial evaluation, provide pt/family " education and to maximize pt's level of independence in the home and community environment.     Pt's spiritual, cultural and educational needs considered and pt agreeable to plan of care and goals.     Anticipated Barriers for therapy: Current pain limiting function.       GOALS: Short Term Goals:  5 weeks  1.Report decreased L hip pain  < / =  8/10  to increase tolerance for transitioning from STS, walking, getting in and out the tub, negotiating stairs, and bending over Met 06/27/2022  2. Increase L hip ROM by 10-15 percent in order to perform ADLs without difficulty. Ongoing  3. Increase strength by 1/3 MMT grade in LEs  to increase tolerance for ADL and work activities. Met 06/27/2022  4. Pt to tolerate HEP to improve ROM and independence with ADL's Ongoing     Long Term Goals: 10 weeks  1.Report decreased L hip pain < / = 4/10  to increase tolerance for transitioning from STS, walking, getting in and out the tub, negotiating stairs, and bending over Ongoing  2.Patient goal: Patient will be able to walk without AD with min to no pain and demonstrated improve gait biomechanics by 10 weeks Ongoing   3.Increase strength to 4+/5 in  LEs  to increase tolerance for ADL and work activities. Ongoing  4. Pt to be Independent with HEP to improve ROM and independence with ADL's. Ongoing      Plan   Plan of care Certification: 5/25/2022 to 08/03/2022.     Outpatient Physical Therapy 2 times weekly for 10 weeks to include the following interventions: Electrical Stimulation ., Gait Training, Manual Therapy, Moist Heat/ Ice, Neuromuscular Re-ed, Patient Education, Therapeutic Activities and Therapeutic Exercise      Tien Casey, PT   07/12/2022

## 2022-07-19 ENCOUNTER — CLINICAL SUPPORT (OUTPATIENT)
Dept: REHABILITATION | Facility: HOSPITAL | Age: 67
End: 2022-07-19
Payer: MEDICARE

## 2022-07-19 DIAGNOSIS — R26.89 ANTALGIC GAIT: ICD-10-CM

## 2022-07-19 DIAGNOSIS — M25.652 DECREASED RANGE OF LEFT HIP MOVEMENT: Primary | ICD-10-CM

## 2022-07-19 DIAGNOSIS — R29.898 WEAKNESS OF BOTH LOWER EXTREMITIES: ICD-10-CM

## 2022-07-19 PROCEDURE — 97110 THERAPEUTIC EXERCISES: CPT | Mod: PN

## 2022-07-19 NOTE — PROGRESS NOTES
Physical Therapy treatment Note     Name: Elías Gay  [facility] Number: [id number]    Therapy Diagnosis:   Encounter Diagnoses   Name Primary?    Decreased range of left hip movement Yes    Antalgic gait     Weakness of both lower extremities      Physician: Abigail White DO    Visit Date: 7/19/2022    Physician Orders: PT Eval and Treat  (Increased pelvic stability, NM retraining, functional movement assessment, and HEP needed with possible left psoas dry needling)  Medical Diagnosis from Referral: Psoas tendonitis of left side   Evaluation Date: 5/25/2022  Authorization Period Expiration: 06/22/2022  Plan of Care Expiration: 08/03/2022  Progress Note Due: 07/27/2022  Visit # / Visits authorized: 7/ 23+1   FOTO: 1/3     Precautions: Standard       Time In:1130 AM  Time Out :1215 AM  Total Billable Time: 45  minutes    Subjective     Pt reports: Increase R hip pain after cutting grass yesterday. L hip has been feeling pretty good  He is not  compliant with home exercise program.  Response to previous treatment: Good  Functional change: Ongoing    Pain: 4/ 10 R hip 0 /10 L hip  Location: bilateral hip flexors      Patients goals: To be able to move better and improve his mobility     Objective   None at this time      Elías received the treatments listed below:       therapeutic exercises to develop ROM and flexibility for 45 minutes including:    Nu-Step at lvl 3 for 6 minutes  Heel raises with UE support 3x10  Standing marches w/ UE support and 3lb cuff weights 2x10 each  + Standing hip abduction w/ UE support and 3lb cuff weights 3x10 each side  Standing hip flexor stretch (stagger stance) 3x30 sec  STS on 18 inch box w/ foam 3x10 (emphasis on hip extension at stance)  SKTC stretch with towel 2x30 sec each side (strethcing contralateral hip flexor)        NP:    Supine LE marching 2x20   Nustep lvl 2 x 7'  Manual stretching (by PT) B hips x 15 minutes  Lower trunk rotations x 3 minutes  SKTC stretch w/  "towel 3 minutes  Supine Hip ER stretch (figure 4) 3x30 sec  Windshield wipers 1x20  +Standing extension 10 x 5"  +Supine hip flexor stretch (no strap) (not tolerable due to P!)  PROM bilateral hips IR/ER x 07 mins  DKTC w/ PB x 2 minutes  Lunge on 6 inch step w/ UE support (holding 5 seconds) stretching contralateral hip flexor  Bridges 3 x 10  Hip ER stretch in HL position 2x30   Hip flexor stretch assisted by PT 2x30 sec each side  Hip adduction ball squeezes in supine 1x20 holding for 5 "    Elías  received the following manual therapy techniques: PROM/Stretchng B hips were applied to the: B hips  for  00minutes, including:    B hip PROM and stretching /Adductor/abductors, hamstring, IR/ER, Hip flexors) x 00 minutes        Home Exercises Provided and Patient Education Provided     Education provided:   - HEP compliance for therapeutic gain    Written Home Exercises Provided: Additional exercies added to HEP.  Exercises were reviewed and Elías was able to demonstrate them prior to the end of the session.  Elías demonstrated good  understanding of the education provided.     See EMR under Patient Instructions for exercises provided prior visit.    Assessment       Pt tolerated tx well. Progress to standing marches and hip abduction with 3 lb cuff weights with positive response. Patient demonstrated independence with stretches today.  Therapist provided verbal/tactile cues to maintain proper form. Pt reported no adverse effects to exercises. Appropriate fatigue level achieved at end of session  Pt would benefit from continued skilled physical therapy in order to reach pt's goals.     Elías Is progressing well towards his goals.   Pt prognosis is Fair.     Pt will continue to benefit from skilled outpatient physical therapy to address the deficits listed in the problem list box on initial evaluation, provide pt/family education and to maximize pt's level of independence in the home and community environment. "     Pt's spiritual, cultural and educational needs considered and pt agreeable to plan of care and goals.     Anticipated Barriers for therapy: Current pain limiting function.       GOALS: Short Term Goals:  5 weeks  1.Report decreased L hip pain  < / =  8/10  to increase tolerance for transitioning from STS, walking, getting in and out the tub, negotiating stairs, and bending over Met 06/27/2022  2. Increase L hip ROM by 10-15 percent in order to perform ADLs without difficulty. Ongoing  3. Increase strength by 1/3 MMT grade in LEs  to increase tolerance for ADL and work activities. Met 06/27/2022  4. Pt to tolerate HEP to improve ROM and independence with ADL's Ongoing     Long Term Goals: 10 weeks  1.Report decreased L hip pain < / = 4/10  to increase tolerance for transitioning from STS, walking, getting in and out the tub, negotiating stairs, and bending over Ongoing  2.Patient goal: Patient will be able to walk without AD with min to no pain and demonstrated improve gait biomechanics by 10 weeks Ongoing   3.Increase strength to 4+/5 in  LEs  to increase tolerance for ADL and work activities. Ongoing  4. Pt to be Independent with HEP to improve ROM and independence with ADL's. Ongoing      Plan   Plan of care Certification: 5/25/2022 to 08/03/2022.     Outpatient Physical Therapy 2 times weekly for 10 weeks to include the following interventions: Electrical Stimulation ., Gait Training, Manual Therapy, Moist Heat/ Ice, Neuromuscular Re-ed, Patient Education, Therapeutic Activities and Therapeutic Exercise      Tien Casey, PT   07/19/2022

## 2022-07-21 ENCOUNTER — CLINICAL SUPPORT (OUTPATIENT)
Dept: REHABILITATION | Facility: HOSPITAL | Age: 67
End: 2022-07-21
Payer: MEDICARE

## 2022-07-21 DIAGNOSIS — R29.898 WEAKNESS OF BOTH LOWER EXTREMITIES: ICD-10-CM

## 2022-07-21 DIAGNOSIS — M25.652 DECREASED RANGE OF LEFT HIP MOVEMENT: Primary | ICD-10-CM

## 2022-07-21 DIAGNOSIS — R26.89 ANTALGIC GAIT: ICD-10-CM

## 2022-07-21 PROCEDURE — 97110 THERAPEUTIC EXERCISES: CPT | Mod: PN

## 2022-07-21 NOTE — PROGRESS NOTES
Physical Therapy treatment Note     Name: Elías Gay  [facility] Number: [id number]    Therapy Diagnosis:   Encounter Diagnoses   Name Primary?    Decreased range of left hip movement Yes    Antalgic gait     Weakness of both lower extremities      Physician: Abigail White DO    Visit Date: 7/21/2022    Physician Orders: PT Eval and Treat  (Increased pelvic stability, NM retraining, functional movement assessment, and HEP needed with possible left psoas dry needling)  Medical Diagnosis from Referral: Psoas tendonitis of left side   Evaluation Date: 5/25/2022  Authorization Period Expiration: 06/22/2022  Plan of Care Expiration: 08/03/2022  Progress Note Due: 07/27/2022  Visit # / Visits authorized: 8/ 23+1   FOTO: 1/3     Precautions: Standard       Time In: 1045 AM  Time Out : 1130 AM  Total Billable Time: 45 minutes    Subjective     Pt reports: R hip pain 3/10. Reports mostly stiffness today  He is not  compliant with home exercise program.  Response to previous treatment: Good  Functional change: Ongoing    Pain: 3/ 10 R hip 0 /10 L hip  Location: bilateral hip flexors      Patients goals: To be able to move better and improve his mobility     Objective   None at this time      Elías received the treatments listed below:       therapeutic exercises to develop ROM and flexibility for 45 minutes including:    Nu-Step at lvl 3 for 6 minutes   STS on 18 inch box w/ foam 3x10 (emphasis on hip extension at stance)  + Step up on 6 inch step // bars w/o UE support  1x10 each leg  Heel raises with UE support 3x10  Standing marches w/o  UE support and 3lb cuff weights 3x10 each  SKTC stretch with towel 2x30 sec each side (strethcing contralateral hip flexor)  Hamstring/adductor/abductor stretch w/ strap 2x30 sec each side                NP:    Supine LE marching 2x20   Nustep lvl 2 x 7'  Manual stretching (by PT) B hips x 15 minutes  Lower trunk rotations x 3 minutes  SKTC stretch w/ towel 3 minutes  Supine Hip  "ER stretch (figure 4) 3x30 sec  Windshield wipers 1x20  +Standing extension 10 x 5"  +Supine hip flexor stretch (no strap) (not tolerable due to P!)  PROM bilateral hips IR/ER x 07 mins  DKTC w/ PB x 2 minutes  Lunge on 6 inch step w/ UE support (holding 5 seconds) stretching contralateral hip flexor  Bridges 3 x 10  Hip ER stretch in HL position 2x30   Hip flexor stretch assisted by PT 2x30 sec each side  Hip adduction ball squeezes in supine 1x20 holding for 5 "    + Standing hip abduction w/ UE support and 3lb cuff weights 3x10 each side  Standing hip flexor stretch (stagger stance) 3x30 sec      Elías  received the following manual therapy techniques: PROM/Stretchng B hips were applied to the: B hips  for  00minutes, including:    B hip PROM and stretching /Adductor/abductors, hamstring, IR/ER, Hip flexors) x 00 minutes        Home Exercises Provided and Patient Education Provided     Education provided:   - HEP compliance for therapeutic gain    Written Home Exercises Provided: Additional exercies added to HEP.  Exercises were reviewed and Elías was able to demonstrate them prior to the end of the session.  Elías demonstrated good  understanding of the education provided.     See EMR under Patient Instructions for exercises provided prior visit.    Assessment       Pt tolerated tx well. Introduced to step us on 6 inch box w/o UE support and was performed with some difficulty, however minimal pain in R hip. Continued w/ standing marches and hip abduction with 3 lb cuff weights with positive response. Continues demonstrating independence with stretches today.Therapist provided verbal/tactile cues to maintain proper form. Pt reported no adverse effects to exercises. Appropriate fatigue level achieved at end of session  Pt would benefit from continued skilled physical therapy in order to reach pt's goals.         Elías Is progressing well towards his goals.   Pt prognosis is Fair.     Pt will continue to " benefit from skilled outpatient physical therapy to address the deficits listed in the problem list box on initial evaluation, provide pt/family education and to maximize pt's level of independence in the home and community environment.     Pt's spiritual, cultural and educational needs considered and pt agreeable to plan of care and goals.     Anticipated Barriers for therapy: Current pain limiting function.       GOALS: Short Term Goals:  5 weeks  1.Report decreased L hip pain  < / =  8/10  to increase tolerance for transitioning from STS, walking, getting in and out the tub, negotiating stairs, and bending over Met 06/27/2022  2. Increase L hip ROM by 10-15 percent in order to perform ADLs without difficulty. Ongoing  3. Increase strength by 1/3 MMT grade in LEs  to increase tolerance for ADL and work activities. Met 06/27/2022  4. Pt to tolerate HEP to improve ROM and independence with ADL's Ongoing     Long Term Goals: 10 weeks  1.Report decreased L hip pain < / = 4/10  to increase tolerance for transitioning from STS, walking, getting in and out the tub, negotiating stairs, and bending over Ongoing  2.Patient goal: Patient will be able to walk without AD with min to no pain and demonstrated improve gait biomechanics by 10 weeks Ongoing   3.Increase strength to 4+/5 in  LEs  to increase tolerance for ADL and work activities. Ongoing  4. Pt to be Independent with HEP to improve ROM and independence with ADL's. Ongoing      Plan   Plan of care Certification: 5/25/2022 to 08/03/2022.     Outpatient Physical Therapy 2 times weekly for 10 weeks to include the following interventions: Electrical Stimulation ., Gait Training, Manual Therapy, Moist Heat/ Ice, Neuromuscular Re-ed, Patient Education, Therapeutic Activities and Therapeutic Exercise      Tien Casey, PT   07/21/2022

## 2022-07-26 ENCOUNTER — LAB VISIT (OUTPATIENT)
Dept: LAB | Facility: HOSPITAL | Age: 67
End: 2022-07-26
Attending: INTERNAL MEDICINE
Payer: MEDICARE

## 2022-07-26 ENCOUNTER — OFFICE VISIT (OUTPATIENT)
Dept: INTERNAL MEDICINE | Facility: CLINIC | Age: 67
End: 2022-07-26
Payer: MEDICARE

## 2022-07-26 VITALS
BODY MASS INDEX: 39.16 KG/M2 | HEART RATE: 65 BPM | OXYGEN SATURATION: 96 % | DIASTOLIC BLOOD PRESSURE: 78 MMHG | SYSTOLIC BLOOD PRESSURE: 130 MMHG | HEIGHT: 71 IN | WEIGHT: 279.75 LBS

## 2022-07-26 DIAGNOSIS — N39.0 ACUTE UTI: ICD-10-CM

## 2022-07-26 DIAGNOSIS — E78.00 ELEVATED LDL CHOLESTEROL LEVEL: ICD-10-CM

## 2022-07-26 DIAGNOSIS — R60.0 LOWER EXTREMITY EDEMA: ICD-10-CM

## 2022-07-26 DIAGNOSIS — Z12.5 PROSTATE CANCER SCREENING: ICD-10-CM

## 2022-07-26 DIAGNOSIS — Z00.00 ANNUAL PHYSICAL EXAM: ICD-10-CM

## 2022-07-26 DIAGNOSIS — Z00.00 ANNUAL PHYSICAL EXAM: Primary | ICD-10-CM

## 2022-07-26 DIAGNOSIS — B35.3 TINEA PEDIS OF BOTH FEET: ICD-10-CM

## 2022-07-26 DIAGNOSIS — G47.33 OSA (OBSTRUCTIVE SLEEP APNEA): ICD-10-CM

## 2022-07-26 DIAGNOSIS — B35.1 ONYCHOMYCOSIS: ICD-10-CM

## 2022-07-26 LAB
ALBUMIN SERPL BCP-MCNC: 3.7 G/DL (ref 3.5–5.2)
ALP SERPL-CCNC: 106 U/L (ref 55–135)
ALT SERPL W/O P-5'-P-CCNC: 18 U/L (ref 10–44)
ANION GAP SERPL CALC-SCNC: 8 MMOL/L (ref 8–16)
AST SERPL-CCNC: 19 U/L (ref 10–40)
BASOPHILS # BLD AUTO: 0.05 K/UL (ref 0–0.2)
BASOPHILS NFR BLD: 0.6 % (ref 0–1.9)
BILIRUB SERPL-MCNC: 0.6 MG/DL (ref 0.1–1)
BILIRUB UR QL STRIP: NEGATIVE
BUN SERPL-MCNC: 9 MG/DL (ref 8–23)
CALCIUM SERPL-MCNC: 9 MG/DL (ref 8.7–10.5)
CHLORIDE SERPL-SCNC: 106 MMOL/L (ref 95–110)
CHOLEST SERPL-MCNC: 156 MG/DL (ref 120–199)
CHOLEST/HDLC SERPL: 4 {RATIO} (ref 2–5)
CLARITY UR REFRACT.AUTO: CLEAR
CO2 SERPL-SCNC: 28 MMOL/L (ref 23–29)
COLOR UR AUTO: YELLOW
COMPLEXED PSA SERPL-MCNC: 2.4 NG/ML (ref 0–4)
CREAT SERPL-MCNC: 0.8 MG/DL (ref 0.5–1.4)
DIFFERENTIAL METHOD: ABNORMAL
EOSINOPHIL # BLD AUTO: 0.1 K/UL (ref 0–0.5)
EOSINOPHIL NFR BLD: 1.5 % (ref 0–8)
ERYTHROCYTE [DISTWIDTH] IN BLOOD BY AUTOMATED COUNT: 13.2 % (ref 11.5–14.5)
EST. GFR  (AFRICAN AMERICAN): >60 ML/MIN/1.73 M^2
EST. GFR  (NON AFRICAN AMERICAN): >60 ML/MIN/1.73 M^2
GLUCOSE SERPL-MCNC: 82 MG/DL (ref 70–110)
GLUCOSE UR QL STRIP: NEGATIVE
HCT VFR BLD AUTO: 42.2 % (ref 40–54)
HDLC SERPL-MCNC: 39 MG/DL (ref 40–75)
HDLC SERPL: 25 % (ref 20–50)
HGB BLD-MCNC: 13.7 G/DL (ref 14–18)
HGB UR QL STRIP: NEGATIVE
IMM GRANULOCYTES # BLD AUTO: 0.07 K/UL (ref 0–0.04)
IMM GRANULOCYTES NFR BLD AUTO: 0.8 % (ref 0–0.5)
KETONES UR QL STRIP: NEGATIVE
LDLC SERPL CALC-MCNC: 101 MG/DL (ref 63–159)
LEUKOCYTE ESTERASE UR QL STRIP: ABNORMAL
LYMPHOCYTES # BLD AUTO: 2 K/UL (ref 1–4.8)
LYMPHOCYTES NFR BLD: 23.1 % (ref 18–48)
MCH RBC QN AUTO: 30.6 PG (ref 27–31)
MCHC RBC AUTO-ENTMCNC: 32.5 G/DL (ref 32–36)
MCV RBC AUTO: 94 FL (ref 82–98)
MICROSCOPIC COMMENT: NORMAL
MONOCYTES # BLD AUTO: 0.6 K/UL (ref 0.3–1)
MONOCYTES NFR BLD: 7.4 % (ref 4–15)
NEUTROPHILS # BLD AUTO: 5.7 K/UL (ref 1.8–7.7)
NEUTROPHILS NFR BLD: 66.6 % (ref 38–73)
NITRITE UR QL STRIP: NEGATIVE
NONHDLC SERPL-MCNC: 117 MG/DL
NRBC BLD-RTO: 0 /100 WBC
PH UR STRIP: 5 [PH] (ref 5–8)
PLATELET # BLD AUTO: 284 K/UL (ref 150–450)
PMV BLD AUTO: 10.2 FL (ref 9.2–12.9)
POTASSIUM SERPL-SCNC: 3.9 MMOL/L (ref 3.5–5.1)
PROT SERPL-MCNC: 6.7 G/DL (ref 6–8.4)
PROT UR QL STRIP: NEGATIVE
RBC # BLD AUTO: 4.48 M/UL (ref 4.6–6.2)
RBC #/AREA URNS AUTO: 1 /HPF (ref 0–4)
SODIUM SERPL-SCNC: 142 MMOL/L (ref 136–145)
SP GR UR STRIP: 1.02 (ref 1–1.03)
TRIGL SERPL-MCNC: 80 MG/DL (ref 30–150)
URN SPEC COLLECT METH UR: ABNORMAL
WBC # BLD AUTO: 8.61 K/UL (ref 3.9–12.7)
WBC #/AREA URNS AUTO: 5 /HPF (ref 0–5)

## 2022-07-26 PROCEDURE — 87086 URINE CULTURE/COLONY COUNT: CPT | Performed by: INTERNAL MEDICINE

## 2022-07-26 PROCEDURE — 99999 PR PBB SHADOW E&M-EST. PATIENT-LVL III: CPT | Mod: PBBFAC,,, | Performed by: INTERNAL MEDICINE

## 2022-07-26 PROCEDURE — 3075F SYST BP GE 130 - 139MM HG: CPT | Mod: CPTII,S$GLB,, | Performed by: INTERNAL MEDICINE

## 2022-07-26 PROCEDURE — 1125F PR PAIN SEVERITY QUANTIFIED, PAIN PRESENT: ICD-10-PCS | Mod: CPTII,S$GLB,, | Performed by: INTERNAL MEDICINE

## 2022-07-26 PROCEDURE — 81001 URINALYSIS AUTO W/SCOPE: CPT | Performed by: INTERNAL MEDICINE

## 2022-07-26 PROCEDURE — 84153 ASSAY OF PSA TOTAL: CPT | Performed by: INTERNAL MEDICINE

## 2022-07-26 PROCEDURE — 99397 PR PREVENTIVE VISIT,EST,65 & OVER: ICD-10-PCS | Mod: GZ,S$GLB,, | Performed by: INTERNAL MEDICINE

## 2022-07-26 PROCEDURE — 80061 LIPID PANEL: CPT | Performed by: INTERNAL MEDICINE

## 2022-07-26 PROCEDURE — 85025 COMPLETE CBC W/AUTO DIFF WBC: CPT | Performed by: INTERNAL MEDICINE

## 2022-07-26 PROCEDURE — 1125F AMNT PAIN NOTED PAIN PRSNT: CPT | Mod: CPTII,S$GLB,, | Performed by: INTERNAL MEDICINE

## 2022-07-26 PROCEDURE — 99397 PER PM REEVAL EST PAT 65+ YR: CPT | Mod: GZ,S$GLB,, | Performed by: INTERNAL MEDICINE

## 2022-07-26 PROCEDURE — 99999 PR PBB SHADOW E&M-EST. PATIENT-LVL III: ICD-10-PCS | Mod: PBBFAC,,, | Performed by: INTERNAL MEDICINE

## 2022-07-26 PROCEDURE — 1160F RVW MEDS BY RX/DR IN RCRD: CPT | Mod: CPTII,S$GLB,, | Performed by: INTERNAL MEDICINE

## 2022-07-26 PROCEDURE — 80053 COMPREHEN METABOLIC PANEL: CPT | Performed by: INTERNAL MEDICINE

## 2022-07-26 PROCEDURE — 3078F DIAST BP <80 MM HG: CPT | Mod: CPTII,S$GLB,, | Performed by: INTERNAL MEDICINE

## 2022-07-26 PROCEDURE — 1159F PR MEDICATION LIST DOCUMENTED IN MEDICAL RECORD: ICD-10-PCS | Mod: CPTII,S$GLB,, | Performed by: INTERNAL MEDICINE

## 2022-07-26 PROCEDURE — 3078F PR MOST RECENT DIASTOLIC BLOOD PRESSURE < 80 MM HG: ICD-10-PCS | Mod: CPTII,S$GLB,, | Performed by: INTERNAL MEDICINE

## 2022-07-26 PROCEDURE — 3008F BODY MASS INDEX DOCD: CPT | Mod: CPTII,S$GLB,, | Performed by: INTERNAL MEDICINE

## 2022-07-26 PROCEDURE — 1159F MED LIST DOCD IN RCRD: CPT | Mod: CPTII,S$GLB,, | Performed by: INTERNAL MEDICINE

## 2022-07-26 PROCEDURE — 3008F PR BODY MASS INDEX (BMI) DOCUMENTED: ICD-10-PCS | Mod: CPTII,S$GLB,, | Performed by: INTERNAL MEDICINE

## 2022-07-26 PROCEDURE — 1160F PR REVIEW ALL MEDS BY PRESCRIBER/CLIN PHARMACIST DOCUMENTED: ICD-10-PCS | Mod: CPTII,S$GLB,, | Performed by: INTERNAL MEDICINE

## 2022-07-26 PROCEDURE — 36415 COLL VENOUS BLD VENIPUNCTURE: CPT | Performed by: INTERNAL MEDICINE

## 2022-07-26 PROCEDURE — 3075F PR MOST RECENT SYSTOLIC BLOOD PRESS GE 130-139MM HG: ICD-10-PCS | Mod: CPTII,S$GLB,, | Performed by: INTERNAL MEDICINE

## 2022-07-26 RX ORDER — KETOCONAZOLE 20 MG/G
CREAM TOPICAL
Qty: 60 G | Refills: 3 | Status: SHIPPED | OUTPATIENT
Start: 2022-07-26 | End: 2024-02-10 | Stop reason: SDUPTHER

## 2022-07-26 RX ORDER — CICLOPIROX 80 MG/ML
SOLUTION TOPICAL
Qty: 1 EACH | Refills: 5 | Status: SHIPPED | OUTPATIENT
Start: 2022-07-26 | End: 2024-02-10 | Stop reason: SDUPTHER

## 2022-07-26 RX ORDER — AZELASTINE 1 MG/ML
1 SPRAY, METERED NASAL 2 TIMES DAILY
Qty: 30 ML | Refills: 11 | Status: SHIPPED | OUTPATIENT
Start: 2022-07-26 | End: 2023-08-15

## 2022-07-26 NOTE — PROGRESS NOTES
CC:  Annual exam    HPI:  The patient is a 67-year-old male with obstructive sleep apnea, BPH, history of melanoma, and colon polyps who presents today for annual exam.  The patient does report using his CPAP she finds helpful.  He cannot sleep without it.  The patient does have low back pain.  He was seen for this and now is being treated for left hip pain.  He is in physical therapy for this.  The patient only new complaint is to urinary tract infections.  The last 1 was 1 month ago.  He was placed on Bactrim for 10 days.  He completed this approximately 15 days ago.    ROS:  Please see patient and review of [employer] for HPI/ROS submitted by the patient on 7/20/2022  activity change: Yes  unexpected weight change: No  neck pain: No  hearing loss: No  rhinorrhea: No  trouble swallowing: No  eye discharge: No  visual disturbance: No  chest tightness: No  wheezing: No  chest pain: No  palpitations: No  blood in stool: No  constipation: No  vomiting: No  diarrhea: No  polydipsia: No  polyuria: No  difficulty urinating: No  urgency: No  hematuria: No  joint swelling: No  arthralgias: Yes  headaches: No  weakness: Yes  confusion: No  dysphoric mood: No  The patient reports losing about 30 lb.  He has done so by counting calories.  He wants to lose 10 lb a month.  Patient does have difficulty rising from a chair.  He does have difficulty turning.  He is hoping that weight loss plus physical therapy will improve his mobility.  He did see Dermatology in November of 2021. His last colonoscopy was in June of 2021.    Physical exam:  General appearance:  No acute distress  HEENT:  Conjunctiva is clear.  Pupils equal.  TMs are clear.  Nasal septum is midline without discharge.  Oropharynx without erythema.  Trachea is midline without JVD or thyromegaly.  Pulmonary:  Good inspiratory, expiratory breath sounds are heard.  Lungs clear auscultation.  Cardiovascular:  S1-S2, rhythm is normal.  2+ carotid pulse of bruits.   Extremities with trace to 1+ edema.  GI:  Abdomen is nontender, nondistended without hepatosplenomegaly  :  A digital rectal exam was performed.  The rectum was normal.  Prostate was without masses or nodules.  Stools brown heme-negative.  Penis and testes were normal.  Lymphatics:  No cervical, axillary inguinal adenopathy    Assessment:  1. Annual exam  2. Obstructive sleep apnea  3. Colon polyps  4. Recent UTI  5. BPH    Plan:  1. Will schedule a CBC, CMP, PSA, lipid panel, UA and culture  2. The patient to follow up pending test results.

## 2022-07-27 LAB — BACTERIA UR CULT: NO GROWTH

## 2022-08-02 ENCOUNTER — CLINICAL SUPPORT (OUTPATIENT)
Dept: REHABILITATION | Facility: HOSPITAL | Age: 67
End: 2022-08-02
Payer: MEDICARE

## 2022-08-02 DIAGNOSIS — M25.652 DECREASED RANGE OF LEFT HIP MOVEMENT: Primary | ICD-10-CM

## 2022-08-02 DIAGNOSIS — R26.89 ANTALGIC GAIT: ICD-10-CM

## 2022-08-02 DIAGNOSIS — R29.898 WEAKNESS OF BOTH LOWER EXTREMITIES: ICD-10-CM

## 2022-08-02 PROCEDURE — 97110 THERAPEUTIC EXERCISES: CPT | Mod: PN

## 2022-08-02 NOTE — PROGRESS NOTES
Physical Therapy Progress Note     Name: Elías Gay  [facility] Number: [id number]    Therapy Diagnosis:   Encounter Diagnoses   Name Primary?    Decreased range of left hip movement Yes    Antalgic gait     Weakness of both lower extremities      Physician: Abigail White DO    Visit Date: 8/2/2022    Physician Orders: PT Eval and Treat  (Increased pelvic stability, NM retraining, functional movement assessment, and HEP needed with possible left psoas dry needling)  Medical Diagnosis from Referral: Psoas tendonitis of left side   Evaluation Date: 5/25/2022  Authorization Period Expiration: 06/22/2022  Plan of Care Expiration: 09/02/2022 (Extended 1 month)  Progress Note Due: 09/02/2022  Visit # / Visits authorized: 9/ 23+1   FOTO: 1/3     Precautions: Standard       Time In: 1133 AM  Time Out : 1215 PM  Total Billable Time: 42 minutes    Subjective     Pt reports Feeling much better today. He had to change a part of his AC unit yesterday and reports pain has subsided after that  :He is not  compliant with home exercise program.  Response to previous treatment: Good  Functional change: Ongoing    Pain: 0/ 10 R hip 0 /10 L hip  Location: bilateral hip flexors      Patients goals: To be able to move better and improve his mobility     Objective   Time taken to complete: 15 minutes     Observation: Pleasant and cooperative     Patient presented ambulating with a single point cane on R UE. Demonstrates decreased step length on L LE.  Antalgic gait favoring R LE     Posture: FHP and rounded shoulder posture     Hip Range of Motion:    Right AROM/PROM Left AROM/PROM   Flexion 90/95 degrees 100 degrees   Abduction 45 degrees P! 30 degrees P!   Extension NT NT    Ext. Rotation 35 degrees 35 degrees   Int. Rotation 15 degrees 10 degrees         Lower Extremity Strength  Right LE   Left LE     Knee extension: 5/5 Knee extension: 5/5   Knee flexion: 5/5 Knee flexion: 5/5   Hip flexion: 4/5 P! Hip flexion: 4+/5   Hip  "Internal Rotation:  4/5    Hip Internal Rotation: 4/5   P!   Hip External Rotation: 4/5    Hip External Rotation: 4/5   P!   Hip abduction: 4/5 Hip abduction: 4/5            Flexibility: Decreased flexibility and hypomobility of bilateral hip joints (IR<ER)     Joint Mobility: Hypomobility of bilateral hip joints.     Palpation: No TTP noted     Sensation: Intact     Edema: None noted        Elías received the treatments listed below:       therapeutic exercises to develop ROM and flexibility for 27 minutes including:      DKTC w/ PB x 2 minutes  Bridges 3 x 10   Lower trunk rotations x 3 minutes  SL Clamshells 2x15 B  Hip flexor stretch assisted by PT 2x30 sec R LE  Hamstring/adductor/abductor stretch w/ strap 2x30 sec each side          NP:  Nu-Step at lvl 3 for 6 minutes   STS on 18 inch box w/ foam 3x10 (emphasis on hip extension at stance)  + Step up on 6 inch step // bars w/o UE support  1x10 each leg  Heel raises with UE support 3x10  Standing marches w/o  UE support and 3lb cuff weights 3x10 each  SKTC stretch with towel 2x30 sec each side (strethcing contralateral hip flexor)    Supine LE marching 2x20   Nustep lvl 2 x 7'    Manual stretching (by PT) B hips x 15 minutes    SKTC stretch w/ towel 3 minutes  Supine Hip ER stretch (figure 4) 3x30 sec  Windshield wipers 1x20  +Standing extension 10 x 5"  +Supine hip flexor stretch (no strap) (not tolerable due to P!)  PROM bilateral hips IR/ER x 07 mins    Lunge on 6 inch step w/ UE support (holding 5 seconds) stretching contralateral hip flexor    Hip ER stretch in HL position 2x30     Hip adduction ball squeezes in supine 1x20 holding for 5 "    + Standing hip abduction w/ UE support and 3lb cuff weights 3x10 each side  Standing hip flexor stretch (stagger stance) 3x30 sec      Elías  received the following manual therapy techniques: PROM/Stretchng B hips were applied to the: B hips  for  00minutes, including:    B hip PROM and stretching " /Adductor/abductors, hamstring, IR/ER, Hip flexors) x 00 minutes        Home Exercises Provided and Patient Education Provided     Education provided:   - HEP compliance for therapeutic gain    Written Home Exercises Provided: Additional exercies added to HEP.  Exercises were reviewed and Elías was able to demonstrate them prior to the end of the session.  Elías demonstrated good  understanding of the education provided.     See EMR under Patient Instructions for exercises provided prior visit.    Assessment       Pt tolerated tx well. Emphasis on mat exercises and stretches. Therapist provided verbal/tactile cues to maintain proper form. Appropriate fatigue level achieved at end of session. Pt reported no adverse effects to exercises.     Patient was reassessed today. Patient has made some progress in LE strengthening and ROM of B hips. Continues ambulating with decreased step length and demonstrates increase apprehension with RPOM of B hips (especially hip extension R>L). Patient L hip symptoms have improved since initial evaluation, however R hip symptoms remain unchneged  Patient has 2/4 STGs and 1/4 LTGs as of today. Pt would benefit from continued skilled physical therapy in order to reach pt's goals.       Elías Is progressing well towards his goals.   Pt prognosis is Fair.     Pt will continue to benefit from skilled outpatient physical therapy to address the deficits listed in the problem list box on initial evaluation, provide pt/family education and to maximize pt's level of independence in the home and community environment.     Pt's spiritual, cultural and educational needs considered and pt agreeable to plan of care and goals.     Anticipated Barriers for therapy: Current pain limiting function.       GOALS: Short Term Goals:  5 weeks  1.Report decreased L hip pain  < / =  8/10  to increase tolerance for transitioning from STS, walking, getting in and out the tub, negotiating stairs, and bending  over Met 06/27/2022  2. Increase L hip ROM by 10-15 percent in order to perform ADLs without difficulty. Ongoing  3. Increase strength by 1/3 MMT grade in LEs  to increase tolerance for ADL and work activities. Met 06/27/2022  4. Pt to tolerate HEP to improve ROM and independence with ADL's Ongoing     Long Term Goals: 10 weeks  1.Report decreased L hip pain < / = 4/10  to increase tolerance for transitioning from STS, walking, getting in and out the tub, negotiating stairs, and bending over Met 08/02/2022  2.Patient goal: Patient will be able to walk without AD with min to no pain and demonstrated improve gait biomechanics by 10 weeks Ongoing   3.Increase strength to 4+/5 in  LEs  to increase tolerance for ADL and work activities. Ongoing  4. Pt to be Independent with HEP to improve ROM and independence with ADL's. Ongoing      Plan   Plan of care Certification: 5/25/2022 to 09/02/2022 (Extended 1 month)     Outpatient Physical Therapy 2 times weekly for 10 weeks to include the following interventions: Electrical Stimulation ., Gait Training, Manual Therapy, Moist Heat/ Ice, Neuromuscular Re-ed, Patient Education, Therapeutic Activities and Therapeutic Exercise      Tien Casye, PT   08/02/2022

## 2022-08-04 ENCOUNTER — CLINICAL SUPPORT (OUTPATIENT)
Dept: REHABILITATION | Facility: HOSPITAL | Age: 67
End: 2022-08-04
Payer: MEDICARE

## 2022-08-04 DIAGNOSIS — R29.898 WEAKNESS OF BOTH LOWER EXTREMITIES: ICD-10-CM

## 2022-08-04 DIAGNOSIS — R26.89 ANTALGIC GAIT: ICD-10-CM

## 2022-08-04 DIAGNOSIS — M25.652 DECREASED RANGE OF LEFT HIP MOVEMENT: Primary | ICD-10-CM

## 2022-08-04 PROCEDURE — 97110 THERAPEUTIC EXERCISES: CPT | Mod: PN

## 2022-08-04 PROCEDURE — 97140 MANUAL THERAPY 1/> REGIONS: CPT | Mod: PN

## 2022-08-04 NOTE — PROGRESS NOTES
Physical Therapy Treatment Note     Name: Elías SALTER [facility] Number: [id number]    Therapy Diagnosis:   Encounter Diagnoses   Name Primary?    Decreased range of left hip movement Yes    Antalgic gait     Weakness of both lower extremities      Physician: Abigail White DO    Visit Date: 8/4/2022    Physician Orders: PT Eval and Treat  (Increased pelvic stability, NM retraining, functional movement assessment, and HEP needed with possible left psoas dry needling)  Medical Diagnosis from Referral: Psoas tendonitis of left side   Evaluation Date: 5/25/2022  Authorization Period Expiration: 06/22/2022  Plan of Care Expiration: 09/02/2022 (Extended 1 month)  Progress Note Due: 09/02/2022  Visit # / Visits authorized: 10/ 23+1   FOTO: 1/3     Precautions: Standard       Time In: 1045 AM  Time Out : 1138  PM  Total Billable Time: 53 minutes    Subjective     Pt reports Feeling much better today. Is able to to more around the house lately   :He is not  compliant with home exercise program.  Response to previous treatment: Good  Functional change: Ongoing    Pain: 2/ 10 R hip 0 /10 L hip  Location: bilateral hip flexors      Patients goals: To be able to move better and improve his mobility     Objective   None at this time    Elías received the treatments listed below:       therapeutic exercises to develop ROM and flexibility for 33 minutes including:    Nu-Step at lvl 3 for 6 minutes   Hip flexors stretch in stagger stance 3x30 sec each side  Wall squats w/ PB 3x10  Step up on 6 inch step // bars w/o UE support  12x10 each leg  Supine hip ER bilaterally x 2 minutes  LTR x 2 minutes        DKTC w/ PB x 2 minutes  Bridges 3 x 10   Lower trunk rotations x 3 minutes  SL Clamshells 2x15 B  Hip flexor stretch assisted by PT 2x30 sec R LE  Hamstring/adductor/abductor stretch w/ strap 2x30 sec each side          NP:    STS on 18 inch box w/ foam 3x10 (emphasis on hip extension at stance)    Heel raises with UE  "support 3x10  Standing marches w/o  UE support and 3lb cuff weights 3x10 each  SKTC stretch with towel 2x30 sec each side (strethcing contralateral hip flexor)    Supine LE marching 2x20   Nustep lvl 2 x 7'    Manual stretching (by PT) B hips x 15 minutes    SKTC stretch w/ towel 3 minutes  Supine Hip ER stretch (figure 4) 3x30 sec  Windshield wipers 1x20  +Standing extension 10 x 5"  +Supine hip flexor stretch (no strap) (not tolerable due to P!)  PROM bilateral hips IR/ER x 07 mins    Lunge on 6 inch step w/ UE support (holding 5 seconds) stretching contralateral hip flexor    Hip ER stretch in HL position 2x30     Hip adduction ball squeezes in supine 1x20 holding for 5 "    + Standing hip abduction w/ UE support and 3lb cuff weights 3x10 each side  Standing hip flexor stretch (stagger stance) 3x30 sec      Elías  received the following manual therapy techniques: PROM/Stretchng B hips were applied to the: B hips  For 20 minutes, including:    R hip PROM   Long axis distraction of R hip using mobilization belt  Lateral R hip distraction        Home Exercises Provided and Patient Education Provided     Education provided:   - HEP compliance for therapeutic gain    Written Home Exercises Provided: Additional exercies added to HEP.  Exercises were reviewed and Elías was able to demonstrate them prior to the end of the session.  Elías demonstrated good  understanding of the education provided.     See EMR under Patient Instructions for exercises provided prior visit.    Assessment     Pt tolerated tx well. Patient introduced to wall squats using PB with positive response, appropriate muscle fatigue and no exacerbation of symptoms. Improvement in 4 inch step up execution demonstrating less hesitation and improved ease of movement. R hip joint mobilization and long axis distraction performed today for pain relief and for general ROM improvement. Therapist provided verbal/tactile cues to maintain proper form. Pt " reported no adverse effects to exercises. Pt would benefit from continued skilled physical therapy in order to reach pt's goals.     Elías Is progressing well towards his goals.   Pt prognosis is Fair.     Pt will continue to benefit from skilled outpatient physical therapy to address the deficits listed in the problem list box on initial evaluation, provide pt/family education and to maximize pt's level of independence in the home and community environment.     Pt's spiritual, cultural and educational needs considered and pt agreeable to plan of care and goals.     Anticipated Barriers for therapy: Current pain limiting function.       GOALS: Short Term Goals:  5 weeks  1.Report decreased L hip pain  < / =  8/10  to increase tolerance for transitioning from STS, walking, getting in and out the tub, negotiating stairs, and bending over Met 06/27/2022  2. Increase L hip ROM by 10-15 percent in order to perform ADLs without difficulty. Ongoing  3. Increase strength by 1/3 MMT grade in LEs  to increase tolerance for ADL and work activities. Met 06/27/2022  4. Pt to tolerate HEP to improve ROM and independence with ADL's Ongoing     Long Term Goals: 10 weeks  1.Report decreased L hip pain < / = 4/10  to increase tolerance for transitioning from STS, walking, getting in and out the tub, negotiating stairs, and bending over Met 08/02/2022  2.Patient goal: Patient will be able to walk without AD with min to no pain and demonstrated improve gait biomechanics by 10 weeks Ongoing   3.Increase strength to 4+/5 in  LEs  to increase tolerance for ADL and work activities. Ongoing  4. Pt to be Independent with HEP to improve ROM and independence with ADL's. Ongoing      Plan   Plan of care Certification: 5/25/2022 to 09/02/2022 (Extended 1 month)     Outpatient Physical Therapy 2 times weekly for 10 weeks to include the following interventions: Electrical Stimulation ., Gait Training, Manual Therapy, Moist Heat/ Ice,  Neuromuscular Re-ed, Patient Education, Therapeutic Activities and Therapeutic Exercise      Tien Casey, PT   08/04/2022

## 2022-08-05 ENCOUNTER — PATIENT MESSAGE (OUTPATIENT)
Dept: INTERNAL MEDICINE | Facility: CLINIC | Age: 67
End: 2022-08-05
Payer: MEDICARE

## 2022-08-10 NOTE — PROGRESS NOTES
"  Physical Therapy Treatment Note     Name: Elías SALTER [facility] Number: [id number]    Therapy Diagnosis:   Encounter Diagnoses   Name Primary?    Decreased range of motion of both hips Yes    Antalgic gait     Weakness of both lower extremities      Physician: Abigail White DO    Visit Date: 8/11/2022    Physician Orders: PT Eval and Treat  (Increased pelvic stability, NM retraining, functional movement assessment, and HEP needed with possible left psoas dry needling)  Medical Diagnosis from Referral: Psoas tendonitis of left side   Evaluation Date: 5/25/2022  Authorization Period Expiration: 06/22/2022  Plan of Care Expiration: 09/02/2022 (Extended 1 month)  Progress Note Due: 09/02/2022  Visit # / Visits authorized: 11/ 23+1   FOTO: 1/3     Precautions: Standard       Time In: 1045 AM  Time Out : 1130 AM  Total Billable Time: 45 minutes    Subjective     Pt reports B groing has been hurting a lot lately as he has been doing rapiers to his AC unit at home  :He is not  compliant with home exercise program.  Response to previous treatment: Good  Functional change: Ongoing    Pain: 2/ 10 R hip 0 /10 L hip  Location: bilateral hip flexors      Patients goals: To be able to move better and improve his mobility     Objective   None at this time    Elías received the treatments listed below:       therapeutic exercises to develop ROM and flexibility for 35 minutes including:    Nu-Step at lvl 3 for 6 minutes   SAQ w/ 4lb cuff weight B LE 1x20 each  Hip adduction ball squeezes in supine 1x20 holding for 5 "  SL Clamshells 2x15 B w/ RTB  Hamstring/adductor/abductor stretch w/ strap 2x30 sec each side        Hip flexors stretch in stagger stance 3x30 sec each side  Wall squats w/ PB 3x10  Step up on 6 inch step // bars w/o UE support  12x10 each leg  Supine hip ER bilaterally x 2 minutes  LTR x 2 minutes        DKTC w/ PB x 2 minutes  Bridges 3 x 10   Lower trunk rotations x 3 minutes    Hip flexor stretch assisted " "by PT 2x30 sec R LE          NP:    STS on 18 inch box w/ foam 3x10 (emphasis on hip extension at stance)    Heel raises with UE support 3x10  Standing marches w/o  UE support and 3lb cuff weights 3x10 each  SKTC stretch with towel 2x30 sec each side (strethcing contralateral hip flexor)    Supine LE marching 2x20   Nustep lvl 2 x 7'    Manual stretching (by PT) B hips x 15 minutes    SKTC stretch w/ towel 3 minutes  Supine Hip ER stretch (figure 4) 3x30 sec  Windshield wipers 1x20  +Standing extension 10 x 5"  +Supine hip flexor stretch (no strap) (not tolerable due to P!)  PROM bilateral hips IR/ER x 07 mins    Lunge on 6 inch step w/ UE support (holding 5 seconds) stretching contralateral hip flexor    Hip ER stretch in HL position 2x30         + Standing hip abduction w/ UE support and 3lb cuff weights 3x10 each side  Standing hip flexor stretch (stagger stance) 3x30 sec      Elías  received the following manual therapy techniques: PROM/Stretchng B hips were applied to the: B hips  For 10 minutes, including:    R hip PROM   Long axis distraction of R hip using mobilization belt  Lateral R hip distraction  Hip flexor stretch assisted by PT x 10 minutes        Home Exercises Provided and Patient Education Provided     Education provided:   - HEP compliance for therapeutic gain    Written Home Exercises Provided: Additional exercies added to HEP.  Exercises were reviewed and Elías was able to demonstrate them prior to the end of the session.  Elías demonstrated good  understanding of the education provided.     See EMR under Patient Instructions for exercises provided prior visit.    Assessment     Pt tolerated tx well. Increase B groin pain today and therefore emphasis on foundational mat exercises and  PROM stretches today.  SL hip flexor stretch performed by PT provided some relief of symptoms Therapist provided verbal/tactile cues to maintain proper form. Pt reported no adverse effects to exercises. " Appropriate fatigue level achieved at end of session. Pt would benefit from continued skilled physical therapy in order to reach pt's goals.     Elías Is progressing well towards his goals.   Pt prognosis is Fair.     Pt will continue to benefit from skilled outpatient physical therapy to address the deficits listed in the problem list box on initial evaluation, provide pt/family education and to maximize pt's level of independence in the home and community environment.     Pt's spiritual, cultural and educational needs considered and pt agreeable to plan of care and goals.     Anticipated Barriers for therapy: Current pain limiting function.       GOALS: Short Term Goals:  5 weeks  1.Report decreased L hip pain  < / =  8/10  to increase tolerance for transitioning from STS, walking, getting in and out the tub, negotiating stairs, and bending over Met 06/27/2022  2. Increase L hip ROM by 10-15 percent in order to perform ADLs without difficulty. Ongoing  3. Increase strength by 1/3 MMT grade in LEs  to increase tolerance for ADL and work activities. Met 06/27/2022  4. Pt to tolerate HEP to improve ROM and independence with ADL's Ongoing     Long Term Goals: 10 weeks  1.Report decreased L hip pain < / = 4/10  to increase tolerance for transitioning from STS, walking, getting in and out the tub, negotiating stairs, and bending over Met 08/02/2022  2.Patient goal: Patient will be able to walk without AD with min to no pain and demonstrated improve gait biomechanics by 10 weeks Ongoing   3.Increase strength to 4+/5 in  LEs  to increase tolerance for ADL and work activities. Ongoing  4. Pt to be Independent with HEP to improve ROM and independence with ADL's. Ongoing      Plan   Plan of care Certification: 5/25/2022 to 09/02/2022 (Extended 1 month)     Outpatient Physical Therapy 2 times weekly for 10 weeks to include the following interventions: Electrical Stimulation ., Gait Training, Manual Therapy, Moist Heat/ Ice,  Neuromuscular Re-ed, Patient Education, Therapeutic Activities and Therapeutic Exercise      Tien Casey, PT   08/11/2022

## 2022-08-11 ENCOUNTER — CLINICAL SUPPORT (OUTPATIENT)
Dept: REHABILITATION | Facility: HOSPITAL | Age: 67
End: 2022-08-11
Payer: MEDICARE

## 2022-08-11 DIAGNOSIS — M25.651 DECREASED RANGE OF MOTION OF BOTH HIPS: Primary | ICD-10-CM

## 2022-08-11 DIAGNOSIS — R26.89 ANTALGIC GAIT: ICD-10-CM

## 2022-08-11 DIAGNOSIS — M25.652 DECREASED RANGE OF MOTION OF BOTH HIPS: Primary | ICD-10-CM

## 2022-08-11 DIAGNOSIS — R29.898 WEAKNESS OF BOTH LOWER EXTREMITIES: ICD-10-CM

## 2022-08-11 PROCEDURE — 97140 MANUAL THERAPY 1/> REGIONS: CPT | Mod: PN

## 2022-08-11 PROCEDURE — 97110 THERAPEUTIC EXERCISES: CPT | Mod: PN

## 2022-08-25 ENCOUNTER — PATIENT MESSAGE (OUTPATIENT)
Dept: SPORTS MEDICINE | Facility: CLINIC | Age: 67
End: 2022-08-25
Payer: MEDICARE

## 2022-08-26 ENCOUNTER — TELEPHONE (OUTPATIENT)
Dept: SPORTS MEDICINE | Facility: CLINIC | Age: 67
End: 2022-08-26
Payer: MEDICARE

## 2022-08-26 NOTE — TELEPHONE ENCOUNTER
Spoke to the Pt about his upcoming appt.  Asked some details to the visit to see if it was a new problem or same as before. He informed me the it was a previous problem and that he would just like a follow up. Confirmed the detail and stated thanks

## 2022-08-30 ENCOUNTER — OFFICE VISIT (OUTPATIENT)
Dept: SPORTS MEDICINE | Facility: CLINIC | Age: 67
End: 2022-08-30
Payer: MEDICARE

## 2022-08-30 ENCOUNTER — HOSPITAL ENCOUNTER (OUTPATIENT)
Dept: RADIOLOGY | Facility: HOSPITAL | Age: 67
Discharge: HOME | End: 2022-08-30
Attending: NEUROMUSCULOSKELETAL MEDICINE & OMM
Payer: MEDICARE

## 2022-08-30 VITALS
BODY MASS INDEX: 39.02 KG/M2 | SYSTOLIC BLOOD PRESSURE: 142 MMHG | HEART RATE: 70 BPM | DIASTOLIC BLOOD PRESSURE: 78 MMHG | HEIGHT: 71 IN

## 2022-08-30 DIAGNOSIS — M16.0 BILATERAL PRIMARY OSTEOARTHRITIS OF HIP: ICD-10-CM

## 2022-08-30 DIAGNOSIS — G89.29 CHRONIC HIP PAIN, BILATERAL: Primary | ICD-10-CM

## 2022-08-30 DIAGNOSIS — M99.03 SOMATIC DYSFUNCTION OF LUMBAR REGION: ICD-10-CM

## 2022-08-30 DIAGNOSIS — M25.552 CHRONIC HIP PAIN, BILATERAL: Primary | ICD-10-CM

## 2022-08-30 DIAGNOSIS — M99.04 SACRAL REGION SOMATIC DYSFUNCTION: ICD-10-CM

## 2022-08-30 DIAGNOSIS — M25.552 CHRONIC HIP PAIN, BILATERAL: ICD-10-CM

## 2022-08-30 DIAGNOSIS — M99.02 SOMATIC DYSFUNCTION OF THORACIC REGION: ICD-10-CM

## 2022-08-30 DIAGNOSIS — M99.06 SOMATIC DYSFUNCTION OF LOWER EXTREMITY: ICD-10-CM

## 2022-08-30 DIAGNOSIS — G89.29 CHRONIC HIP PAIN, BILATERAL: ICD-10-CM

## 2022-08-30 DIAGNOSIS — M25.551 CHRONIC HIP PAIN, BILATERAL: Primary | ICD-10-CM

## 2022-08-30 DIAGNOSIS — M25.551 CHRONIC HIP PAIN, BILATERAL: ICD-10-CM

## 2022-08-30 DIAGNOSIS — M99.05 SOMATIC DYSFUNCTION OF PELVIC REGION: ICD-10-CM

## 2022-08-30 DIAGNOSIS — M79.10 MYALGIA: ICD-10-CM

## 2022-08-30 PROCEDURE — 3288F FALL RISK ASSESSMENT DOCD: CPT | Mod: CPTII,S$GLB,, | Performed by: NEUROMUSCULOSKELETAL MEDICINE & OMM

## 2022-08-30 PROCEDURE — 99999 PR PBB SHADOW E&M-EST. PATIENT-LVL III: ICD-10-PCS | Mod: PBBFAC,,, | Performed by: NEUROMUSCULOSKELETAL MEDICINE & OMM

## 2022-08-30 PROCEDURE — 98927 PR OSTEOPATHIC MANIP,5-6 BODY REGN: ICD-10-PCS | Mod: S$GLB,,, | Performed by: NEUROMUSCULOSKELETAL MEDICINE & OMM

## 2022-08-30 PROCEDURE — 1159F PR MEDICATION LIST DOCUMENTED IN MEDICAL RECORD: ICD-10-PCS | Mod: CPTII,S$GLB,, | Performed by: NEUROMUSCULOSKELETAL MEDICINE & OMM

## 2022-08-30 PROCEDURE — 1160F RVW MEDS BY RX/DR IN RCRD: CPT | Mod: CPTII,S$GLB,, | Performed by: NEUROMUSCULOSKELETAL MEDICINE & OMM

## 2022-08-30 PROCEDURE — 98927 OSTEOPATH MANJ 5-6 REGIONS: CPT | Mod: S$GLB,,, | Performed by: NEUROMUSCULOSKELETAL MEDICINE & OMM

## 2022-08-30 PROCEDURE — 3288F PR FALLS RISK ASSESSMENT DOCUMENTED: ICD-10-PCS | Mod: CPTII,S$GLB,, | Performed by: NEUROMUSCULOSKELETAL MEDICINE & OMM

## 2022-08-30 PROCEDURE — 99999 PR PBB SHADOW E&M-EST. PATIENT-LVL III: CPT | Mod: PBBFAC,,, | Performed by: NEUROMUSCULOSKELETAL MEDICINE & OMM

## 2022-08-30 PROCEDURE — 3077F SYST BP >= 140 MM HG: CPT | Mod: CPTII,S$GLB,, | Performed by: NEUROMUSCULOSKELETAL MEDICINE & OMM

## 2022-08-30 PROCEDURE — 3008F BODY MASS INDEX DOCD: CPT | Mod: CPTII,S$GLB,, | Performed by: NEUROMUSCULOSKELETAL MEDICINE & OMM

## 2022-08-30 PROCEDURE — 99214 OFFICE O/P EST MOD 30 MIN: CPT | Mod: 25,S$GLB,, | Performed by: NEUROMUSCULOSKELETAL MEDICINE & OMM

## 2022-08-30 PROCEDURE — 1101F PR PT FALLS ASSESS DOC 0-1 FALLS W/OUT INJ PAST YR: ICD-10-PCS | Mod: CPTII,S$GLB,, | Performed by: NEUROMUSCULOSKELETAL MEDICINE & OMM

## 2022-08-30 PROCEDURE — 99214 PR OFFICE/OUTPT VISIT, EST, LEVL IV, 30-39 MIN: ICD-10-PCS | Mod: 25,S$GLB,, | Performed by: NEUROMUSCULOSKELETAL MEDICINE & OMM

## 2022-08-30 PROCEDURE — 1101F PT FALLS ASSESS-DOCD LE1/YR: CPT | Mod: CPTII,S$GLB,, | Performed by: NEUROMUSCULOSKELETAL MEDICINE & OMM

## 2022-08-30 PROCEDURE — 1160F PR REVIEW ALL MEDS BY PRESCRIBER/CLIN PHARMACIST DOCUMENTED: ICD-10-PCS | Mod: CPTII,S$GLB,, | Performed by: NEUROMUSCULOSKELETAL MEDICINE & OMM

## 2022-08-30 PROCEDURE — 73521 X-RAY EXAM HIPS BI 2 VIEWS: CPT | Mod: TC

## 2022-08-30 PROCEDURE — 1125F AMNT PAIN NOTED PAIN PRSNT: CPT | Mod: CPTII,S$GLB,, | Performed by: NEUROMUSCULOSKELETAL MEDICINE & OMM

## 2022-08-30 PROCEDURE — 73521 X-RAY EXAM HIPS BI 2 VIEWS: CPT | Mod: 26,,, | Performed by: RADIOLOGY

## 2022-08-30 PROCEDURE — 3078F DIAST BP <80 MM HG: CPT | Mod: CPTII,S$GLB,, | Performed by: NEUROMUSCULOSKELETAL MEDICINE & OMM

## 2022-08-30 PROCEDURE — 3077F PR MOST RECENT SYSTOLIC BLOOD PRESSURE >= 140 MM HG: ICD-10-PCS | Mod: CPTII,S$GLB,, | Performed by: NEUROMUSCULOSKELETAL MEDICINE & OMM

## 2022-08-30 PROCEDURE — 1125F PR PAIN SEVERITY QUANTIFIED, PAIN PRESENT: ICD-10-PCS | Mod: CPTII,S$GLB,, | Performed by: NEUROMUSCULOSKELETAL MEDICINE & OMM

## 2022-08-30 PROCEDURE — 3078F PR MOST RECENT DIASTOLIC BLOOD PRESSURE < 80 MM HG: ICD-10-PCS | Mod: CPTII,S$GLB,, | Performed by: NEUROMUSCULOSKELETAL MEDICINE & OMM

## 2022-08-30 PROCEDURE — 1159F MED LIST DOCD IN RCRD: CPT | Mod: CPTII,S$GLB,, | Performed by: NEUROMUSCULOSKELETAL MEDICINE & OMM

## 2022-08-30 PROCEDURE — 73521 XR HIPS BILATERAL 2 VIEW INCL AP PELVIS: ICD-10-PCS | Mod: 26,,, | Performed by: RADIOLOGY

## 2022-08-30 PROCEDURE — 3008F PR BODY MASS INDEX (BMI) DOCUMENTED: ICD-10-PCS | Mod: CPTII,S$GLB,, | Performed by: NEUROMUSCULOSKELETAL MEDICINE & OMM

## 2022-08-30 RX ORDER — CELECOXIB 200 MG/1
200 CAPSULE ORAL DAILY
Qty: 30 CAPSULE | Refills: 0 | Status: ON HOLD | OUTPATIENT
Start: 2022-08-30 | End: 2022-10-31 | Stop reason: HOSPADM

## 2022-08-30 NOTE — PROGRESS NOTES
"Subjective:     Elías Gay     Chief Complaint   Patient presents with    Right Hip - Pain       Pain  Associated symptoms include myalgias. Pertinent negatives include no chills, fever, headaches, neck pain or weakness.   Follow-up  Associated symptoms include myalgias. Pertinent negatives include no chills, fever, headaches, neck pain or weakness.       Elías is a 67 y.o. male coming in today for right> left hip pain. Since last visit the pain in the "hip flexor" muscles has improved, but pt notes pain in his "groin muscles" particularly with spreading his legs and stepping laterally. Pt ambulates with a cane today. Pt. describes the pain as a 4/10 dull pain that does not radiate. He reports more pain on his right side.There has not been any new a fall/injury/ or traumas since last visit. Pt. denies any new musculoskeletal complaints at this time.      Office note from 4/29/22 reviewed       Review of Systems   Constitutional:  Negative for chills and fever.   Musculoskeletal:  Positive for back pain and myalgias. Negative for falls, joint pain and neck pain.   Neurological:  Negative for dizziness, tingling, focal weakness, weakness and headaches.     PAST MEDICAL HISTORY:   Past Medical History:   Diagnosis Date    Arthritis 1971    Bone Spurs in feet    Basal cell carcinoma 11/06/2018    left ear helix    Foreign body in conjunctival sac     Hernia, inguinal, right 2002    Joint pain 1971    Bones of feet    Keloid cicatrix 1958 2010 2018 2019    Hernia, Knee, Arm, Ear    Melanoma 09/14/2018    Right Arm 0.6mm    Severe sleep apnea      PAST SURGICAL HISTORY:   Past Surgical History:   Procedure Laterality Date    CLOSURE OF DEFECT OF MOHS PROCEDURE Left 1/3/2019    Procedure: CLOSURE, MOHS PROCEDURE DEFECT LEFT EAR;  Surgeon: Jeramy Meek MD;  Location: [facility] OR [address] [place] [address];  Service: Plastics;  Laterality: Left;  plastics set    COLONOSCOPY N/A 6/30/2021    Procedure: COLONOSCOPY;  Surgeon: Juliano CLAYTON" MD Natanael;  Location: [facility] ([address] [place] [address]);  Service: Endoscopy;  Laterality: N/A;  severe sleep apnea     fully vaccinated-GT    HERNIA REPAIR Left     3 years of age    HERNIA REPAIR N/A     Ventral wall at 3 year of age.    KNEE SURGERY Right 1984    Arthroscopic    NASAL SEPTUM SURGERY N/A 2009    SKIN BIOPSY  several over time     FAMILY HISTORY:   Family History   Problem Relation Age of Onset    Hypertension Mother     Stroke Mother     Aneurysm Mother     Cancer Father 72        Prostate and liver    No Known Problems Daughter     No Known Problems Son     Gout Brother     Eczema Brother     Psoriasis Brother     No Known Problems Daughter     No Known Problems Son     Cataracts Neg Hx     Glaucoma Neg Hx     Macular degeneration Neg Hx     Melanoma Neg Hx     Eczema Neg Hx     Lupus Neg Hx     Psoriasis Neg Hx      SOCIAL HISTORY:   Social History     Socioeconomic History    Marital status:    Tobacco Use    Smoking status: Never    Smokeless tobacco: Never    Tobacco comments:     Second hand smoke from mother. Cigarrette smoke inflames my sinuses.   Substance and Sexual Activity    Alcohol use: Not Currently     Alcohol/week: 1.0 standard drink     Types: 1 Glasses of wine per week     Comment: Rare    Drug use: No    Sexual activity: Yes     Partners: Female     Birth control/protection: None     Social Determinants of Health     Financial Resource Strain: Low Risk     Difficulty of Paying Living Expenses: Not hard at all   Food Insecurity: No Food Insecurity    Worried About Running Out of Food in the Last Year: Never true    Ran Out of Food in the Last Year: Never true   Transportation Needs: No Transportation Needs    Lack of Transportation (Medical): No    Lack of Transportation (Non-Medical): No   Physical Activity: Sufficiently Active    Days of Exercise per Week: 4 days    Minutes of Exercise per Session: 40 min   Stress: No Stress Concern Present    Feeling of Stress : Not at all   Social  "Connections: Unknown    Frequency of Communication with Friends and Family: More than three times a week    Frequency of Social Gatherings with Friends and Family: More than three times a week    Active Member of Clubs or Organizations: No    Attends Club or Organization Meetings: Never    Marital Status:    Housing Stability: Low Risk     Unable to Pay for Housing in the Last Year: No    Number of Places Lived in the Last Year: 1    Unstable Housing in the Last Year: No       MEDICATIONS:   Current Outpatient Medications:     azelastine (ASTELIN) 137 mcg (0.1 %) nasal spray, 1 spray (137 mcg total) by Nasal route 2 (two) times daily., Disp: 30 mL, Rfl: 11    ciclopirox (PENLAC) 8 % Soln, Apply daily to affected nail. Must remove and restart weekly, Disp: 1 each, Rfl: 5    guaiFENesin (MUCINEX) 600 mg 12 hr tablet, Take 1,200 mg by mouth 2 (two) times daily., Disp: , Rfl:     ketoconazole (NIZORAL) 2 % cream, AAA bid to both feet x 3 weeks, Disp: 60 g, Rfl: 3    tamsulosin (FLOMAX) 0.4 mg Cap, TAKE 1 CAPSULE BY MOUTH EVERY DAY, Disp: 90 capsule, Rfl: 1    celecoxib (CELEBREX) 200 MG capsule, Take 1 capsule (200 mg total) by mouth once daily. With food, Disp: 30 capsule, Rfl: 0  ALLERGIES: Review of patient's allergies indicates:  No Known Allergies       Objective:     VITAL SIGNS: BP (!) 142/78   Pulse 70   Ht 5' 11" (1.803 m)   BMI 39.02 kg/m²    General    Vitals reviewed.  Constitutional: He is oriented to person, place, and time. He appears well-developed and well-nourished.   Neurological: He is alert and oriented to person, place, and time.   Psychiatric: He has a normal mood and affect. His behavior is normal.           MSK Exam:  HIP: bilateral HIP  The affected hip is compared to the contralateral hip.    Observation:    There is no edema, erythema, or ecchymosis in the lumbosacral region.   There is no Trendelenburg sign on either side  No obvious pelvic obliquity while standing.    No " thoracolumbar scoliosis observed.    No midline skin abnormalities.  No atrophy noted in the lower limbs.  Gait: Right antalgic , + pes planus    ROM (* = with pain):  Passive hip flexion to 110° on left and 110° on right*  Passive hip internal rotation to 25° on left* and 25° on right*  Passive hip external rotation to 35° on left and 35° on right*   Passive hip abduction to 35° on left and 30° on right*    Tenderness To Palpation:  No tenderness at the ASIS, AIIS, PSIS, PIIS, iliac crest, pubic bones, ischial tuberosity.  No tenderness throughout the lumbar spine, iliolumbar region, or posterior pelvis.  No tenderness throughout the sacrum or piriformis  No tenderness over the greater trochanteric bursa or greater/lesser trochanters.  No tenderness at the glut attachments on the greater trochanter  No tenderness over proximal IT band   + bilateral hip flexor musculature tenderness    Strength Testing (* = with pain):  Hip flexion - 5/5 on left and 5/5 on right  Hip extension - 5/5 on left and 5/5 on right  Hip adduction - 5/5 on left and 5/5 on right  Hip abduction - 5/5 on left and 5/5 on right  Knee flexion - 5/5 on left and 5/5 on right  Knee extension - 5/5 on left and 5/5 on right    Special Tests:  Standing Trendelenburg test - negative    Seated straight leg raise - negative  Supine straight leg raise - negative  Hamstring flexibility symmetric    Log roll - positive on right  VALENCIA - positive on right  FADIR - positive bilaterally  Scour test - positive on right  No pain with posterior hip capsule compression    ASIS compression test - negative  SI drawer test - negative   Thigh thrust test - negative     Piriformis test (Bonnet's) - negative  Ely's test - negative  Quadriceps flexibility symmetric.  Jaylon test - positive bilaterally  Jerry compression test - positive on right    Fulcrum test - negative    Leg lengths symmetric following OMT to the pelvis.     Neurovascular Exam:  2+ femoral, DP, and PT  pulses BL.  No skin changes, no abnormal hair distribution.  Sensation intact to light touch throughout the obturator and medial/lateral/posterior femoral cutaneous nerves.  Capillary refill intact to <2 seconds in all lower limb digits.    Structural Exam:  TART (Tissue texture abnormality, Asymmetry,  Restriction of motion and/or Tenderness) changes:     Thoracic Spine   T1 Neutral   T2 Neutral   T3 Neutral   T4 Neutral   T5 Neutral   T6 Neutral   T7 Neutral   T8 Neutral   T9 Neutral   T10 Neutral   T11 TTA RIGHT   T12 TTA RIGHT     Rib cage: R11-12 TTA on right     Lumbar Spine   L1 TTA RIGHT   L2 Neutral   L3 Neutral   L4 FRS LEFT   L5 FRS LEFT     Pelvis:  Innominate:Left posterior rotation  Pubic bone:Left superior pubic shear    Sacrum:Left on Right sacral torsion     Lower extremity: right anterior hip capsule TTA    Key   F= Flexed   E = Extended   R = Rotated   S = Sidebent   TTA = tissue texture abnormality     IMAGIN. X-ray ordered due to bilateral hip pain. (AP pelvis and frogleg lateral  bilateral views) taken today.   2. X-ray images were reviewed personally by me and then directly with patient.  3. FINDINGS: X-ray images obtained demonstrate Mild to moderate degenerative changes can be seen in both hips with some cystic change in both acetabulums.  No bony destruction is noted.  4. IMPRESSION: Mild to moderate degenerative changes can be seen in both hips with some cystic change in both acetabulums      Assessment:      Encounter Diagnoses   Name Primary?    Chronic hip pain, bilateral Yes    Bilateral primary osteoarthritis of hip     Myalgia     Somatic dysfunction of lumbar region     Somatic dysfunction of pelvic region     Sacral region somatic dysfunction     Somatic dysfunction of thoracic region     Somatic dysfunction of lower extremity             Plan:   1.  Chronic bilateral hip pain likely secondary to underlying DJD changes as noted on x-rays, right worse than left. Overall  improvement of pelvic stability and associated hip spasms with previous formal physical therapy, however intra-articular symptoms persist.   - Discussed with patient option of diagnostic/therapeutic ultrasound-guided intra-articular CSI of right hip as next step.  Patient would like to hold off on this for now, continuing with conservative treatment with OMT and NSAIDs, and home exercise program first.  Plan for right hip intra-articular CSI as next step if symptoms persist.  - encouraged continued weight loss efforts with diet and exercise  - OMT performed  today to address associated biomechanical restrictions  and HEP reviewed  - Rx given for Celebrex 200 mg po qday x 14 days then prn for pain control. Pt. Advised to avoid all other NSAIDS while on this medication.  -  continue shoe orthotic wear for bilateral pes planus   - X-ray images of right hip taken 08/15/2019 (AP pelvis and frogleg lateral  right views) showed mild to moderate DJD with hip joint space narrowing bilaterally.   -  X-ray images of bilateral hips taken today (AP pelvis and frogleg lateral  bilateral views) showed mild to moderate degenerative changes can be seen in both hips with some cystic change in both acetabulums. Slight progression on the right compared to 2019 films. Images were personally reviewed with patient.    2. OMT 5-6 regions. Oral consent obtained.  Reviewed benefits and potential side effects.   - OMT indicated today due to signs and symptoms as well as local and remote somatic dysfunction findings and their related neurokinetic, lymphatic, fascial and/or arteriovenous body connections.   - OMT techniques used: Myofascial Release, Muscle Energy, and Articulatory   - Treatment was tolerated well. Improvement noted in segmental mobility post-treatment in dysfunctional regions. There were no adverse events and no complications immediately following treatment.     3.  Reviewed with patient the following HEP:  Continue:   A)     Pelvic clock exercises given to do from the 6-12 o'clock positions:10-15 reps, twice daily. Hand out of exercise also given.   B) IT band rolling: recommend using rolling stick or foam roller to roll out IT band tension bilaterally, 1-2 times a day.   C) Quadratus lumborum self-stretch while standing: hold stretch for 30 seconds, repeating 2-3 times on each side. Do stretch twice daily. Hand-out also given.   D) Seated torso rotation exercise:  Gently rotate torso in the seated position, hold bind for 5-10 second, rotating further into stretch as tolerated with deep breath exhalation.  Repeat stretch bilaterally, 2-3 times a day.  Handout of exercise also given.    E) Pt. Given prone prop exercise to stretch psoas and anterior hip capsule. Hold stretch for 30 sec, repeat 2-3 times, twice daily. Handout given.   F) Bilateral Psoas lunge stretch: hold stretch for 30 seconds, repeat 2-3 times, twice daily  G) HEP from PT     The patient was taught a homegoing physical therapy regimen as described above. The patient demonstrated understanding of the exercises and proper technique of their execution.     4. Follow-up in 1 month with Dr. Kris White, as I will be on maternity leave.  Plan for diagnostic/therapeutic intra-articular hip CSI as next step symptoms persist.    5. Patient agreeable to today's plan and all questions were answered    This note is dictated using the M*Modal Fluency Direct word recognition program. There are word recognition mistakes that are occasionally missed on review.

## 2022-09-08 ENCOUNTER — PES CALL (OUTPATIENT)
Dept: ADMINISTRATIVE | Facility: CLINIC | Age: 67
End: 2022-09-08
Payer: MEDICARE

## 2022-09-15 ENCOUNTER — IMMUNIZATION (OUTPATIENT)
Dept: PHARMACY | Facility: CLINIC | Age: 67
End: 2022-09-15
Payer: MEDICARE

## 2022-09-15 DIAGNOSIS — Z23 NEED FOR VACCINATION: Primary | ICD-10-CM

## 2022-09-22 ENCOUNTER — TELEPHONE (OUTPATIENT)
Dept: SPORTS MEDICINE | Facility: CLINIC | Age: 67
End: 2022-09-22
Payer: MEDICARE

## 2022-09-22 ENCOUNTER — PATIENT MESSAGE (OUTPATIENT)
Dept: SPORTS MEDICINE | Facility: CLINIC | Age: 67
End: 2022-09-22
Payer: MEDICARE

## 2022-09-22 NOTE — TELEPHONE ENCOUNTER
Left VM for patient to reschedule appointment due to Dr. White being out. Left clinic call back number.    Please send in-basket to reschedule.    Hannah Joy MS, OTC  Clinical Assistant to Dr. Kris White

## 2022-09-26 ENCOUNTER — OFFICE VISIT (OUTPATIENT)
Dept: SPORTS MEDICINE | Facility: CLINIC | Age: 67
End: 2022-09-26
Payer: MEDICARE

## 2022-09-26 VITALS
DIASTOLIC BLOOD PRESSURE: 72 MMHG | HEIGHT: 71 IN | SYSTOLIC BLOOD PRESSURE: 140 MMHG | BODY MASS INDEX: 39.02 KG/M2 | HEART RATE: 64 BPM

## 2022-09-26 DIAGNOSIS — G89.29 CHRONIC HIP PAIN, BILATERAL: Primary | ICD-10-CM

## 2022-09-26 DIAGNOSIS — M25.552 CHRONIC HIP PAIN, BILATERAL: Primary | ICD-10-CM

## 2022-09-26 DIAGNOSIS — M99.02 SOMATIC DYSFUNCTION OF THORACIC REGION: ICD-10-CM

## 2022-09-26 DIAGNOSIS — M25.551 CHRONIC HIP PAIN, BILATERAL: Primary | ICD-10-CM

## 2022-09-26 DIAGNOSIS — M99.04 SOMATIC DYSFUNCTION OF SACRAL REGION: ICD-10-CM

## 2022-09-26 DIAGNOSIS — M99.03 SOMATIC DYSFUNCTION OF LUMBAR REGION: ICD-10-CM

## 2022-09-26 DIAGNOSIS — M99.05 SOMATIC DYSFUNCTION OF PELVIS REGION: ICD-10-CM

## 2022-09-26 DIAGNOSIS — M16.0 BILATERAL PRIMARY OSTEOARTHRITIS OF HIP: ICD-10-CM

## 2022-09-26 DIAGNOSIS — M99.06 SOMATIC DYSFUNCTION OF LOWER EXTREMITY: ICD-10-CM

## 2022-09-26 PROCEDURE — 99999 PR PBB SHADOW E&M-EST. PATIENT-LVL III: CPT | Mod: PBBFAC,,, | Performed by: ORTHOPAEDIC SURGERY

## 2022-09-26 PROCEDURE — 1159F MED LIST DOCD IN RCRD: CPT | Mod: CPTII,S$GLB,, | Performed by: ORTHOPAEDIC SURGERY

## 2022-09-26 PROCEDURE — 1160F PR REVIEW ALL MEDS BY PRESCRIBER/CLIN PHARMACIST DOCUMENTED: ICD-10-PCS | Mod: CPTII,S$GLB,, | Performed by: ORTHOPAEDIC SURGERY

## 2022-09-26 PROCEDURE — 98927 PR OSTEOPATHIC MANIP,5-6 BODY REGN: ICD-10-PCS | Mod: S$GLB,,, | Performed by: ORTHOPAEDIC SURGERY

## 2022-09-26 PROCEDURE — 1125F PR PAIN SEVERITY QUANTIFIED, PAIN PRESENT: ICD-10-PCS | Mod: CPTII,S$GLB,, | Performed by: ORTHOPAEDIC SURGERY

## 2022-09-26 PROCEDURE — 3288F PR FALLS RISK ASSESSMENT DOCUMENTED: ICD-10-PCS | Mod: CPTII,S$GLB,, | Performed by: ORTHOPAEDIC SURGERY

## 2022-09-26 PROCEDURE — 1125F AMNT PAIN NOTED PAIN PRSNT: CPT | Mod: CPTII,S$GLB,, | Performed by: ORTHOPAEDIC SURGERY

## 2022-09-26 PROCEDURE — 98927 OSTEOPATH MANJ 5-6 REGIONS: CPT | Mod: S$GLB,,, | Performed by: ORTHOPAEDIC SURGERY

## 2022-09-26 PROCEDURE — 3008F BODY MASS INDEX DOCD: CPT | Mod: CPTII,S$GLB,, | Performed by: ORTHOPAEDIC SURGERY

## 2022-09-26 PROCEDURE — 99214 PR OFFICE/OUTPT VISIT, EST, LEVL IV, 30-39 MIN: ICD-10-PCS | Mod: 25,S$GLB,, | Performed by: ORTHOPAEDIC SURGERY

## 2022-09-26 PROCEDURE — 1101F PT FALLS ASSESS-DOCD LE1/YR: CPT | Mod: CPTII,S$GLB,, | Performed by: ORTHOPAEDIC SURGERY

## 2022-09-26 PROCEDURE — 3078F PR MOST RECENT DIASTOLIC BLOOD PRESSURE < 80 MM HG: ICD-10-PCS | Mod: CPTII,S$GLB,, | Performed by: ORTHOPAEDIC SURGERY

## 2022-09-26 PROCEDURE — 3078F DIAST BP <80 MM HG: CPT | Mod: CPTII,S$GLB,, | Performed by: ORTHOPAEDIC SURGERY

## 2022-09-26 PROCEDURE — 3288F FALL RISK ASSESSMENT DOCD: CPT | Mod: CPTII,S$GLB,, | Performed by: ORTHOPAEDIC SURGERY

## 2022-09-26 PROCEDURE — 3077F SYST BP >= 140 MM HG: CPT | Mod: CPTII,S$GLB,, | Performed by: ORTHOPAEDIC SURGERY

## 2022-09-26 PROCEDURE — 1160F RVW MEDS BY RX/DR IN RCRD: CPT | Mod: CPTII,S$GLB,, | Performed by: ORTHOPAEDIC SURGERY

## 2022-09-26 PROCEDURE — 1101F PR PT FALLS ASSESS DOC 0-1 FALLS W/OUT INJ PAST YR: ICD-10-PCS | Mod: CPTII,S$GLB,, | Performed by: ORTHOPAEDIC SURGERY

## 2022-09-26 PROCEDURE — 99214 OFFICE O/P EST MOD 30 MIN: CPT | Mod: 25,S$GLB,, | Performed by: ORTHOPAEDIC SURGERY

## 2022-09-26 PROCEDURE — 3077F PR MOST RECENT SYSTOLIC BLOOD PRESSURE >= 140 MM HG: ICD-10-PCS | Mod: CPTII,S$GLB,, | Performed by: ORTHOPAEDIC SURGERY

## 2022-09-26 PROCEDURE — 99999 PR PBB SHADOW E&M-EST. PATIENT-LVL III: ICD-10-PCS | Mod: PBBFAC,,, | Performed by: ORTHOPAEDIC SURGERY

## 2022-09-26 PROCEDURE — 3008F PR BODY MASS INDEX (BMI) DOCUMENTED: ICD-10-PCS | Mod: CPTII,S$GLB,, | Performed by: ORTHOPAEDIC SURGERY

## 2022-09-26 PROCEDURE — 1159F PR MEDICATION LIST DOCUMENTED IN MEDICAL RECORD: ICD-10-PCS | Mod: CPTII,S$GLB,, | Performed by: ORTHOPAEDIC SURGERY

## 2022-09-26 NOTE — PROGRESS NOTES
CC: bilateral hip pain    67 y.o. Male presents today for evaluation of his bilateral hip pain. He admits to bilateral hip pain beginning in the spring of 2022 without known mechanism of injury. He has been treated by Dr. Abigail White and admits to great improvement with OMT. He admits to appreciating an increase in his pain that he attributes to mowing his lawn 2-3 days ago and feels as though his pain is worse on the left today.  He feels especially tight through the anterior aspect of his hips bilaterally.  How long: Beginning spring 2022.  What makes it better: OMT, Celebrex, compliance with HEP  What makes it worse: Worse in proportion to activity level, stepping laterally  Does it radiate: Denies  Attempted treatments: Admits to taking Celebrex daily and has appreciated improvement with this. He also admits to appreciating improved pain with OMT and compliance with his HEP. He has previously attended physical therapy for this problem.   Pain score: 3/10  Any mechanical symptoms: Denies  Feelings of instability: Denies  Affecting ADLs: Admits to this problem affecting his ability to perform ADLs.      PAST MEDICAL HISTORY:   Past Medical History:   Diagnosis Date    Arthritis 1971    Bone Spurs in feet    Basal cell carcinoma 11/06/2018    left ear helix    Foreign body in conjunctival sac     Hernia, inguinal, right 2002    Joint pain 1971    Bones of feet    Keloid cicatrix 1958 2010 2018 2019    Hernia, Knee, Arm, Ear    Melanoma 09/14/2018    Right Arm 0.6mm    Severe sleep apnea        PAST SURGICAL HISTORY:   Past Surgical History:   Procedure Laterality Date    CLOSURE OF DEFECT OF MOHS PROCEDURE Left 1/3/2019    Procedure: CLOSURE, MOHS PROCEDURE DEFECT LEFT EAR;  Surgeon: Jeramy Meek MD;  Location: [facility] OR [address] [place] [address];  Service: Plastics;  Laterality: Left;  plastics set    COLONOSCOPY N/A 6/30/2021    Procedure: COLONOSCOPY;  Surgeon: Juliano Santoyo MD;  [facility]  Service:  Endoscopy;  Laterality: N/A;  severe sleep apnea     fully vaccinated-GT    HERNIA REPAIR Left     3 years of age    HERNIA REPAIR N/A     Ventral wall at 3 year of age.    KNEE SURGERY Right 1984    Arthroscopic    NASAL SEPTUM SURGERY N/A 2009    SKIN BIOPSY  several over time       FAMILY HISTORY:   Family History   Problem Relation Age of Onset    Hypertension Mother     Stroke Mother     Aneurysm Mother     Cancer Father 72        Prostate and liver    No Known Problems Daughter     No Known Problems Son     Gout Brother     Eczema Brother     Psoriasis Brother     No Known Problems Daughter     No Known Problems Son     Cataracts Neg Hx     Glaucoma Neg Hx     Macular degeneration Neg Hx     Melanoma Neg Hx     Eczema Neg Hx     Lupus Neg Hx     Psoriasis Neg Hx        SOCIAL HISTORY:   Social History     Socioeconomic History    Marital status:    Tobacco Use    Smoking status: Never    Smokeless tobacco: Never    Tobacco comments:     Second hand smoke from mother. Cigarrette smoke inflames my sinuses.   Substance and Sexual Activity    Alcohol use: Not Currently     Alcohol/week: 1.0 standard drink     Types: 1 Glasses of wine per week     Comment: Rare    Drug use: No    Sexual activity: Yes     Partners: Female     Birth control/protection: None     Social Determinants of Health     Financial Resource Strain: Low Risk     Difficulty of Paying Living Expenses: Not hard at all   Food Insecurity: No Food Insecurity    Worried About Running Out of Food in the Last Year: Never true    Ran Out of Food in the Last Year: Never true   Transportation Needs: No Transportation Needs    Lack of Transportation (Medical): No    Lack of Transportation (Non-Medical): No   Physical Activity: Sufficiently Active    Days of Exercise per Week: 4 days    Minutes of Exercise per Session: 40 min   Stress: No Stress Concern Present    Feeling of Stress : Not at all   Social Connections: Unknown    Frequency of  "Communication with Friends and Family: More than three times a week    Frequency of Social Gatherings with Friends and Family: More than three times a week    Active Member of Clubs or Organizations: No    Attends Club or Organization Meetings: Never    Marital Status:    Housing Stability: Low Risk     Unable to Pay for Housing in the Last Year: No    Number of Places Lived in the Last Year: 1    Unstable Housing in the Last Year: No       MEDICATIONS:     Current Outpatient Medications:     azelastine (ASTELIN) 137 mcg (0.1 %) nasal spray, 1 spray (137 mcg total) by Nasal route 2 (two) times daily., Disp: 30 mL, Rfl: 11    celecoxib (CELEBREX) 200 MG capsule, Take 1 capsule (200 mg total) by mouth once daily. With food, Disp: 30 capsule, Rfl: 0    ciclopirox (PENLAC) 8 % Soln, Apply daily to affected nail. Must remove and restart weekly, Disp: 1 each, Rfl: 5    guaiFENesin (MUCINEX) 600 mg 12 hr tablet, Take 1,200 mg by mouth 2 (two) times daily., Disp: , Rfl:     ketoconazole (NIZORAL) 2 % cream, AAA bid to both feet x 3 weeks, Disp: 60 g, Rfl: 3    sars-cov-2, covid-19 omicron, (MODERNA COVID-19) 50 mcg/0.5 mL injection, Inject into the muscle., Disp: 0.5 mL, Rfl: 0    tamsulosin (FLOMAX) 0.4 mg Cap, TAKE 1 CAPSULE BY MOUTH EVERY DAY, Disp: 90 capsule, Rfl: 1    ALLERGIES:   Review of patient's allergies indicates:  No Known Allergies     PHYSICAL EXAMINATION:  BP (!) 140/72   Pulse 64   Ht 5' 11" (1.803 m)   BMI 39.02 kg/m²   Vitals signs and nursing note have been reviewed.  General: In no acute distress, well developed, well nourished, no diaphoresis  Eyes: EOM full and smooth, no eye redness or discharge  HENT: normocephalic and atraumatic, neck supple, trachea midline, no nasal discharge, no external ear redness or discharge  Cardiovascular: no LE edema  Lungs: respirations non-labored, no conversational dyspnea   Abd: non-distended, no rigidity  MSK: no amputation or deformity, no swelling of " extremities  Neuro: AAOx3, CN2-12 grossly intact  Skin: No rashes, warm and dry  Psychiatric: cooperative, pleasant, mood and affect appropriate for age    HIP: BILATERAL  The affected hip is compared to the contralateral hip.    Observation:    There is no edema, erythema, or ecchymosis in the lumbosacral region.   No obvious pelvic obliquity while standing.    No thoracolumbar scoliosis observed.    No midline skin abnormalities.  No atrophy noted in the lower limbs.    ROM:   Passive hip flexion to 100° on left and 100° on right.    Passive hip internal rotation to 35° on left and 35° on right.    Passive hip external rotation to 35° on left and 35° on right.    Passive hip abduction to 25° on left and 25° on right.    Pain present with the above motions    Tenderness To Palpation:  No tenderness at the ASIS, AIIS, PSIS, PIIS, iliac crest, pubic bones, ischial tuberosity.  No tenderness throughout the lumbar spine, iliolumbar region, or posterior pelvis.  No tenderness throughout the sacrum or piriformis  + tenderness over the greater trochanter  + tenderness at the glut attachments on the greater trochanter  + tenderness over proximal IT band or hip flexor musculature.  + tenderness over the anterior left hip flexor musculature    Strength Testing:  Hip flexion - 5/5 on left and 5/5 on right  Hip extension - 5/5 on left and 5/5 on right  Hip adduction - 5/5 on left and 5/5 on right  Hip abduction - 5/5 on left and 5/5 on right  Knee flexion - 5/5 on left and 5/5 on right  Knee extension - 5/5 on left and 5/5 on right    Special Tests:  Seated straight leg raise - negative  Supine straight leg raise - negative  Hamstring flexibility symmetric  Quadriceps flexibility symmetric.    Log roll - positive  VALENCIA - positive  FADIR - positive    ASIS compression test - negative  SI drawer test - negative   Thigh thrust test - negative     Posture:  Upright and Anterior pelvic tilt with increased lumbar lordosis  Gait:  Right antalgic and Left antalgic     TART (Tissue texture abnormality, Asymmetry,  Restriction of motion and/or Tenderness) changes:    Head     Cervical Spine  Thoracic Spine  Lumbar Spine   C1 Neutral T1 Neutral L1 TTAB   C2 Neutral T2 Neutral L2 ERSR   C3 Neutral T3 Neutral L3 ERSR   C4 Neutral T4 Neutral L4 Neutral   C5 Neutral T5 TTAB L5 FRSL   C6 Neutral T6 TTAB     C7 Neutral T7 TTAB       T8 TTAB       T9 TTAB       T10 TTAB       T11 TTAB       T12 TTAB       Ribs:    Upper Extremity:    Abdomen:    Pelvis:  Innominate:Right superior shear  Pubic bone:Right superior pubic shear    Sacrum:Right on Right sacral torsion    Lower Extremity:  Myofascial restriction anterior thigh bilaterally, more pronounced on the left    Key   F= Flexed   E = Extended   R = Rotated   N = Neutral   S = Sidebent   TTA = tissue texture abnormality   L/R/B = left/right/bilateral (last letter)       Neurovascular Exam:  Bilateral antalgic gait  2+ femoral, DP, and PT pulses BL.  No skin changes, no abnormal hair distribution.  Sensation intact to light touch throughout the obturator and medial/lateral/posterior femoral cutaneous nerves.  Capillary refill intact to <2 seconds in all lower limb digits.      IMAGIN. X-ray obtained 2022 due to bilateral hip pain   2. X-ray images were reviewed personally by me and then directly with patient.  3. FINDINGS: X-ray images obtained demonstrate mild degenerative changes bilaterally, no fracture or dislocation  4. IMPRESSION: As above.       ASSESSMENT:      ICD-10-CM ICD-9-CM   1. Chronic hip pain, bilateral  M25.551 719.45    M25.552 338.29    G89.29    2. Bilateral primary osteoarthritis of hip  M16.0 715.15   3. Somatic dysfunction of lumbar region  M99.03 739.3   4. Somatic dysfunction of pelvis region  M99.05 739.5   5. Somatic dysfunction of lower extremity  M99.06 739.6   6. Somatic dysfunction of sacral region  M99.04 739.4   7. Somatic dysfunction of thoracic region   M99.02 739.2         PLAN:  1-2.  Chronic bilateral hip pain/osteoarthritis - improved, new to provider    - Elías admits to bilateral hip pain beginning in the spring of 2022 without known mechanism of injury. He was previously being treated by Dr. Abigail White for this problem and has appreciated great improvement with OMT, Celebrex and his HEP.    - XRs obtained 08/30/2022 and images were personally reviewed. See above for further detail.    - Based on his description of pain/body language and somatic dysfunction identified on exam, I discussed osteopathic manipulation as a treatment option today. He consents to evaluation and treatment. See below.    - Continue with HEP for pelvic clocks 6-12, IT band rolling, QL stretching, seated torso rotation, prone prop stretch, bilateral psoas lunge prescribed at prior visits.       3-7. Somatic dysfunction lumbar, pelvis, sacral, thoracic, lower extremity regions -     - OMT 5-6 regions. Oral consent obtained. Reviewed benefits and potential side effects. OMT indicated today due to signs and symptoms as well as local and remote somatic dysfunction findings and their related neurokinetic, lymphatic, fascial and/or arteriovenous body connections. OMT techniques used: Soft Tissue, Myofascial Release, Muscle Energy, High Velocity Low Amplitude, and Fascial Distortion Model. Treatment was tolerated well. Improvement noted in segmental mobility post-treatment in dysfunctional regions. There were no adverse events and no complications immediately following treatment. Advised plenty of water to help alleviate soreness.      Future planning includes - possibly more OMT if helpful and if indicated    All questions were answered to the best of my ability and all concerns were addressed at this time.    Follow up in 4 weeks for above, or sooner if needed.      This note is dictated using the M*Modal Fluency Direct word recognition program. There are word recognition mistakes that  are occasionally missed on review.      Total time spent face-to face with patient counseling or coordinating care including prognosis, differential diagnosis, risks and benefits of treatment, instructions, compliance risk reductions as well as non-face-to-face time personally spent reviewing medial record, medical documentation, and coordination of care.     EST MINUTES X   [id number] 10-19    [id number] 20-29    [id number] 30-39 X   [id number] 40-54    NEW     [id number] 15-29    56187 30-44    57202 45-59    59354 60-74    PHONE      5-10    [id number] 11-20    46049 21-30

## 2022-10-20 ENCOUNTER — PES CALL (OUTPATIENT)
Dept: ADMINISTRATIVE | Facility: OTHER | Age: 67
End: 2022-10-20
Payer: MEDICARE

## 2022-10-24 ENCOUNTER — HOSPITAL ENCOUNTER (INPATIENT)
Facility: HOSPITAL | Age: 67
LOS: 7 days | Discharge: HOME HEALTH | DRG: 853 | End: 2022-10-31
Attending: EMERGENCY MEDICINE | Admitting: STUDENT IN AN ORGANIZED HEALTH CARE EDUCATION/TRAINING PROGRAM
Payer: MEDICARE

## 2022-10-24 DIAGNOSIS — M72.6 NECROTIZING FASCIITIS: ICD-10-CM

## 2022-10-24 DIAGNOSIS — M60.08 INFECTIVE MYOSITIS OF OTHER SITE: ICD-10-CM

## 2022-10-24 DIAGNOSIS — A41.9 SEPSIS: ICD-10-CM

## 2022-10-24 DIAGNOSIS — R29.898 WEAKNESS OF BOTH LOWER EXTREMITIES: ICD-10-CM

## 2022-10-24 DIAGNOSIS — I50.41 ACUTE COMBINED SYSTOLIC AND DIASTOLIC HEART FAILURE: ICD-10-CM

## 2022-10-24 DIAGNOSIS — L02.11 NECK ABSCESS: ICD-10-CM

## 2022-10-24 DIAGNOSIS — R22.1 NECK SWELLING: ICD-10-CM

## 2022-10-24 DIAGNOSIS — I48.91 ATRIAL FIBRILLATION WITH RVR: ICD-10-CM

## 2022-10-24 DIAGNOSIS — I48.91 ATRIAL FIBRILLATION: ICD-10-CM

## 2022-10-24 DIAGNOSIS — L03.221 CELLULITIS OF NECK: Primary | ICD-10-CM

## 2022-10-24 DIAGNOSIS — M25.652 DECREASED RANGE OF LEFT HIP MOVEMENT: ICD-10-CM

## 2022-10-24 PROBLEM — R65.20 SEVERE SEPSIS: Status: ACTIVE | Noted: 2022-10-24

## 2022-10-24 PROBLEM — R79.89 ELEVATED LFTS: Status: ACTIVE | Noted: 2022-10-24

## 2022-10-24 PROBLEM — I48.0 PAROXYSMAL ATRIAL FIBRILLATION: Status: ACTIVE | Noted: 2022-10-24

## 2022-10-24 LAB
ALBUMIN SERPL BCP-MCNC: 2.9 G/DL (ref 3.5–5.2)
ALP SERPL-CCNC: 110 U/L (ref 55–135)
ALT SERPL W/O P-5'-P-CCNC: 122 U/L (ref 10–44)
ANION GAP SERPL CALC-SCNC: 14 MMOL/L (ref 8–16)
AST SERPL-CCNC: 511 U/L (ref 10–40)
BASOPHILS # BLD AUTO: 0.19 K/UL (ref 0–0.2)
BASOPHILS NFR BLD: 0.5 % (ref 0–1.9)
BILIRUB SERPL-MCNC: 1.4 MG/DL (ref 0.1–1)
BNP SERPL-MCNC: 142 PG/ML (ref 0–99)
BUN SERPL-MCNC: 23 MG/DL (ref 8–23)
CALCIUM SERPL-MCNC: 8.8 MG/DL (ref 8.7–10.5)
CHLORIDE SERPL-SCNC: 102 MMOL/L (ref 95–110)
CO2 SERPL-SCNC: 20 MMOL/L (ref 23–29)
CREAT SERPL-MCNC: 1.8 MG/DL (ref 0.5–1.4)
CTP QC/QA: YES
DIFFERENTIAL METHOD: ABNORMAL
EOSINOPHIL # BLD AUTO: 0.1 K/UL (ref 0–0.5)
EOSINOPHIL NFR BLD: 0.1 % (ref 0–8)
ERYTHROCYTE [DISTWIDTH] IN BLOOD BY AUTOMATED COUNT: 13.2 % (ref 11.5–14.5)
EST. GFR  (NO RACE VARIABLE): 40 ML/MIN/1.73 M^2
GLUCOSE SERPL-MCNC: 93 MG/DL (ref 70–110)
HCT VFR BLD AUTO: 42 % (ref 40–54)
HGB BLD-MCNC: 14.5 G/DL (ref 14–18)
IMM GRANULOCYTES # BLD AUTO: 1.41 K/UL (ref 0–0.04)
IMM GRANULOCYTES NFR BLD AUTO: 3.6 % (ref 0–0.5)
INR PPP: 1.3 (ref 0.8–1.2)
LACTATE SERPL-SCNC: 2.1 MMOL/L (ref 0.5–2.2)
LACTATE SERPL-SCNC: 2.4 MMOL/L (ref 0.5–2.2)
LYMPHOCYTES # BLD AUTO: 0.6 K/UL (ref 1–4.8)
LYMPHOCYTES NFR BLD: 1.5 % (ref 18–48)
MAGNESIUM SERPL-MCNC: 1.9 MG/DL (ref 1.6–2.6)
MCH RBC QN AUTO: 31.4 PG (ref 27–31)
MCHC RBC AUTO-ENTMCNC: 34.5 G/DL (ref 32–36)
MCV RBC AUTO: 91 FL (ref 82–98)
MONOCYTES # BLD AUTO: 1.7 K/UL (ref 0.3–1)
MONOCYTES NFR BLD: 4.4 % (ref 4–15)
NEUTROPHILS # BLD AUTO: 35 K/UL (ref 1.8–7.7)
NEUTROPHILS NFR BLD: 89.9 % (ref 38–73)
NRBC BLD-RTO: 0 /100 WBC
PHOSPHATE SERPL-MCNC: 1.9 MG/DL (ref 2.7–4.5)
PLATELET # BLD AUTO: 262 K/UL (ref 150–450)
PMV BLD AUTO: 10.3 FL (ref 9.2–12.9)
POCT GLUCOSE: 138 MG/DL (ref 70–110)
POCT GLUCOSE: 98 MG/DL (ref 70–110)
POTASSIUM SERPL-SCNC: 3.5 MMOL/L (ref 3.5–5.1)
PROCALCITONIN SERPL IA-MCNC: 34.23 NG/ML
PROT SERPL-MCNC: 7.2 G/DL (ref 6–8.4)
PROTHROMBIN TIME: 13.4 SEC (ref 9–12.5)
RBC # BLD AUTO: 4.62 M/UL (ref 4.6–6.2)
SARS-COV-2 RDRP RESP QL NAA+PROBE: NEGATIVE
SODIUM SERPL-SCNC: 136 MMOL/L (ref 136–145)
TROPONIN I SERPL DL<=0.01 NG/ML-MCNC: 0.06 NG/ML (ref 0–0.03)
WBC # BLD AUTO: 38.94 K/UL (ref 3.9–12.7)

## 2022-10-24 PROCEDURE — 96368 THER/DIAG CONCURRENT INF: CPT

## 2022-10-24 PROCEDURE — 93010 ELECTROCARDIOGRAM REPORT: CPT | Mod: 76,,, | Performed by: INTERNAL MEDICINE

## 2022-10-24 PROCEDURE — 96376 TX/PRO/DX INJ SAME DRUG ADON: CPT

## 2022-10-24 PROCEDURE — 82962 GLUCOSE BLOOD TEST: CPT

## 2022-10-24 PROCEDURE — 20000000 HC ICU ROOM

## 2022-10-24 PROCEDURE — 25000003 PHARM REV CODE 250: Performed by: EMERGENCY MEDICINE

## 2022-10-24 PROCEDURE — 83735 ASSAY OF MAGNESIUM: CPT | Performed by: EMERGENCY MEDICINE

## 2022-10-24 PROCEDURE — 93005 ELECTROCARDIOGRAM TRACING: CPT

## 2022-10-24 PROCEDURE — S0030 INJECTION, METRONIDAZOLE: HCPCS | Performed by: EMERGENCY MEDICINE

## 2022-10-24 PROCEDURE — 25500020 PHARM REV CODE 255: Performed by: EMERGENCY MEDICINE

## 2022-10-24 PROCEDURE — 84145 PROCALCITONIN (PCT): CPT | Performed by: EMERGENCY MEDICINE

## 2022-10-24 PROCEDURE — 87635 SARS-COV-2 COVID-19 AMP PRB: CPT | Performed by: EMERGENCY MEDICINE

## 2022-10-24 PROCEDURE — 93010 EKG 12-LEAD: ICD-10-PCS | Mod: 76,,, | Performed by: INTERNAL MEDICINE

## 2022-10-24 PROCEDURE — 84484 ASSAY OF TROPONIN QUANT: CPT | Performed by: EMERGENCY MEDICINE

## 2022-10-24 PROCEDURE — 85610 PROTHROMBIN TIME: CPT | Performed by: EMERGENCY MEDICINE

## 2022-10-24 PROCEDURE — 12000002 HC ACUTE/MED SURGE SEMI-PRIVATE ROOM

## 2022-10-24 PROCEDURE — 83605 ASSAY OF LACTIC ACID: CPT | Mod: 91 | Performed by: EMERGENCY MEDICINE

## 2022-10-24 PROCEDURE — 96375 TX/PRO/DX INJ NEW DRUG ADDON: CPT

## 2022-10-24 PROCEDURE — 85025 COMPLETE CBC W/AUTO DIFF WBC: CPT | Performed by: EMERGENCY MEDICINE

## 2022-10-24 PROCEDURE — 99285 EMERGENCY DEPT VISIT HI MDM: CPT | Mod: 25

## 2022-10-24 PROCEDURE — 63600175 PHARM REV CODE 636 W HCPCS: Performed by: EMERGENCY MEDICINE

## 2022-10-24 PROCEDURE — 80053 COMPREHEN METABOLIC PANEL: CPT | Performed by: EMERGENCY MEDICINE

## 2022-10-24 PROCEDURE — 87040 BLOOD CULTURE FOR BACTERIA: CPT | Mod: 59 | Performed by: EMERGENCY MEDICINE

## 2022-10-24 PROCEDURE — 96365 THER/PROPH/DIAG IV INF INIT: CPT

## 2022-10-24 PROCEDURE — 96366 THER/PROPH/DIAG IV INF ADDON: CPT

## 2022-10-24 PROCEDURE — 83880 ASSAY OF NATRIURETIC PEPTIDE: CPT | Performed by: EMERGENCY MEDICINE

## 2022-10-24 PROCEDURE — 84100 ASSAY OF PHOSPHORUS: CPT | Performed by: EMERGENCY MEDICINE

## 2022-10-24 RX ORDER — METRONIDAZOLE 500 MG/100ML
500 INJECTION, SOLUTION INTRAVENOUS
Status: DISCONTINUED | OUTPATIENT
Start: 2022-10-25 | End: 2022-10-25

## 2022-10-24 RX ORDER — POTASSIUM CHLORIDE 7.45 MG/ML
40 INJECTION INTRAVENOUS
Status: DISCONTINUED | OUTPATIENT
Start: 2022-10-25 | End: 2022-10-31 | Stop reason: HOSPADM

## 2022-10-24 RX ORDER — DILTIAZEM HYDROCHLORIDE 5 MG/ML
10 INJECTION INTRAVENOUS
Status: COMPLETED | OUTPATIENT
Start: 2022-10-24 | End: 2022-10-24

## 2022-10-24 RX ORDER — SODIUM CHLORIDE 0.9 % (FLUSH) 0.9 %
10 SYRINGE (ML) INJECTION
Status: DISCONTINUED | OUTPATIENT
Start: 2022-10-25 | End: 2022-10-26

## 2022-10-24 RX ORDER — METRONIDAZOLE 500 MG/100ML
500 INJECTION, SOLUTION INTRAVENOUS
Status: COMPLETED | OUTPATIENT
Start: 2022-10-24 | End: 2022-10-24

## 2022-10-24 RX ORDER — CALCIUM GLUCONATE 20 MG/ML
3 INJECTION, SOLUTION INTRAVENOUS
Status: DISCONTINUED | OUTPATIENT
Start: 2022-10-25 | End: 2022-10-31 | Stop reason: HOSPADM

## 2022-10-24 RX ORDER — TAMSULOSIN HYDROCHLORIDE 0.4 MG/1
1 CAPSULE ORAL DAILY
Status: DISCONTINUED | OUTPATIENT
Start: 2022-10-25 | End: 2022-10-31 | Stop reason: HOSPADM

## 2022-10-24 RX ORDER — CEFEPIME HYDROCHLORIDE 1 G/50ML
1 INJECTION, SOLUTION INTRAVENOUS
Status: DISCONTINUED | OUTPATIENT
Start: 2022-10-25 | End: 2022-10-24

## 2022-10-24 RX ORDER — PROCHLORPERAZINE EDISYLATE 5 MG/ML
5 INJECTION INTRAMUSCULAR; INTRAVENOUS EVERY 6 HOURS PRN
Status: DISCONTINUED | OUTPATIENT
Start: 2022-10-25 | End: 2022-10-31 | Stop reason: HOSPADM

## 2022-10-24 RX ORDER — ACETAMINOPHEN 325 MG/1
650 TABLET ORAL EVERY 4 HOURS PRN
Status: DISCONTINUED | OUTPATIENT
Start: 2022-10-25 | End: 2022-10-31 | Stop reason: HOSPADM

## 2022-10-24 RX ORDER — CALCIUM GLUCONATE 20 MG/ML
1 INJECTION, SOLUTION INTRAVENOUS
Status: DISCONTINUED | OUTPATIENT
Start: 2022-10-25 | End: 2022-10-31 | Stop reason: HOSPADM

## 2022-10-24 RX ORDER — TALC
6 POWDER (GRAM) TOPICAL NIGHTLY PRN
Status: DISCONTINUED | OUTPATIENT
Start: 2022-10-25 | End: 2022-10-31 | Stop reason: HOSPADM

## 2022-10-24 RX ORDER — POTASSIUM CHLORIDE 7.45 MG/ML
80 INJECTION INTRAVENOUS
Status: DISCONTINUED | OUTPATIENT
Start: 2022-10-25 | End: 2022-10-31 | Stop reason: HOSPADM

## 2022-10-24 RX ORDER — CEFEPIME HYDROCHLORIDE 1 G/50ML
1 INJECTION, SOLUTION INTRAVENOUS
Status: COMPLETED | OUTPATIENT
Start: 2022-10-24 | End: 2022-10-24

## 2022-10-24 RX ORDER — ONDANSETRON 2 MG/ML
4 INJECTION INTRAMUSCULAR; INTRAVENOUS EVERY 8 HOURS PRN
Status: DISCONTINUED | OUTPATIENT
Start: 2022-10-25 | End: 2022-10-31 | Stop reason: HOSPADM

## 2022-10-24 RX ORDER — IPRATROPIUM BROMIDE AND ALBUTEROL SULFATE 2.5; .5 MG/3ML; MG/3ML
3 SOLUTION RESPIRATORY (INHALATION) EVERY 4 HOURS PRN
Status: DISCONTINUED | OUTPATIENT
Start: 2022-10-25 | End: 2022-10-31 | Stop reason: HOSPADM

## 2022-10-24 RX ORDER — MAGNESIUM SULFATE HEPTAHYDRATE 40 MG/ML
4 INJECTION, SOLUTION INTRAVENOUS
Status: DISCONTINUED | OUTPATIENT
Start: 2022-10-25 | End: 2022-10-31 | Stop reason: HOSPADM

## 2022-10-24 RX ORDER — DILTIAZEM HCL 1 MG/ML
0-15 INJECTION, SOLUTION INTRAVENOUS
Status: COMPLETED | OUTPATIENT
Start: 2022-10-24 | End: 2022-10-25

## 2022-10-24 RX ORDER — HYDROCODONE BITARTRATE AND ACETAMINOPHEN 10; 325 MG/1; MG/1
1 TABLET ORAL EVERY 4 HOURS PRN
Status: DISCONTINUED | OUTPATIENT
Start: 2022-10-25 | End: 2022-10-31 | Stop reason: HOSPADM

## 2022-10-24 RX ORDER — CALCIUM GLUCONATE 20 MG/ML
2 INJECTION, SOLUTION INTRAVENOUS
Status: DISCONTINUED | OUTPATIENT
Start: 2022-10-25 | End: 2022-10-31 | Stop reason: HOSPADM

## 2022-10-24 RX ORDER — HYDROCODONE BITARTRATE AND ACETAMINOPHEN 5; 325 MG/1; MG/1
1 TABLET ORAL EVERY 4 HOURS PRN
Status: DISCONTINUED | OUTPATIENT
Start: 2022-10-25 | End: 2022-10-31 | Stop reason: HOSPADM

## 2022-10-24 RX ORDER — MAGNESIUM SULFATE HEPTAHYDRATE 40 MG/ML
2 INJECTION, SOLUTION INTRAVENOUS
Status: DISCONTINUED | OUTPATIENT
Start: 2022-10-25 | End: 2022-10-31 | Stop reason: HOSPADM

## 2022-10-24 RX ORDER — FAMOTIDINE 10 MG/ML
20 INJECTION INTRAVENOUS EVERY 12 HOURS
Status: DISCONTINUED | OUTPATIENT
Start: 2022-10-24 | End: 2022-10-25

## 2022-10-24 RX ORDER — POTASSIUM CHLORIDE 7.45 MG/ML
60 INJECTION INTRAVENOUS
Status: DISCONTINUED | OUTPATIENT
Start: 2022-10-25 | End: 2022-10-31 | Stop reason: HOSPADM

## 2022-10-24 RX ORDER — ACETAMINOPHEN 500 MG
1000 TABLET ORAL
Status: COMPLETED | OUTPATIENT
Start: 2022-10-24 | End: 2022-10-24

## 2022-10-24 RX ADMIN — DILTIAZEM HYDROCHLORIDE 10 MG: 5 INJECTION INTRAVENOUS at 09:10

## 2022-10-24 RX ADMIN — METRONIDAZOLE 500 MG: 500 INJECTION, SOLUTION INTRAVENOUS at 09:10

## 2022-10-24 RX ADMIN — ACETAMINOPHEN 1000 MG: 500 TABLET ORAL at 07:10

## 2022-10-24 RX ADMIN — SODIUM CHLORIDE, SODIUM LACTATE, POTASSIUM CHLORIDE, AND CALCIUM CHLORIDE 3798 ML: .6; .31; .03; .02 INJECTION, SOLUTION INTRAVENOUS at 07:10

## 2022-10-24 RX ADMIN — DILTIAZEM HYDROCHLORIDE 10 MG: 5 INJECTION INTRAVENOUS at 07:10

## 2022-10-24 RX ADMIN — IOHEXOL 70 ML: 350 INJECTION, SOLUTION INTRAVENOUS at 08:10

## 2022-10-24 RX ADMIN — VANCOMYCIN HYDROCHLORIDE 2500 MG: 500 INJECTION, POWDER, LYOPHILIZED, FOR SOLUTION INTRAVENOUS at 08:10

## 2022-10-24 RX ADMIN — CEFEPIME HYDROCHLORIDE 1 G: 1 INJECTION, SOLUTION INTRAVENOUS at 08:10

## 2022-10-24 RX ADMIN — DILTIAZEM HYDROCHLORIDE 5 MG/HR: 5 INJECTION, SOLUTION INTRAVENOUS at 10:10

## 2022-10-24 NOTE — ED TRIAGE NOTES
Patient presents to the ED via EMS w/ c/o generalized weakness. Swelling and redness noted to left side of neck. Patient is Tachycardic and febrile upon arrival to ED. Denies any n/v/d, SOB, or chest pain. Pt. Is AAOx4, NAD noted.

## 2022-10-25 ENCOUNTER — PATIENT MESSAGE (OUTPATIENT)
Dept: SPORTS MEDICINE | Facility: CLINIC | Age: 67
End: 2022-10-25
Payer: MEDICARE

## 2022-10-25 PROBLEM — N17.9 ACUTE RENAL FAILURE: Status: ACTIVE | Noted: 2022-10-25

## 2022-10-25 PROBLEM — M62.82 NON-TRAUMATIC RHABDOMYOLYSIS: Status: ACTIVE | Noted: 2022-10-25

## 2022-10-25 LAB
ALBUMIN SERPL BCP-MCNC: 2.6 G/DL (ref 3.5–5.2)
ALP SERPL-CCNC: 108 U/L (ref 55–135)
ALT SERPL W/O P-5'-P-CCNC: 173 U/L (ref 10–44)
AMORPH CRY URNS QL MICRO: ABNORMAL
ANION GAP SERPL CALC-SCNC: 12 MMOL/L (ref 8–16)
ASCENDING AORTA: 3.89 CM
AST SERPL-CCNC: 707 U/L (ref 10–40)
AV INDEX (PROSTH): 0.55
AV MEAN GRADIENT: 9 MMHG
AV PEAK GRADIENT: 16 MMHG
AV VALVE AREA: 2.44 CM2
AV VELOCITY RATIO: 0.5
BACTERIA #/AREA URNS HPF: ABNORMAL /HPF
BASOPHILS NFR BLD: 0 % (ref 0–1.9)
BILIRUB DIRECT SERPL-MCNC: 0.6 MG/DL (ref 0.1–0.3)
BILIRUB SERPL-MCNC: 1 MG/DL (ref 0.1–1)
BILIRUB UR QL STRIP: NEGATIVE
BSA FOR ECHO PROCEDURE: 2.52 M2
BUN SERPL-MCNC: 29 MG/DL (ref 8–23)
CALCIUM SERPL-MCNC: 8.4 MG/DL (ref 8.7–10.5)
CHLORIDE SERPL-SCNC: 101 MMOL/L (ref 95–110)
CK SERPL-CCNC: ABNORMAL U/L (ref 20–200)
CLARITY UR: ABNORMAL
CO2 SERPL-SCNC: 22 MMOL/L (ref 23–29)
COLOR UR: ABNORMAL
CREAT SERPL-MCNC: 2.3 MG/DL (ref 0.5–1.4)
CRP SERPL-MCNC: 278.8 MG/L (ref 0–8.2)
CV ECHO LV RWT: 0.37 CM
DIFFERENTIAL METHOD: ABNORMAL
DOP CALC AO PEAK VEL: 2 M/S
DOP CALC AO VTI: 36.2 CM
DOP CALC LVOT AREA: 4.4 CM2
DOP CALC LVOT DIAMETER: 2.38 CM
DOP CALC LVOT PEAK VEL: 0.99 M/S
DOP CALC LVOT STROKE VOLUME: 88.49 CM3
DOP CALCLVOT PEAK VEL VTI: 19.9 CM
E WAVE DECELERATION TIME: 162.55 MSEC
E/A RATIO: 1.01
E/E' RATIO: 9.41 M/S
ECHO LV POSTERIOR WALL: 1.05 CM (ref 0.6–1.1)
EJECTION FRACTION: 60 %
EOSINOPHIL NFR BLD: 0 % (ref 0–8)
ERYTHROCYTE [DISTWIDTH] IN BLOOD BY AUTOMATED COUNT: 13.2 % (ref 11.5–14.5)
ERYTHROCYTE [SEDIMENTATION RATE] IN BLOOD BY WESTERGREN METHOD: 52 MM/HR (ref 0–10)
EST. GFR  (NO RACE VARIABLE): 30 ML/MIN/1.73 M^2
FRACTIONAL SHORTENING: 32 % (ref 28–44)
GLUCOSE SERPL-MCNC: 125 MG/DL (ref 70–110)
GLUCOSE UR QL STRIP: NEGATIVE
HAV IGM SERPL QL IA: NORMAL
HBV CORE IGM SERPL QL IA: NORMAL
HBV SURFACE AG SERPL QL IA: NORMAL
HCT VFR BLD AUTO: 39.6 % (ref 40–54)
HCV AB SERPL QL IA: NORMAL
HGB BLD-MCNC: 13.8 G/DL (ref 14–18)
HGB UR QL STRIP: ABNORMAL
HYALINE CASTS #/AREA URNS LPF: 4 /LPF
IMM GRANULOCYTES # BLD AUTO: ABNORMAL K/UL (ref 0–0.04)
IMM GRANULOCYTES NFR BLD AUTO: ABNORMAL % (ref 0–0.5)
INTERVENTRICULAR SEPTUM: 1.16 CM (ref 0.6–1.1)
IVC DIAMETER: 2.59 CM
IVRT: 81.83 MSEC
KETONES UR QL STRIP: ABNORMAL
LA MAJOR: 5.6 CM
LA MINOR: 5.87 CM
LA WIDTH: 4.2 CM
LDH SERPL L TO P-CCNC: 1073 U/L (ref 110–260)
LEFT ATRIUM SIZE: 4.81 CM
LEFT ATRIUM VOLUME INDEX: 40.5 ML/M2
LEFT ATRIUM VOLUME: 98.43 CM3
LEFT INTERNAL DIMENSION IN SYSTOLE: 3.87 CM (ref 2.1–4)
LEFT VENTRICLE DIASTOLIC VOLUME INDEX: 64.79 ML/M2
LEFT VENTRICLE DIASTOLIC VOLUME: 157.45 ML
LEFT VENTRICLE MASS INDEX: 105 G/M2
LEFT VENTRICLE SYSTOLIC VOLUME INDEX: 26.6 ML/M2
LEFT VENTRICLE SYSTOLIC VOLUME: 64.67 ML
LEFT VENTRICULAR INTERNAL DIMENSION IN DIASTOLE: 5.66 CM (ref 3.5–6)
LEFT VENTRICULAR MASS: 255.29 G
LEUKOCYTE ESTERASE UR QL STRIP: ABNORMAL
LV LATERAL E/E' RATIO: 11.43 M/S
LV SEPTAL E/E' RATIO: 8 M/S
LVOT MG: 2.56 MMHG
LVOT MV: 0.76 CM/S
LYMPHOCYTES NFR BLD: 7 % (ref 18–48)
MAGNESIUM SERPL-MCNC: 1.9 MG/DL (ref 1.6–2.6)
MCH RBC QN AUTO: 31.6 PG (ref 27–31)
MCHC RBC AUTO-ENTMCNC: 34.8 G/DL (ref 32–36)
MCV RBC AUTO: 91 FL (ref 82–98)
METAMYELOCYTES NFR BLD MANUAL: 1 %
MICROSCOPIC COMMENT: ABNORMAL
MONOCYTES NFR BLD: 3 % (ref 4–15)
MV PEAK A VEL: 0.79 M/S
MV PEAK E VEL: 0.8 M/S
MV STENOSIS PRESSURE HALF TIME: 47.14 MS
MV VALVE AREA P 1/2 METHOD: 4.67 CM2
NEUTROPHILS NFR BLD: 78 % (ref 38–73)
NEUTS BAND NFR BLD MANUAL: 11 %
NITRITE UR QL STRIP: NEGATIVE
NRBC BLD-RTO: 0 /100 WBC
PH UR STRIP: 6 [PH] (ref 5–8)
PHOSPHATE SERPL-MCNC: 4.6 MG/DL (ref 2.7–4.5)
PISA TR MAX VEL: 2.17 M/S
PLATELET # BLD AUTO: 233 K/UL (ref 150–450)
PMV BLD AUTO: 10 FL (ref 9.2–12.9)
POCT GLUCOSE: 122 MG/DL (ref 70–110)
POTASSIUM SERPL-SCNC: 3.7 MMOL/L (ref 3.5–5.1)
PROT SERPL-MCNC: 6.7 G/DL (ref 6–8.4)
PROT UR QL STRIP: ABNORMAL
PV PEAK VELOCITY: 0.94 CM/S
RA MAJOR: 5.77 CM
RA PRESSURE: 8 MMHG
RBC # BLD AUTO: 4.37 M/UL (ref 4.6–6.2)
RBC #/AREA URNS HPF: 9 /HPF (ref 0–4)
RIGHT VENTRICULAR END-DIASTOLIC DIMENSION: 4.88 CM
RV TISSUE DOPPLER FREE WALL SYSTOLIC VELOCITY 1 (APICAL 4 CHAMBER VIEW): 0.01 CM/S
SINUS: 3.55 CM
SODIUM SERPL-SCNC: 135 MMOL/L (ref 136–145)
SP GR UR STRIP: 1.02 (ref 1–1.03)
SQUAMOUS #/AREA URNS HPF: 2 /HPF
STJ: 3.12 CM
TDI LATERAL: 0.07 M/S
TDI SEPTAL: 0.1 M/S
TDI: 0.09 M/S
TR MAX PG: 19 MMHG
TRICUSPID ANNULAR PLANE SYSTOLIC EXCURSION: 2.38 CM
TV PEAK GRADIENT: 0.57 MMHG
TV REST PULMONARY ARTERY PRESSURE: 27 MMHG
UNIDENT CRYS URNS QL MICRO: ABNORMAL
URN SPEC COLLECT METH UR: ABNORMAL
UROBILINOGEN UR STRIP-ACNC: NEGATIVE EU/DL
VANCOMYCIN SERPL-MCNC: 21 UG/ML
WBC # BLD AUTO: 35.76 K/UL (ref 3.9–12.7)
WBC #/AREA URNS HPF: 55 /HPF (ref 0–5)
YEAST URNS QL MICRO: ABNORMAL

## 2022-10-25 PROCEDURE — 99291 PR CRITICAL CARE, E/M 30-74 MINUTES: ICD-10-PCS | Mod: 25,95,GC, | Performed by: INTERNAL MEDICINE

## 2022-10-25 PROCEDURE — 25000003 PHARM REV CODE 250: Performed by: INTERNAL MEDICINE

## 2022-10-25 PROCEDURE — C1751 CATH, INF, PER/CENT/MIDLINE: HCPCS

## 2022-10-25 PROCEDURE — 82550 ASSAY OF CK (CPK): CPT | Performed by: STUDENT IN AN ORGANIZED HEALTH CARE EDUCATION/TRAINING PROGRAM

## 2022-10-25 PROCEDURE — 63600175 PHARM REV CODE 636 W HCPCS: Performed by: HOSPITALIST

## 2022-10-25 PROCEDURE — S0030 INJECTION, METRONIDAZOLE: HCPCS | Performed by: EMERGENCY MEDICINE

## 2022-10-25 PROCEDURE — 99291 CRITICAL CARE FIRST HOUR: CPT | Mod: 25,95,GC, | Performed by: INTERNAL MEDICINE

## 2022-10-25 PROCEDURE — 85007 BL SMEAR W/DIFF WBC COUNT: CPT | Performed by: STUDENT IN AN ORGANIZED HEALTH CARE EDUCATION/TRAINING PROGRAM

## 2022-10-25 PROCEDURE — 36415 COLL VENOUS BLD VENIPUNCTURE: CPT | Performed by: EMERGENCY MEDICINE

## 2022-10-25 PROCEDURE — 25000003 PHARM REV CODE 250: Performed by: EMERGENCY MEDICINE

## 2022-10-25 PROCEDURE — 36569 INSJ PICC 5 YR+ W/O IMAGING: CPT

## 2022-10-25 PROCEDURE — 81000 URINALYSIS NONAUTO W/SCOPE: CPT | Performed by: EMERGENCY MEDICINE

## 2022-10-25 PROCEDURE — 85027 COMPLETE CBC AUTOMATED: CPT | Performed by: STUDENT IN AN ORGANIZED HEALTH CARE EDUCATION/TRAINING PROGRAM

## 2022-10-25 PROCEDURE — 80074 ACUTE HEPATITIS PANEL: CPT | Performed by: STUDENT IN AN ORGANIZED HEALTH CARE EDUCATION/TRAINING PROGRAM

## 2022-10-25 PROCEDURE — 84100 ASSAY OF PHOSPHORUS: CPT | Performed by: STUDENT IN AN ORGANIZED HEALTH CARE EDUCATION/TRAINING PROGRAM

## 2022-10-25 PROCEDURE — 86140 C-REACTIVE PROTEIN: CPT | Performed by: STUDENT IN AN ORGANIZED HEALTH CARE EDUCATION/TRAINING PROGRAM

## 2022-10-25 PROCEDURE — 25000003 PHARM REV CODE 250: Performed by: STUDENT IN AN ORGANIZED HEALTH CARE EDUCATION/TRAINING PROGRAM

## 2022-10-25 PROCEDURE — 83735 ASSAY OF MAGNESIUM: CPT | Performed by: STUDENT IN AN ORGANIZED HEALTH CARE EDUCATION/TRAINING PROGRAM

## 2022-10-25 PROCEDURE — 63600175 PHARM REV CODE 636 W HCPCS: Performed by: STUDENT IN AN ORGANIZED HEALTH CARE EDUCATION/TRAINING PROGRAM

## 2022-10-25 PROCEDURE — 97535 SELF CARE MNGMENT TRAINING: CPT

## 2022-10-25 PROCEDURE — 36415 COLL VENOUS BLD VENIPUNCTURE: CPT | Performed by: STUDENT IN AN ORGANIZED HEALTH CARE EDUCATION/TRAINING PROGRAM

## 2022-10-25 PROCEDURE — 94761 N-INVAS EAR/PLS OXIMETRY MLT: CPT

## 2022-10-25 PROCEDURE — 80076 HEPATIC FUNCTION PANEL: CPT | Performed by: STUDENT IN AN ORGANIZED HEALTH CARE EDUCATION/TRAINING PROGRAM

## 2022-10-25 PROCEDURE — 97161 PT EVAL LOW COMPLEX 20 MIN: CPT

## 2022-10-25 PROCEDURE — 83615 LACTATE (LD) (LDH) ENZYME: CPT | Performed by: STUDENT IN AN ORGANIZED HEALTH CARE EDUCATION/TRAINING PROGRAM

## 2022-10-25 PROCEDURE — 25000003 PHARM REV CODE 250: Performed by: HOSPITALIST

## 2022-10-25 PROCEDURE — 85652 RBC SED RATE AUTOMATED: CPT | Performed by: STUDENT IN AN ORGANIZED HEALTH CARE EDUCATION/TRAINING PROGRAM

## 2022-10-25 PROCEDURE — A4216 STERILE WATER/SALINE, 10 ML: HCPCS | Performed by: HOSPITALIST

## 2022-10-25 PROCEDURE — 80048 BASIC METABOLIC PNL TOTAL CA: CPT | Performed by: STUDENT IN AN ORGANIZED HEALTH CARE EDUCATION/TRAINING PROGRAM

## 2022-10-25 PROCEDURE — 80202 ASSAY OF VANCOMYCIN: CPT | Performed by: EMERGENCY MEDICINE

## 2022-10-25 PROCEDURE — 20000000 HC ICU ROOM

## 2022-10-25 PROCEDURE — 97116 GAIT TRAINING THERAPY: CPT

## 2022-10-25 PROCEDURE — 87086 URINE CULTURE/COLONY COUNT: CPT | Performed by: EMERGENCY MEDICINE

## 2022-10-25 PROCEDURE — 97165 OT EVAL LOW COMPLEX 30 MIN: CPT

## 2022-10-25 PROCEDURE — 99900035 HC TECH TIME PER 15 MIN (STAT)

## 2022-10-25 RX ORDER — SODIUM CHLORIDE, SODIUM LACTATE, POTASSIUM CHLORIDE, CALCIUM CHLORIDE 600; 310; 30; 20 MG/100ML; MG/100ML; MG/100ML; MG/100ML
INJECTION, SOLUTION INTRAVENOUS CONTINUOUS
Status: DISCONTINUED | OUTPATIENT
Start: 2022-10-25 | End: 2022-10-26

## 2022-10-25 RX ORDER — MUPIROCIN 20 MG/G
OINTMENT TOPICAL 2 TIMES DAILY
Status: CANCELLED | OUTPATIENT
Start: 2022-10-25 | End: 2022-10-30

## 2022-10-25 RX ORDER — FAMOTIDINE 10 MG/ML
20 INJECTION INTRAVENOUS DAILY
Status: DISCONTINUED | OUTPATIENT
Start: 2022-10-25 | End: 2022-10-31 | Stop reason: HOSPADM

## 2022-10-25 RX ORDER — HEPARIN SODIUM 5000 [USP'U]/ML
5000 INJECTION, SOLUTION INTRAVENOUS; SUBCUTANEOUS EVERY 8 HOURS
Status: DISCONTINUED | OUTPATIENT
Start: 2022-10-25 | End: 2022-10-31 | Stop reason: HOSPADM

## 2022-10-25 RX ORDER — FAMOTIDINE 20 MG/1
20 TABLET, FILM COATED ORAL DAILY
Status: DISCONTINUED | OUTPATIENT
Start: 2022-10-25 | End: 2022-10-25

## 2022-10-25 RX ORDER — METOPROLOL SUCCINATE 25 MG/1
25 TABLET, EXTENDED RELEASE ORAL 2 TIMES DAILY
Status: DISCONTINUED | OUTPATIENT
Start: 2022-10-25 | End: 2022-10-31 | Stop reason: HOSPADM

## 2022-10-25 RX ORDER — MUPIROCIN 20 MG/G
OINTMENT TOPICAL 2 TIMES DAILY
Status: DISPENSED | OUTPATIENT
Start: 2022-10-25 | End: 2022-10-30

## 2022-10-25 RX ORDER — CLINDAMYCIN PHOSPHATE 900 MG/50ML
900 INJECTION, SOLUTION INTRAVENOUS
Status: DISCONTINUED | OUTPATIENT
Start: 2022-10-25 | End: 2022-10-31 | Stop reason: HOSPADM

## 2022-10-25 RX ORDER — DILTIAZEM HCL 1 MG/ML
0-15 INJECTION, SOLUTION INTRAVENOUS
Status: DISCONTINUED | OUTPATIENT
Start: 2022-10-25 | End: 2022-10-25

## 2022-10-25 RX ORDER — SODIUM CHLORIDE 0.9 % (FLUSH) 0.9 %
10 SYRINGE (ML) INJECTION EVERY 6 HOURS
Status: DISCONTINUED | OUTPATIENT
Start: 2022-10-25 | End: 2022-10-31 | Stop reason: HOSPADM

## 2022-10-25 RX ORDER — SODIUM CHLORIDE 0.9 % (FLUSH) 0.9 %
10 SYRINGE (ML) INJECTION
Status: DISCONTINUED | OUTPATIENT
Start: 2022-10-25 | End: 2022-10-27

## 2022-10-25 RX ORDER — CLINDAMYCIN PHOSPHATE 150 MG/ML
900 INJECTION, SOLUTION INTRAVENOUS
Status: DISCONTINUED | OUTPATIENT
Start: 2022-10-25 | End: 2022-10-25 | Stop reason: CLARIF

## 2022-10-25 RX ORDER — CEFEPIME HYDROCHLORIDE 1 G/50ML
2 INJECTION, SOLUTION INTRAVENOUS
Status: DISCONTINUED | OUTPATIENT
Start: 2022-10-25 | End: 2022-10-26

## 2022-10-25 RX ADMIN — SODIUM CHLORIDE, SODIUM LACTATE, POTASSIUM CHLORIDE, AND CALCIUM CHLORIDE: .6; .31; .03; .02 INJECTION, SOLUTION INTRAVENOUS at 11:10

## 2022-10-25 RX ADMIN — HEPARIN SODIUM 5000 UNITS: 5000 INJECTION INTRAVENOUS; SUBCUTANEOUS at 03:10

## 2022-10-25 RX ADMIN — SODIUM CHLORIDE 1000 ML: 0.9 INJECTION, SOLUTION INTRAVENOUS at 06:10

## 2022-10-25 RX ADMIN — HEPARIN SODIUM 5000 UNITS: 5000 INJECTION INTRAVENOUS; SUBCUTANEOUS at 09:10

## 2022-10-25 RX ADMIN — SODIUM CHLORIDE 1000 ML: 0.9 INJECTION, SOLUTION INTRAVENOUS at 01:10

## 2022-10-25 RX ADMIN — CEFEPIME HYDROCHLORIDE 2 G: 2 INJECTION, SOLUTION INTRAVENOUS at 09:10

## 2022-10-25 RX ADMIN — MUPIROCIN: 20 OINTMENT TOPICAL at 08:10

## 2022-10-25 RX ADMIN — SODIUM CHLORIDE, SODIUM LACTATE, POTASSIUM CHLORIDE, AND CALCIUM CHLORIDE: .6; .31; .03; .02 INJECTION, SOLUTION INTRAVENOUS at 05:10

## 2022-10-25 RX ADMIN — Medication 10 ML: at 05:10

## 2022-10-25 RX ADMIN — FAMOTIDINE 20 MG: 10 INJECTION INTRAVENOUS at 08:10

## 2022-10-25 RX ADMIN — SODIUM CHLORIDE, SODIUM LACTATE, POTASSIUM CHLORIDE, AND CALCIUM CHLORIDE: .6; .31; .03; .02 INJECTION, SOLUTION INTRAVENOUS at 04:10

## 2022-10-25 RX ADMIN — METOPROLOL SUCCINATE 25 MG: 25 TABLET, EXTENDED RELEASE ORAL at 11:10

## 2022-10-25 RX ADMIN — CEFEPIME HYDROCHLORIDE 2 G: 2 INJECTION, SOLUTION INTRAVENOUS at 08:10

## 2022-10-25 RX ADMIN — TAMSULOSIN HYDROCHLORIDE 0.4 MG: 0.4 CAPSULE ORAL at 08:10

## 2022-10-25 RX ADMIN — CLINDAMYCIN IN 5 PERCENT DEXTROSE 900 MG: 18 INJECTION, SOLUTION INTRAVENOUS at 09:10

## 2022-10-25 RX ADMIN — CLINDAMYCIN IN 5 PERCENT DEXTROSE 900 MG: 18 INJECTION, SOLUTION INTRAVENOUS at 03:10

## 2022-10-25 RX ADMIN — METOPROLOL SUCCINATE 25 MG: 25 TABLET, EXTENDED RELEASE ORAL at 09:10

## 2022-10-25 RX ADMIN — MUPIROCIN: 20 OINTMENT TOPICAL at 09:10

## 2022-10-25 RX ADMIN — CEFTRIAXONE 1 G: 1 INJECTION, SOLUTION INTRAVENOUS at 07:10

## 2022-10-25 RX ADMIN — METRONIDAZOLE 500 MG: 500 INJECTION, SOLUTION INTRAVENOUS at 05:10

## 2022-10-25 RX ADMIN — Medication 10 ML: at 12:10

## 2022-10-25 NOTE — CLINICAL REVIEW
IP Sepsis Screen (most recent)       Sepsis Screen (IP) - 10/25/22 1038       Is the patient's history or complaint suggestive of a possible infection? Yes  -LW    Are there at least two of the following signs and symptoms present? Yes  -LW    Sepsis signs/symptoms - Tachypnea Tachypnea     >20  -LW    Sepsis signs/symptoms - WBC WBC < 4,000 or WBC > 12,000  -LW    Are any of the following organ dysfunction criteria present and not considered to be due to a chronic condition? Yes  -LW    Organ Dysfunction Criteria Creatinine > 2.0  -LW    Organ Dysfunction Criteria Lactate > 2.0  -LW    Organ Dysfunction Criteria - Resp Comp Respiratory compromise requiring Bipap, Cpap, or Intubation  -LW    Initiate Sepsis Protocol No  -LW    Reason sepsis not considered Pt. receiving appropriate management  -LW              User Key  (r) = Recorded By, (t) = Taken By, (c) = Cosigned By      Initials Name    CAROLYN Meza RN

## 2022-10-25 NOTE — PLAN OF CARE
Problem: Occupational Therapy  Goal: Occupational Therapy Goal  Description: Goals to be met by: 11/8/22     Patient will increase functional independence with ADLs by performing:    LE Dressing with Modified [place].  Grooming while standing at sink with Modified [place].  Toileting from bedside commode with Modified [place] for hygiene and clothing management.   Supine to sit with Modified [place].  Step transfer with Modified [place]  Toilet transfer to toilet with Modified [place].  Upper extremity exercise program x15 reps per handout, with independence.    Outcome: Ongoing, Progressing

## 2022-10-25 NOTE — PLAN OF CARE
Problem: Physical Therapy  Goal: Physical Therapy Goal  Description: Goals to be met by: 22     Patient will increase functional independence with mobility by performin. Pt to be supervision with bed mobility.  2. Pt to transfer with supervision.  3. Pt to ambulate 150' w/RW SBA.  4. Pt to be (I) with written HEP.    Outcome: Ongoing, Progressing   Initial eval completed.  Pt requested to use toilet, left seated with wife present.

## 2022-10-25 NOTE — PROGRESS NOTES
Spoke with CT regarding ability to bring patient for scan, per tech patient had previously received CT with contrast and may still have contrast present. Updated EICU Dr Ashby, ok to hold CT for now and attempt CT at 8am.

## 2022-10-25 NOTE — SUBJECTIVE & OBJECTIVE
Past Medical History:   Diagnosis Date    Acute renal failure 10/25/2022    Arthritis 1971    Bone Spurs in feet    Basal cell carcinoma 11/06/2018    left ear helix    Foreign body in conjunctival sac     Hernia, inguinal, right 2002    Joint pain 1971    Bones of feet    Keloid cicatrix 1958 2010 2018 2019    Hernia, Knee, Arm, Ear    Melanoma 09/14/2018    Right Arm 0.6mm    Severe sleep apnea        Past Surgical History:   Procedure Laterality Date    CLOSURE OF DEFECT OF MOHS PROCEDURE Left 1/3/2019    Procedure: CLOSURE, MOHS PROCEDURE DEFECT LEFT EAR;  Surgeon: Jeramy Meek MD;  Location: [facility] OR [facility] FLR;  Service: Plastics;  Laterality: Left;  plastics set    COLONOSCOPY N/A 6/30/2021    Procedure: COLONOSCOPY;  Surgeon: Juliano Santoyo MD;  Location: [facility] ENDO (2ND FLR);  Service: Endoscopy;  Laterality: N/A;  severe sleep apnea     fully vaccinated-GT    HERNIA REPAIR Left     3 years of age    HERNIA REPAIR N/A     Ventral wall at 3 year of age.    KNEE SURGERY Right 1984    Arthroscopic    NASAL SEPTUM SURGERY N/A 2009    SKIN BIOPSY  several over time       Review of patient's allergies indicates:  No Known Allergies    No current facility-administered medications on file prior to encounter.     Current Outpatient Medications on File Prior to Encounter   Medication Sig    azelastine (ASTELIN) 137 mcg (0.1 %) nasal spray 1 spray (137 mcg total) by Nasal route 2 (two) times daily.    calcium-magnesium-zinc Tab Take 2 tablets by mouth once daily.    ciclopirox (PENLAC) 8 % Soln Apply daily to affected nail. Must remove and restart weekly    guaiFENesin (MUCINEX) 600 mg 12 hr tablet Take 1,200 mg by mouth 2 (two) times daily.    ketoconazole (NIZORAL) 2 % cream AAA bid to both feet x 3 weeks    multivitamin capsule Take 1 capsule by mouth once daily.    tamsulosin (FLOMAX) 0.4 mg Cap TAKE 1 CAPSULE BY MOUTH EVERY DAY    celecoxib (CELEBREX) 200 MG capsule Take 1 capsule (200 mg total) by mouth once  daily. With food    sars-cov-2, covid-19 omicron, (MODERNA COVID-19) 50 mcg/0.5 mL injection Inject into the muscle.     Family History       Problem Relation (Age of Onset)    Aneurysm Mother    Cancer Father (72)    Eczema Brother    Gout Brother    Hypertension Mother    No Known Problems Daughter, Son, Daughter, Son    Psoriasis Brother    Stroke Mother          Tobacco Use    Smoking status: Never    Smokeless tobacco: Never    Tobacco comments:     Second hand smoke from mother. Cigarrette smoke inflames my sinuses.   Substance and Sexual Activity    Alcohol use: Not Currently     Alcohol/week: 1.0 standard drink     Types: 1 Glasses of wine per week     Comment: Rare    Drug use: No    Sexual activity: Yes     Partners: Female     Birth control/protection: None     Review of Systems   Constitutional: Negative for diaphoresis and malaise/fatigue.   HENT:  Positive for congestion. Negative for ear pain, hoarse voice, nosebleeds, odynophagia, sore throat and stridor.    Cardiovascular:  Negative for chest pain, dyspnea on exertion, irregular heartbeat, leg swelling, near-syncope, orthopnea, palpitations, paroxysmal nocturnal dyspnea and syncope.   Respiratory:  Negative for shortness of breath, sputum production and wheezing.    Gastrointestinal:  Negative for abdominal pain, heartburn, hematemesis and melena.   Neurological:  Negative for dizziness, focal weakness, light-headedness, numbness, paresthesias, seizures and weakness.   Objective:     Vital Signs (Most Recent):  Temp: 98.2 °F (36.8 °C) (10/25/22 0700)  Pulse: 83 (10/25/22 1000)  Resp: (!) 23 (10/25/22 1000)  BP: 120/64 (10/25/22 1000)  SpO2: 95 % (10/25/22 1000)   Vital Signs (24h Range):  Temp:  [97.7 °F (36.5 °C)-100.7 °F (38.2 °C)] 98.2 °F (36.8 °C)  Pulse:  [] 83  Resp:  [17-32] 23  SpO2:  [91 %-97 %] 95 %  BP: ()/(52-99) 120/64     Weight: 126.9 kg (279 lb 12.2 oz)  Body mass index is 39.02 kg/m².    SpO2: 95 %  O2 Device (Oxygen  Therapy): room air      Intake/Output Summary (Last 24 hours) at 10/25/2022 1130  Last data filed at 10/25/2022 1000  Gross per 24 hour   Intake 3081.23 ml   Output 390 ml   Net 2691.23 ml       Lines/Drains/Airways       Peripherally Inserted Central Catheter Line  Duration             PICC Triple Lumen 10/25/22 1030 right basilic <1 day              Drain  Duration                  Urethral Catheter 10/25/22 0230 16 Fr. <1 day              Airway  Duration                  Airway - Non-Surgical 19 1233 Mask 1390 days              Peripheral Intravenous Line  Duration                  Peripheral IV - Single Lumen 10/24/22 20 G Left;Posterior Hand 1 day         Peripheral IV - Single Lumen 10/25/22 0210 22 G Left;Posterior Hand <1 day                    Physical Exam  Vitals reviewed.   Constitutional:       General: He is not in acute distress.     Appearance: He is obese. He is not diaphoretic.   Neck:      Vascular: No carotid bruit or JVD.   Cardiovascular:      Rate and Rhythm: Normal rate and regular rhythm.      Pulses: Normal pulses.   Pulmonary:      Effort: Pulmonary effort is normal.      Breath sounds: Normal breath sounds.   Abdominal:      General: Bowel sounds are normal.      Palpations: Abdomen is soft.      Tenderness: There is no abdominal tenderness.   Musculoskeletal:      Cervical back: Edema and erythema present.      Right lower le+ Edema present.      Left lower le+ Edema present.      Comments: Venous stasis dermatitis   Neurological:      Mental Status: He is alert and oriented to person, place, and time.   Psychiatric:         Speech: Speech normal.         Behavior: Behavior normal.       Significant Labs: CMP   Recent Labs   Lab 10/24/22  1909 10/25/22  0050    135*   K 3.5 3.7    101   CO2 20* 22*   GLU 93 125*   BUN 23 29*   CREATININE 1.8* 2.3*   CALCIUM 8.8 8.4*   PROT 7.2 6.7   ALBUMIN 2.9* 2.6*   BILITOT 1.4* 1.0   ALKPHOS 110 108   * 707*   ALT  122* 173*   ANIONGAP 14 12   , CBC   Recent Labs   Lab 10/24/22  1909 10/25/22  0050   WBC 38.94* 35.76*   HGB 14.5 13.8*   HCT 42.0 39.6*    233   , Lipid Panel No results for input(s): CHOL, HDL, LDLCALC, TRIG, CHOLHDL in the last 48 hours., and Troponin   Recent Labs   Lab 10/24/22  1909   TROPONINI 0.063*       Significant Imaging: Echocardiogram: Transthoracic echo (TTE) complete (Cupid Only):   Results for orders placed or performed during the hospital encounter of 10/24/22   Echo   Result Value Ref Range    BSA 2.52 m2    TDI SEPTAL 0.10 m/s    LV LATERAL E/E' RATIO 11.43 m/s    LV SEPTAL E/E' RATIO 8.00 m/s    IVC diameter 2.59 cm    Left Ventricular Outflow Tract Mean Velocity 0.76 cm/s    Left Ventricular Outflow Tract Mean Gradient 2.56 mmHg    TDI LATERAL 0.07 m/s    PV PEAK VELOCITY 0.94 cm/s    LVIDd 5.66 3.5 - 6.0 cm    IVS 1.16 (A) 0.6 - 1.1 cm    Posterior Wall 1.05 0.6 - 1.1 cm    LVIDs 3.87 2.1 - 4.0 cm    FS 32 28 - 44 %    Sinus 3.55 cm    STJ 3.12 cm    Ascending aorta 3.89 cm    LV mass 255.29 g    LA size 4.81 cm    RVDD 4.88 cm    TAPSE 2.38 cm    RV S' 0.01 cm/s    Left Ventricle Relative Wall Thickness 0.37 cm    AV mean gradient 9 mmHg    AV valve area 2.44 cm2    AV Velocity Ratio 0.50     AV index (prosthetic) 0.55     MV valve area p 1/2 method 4.67 cm2    E/A ratio 1.01     Mean e' 0.09 m/s    E wave deceleration time 162.55 msec    IVRT 81.83 msec    LVOT diameter 2.38 cm    LVOT area 4.4 cm2    LVOT peak asif 0.99 m/s    LVOT peak VTI 19.90 cm    Ao peak asif 2.00 m/s    Ao VTI 36.2 cm    LVOT stroke volume 88.49 cm3    AV peak gradient 16 mmHg    TV peak gradient 0.57 mmHg    E/E' ratio 9.41 m/s    MV Peak E Asif 0.80 m/s    TR Max Asif 2.17 m/s    MV stenosis pressure 1/2 time 47.14 ms    MV Peak A Asif 0.79 m/s    LV Systolic Volume 64.67 mL    LV Systolic Volume Index 26.6 mL/m2    LV Diastolic Volume 157.45 mL    LV Diastolic Volume Index 64.79 mL/m2    LV Mass Index 105 g/m2     RA Major Axis 5.77 cm    Left Atrium Minor Axis 5.87 cm    Left Atrium Major Axis 5.60 cm    Triscuspid Valve Regurgitation Peak Gradient 19 mmHg    LA WIDTH 4.20 cm    LA volume 98.43 cm3    LA Volume Index 40.5 mL/m2    Right Atrial Pressure (from IVC) 8 mmHg    EF 60 %    TV rest pulmonary artery pressure 27 mmHg    Narrative    · TDS secondary to body habitus.  · The left ventricle is normal in size with normal systolic function.  · The estimated ejection fraction is 60%.  · Indeterminate left ventricular diastolic function.  · Mild left atrial enlargement.  · Normal right ventricular size with normal right ventricular systolic   function.  · Intermediate central venous pressure (8 mmHg).  · The estimated PA systolic pressure is 27 mmHg.  · There is no pulmonary hypertension.

## 2022-10-25 NOTE — PROCEDURES
"Elías Gay is a 67 y.o. male patient.    Temp: 98.2 °F (36.8 °C) (10/25/22 0700)  Pulse: 83 (10/25/22 1000)  Resp: (!) 23 (10/25/22 1000)  BP: 120/64 (10/25/22 1000)  SpO2: 95 % (10/25/22 1000)  Weight: 126.9 kg (279 lb 12.2 oz) (10/25/22 0035)  Height: 5' 11" (180.3 cm) (10/25/22 0035)    PICC  Date/Time: 10/25/2022 10:30 AM  Performed by: Neo Huynh RN  Consent Done: Yes  Time out: Immediately prior to procedure a time out was called to verify the correct patient, procedure, equipment, support staff and site/side marked as required  Indications: med administration and vascular access  Anesthesia: local infiltration  Local anesthetic: lidocaine 1% without epinephrine  Anesthetic Total (mL): 2  Preparation: skin prepped with ChloraPrep  Skin prep agent dried: skin prep agent completely dried prior to procedure  Sterile barriers: all five maximum sterile barriers used - cap, mask, sterile gown, sterile gloves, and large sterile sheet  Hand hygiene: hand hygiene performed prior to central venous catheter insertion  Location details: right basilic  Catheter type: triple lumen  Catheter size: 5 Fr  Catheter Length: 45cm    Ultrasound guidance: yes  Vessel Caliber: large, compressibility normal  Vascular Doppler: not done  Needle advanced into vessel with real time Ultrasound guidance.  Guidewire confirmed in vessel.  Number of attempts: 1  Post-procedure: blood return through all ports, chlorhexidine patch and sterile dressing applied    Assessment: successful placement, tip termination and placement verified by x-ray        Name Neo Huynh RN   10/25/2022    "

## 2022-10-25 NOTE — SUBJECTIVE & OBJECTIVE
Past Medical History:   Diagnosis Date    Arthritis 1971    Bone Spurs in feet    Basal cell carcinoma 11/06/2018    left ear helix    Foreign body in conjunctival sac     Hernia, inguinal, right 2002    Joint pain 1971    Bones of feet    Keloid cicatrix 1958 2010 2018 2019    Hernia, Knee, Arm, Ear    Melanoma 09/14/2018    Right Arm 0.6mm    Severe sleep apnea        Past Surgical History:   Procedure Laterality Date    CLOSURE OF DEFECT OF MOHS PROCEDURE Left 1/3/2019    Procedure: CLOSURE, MOHS PROCEDURE DEFECT LEFT EAR;  Surgeon: Jermay Meek MD;  Location: [facility] OR 2ND FLR;  Service: Plastics;  Laterality: Left;  plastics set    COLONOSCOPY N/A 6/30/2021    Procedure: COLONOSCOPY;  Surgeon: Juliano Santoyo MD;  Location: [facility] ENDO (2ND FLR);  Service: Endoscopy;  Laterality: N/A;  severe sleep apnea     fully vaccinated-GT    HERNIA REPAIR Left     3 years of age    HERNIA REPAIR N/A     Ventral wall at 3 year of age.    KNEE SURGERY Right 1984    Arthroscopic    NASAL SEPTUM SURGERY N/A 2009    SKIN BIOPSY  several over time       Review of patient's allergies indicates:  No Known Allergies    No current facility-administered medications on file prior to encounter.     Current Outpatient Medications on File Prior to Encounter   Medication Sig    azelastine (ASTELIN) 137 mcg (0.1 %) nasal spray 1 spray (137 mcg total) by Nasal route 2 (two) times daily.    calcium-magnesium-zinc Tab Take 2 tablets by mouth once daily.    ciclopirox (PENLAC) 8 % Soln Apply daily to affected nail. Must remove and restart weekly    guaiFENesin (MUCINEX) 600 mg 12 hr tablet Take 1,200 mg by mouth 2 (two) times daily.    ketoconazole (NIZORAL) 2 % cream AAA bid to both feet x 3 weeks    multivitamin capsule Take 1 capsule by mouth once daily.    tamsulosin (FLOMAX) 0.4 mg Cap TAKE 1 CAPSULE BY MOUTH EVERY DAY    celecoxib (CELEBREX) 200 MG capsule Take 1 capsule (200 mg total) by mouth once daily. With food    sars-cov-2,  covid-19 omicron, (MODERNA COVID-19) 50 mcg/0.5 mL injection Inject into the muscle.     Family History       Problem Relation (Age of Onset)    Aneurysm Mother    Cancer Father (72)    Eczema Brother    Gout Brother    Hypertension Mother    No Known Problems Daughter, Son, Daughter, Son    Psoriasis Brother    Stroke Mother          Tobacco Use    Smoking status: Never    Smokeless tobacco: Never    Tobacco comments:     Second hand smoke from mother. Cigarrette smoke inflames my sinuses.   Substance and Sexual Activity    Alcohol use: Not Currently     Alcohol/week: 1.0 standard drink     Types: 1 Glasses of wine per week     Comment: Rare    Drug use: No    Sexual activity: Yes     Partners: Female     Birth control/protection: None     Review of Systems   Constitutional:  Positive for activity change, appetite change and chills.   HENT:  Positive for facial swelling, sinus pressure and sinus pain. Negative for dental problem.    Eyes: Negative.    Respiratory: Negative.     Cardiovascular: Negative.    Gastrointestinal: Negative.    Endocrine: Negative.    Genitourinary: Negative.    Musculoskeletal: Negative.  Negative for neck pain.   Skin: Negative.    Allergic/Immunologic: Negative.    Neurological: Negative.    Psychiatric/Behavioral: Negative.     Objective:     Vital Signs (Most Recent):  Temp: 98.2 °F (36.8 °C) (10/25/22 0700)  Pulse: 83 (10/25/22 1000)  Resp: (!) 23 (10/25/22 1000)  BP: 120/64 (10/25/22 1000)  SpO2: 95 % (10/25/22 1000)   Vital Signs (24h Range):  Temp:  [97.7 °F (36.5 °C)-100.7 °F (38.2 °C)] 98.2 °F (36.8 °C)  Pulse:  [] 83  Resp:  [17-32] 23  SpO2:  [91 %-97 %] 95 %  BP: ()/(52-99) 120/64     Weight: 126.9 kg (279 lb 12.2 oz)  Body mass index is 39.02 kg/m².    Physical Exam  Vitals and nursing note reviewed.   Constitutional:       General: He is not in acute distress.     Appearance: He is not ill-appearing.   HENT:      Head: Normocephalic and atraumatic.       Comments: Left neck swelling      Nose: Nose normal.      Mouth/Throat:      Mouth: Mucous membranes are dry.   Eyes:      Extraocular Movements: Extraocular movements intact.   Cardiovascular:      Rate and Rhythm: Normal rate and regular rhythm.      Pulses: Normal pulses.      Heart sounds: No murmur heard.  Pulmonary:      Effort: No respiratory distress.      Breath sounds: No rales.   Abdominal:      General: Abdomen is flat.      Palpations: Abdomen is soft.      Tenderness: There is no abdominal tenderness.   Musculoskeletal:      Right lower leg: No edema.      Left lower leg: No edema.      Comments: Left sided neck swelling, firm without fluctuance    Skin:     General: Skin is warm.   Neurological:      General: No focal deficit present.      Mental Status: He is alert.   Psychiatric:         Mood and Affect: Mood normal.           Significant Labs: All pertinent labs within the past 24 hours have been reviewed.    Significant Imaging: I have reviewed all pertinent imaging results/findings within the past 24 hours.

## 2022-10-25 NOTE — PT/OT/SLP EVAL
"Physical Therapy Evaluation    Patient Name:  Elías Gay   MRN:  [id number]    Recommendations:     Discharge Recommendations:  outpatient PT   Discharge Equipment Recommendations:  (TBD)     Assessment:     Elías Gay is a 67 y.o. male admitted with a medical diagnosis of Sepsis.  He presents with the following impairments/functional limitations:  weakness, impaired self care skills, impaired functional mobility, gait instability, decreased lower extremity function, decreased ROM .    Rehab Prognosis: Good; patient would benefit from acute skilled PT services to address these deficits and reach maximum level of function.    Recent Surgery: * No surgery found *      Plan:     During this hospitalization, patient to be seen 5 x/week to address the identified rehab impairments via gait training, therapeutic activities, therapeutic exercises and progress toward the following goals:    Plan of Care Expires:  11/01/22    Subjective     Chief Complaint: I need to use the toilet  Patient/Family Comments/goals: Pt agreeable to therapy evals  Pain/Comfort:  Pain Rating 1: 0/10    Patients cultural, spiritual, Christianity conflicts given the current situation: no    Living Environment:  PTA pt lived with his wife in a 2 story house, bedroom downstairs and 5 steps to enter, (B) HRs.  Prior to admission, patients level of function was mod I.  Equipment used at home: cane, straight, other (see comments) ("2 walking sticks/ski poles").  DME owned (not currently used): none.  Upon discharge, patient will have assistance from wife.    Objective:     Communicated with nurseAva prior to session.  Patient found supine with blood pressure cuff, casillas catheter, peripheral IV, pulse ox (continuous), telemetry  upon PT entry to room.    General Precautions: Standard,     Orthopedic Precautions:N/A   Braces: N/A  Respiratory Status: Room air    Exams:  Cognitive Exam:  Patient is oriented to Person, Place, and Situation  RLE ROM: " WFL except hips  RLE Strength: WFL except hips; grossly 2+/5  LLE ROM: WFL except hips  LLE Strength: WFL except hips grossly 2+/5    Functional Mobility:  Bed Mobility:     Supine to Sit: maximal assistance and of 2 persons  Transfers:     Sit to Stand:  minimum assistance with rolling walker  Gait: 5' w/RW CGA      AM-PAC 6 CLICK MOBILITY  Total Score:15       Treatment & Education:  Educated on role of PT and POC, ambulated to toilet and left seated per request.    Patient left  on toilet  with call button in reach, nurse notified, and wife present.    GOALS:   Multidisciplinary Problems       Physical Therapy Goals          Problem: Physical Therapy    Goal Priority Disciplines Outcome Goal Variances Interventions   Physical Therapy Goal     PT, PT/OT Ongoing, Progressing     Description: Goals to be met by: 22     Patient will increase functional independence with mobility by performin. Pt to be supervision with bed mobility.  2. Pt to transfer with supervision.  3. Pt to ambulate 150' w/RW SBA.  4. Pt to be (I) with written HEP.                     Goal 5. Pt to go up/down 5 steps using (B) HRs and CGA.    History:     Past Medical History:   Diagnosis Date    Acute renal failure 10/25/2022    Arthritis 1971    Bone Spurs in feet    Basal cell carcinoma 2018    left ear helix    Foreign body in conjunctival sac     Hernia, inguinal, right 2002    Joint pain 1971    Bones of feet    Keloid cicatrix [id number] 2019    Hernia, Knee, Arm, Ear    Melanoma 2018    Right Arm 0.6mm    Severe sleep apnea        Past Surgical History:   Procedure Laterality Date    CLOSURE OF DEFECT OF MOHS PROCEDURE Left 1/3/2019    Procedure: CLOSURE, MOHS PROCEDURE DEFECT LEFT EAR;  Surgeon: Jeramy Meek MD;  Location: [facility] OR [address] [place] [address];  Service: Plastics;  Laterality: Left;  plastics set    COLONOSCOPY N/A 2021    Procedure: COLONOSCOPY;  Surgeon: Juliano Santoyo MD;  Location: [facility] ([facility]  [facility]);  Service: Endoscopy;  Laterality: N/A;  severe sleep apnea     fully vaccinated-GT    HERNIA REPAIR Left     3 years of age    HERNIA REPAIR N/A     Ventral wall at 3 year of age.    KNEE SURGERY Right 1984    Arthroscopic    NASAL SEPTUM SURGERY N/A 2009    SKIN BIOPSY  several over time       Time Tracking:     PT Received On: 10/25/22  PT Start Time: 1210     PT Stop Time: 1235  PT Total Time (min): 25 min     Billable Minutes: Evaluation 15 and Gait Training 10      10/25/2022

## 2022-10-25 NOTE — EICU
Patient has elevating creatinine   CK 30,000     LR was running at 200 cc/ hr, decreased to 100 cc/ hr   Added bicarb infusion at 100 cc/ hr   NS 1 litre bolus     He is not having urine output. Possible ATN   Injusy from Rhabdo and sepsis ?

## 2022-10-25 NOTE — EICU
Alerted by bedside regarding patient c/o difficulty urinating.   4 th litre IVF going in   Difficult to assess volume in bladder via bladder scan as patient is obese.     Card to be placed.

## 2022-10-25 NOTE — PHARMACY MED REC
"  Admission Medication History     The home medication history was taken by Estephanie Juarez.    You may go to "Admission" then "Reconcile Home Medications" tabs to review and/or act upon these items.     The home medication list has been updated by the Pharmacy department.   Please read ALL comments highlighted in yellow.   Please address this information as you see fit.    Feel free to contact us if you have any questions or require assistance.      The medications listed below were removed from the home medication list. Please reorder if appropriate:  Patient reports no longer taking the following medication(s):  celebrex        Medications listed below were obtained from: Patient/family, Analytic software- [employer], and Medical records  (Not in a hospital admission)          Estephanie Juarez  [phone]                .        "

## 2022-10-25 NOTE — ASSESSMENT & PLAN NOTE
Likely related to an underlying infection, see plan for sepsis .  Appear to be sinusitis as source,no sign of dental abscess,going frequently to dentist.  Need be seen by ENT,not available in this fascility ,aclleed transfer center.  If not resolving, consider exploring autoimmune or neoplastic processes

## 2022-10-25 NOTE — ASSESSMENT & PLAN NOTE
10/25/2022: [facility] - 1. Place patient on Toprol.  Discontinue to Diltiazem.  IIB indication for anticoagulation. Can place on ASA. Outpatient event monitor.

## 2022-10-25 NOTE — ASSESSMENT & PLAN NOTE
This patient does have evidence of infective focus  My overall impression is sepsis. Vital signs were reviewed and noted in progress note.  Antibiotics given-   Antibiotics (From admission, onward)    Start     Stop Route Frequency Ordered    10/25/22 0700  cefTRIAXone (ROCEPHIN) 1 g/50 mL D5W IVPB         -- IV Every 12 hours (non-standard times) 10/24/22 2305    10/25/22 0300  metronidazole IVPB 500 mg         -- IV Every 8 hours (non-standard times) 10/24/22 2232    10/24/22 2051  vancomycin - pharmacy to dose  (vancomycin IVPB)        [facility].    -- IV pharmacy to manage frequency 10/24/22 1952    10/24/22 2000  vancomycin (VANCOCIN) 2,500 mg in dextrose 5 % 500 mL IVPB         10/25 0759 IV ED 1 Time 10/24/22 1955        Cultures were taken-   Microbiology Results (last 7 days)     Procedure Component Value Units Date/Time    Blood culture x two cultures. Draw prior to antibiotics. [[id number]] Collected: 10/24/22 1911    Order Status: Sent Specimen: Blood from Peripheral, Antecubital, Left Updated: 10/24/22 1916    Blood culture x two cultures. Draw prior to antibiotics. [[id number]] Collected: 10/24/22 1910    Order Status: Sent Specimen: Blood from Peripheral, Hand, Right Updated: 10/24/22 1916        Latest lactate reviewed, they are-  Recent Labs   Lab 10/24/22  2058 10/24/22  2306   LACTATE 2.4* 2.1       Organ dysfunction indicated by Acute liver injury  Source- Likely deep soft tissue neck infection related to possibly recent sinusitis infection or odontogenic in source     Plan:   Source control Achieved by- Antibiotics (Vancomycin, ceftriaxone and flagyl)  ENT consult - consider transfer to [facility] for evaluation, currently there is no concern for airway compromise   Trend perfusion parameters

## 2022-10-25 NOTE — HPI
This is a 67 year old male with a PMHx of BPH, TJ (on BiPAP) and obesity who presents with generalized weakness.     Patient reports developing worsening sinusitis over the last 3 weeks. He reports that his nasal discharge have been bloody mixed with mucous. About a week prior, his symptoms progressively became worse and 3 days prior to presentation he noticed left neck swelling. He tried to carry on with his ADLs but felt too weak and he collapsed on the bathroom floor, with no loss of consciousness. He reported having chills, and decreased po intake, but denied having cough, SOB or diarrhea. His wife is sick with similar symptoms.     In the ED, the patient was febrile (38.2), tachycardic (130s), tachypnic and requiring 2L of supplemental O2. Labs showed leukocytosis (38.9 - with neutrophilia), elevated creatinine (1.8 - baseline of 0.9), elevated LFTs (Tbili: 1.4, AST: 511, ALT: 122), elevated troponin (0.063), elevated BNP (142) and elevated procalcitonin (34.23). EKG showed SVT with PVCs. CXR showed mild increased interstitial attenuation (possible mild pulmonary edema). CT neck showed extensive left neck soft tissue swelling with inflammatory changes and edema with asymmetric heterogenous enlargement is seen of the left sternocleidomastoid & numerous enlarged cervical chain lymph nodes are seen throughout the region (cellulitis versus myositis or neoplastic process) without an abscess. He was given vancomycin, cefepime, sepsis bolus and 10 mg of diltiazem x2 and was started on a diltiazem drip. A transfer to [facility] for ENT/OMFS evaluation was attempted, however, there were no bed availability. The patient was admitted to the ICU for further management.

## 2022-10-25 NOTE — H&P
"[facility]  [facility] Medicine  History & Physical    Patient Name: Elías Gay  MRN: [id number]  Patient Class: IP- Inpatient  Admission Date: 10/24/2022  Attending Physician: Amaya Martin MD   Primary Care Provider: Kameron Diaz MD         Patient information was obtained from patient, relative(s) and ER records.     Subjective:     Principal Problem:Sepsis    Chief Complaint:   Chief Complaint   Patient presents with    Fatigue    Neck Swelling     Pt reports left sided neck swelling and redness that started Friday and generalized weakness that started today. Pt reports "I was too weak to even stand today". Fever and tachycardia also reported per EMS.         HPI: This is a 67 year old male with a PMHx of BPH, TJ (on BiPAP) and obesity who presents with generalized weakness.     Patient reports developing worsening sinusitis over the last 3 weeks. He reports that his nasal discharge have been bloody mixed with mucous. About a week prior, his symptoms progressively became worse and 3 days prior to presentation he noticed left neck swelling. He tried to carry on with his ADLs but felt too weak and he collapsed on the bathroom floor, with no loss of consciousness. He reported having chills, and decreased po intake, but denied having cough, SOB or diarrhea. His wife is sick with similar symptoms.     In the ED, the patient was febrile (38.2), tachycardic (130s), tachypnic and requiring 2L of supplemental O2. Labs showed leukocytosis (38.9 - with neutrophilia), elevated creatinine (1.8 - baseline of 0.9), elevated LFTs (Tbili: 1.4, AST: 511, ALT: 122), elevated troponin (0.063), elevated BNP (142) and elevated procalcitonin (34.23). EKG showed SVT with PVCs. CXR showed mild increased interstitial attenuation (possible mild pulmonary edema). CT neck showed extensive left neck soft tissue swelling with inflammatory changes and edema with asymmetric heterogenous enlargement is seen of the left " sternocleidomastoid & numerous enlarged cervical chain lymph nodes are seen throughout the region (cellulitis versus myositis or neoplastic process) without an abscess. He was given vancomycin, cefepime, sepsis bolus and 10 mg of diltiazem x2 and was started on a diltiazem drip. [facility] transfer to [facility] for E[facility]/OMFS evaluation was attempted, however, there were no bed availability. The patient was admitted to the ICU for further management.       Past Medical History:   Diagnosis Date    Arthritis 1971    Bone Spurs in feet    Basal cell carcinoma 11/06/2018    left ear helix    Foreign body in conjunctival sac     Hernia, inguinal, right 2002    Joint pain 1971    Bones of feet    Keloid cicatrix 1958 2010 2018 2019    Hernia, Knee, Arm, Ear    Melanoma 09/14/2018    Right Arm 0.6mm    Severe sleep apnea        Past Surgical History:   Procedure Laterality Date    CLOSURE OF DEFECT OF MOHS PROCEDURE Left 1/3/2019    Procedure: CLOSURE, MOHS PROCEDURE DEFECT LEFT EAR;  Surgeon: Jeramy Meek MD;  Location: [facility] OR [address] [place] [address];  Service: Plastics;  Laterality: Left;  plastics set    COLONOSCOPY N/A 6/30/2021    Procedure: COLONOSCOPY;  Surgeon: Juliano Santoyo MD;  [facility]  Service: Endoscopy;  Laterality: N/A;  severe sleep apnea     fully vaccinated-GT    HERNIA REPAIR Left     3 years of age    HERNIA REPAIR N/A     Ventral wall at 3 year of age.    KNEE SURGERY Right 1984    Arthroscopic    NASAL SEPTUM SURGERY N/A 2009    SKIN BIOPSY  several over time       Review of patient's allergies indicates:  No Known Allergies    No current facility-administered medications on file prior to encounter.     Current Outpatient Medications on File Prior to Encounter   Medication Sig    azelastine (ASTELIN) 137 mcg (0.1 %) nasal spray 1 spray (137 mcg total) by Nasal route 2 (two) times daily.    calcium-magnesium-zinc Tab Take 2 tablets by mouth once daily.    ciclopirox (PENLAC) 8  % Soln Apply daily to affected nail. Must remove and restart weekly    guaiFENesin (MUCINEX) 600 mg 12 hr tablet Take 1,200 mg by mouth 2 (two) times daily.    ketoconazole (NIZORAL) 2 % cream AAA bid to both feet x 3 weeks    multivitamin capsule Take 1 capsule by mouth once daily.    tamsulosin (FLOMAX) 0.4 mg Cap TAKE 1 CAPSULE BY MOUTH EVERY DAY    celecoxib (CELEBREX) 200 MG capsule Take 1 capsule (200 mg total) by mouth once daily. With food    sars-cov-2, covid-19 omicron, (MODERNA COVID-19) 50 mcg/0.5 mL injection Inject into the muscle.     Family History       Problem Relation (Age of Onset)    Aneurysm Mother    Cancer Father (72)    Eczema Brother    Gout Brother    Hypertension Mother    No Known Problems Daughter, Son, Daughter, Son    Psoriasis Brother    Stroke Mother          Tobacco Use    Smoking status: Never    Smokeless tobacco: Never    Tobacco comments:     Second hand smoke from mother. Cigarrette smoke inflames my sinuses.   Substance and Sexual Activity    Alcohol use: Not Currently     Alcohol/week: 1.0 standard drink     Types: 1 Glasses of wine per week     Comment: Rare    Drug use: No    Sexual activity: Yes     Partners: Female     Birth control/protection: None     Review of Systems   Constitutional:  Positive for activity change, appetite change and chills.   HENT:  Positive for facial swelling, sinus pressure and sinus pain. Negative for dental problem.    Eyes: Negative.    Respiratory: Negative.     Cardiovascular: Negative.    Gastrointestinal: Negative.    Endocrine: Negative.    Genitourinary: Negative.    Musculoskeletal: Negative.  Negative for neck pain.   Skin: Negative.    Allergic/Immunologic: Negative.    Neurological: Negative.    Psychiatric/Behavioral: Negative.     Objective:     Vital Signs (Most Recent):  Temp: 97.7 °F (36.5 °C) (10/24/22 2125)  Pulse: (!) 126 (10/24/22 2229)  Resp: (!) 26 (10/24/22 2229)  BP: (!) 148/69 (10/24/22 2229)  SpO2: 97 %  (10/24/22 2229)   Vital Signs (24h Range):  Temp:  [97.7 °F (36.5 °C)-100.7 °F (38.2 °C)] 97.7 °F (36.5 °C)  Pulse:  [] 126  Resp:  [20-28] 26  SpO2:  [95 %-97 %] 97 %  BP: (105-148)/(58-90) 148/69     Weight: 126.6 kg (279 lb)  Body mass index is 38.91 kg/m².    Physical Exam  Vitals and nursing note reviewed.   Constitutional:       General: He is in acute distress.      Appearance: He is ill-appearing.   HENT:      Head: Normocephalic and atraumatic.      Nose: Nose normal.      Mouth/Throat:      Mouth: Mucous membranes are dry.   Eyes:      Extraocular Movements: Extraocular movements intact.   Cardiovascular:      Rate and Rhythm: Tachycardia present. Rhythm irregular.      Pulses: Normal pulses.      Heart sounds: No murmur heard.  Pulmonary:      Effort: Respiratory distress present.      Breath sounds: Rales present.   Abdominal:      General: Abdomen is flat.      Palpations: Abdomen is soft.      Tenderness: There is no abdominal tenderness.   Musculoskeletal:      Right lower leg: No edema.      Left lower leg: No edema.      Comments: Left sided neck swelling, firm without fluctuance    Skin:     General: Skin is warm.   Neurological:      General: No focal deficit present.      Mental Status: He is alert.   Psychiatric:         Mood and Affect: Mood normal.           Significant Labs: All pertinent labs within the past 24 hours have been reviewed.    Significant Imaging: I have reviewed all pertinent imaging results/findings within the past 24 hours.      Assessment/Plan:     * Sepsis  This patient does have evidence of infective focus  My overall impression is sepsis. Vital signs were reviewed and noted in progress note.  Antibiotics given-   Antibiotics (From admission, onward)    Start     Stop Route Frequency Ordered    10/25/22 0700  cefTRIAXone (ROCEPHIN) 1 g/50 mL D5W IVPB         -- IV Every 12 hours (non-standard times) 10/24/22 2305    10/25/22 0300  metronidazole IVPB 500 mg         --  IV Every 8 hours (non-standard times) 10/24/22 2232    10/24/22 2051  vancomycin - pharmacy to dose  (vancomycin IVPB)        See Hyperspace for full Linked Orders Report.    -- IV pharmacy to manage frequency 10/24/22 1952    10/24/22 2000  vancomycin (VANCOCIN) 2,500 mg in dextrose 5 % 500 mL IVPB         10/25 0759 IV ED 1 Time 10/24/22 1955        Cultures were taken-   Microbiology Results (last 7 days)     Procedure Component Value Units Date/Time    Blood culture x two cultures. Draw prior to antibiotics. [[id number]] Collected: 10/24/22 1911    Order Status: Sent Specimen: Blood from Peripheral, Antecubital, Left Updated: 10/24/22 1916    Blood culture x two cultures. Draw prior to antibiotics. [[id number]] Collected: 10/24/22 1910    Order Status: Sent Specimen: Blood from Peripheral, Hand, Right Updated: 10/24/22 1916        Latest lactate reviewed, they are-  Recent Labs   Lab 10/24/22  2058 10/24/22  2306   LACTATE 2.4* 2.1       Organ dysfunction indicated by Acute liver injury  Source- Likely deep soft tissue neck infection related to possibly recent sinusitis infection or odontogenic in source     Plan:   Source control Achieved by- Antibiotics (Vancomycin, ceftriaxone and flagyl)  ENT consult - consider transfer to [facility] for evaluation, currently there is no concern for airway compromise   Trend perfusion parameters       Paroxysmal atrial fibrillation  - New onset   - [id number] Score: 1   - Will hold off on anticoagulation given low score   - Continue diltiazem drip   - Cardiology consult     Neck swelling  Likely related to an underlying infection, see plan for sepsis   If not resolving, consider exploring autoimmune or neoplastic processes       Elevated LFTs  Likely related to underlying muscle infection and sepsis   Check RUQ US   Hepatitis panel     Morbid obesity  Body mass index is 38.91 kg/m². Morbid obesity complicates all aspects of disease management from diagnostic modalities to  treatment. Weight loss encouraged and health benefits explained to patient.         BPH with urinary obstruction  Resume Flomax       Complex sleep apnea syndrome  Resume BiPAP         VTE Risk Mitigation (From admission, onward)         Ordered     IP VTE HIGH RISK PATIENT  Once         10/24/22 2303     Place sequential compression device  Until discontinued         10/24/22 2303                   Terry Crawford MD  Department of Hospital Medicine   [facility] - Emergency Dept

## 2022-10-25 NOTE — PLAN OF CARE
Problem: Adult Inpatient Plan of Care  Goal: Plan of Care Review  Outcome: Ongoing, Progressing  Goal: Patient-Specific Goal (Individualized)  Outcome: Ongoing, Progressing  Goal: Absence of Hospital-Acquired Illness or Injury  Outcome: Ongoing, Progressing  Goal: Optimal Comfort and Wellbeing  Outcome: Ongoing, Progressing  Goal: Readiness for Transition of Care  Outcome: Ongoing, Progressing     Problem: Adjustment to Illness (Sepsis/Septic Shock)  Goal: Optimal Coping  Outcome: Ongoing, Progressing     Problem: Bleeding (Sepsis/Septic Shock)  Goal: Absence of Bleeding  Outcome: Ongoing, Progressing     Problem: Glycemic Control Impaired (Sepsis/Septic Shock)  Goal: Blood Glucose Level Within Desired Range  Outcome: Ongoing, Progressing     Problem: Infection Progression (Sepsis/Septic Shock)  Goal: Absence of Infection Signs and Symptoms  Outcome: Ongoing, Progressing     Problem: Nutrition Impaired (Sepsis/Septic Shock)  Goal: Optimal Nutrition Intake  Outcome: Ongoing, Progressing     Problem: Infection  Goal: Absence of Infection Signs and Symptoms  Outcome: Ongoing, Progressing     Problem: Impaired Wound Healing  Goal: Optimal Wound Healing  Outcome: Ongoing, Progressing     Problem: Fluid and Electrolyte Imbalance (Acute Kidney Injury/Impairment)  Goal: Fluid and Electrolyte Balance  Outcome: Ongoing, Progressing     Problem: Oral Intake Inadequate (Acute Kidney Injury/Impairment)  Goal: Optimal Nutrition Intake  Outcome: Ongoing, Progressing     Problem: Renal Function Impairment (Acute Kidney Injury/Impairment)  Goal: Effective Renal Function  Outcome: Ongoing, Progressing

## 2022-10-25 NOTE — EICU
Brief Note     67 yr old male PMH TJ   Body mass index is 39.02 kg/m².    Left sided neck swelling   Sepsis     Patient's neck is tender but not significantly, he can move his neck around. Does not object to palpation. Left side of the neck is warm but not hot        IVF   On cefepime / received vanco   ENT eval   May need CT sinuses as well   Follow c/s   Cardizem infusion ongoing

## 2022-10-25 NOTE — ASSESSMENT & PLAN NOTE
Likely related to an underlying infection, see plan for sepsis   If not resolving, consider exploring autoimmune or neoplastic processes

## 2022-10-25 NOTE — ASSESSMENT & PLAN NOTE
Likely related to underlying muscle infection and sepsis   Check RUQ US ,has fatty liver Hepatitis panel

## 2022-10-25 NOTE — PLAN OF CARE
Problem: Adjustment to Illness (Sepsis/Septic Shock)  Goal: Optimal Coping  Outcome: Ongoing, Progressing     Problem: Bleeding (Sepsis/Septic Shock)  Goal: Absence of Bleeding  Outcome: Ongoing, Progressing     Problem: Oral Intake Inadequate (Acute Kidney Injury/Impairment)  Goal: Optimal Nutrition Intake  Outcome: Ongoing, Progressing     Problem: Fluid and Electrolyte Imbalance (Acute Kidney Injury/Impairment)  Goal: Fluid and Electrolyte Balance  Outcome: Ongoing, Not Progressing     Problem: Fluid and Electrolyte Imbalance (Acute Kidney Injury/Impairment)  Goal: Fluid and Electrolyte Balance  Outcome: Ongoing, Not Progressing

## 2022-10-25 NOTE — PROGRESS NOTES
Pharmacokinetic Assessment Follow Up: IV Vancomycin    Vancomycin serum concentration assessment(s):    The random level was drawn correctly and can be used to guide therapy at this time. The measurement is above the desired definitive target range of 10 to 20 mcg/mL.    Vancomycin Regimen Plan:    No dose today.  Re-dose when the random level is less than 20 mcg/mL, next level to be drawn at 0300 on 10/26/2022    Drug levels (last 3 results):  Recent Labs   Lab Result Units 10/25/22  0919   Vancomycin, Random ug/mL 21.0       Pharmacy will continue to follow and monitor vancomycin.    Please contact pharmacy at extension [phone] for questions regarding this assessment.    Thank you for the consult,   Trevon Sepulveda Jr       Patient brief summary:  Elías Gay is a 67 y.o. male initiated on antimicrobial therapy with IV Vancomycin for treatment of skin & soft tissue infection    Drug Allergies:   Review of patient's allergies indicates:  No Known Allergies    Actual Body Weight:   126.9 kg    Renal Function:   Estimated Creatinine Clearance: 41.1 mL/min (A) (based on SCr of 2.3 mg/dL (H)).,     Dialysis Method (if applicable):  N/A    CBC (last 72 hours):  Recent Labs   Lab Result Units 10/24/22  1909 10/25/22  0050   WBC K/uL 38.94* 35.76*   Hemoglobin g/dL 14.5 13.8*   Hematocrit % 42.0 39.6*   Platelets K/uL 262 233   Gran % % 89.9* 78.0*   Lymph % % 1.5* 7.0*   Mono % % 4.4 3.0*   Eosinophil % % 0.1 0.0   Basophil % % 0.5 0.0   Differential Method  Automated Manual       Metabolic Panel (last 72 hours):  Recent Labs   Lab Result Units 10/24/22  1909 10/25/22  0050 10/25/22  0230   Sodium mmol/L 136 135*  --    Potassium mmol/L 3.5 3.7  --    Chloride mmol/L 102 101  --    CO2 mmol/L 20* 22*  --    Glucose mg/dL 93 125*  --    Glucose, UA   --   --  Negative   BUN mg/dL 23 29*  --    Creatinine mg/dL 1.8* 2.3*  --    Albumin g/dL 2.9* 2.6*  --    Total Bilirubin mg/dL 1.4* 1.0  --    Alkaline Phosphatase U/L  110 108  --    AST U/L 511* 707*  --    ALT U/L 122* 173*  --    Magnesium mg/dL 1.9 1.9  --    Phosphorus mg/dL 1.9* 4.6*  --        Vancomycin Administrations:  vancomycin given in the last 96 hours                     vancomycin (VANCOCIN) 2,500 mg in dextrose 5 % 500 mL IVPB ()  Restarted 10/24/22 2348      Restarted  2304     2,500 mg New Bag  2057                    Microbiologic Results:  Microbiology Results (last 7 days)       Procedure Component Value Units Date/Time    Blood culture x two cultures. Draw prior to antibiotics. [[id number]] Collected: 10/24/22 1911    Order Status: Completed Specimen: Blood from Peripheral, Antecubital, Left Updated: 10/25/22 0312     Blood Culture, Routine No Growth to date    Narrative:      Aerobic and anaerobic    Blood culture x two cultures. Draw prior to antibiotics. [[id number]] Collected: 10/24/22 1910    Order Status: Completed Specimen: Blood from Peripheral, Hand, Right Updated: 10/25/22 0312     Blood Culture, Routine No Growth to date    Narrative:      Aerobic and anaerobic    Urine culture [[id number]] Collected: 10/25/22 0230    Order Status: No result Specimen: Urine Updated: 10/25/22 0253

## 2022-10-25 NOTE — CONSULTS
[facility] - Intensive Care  Cardiology  Consult Note    Patient Name: Elías Gay  MRN: [id number]  Admission Date: 10/24/2022  Hospital Length of Stay: 1 days  Code Status: Full Code   Attending Provider: Bob Mccollum MD   Consulting Provider: Warren Anthony MD  Primary Care Physician: Kameron Diaz MD  Principal Problem:Sepsis    Patient information was obtained from patient, past medical records and ER records.     Inpatient consult to Cardiology  Consult performed by: Warren Anthony MD  Consult ordered by: Bob Mccollum MD        Subjective:     Chief Complaint:  Atrial fibrillation     HPI:   Elías Gay is a 68 yo male who presented with complaints of weakness.  Complaints of sinusitis/sinus congestion over the past few weeks.  This is a 67 year old male with a PMHx of BPH, TJ (on BiPAP) and obesity who presents with generalized weakness.  In the emergency room patient was febrile.  Tachypneic.  Tachycardic.Initial EKG poor quality.  Subsequent EKG appeared to be SVT.  Last EKG atrial fibrillation. Elevated bnp, minimally abnormal troponin. He was started on a diltiazem gtt. Currently in sinus rhythm. History of TJ.    Echocardiogram 10/25/2022:     TDS secondary to body habitus.   The left ventricle is normal in size with normal systolic function.   The estimated ejection fraction is 60%.   Indeterminate left ventricular diastolic function.   Mild left atrial enlargement.   Normal right ventricular size with normal right ventricular systolic function.   Intermediate central venous pressure (8 mmHg).   The estimated PA systolic pressure is 27 mmHg.   There is no pulmonary hypertension     Echocardiogram 04/24/2018:    CONCLUSIONS     1 - No wall motion abnormalities.     2 - Normal left ventricular systolic function (EF 60-65%).     3 - Concentric remodeling.     4 - Normal left ventricular diastolic function.     5 - Right atrial enlargement.     6 - Normal right  ventricular systolic function .           Past Medical History:   Diagnosis Date    Acute renal failure 10/25/2022    Arthritis 1971    Bone Spurs in feet    Basal cell carcinoma 11/06/2018    left ear helix    Foreign body in conjunctival sac     Hernia, inguinal, right 2002    Joint pain 1971    Bones of feet    Keloid cicatrix 1958 2010 2018 2019    Hernia, Knee, Arm, Ear    Melanoma 09/14/2018    Right Arm 0.6mm    Severe sleep apnea        Past Surgical History:   Procedure Laterality Date    CLOSURE OF DEFECT OF MOHS PROCEDURE Left 1/3/2019    Procedure: CLOSURE, MOHS PROCEDURE DEFECT LEFT EAR;  Surgeon: Jeramy Meek MD;  Location: [facility] OR 2ND FLR;  Service: Plastics;  Laterality: Left;  plastics set    COLONOSCOPY N/A 6/30/2021    Procedure: COLONOSCOPY;  Surgeon: Juliano Santoyo MD;  Location: [facility] ENDO (2ND FLR);  Service: Endoscopy;  Laterality: N/A;  severe sleep apnea     fully vaccinated-GT    HERNIA REPAIR Left     3 years of age    HERNIA REPAIR N/A     Ventral wall at 3 year of age.    KNEE SURGERY Right 1984    Arthroscopic    NASAL SEPTUM SURGERY N/A 2009    SKIN BIOPSY  several over time       Review of patient's allergies indicates:  No Known Allergies    No current facility-administered medications on file prior to encounter.     Current Outpatient Medications on File Prior to Encounter   Medication Sig    azelastine (ASTELIN) 137 mcg (0.1 %) nasal spray 1 spray (137 mcg total) by Nasal route 2 (two) times daily.    calcium-magnesium-zinc Tab Take 2 tablets by mouth once daily.    ciclopirox (PENLAC) 8 % Soln Apply daily to affected nail. Must remove and restart weekly    guaiFENesin (MUCINEX) 600 mg 12 hr tablet Take 1,200 mg by mouth 2 (two) times daily.    ketoconazole (NIZORAL) 2 % cream AAA bid to both feet x 3 weeks    multivitamin capsule Take 1 capsule by mouth once daily.    tamsulosin (FLOMAX) 0.4 mg Cap TAKE 1 CAPSULE BY MOUTH EVERY DAY    celecoxib  (CELEBREX) 200 MG capsule Take 1 capsule (200 mg total) by mouth once daily. With food    sars-cov-2, covid-19 omicron, (MODERNA COVID-19) 50 mcg/0.5 mL injection Inject into the muscle.     Family History       Problem Relation (Age of Onset)    Aneurysm Mother    Cancer Father (72)    Eczema Brother    Gout Brother    Hypertension Mother    No Known Problems Daughter, Son, Daughter, Son    Psoriasis Brother    Stroke Mother          Tobacco Use    Smoking status: Never    Smokeless tobacco: Never    Tobacco comments:     Second hand smoke from mother. Cigarrette smoke inflames my sinuses.   Substance and Sexual Activity    Alcohol use: Not Currently     Alcohol/week: 1.0 standard drink     Types: 1 Glasses of wine per week     Comment: Rare    Drug use: No    Sexual activity: Yes     Partners: Female     Birth control/protection: None     Review of Systems   Constitutional: Negative for diaphoresis and malaise/fatigue.   HENT:  Positive for congestion. Negative for ear pain, hoarse voice, nosebleeds, odynophagia, sore throat and stridor.    Cardiovascular:  Negative for chest pain, dyspnea on exertion, irregular heartbeat, leg swelling, near-syncope, orthopnea, palpitations, paroxysmal nocturnal dyspnea and syncope.   Respiratory:  Negative for shortness of breath, sputum production and wheezing.    Gastrointestinal:  Negative for abdominal pain, heartburn, hematemesis and melena.   Neurological:  Negative for dizziness, focal weakness, light-headedness, numbness, paresthesias, seizures and weakness.   Objective:     Vital Signs (Most Recent):  Temp: 98.2 °F (36.8 °C) (10/25/22 0700)  Pulse: 83 (10/25/22 1000)  Resp: (!) 23 (10/25/22 1000)  BP: 120/64 (10/25/22 1000)  SpO2: 95 % (10/25/22 1000)   Vital Signs (24h Range):  Temp:  [97.7 °F (36.5 °C)-100.7 °F (38.2 °C)] 98.2 °F (36.8 °C)  Pulse:  [] 83  Resp:  [17-32] 23  SpO2:  [91 %-97 %] 95 %  BP: ()/(52-99) 120/64     Weight: 126.9 kg (279 lb  12.2 oz)  Body mass index is 39.02 kg/m².    SpO2: 95 %  O2 Device (Oxygen Therapy): room air      Intake/Output Summary (Last 24 hours) at 10/25/2022 1130  Last data filed at 10/25/2022 1000  Gross per 24 hour   Intake 3081.23 ml   Output 390 ml   Net 2691.23 ml       Lines/Drains/Airways       Peripherally Inserted Central Catheter Line  Duration             PICC Triple Lumen 10/25/22 1030 right basilic <1 day              Drain  Duration                  Urethral Catheter 10/25/22 0230 16 Fr. <1 day              Airway  Duration                  Airway - Non-Surgical 19 1233 Mask 1390 days              Peripheral Intravenous Line  Duration                  Peripheral IV - Single Lumen 10/24/22 20 G Left;Posterior Hand 1 day         Peripheral IV - Single Lumen 10/25/22 0210 22 G Left;Posterior Hand <1 day                    Physical Exam  Vitals reviewed.   Constitutional:       General: He is not in acute distress.     Appearance: He is obese. He is not diaphoretic.   Neck:      Vascular: No carotid bruit or JVD.   Cardiovascular:      Rate and Rhythm: Normal rate and regular rhythm.      Pulses: Normal pulses.   Pulmonary:      Effort: Pulmonary effort is normal.      Breath sounds: Normal breath sounds.   Abdominal:      General: Bowel sounds are normal.      Palpations: Abdomen is soft.      Tenderness: There is no abdominal tenderness.   Musculoskeletal:      Cervical back: Edema and erythema present.      Right lower le+ Edema present.      Left lower le+ Edema present.      Comments: Venous stasis dermatitis   Neurological:      Mental Status: He is alert and oriented to person, place, and time.   Psychiatric:         Speech: Speech normal.         Behavior: Behavior normal.       Significant Labs: CMP   Recent Labs   Lab 10/24/22  1909 10/25/22  0050    135*   K 3.5 3.7    101   CO2 20* 22*   GLU 93 125*   BUN 23 29*   CREATININE 1.8* 2.3*   CALCIUM 8.8 8.4*   PROT 7.2 6.7    ALBUMIN 2.9* 2.6*   BILITOT 1.4* 1.0   ALKPHOS 110 108   * 707*   * 173*   ANIONGAP 14 12   , CBC   Recent Labs   Lab 10/24/22  1909 10/25/22  0050   WBC 38.94* 35.76*   HGB 14.5 13.8*   HCT 42.0 39.6*    233   , Lipid Panel No results for input(s): CHOL, HDL, LDLCALC, TRIG, CHOLHDL in the last 48 hours., and Troponin   Recent Labs   Lab 10/24/22  1909   TROPONINI 0.063*       Significant Imaging: Echocardiogram: Transthoracic echo (TTE) complete (Cupid Only):   Results for orders placed or performed during the hospital encounter of 10/24/22   Echo   Result Value Ref Range    BSA 2.52 m2    TDI SEPTAL 0.10 m/s    LV LATERAL E/E' RATIO 11.43 m/s    LV SEPTAL E/E' RATIO 8.00 m/s    IVC diameter 2.59 cm    Left Ventricular Outflow Tract Mean Velocity 0.76 cm/s    Left Ventricular Outflow Tract Mean Gradient 2.56 mmHg    TDI LATERAL 0.07 m/s    PV PEAK VELOCITY 0.94 cm/s    LVIDd 5.66 3.5 - 6.0 cm    IVS 1.16 (A) 0.6 - 1.1 cm    Posterior Wall 1.05 0.6 - 1.1 cm    LVIDs 3.87 2.1 - 4.0 cm    FS 32 28 - 44 %    Sinus 3.55 cm    STJ 3.12 cm    Ascending aorta 3.89 cm    LV mass 255.29 g    LA size 4.81 cm    RVDD 4.88 cm    TAPSE 2.38 cm    RV S' 0.01 cm/s    Left Ventricle Relative Wall Thickness 0.37 cm    AV mean gradient 9 mmHg    AV valve area 2.44 cm2    AV Velocity Ratio 0.50     AV index (prosthetic) 0.55     MV valve area p 1/2 method 4.67 cm2    E/A ratio 1.01     Mean e' 0.09 m/s    E wave deceleration time 162.55 msec    IVRT 81.83 msec    LVOT diameter 2.38 cm    LVOT area 4.4 cm2    LVOT peak asif 0.99 m/s    LVOT peak VTI 19.90 cm    Ao peak asif 2.00 m/s    Ao VTI 36.2 cm    LVOT stroke volume 88.49 cm3    AV peak gradient 16 mmHg    TV peak gradient 0.57 mmHg    E/E' ratio 9.41 m/s    MV Peak E Asif 0.80 m/s    TR Max Asif 2.17 m/s    MV stenosis pressure 1/2 time 47.14 ms    MV Peak A Asif 0.79 m/s    LV Systolic Volume 64.67 mL    LV Systolic Volume Index 26.6 mL/m2    LV Diastolic  Volume 157.45 mL    LV Diastolic Volume Index 64.79 mL/m2    LV Mass Index 105 g/m2    RA Major Axis 5.77 cm    Left Atrium Minor Axis 5.87 cm    Left Atrium Major Axis 5.60 cm    Triscuspid Valve Regurgitation Peak Gradient 19 mmHg    LA WIDTH 4.20 cm    LA volume 98.43 cm3    LA Volume Index 40.5 mL/m2    Right Atrial Pressure (from IVC) 8 mmHg    EF 60 %    TV rest pulmonary artery pressure 27 mmHg    Narrative    · TDS secondary to body habitus.  · The left ventricle is normal in size with normal systolic function.  · The estimated ejection fraction is 60%.  · Indeterminate left ventricular diastolic function.  · Mild left atrial enlargement.  · Normal right ventricular size with normal right ventricular systolic   function.  · Intermediate central venous pressure (8 mmHg).  · The estimated PA systolic pressure is 27 mmHg.  · There is no pulmonary hypertension.        Assessment and Plan:     Paroxysmal atrial fibrillation  10/25/2022: [facility] - 1. Place patient on Toprol.  Discontinue to Diltiazem.  IIB indication for anticoagulation. Can place on ASA. Outpatient event monitor.        VTE Risk Mitigation (From admission, onward)         Ordered     heparin (porcine) injection 5,000 Units  Every 8 hours         10/25/22 0714     IP VTE HIGH RISK PATIENT  Once         10/24/22 2303     Place sequential compression device  Until discontinued         10/24/22 2303              Critical Care Time: 30 minutes     Critical care was time spent personally by me on the following activities: development of treatment plan with patient or surrogate and bedside caregivers, discussions with consultants, evaluation of patient's response to treatment, examination of patient, ordering and performing treatments and interventions, ordering and review of laboratory studies, ordering and review of radiographic studies, pulse oximetry, re-evaluation of patient's condition. This critical care time did not overlap with that of any  other provider or involve time for any procedures.    Thank you for your consult. I will follow-up with patient. Please contact us if you have any additional questions.    Warren Anthony MD  Cardiology   [facility] - [place] - Intensive Care

## 2022-10-25 NOTE — ASSESSMENT & PLAN NOTE
- New onset   - [id number] Score: 1   - Will hold off on anticoagulation given low score   - Continue diltiazem drip   - Cardiology consult .  Off Cardizem,sinus at this time,echo. Is pending.

## 2022-10-25 NOTE — ASSESSMENT & PLAN NOTE
- New onset   - [id number] Score: 1   - Will hold off on anticoagulation given low score   - Continue diltiazem drip   - Cardiology consult

## 2022-10-25 NOTE — PROGRESS NOTES
Pharmacokinetic Initial Assessment: IV Vancomycin    Assessment/Plan:    Initiate intravenous vancomycin with loading dose of 2500 mg once with subsequent doses when random concentrations are less than 20 mcg/mL  Desired empiric serum trough concentration is 10 to 20 mcg/mL  Draw vancomycin random level on 10/25/22 at 0900.  Pharmacy will continue to follow and monitor vancomycin.      Please contact pharmacy at extension [phone] with any questions regarding this assessment.     Thank you for the consult,   Floresita Holloway       Patient brief summary:  Elías Gay is a 67 y.o. male initiated on antimicrobial therapy with IV Vancomycin for treatment of suspected skin & soft tissue infection    Drug Allergies:   Review of patient's allergies indicates:  No Known Allergies    Actual Body Weight:   126.6 kg     Renal Function:   Estimated Creatinine Clearance: 52.5 mL/min (A) (based on SCr of 1.8 mg/dL (H)).,     Dialysis Method (if applicable):  N/A    CBC (last 72 hours):  Recent Labs   Lab Result Units 10/24/22  1909   WBC K/uL 38.94*   Hemoglobin g/dL 14.5   Hematocrit % 42.0   Platelets K/uL 262   Gran % % 89.9*   Lymph % % 1.5*   Mono % % 4.4   Eosinophil % % 0.1   Basophil % % 0.5   Differential Method  Automated       Metabolic Panel (last 72 hours):  Recent Labs   Lab Result Units 10/24/22  1909   Sodium mmol/L 136   Potassium mmol/L 3.5   Chloride mmol/L 102   CO2 mmol/L 20*   Glucose mg/dL 93   BUN mg/dL 23   Creatinine mg/dL 1.8*   Albumin g/dL 2.9*   Total Bilirubin mg/dL 1.4*   Alkaline Phosphatase U/L 110   AST U/L 511*   ALT U/L 122*   Magnesium mg/dL 1.9   Phosphorus mg/dL 1.9*       Drug levels (last 3 results):  No results for input(s): VANCOMYCINRA, VANCORANDOM, VANCOMYCINPE, VANCOPEAK, VANCOMYCINTR, VANCOTROUGH in the last 72 hours.    Microbiologic Results:  Microbiology Results (last 7 days)       Procedure Component Value Units Date/Time    Blood culture x two cultures. Draw prior to antibiotics.  [[id number]] Collected: 10/24/22 1911    Order Status: Sent Specimen: Blood from Peripheral, Antecubital, Left Updated: 10/24/22 1916    Blood culture x two cultures. Draw prior to antibiotics. [[id number]] Collected: 10/24/22 1910    Order Status: Sent Specimen: Blood from Peripheral, Hand, Right Updated: 10/24/22 1916

## 2022-10-25 NOTE — PROGRESS NOTES
[facility] Medicine  Progress Note    Patient Name: Elías Gay  MRN: [id number]  Patient Class: IP- Inpatient   Admission Date: 10/24/2022  Length of Stay: 1 days  Attending Physician: Bob Mccollum MD  Primary Care Provider: Kameron Diaz MD        Subjective:     Principal Problem:Sepsis        HPI:  This is a 67 year old male with a PMHx of BPH, TJ (on BiPAP) and obesity who presents with generalized weakness.     Patient reports developing worsening sinusitis over the last 3 weeks. He reports that his nasal discharge have been bloody mixed with mucous. About a week prior, his symptoms progressively became worse and 3 days prior to presentation he noticed left neck swelling. He tried to carry on with his ADLs but felt too weak and he collapsed on the bathroom floor, with no loss of consciousness. He reported having chills, and decreased po intake, but denied having cough, SOB or diarrhea. His wife is sick with similar symptoms.     In the ED, the patient was febrile (38.2), tachycardic (130s), tachypnic and requiring 2L of supplemental O2. Labs showed leukocytosis (38.9 - with neutrophilia), elevated creatinine (1.8 - baseline of 0.9), elevated LFTs (Tbili: 1.4, AST: 511, ALT: 122), elevated troponin (0.063), elevated BNP (142) and elevated procalcitonin (34.23). EKG showed SVT with PVCs. CXR showed mild increased interstitial attenuation (possible mild pulmonary edema). CT neck showed extensive left neck soft tissue swelling with inflammatory changes and edema with asymmetric heterogenous enlargement is seen of the left sternocleidomastoid & numerous enlarged cervical chain lymph nodes are seen throughout the region (cellulitis versus myositis or neoplastic process) without an abscess. He was given vancomycin, cefepime, sepsis bolus and 10 mg of diltiazem x2 and was started on a diltiazem drip. A transfer to [facility] for ENT/OMFS evaluation was attempted, however, there  were no bed availability. The patient was admitted to the ICU for further management.       Overview/Hospital Course:  This is a 67 year old male with a PMHx of BPH, TJ (on BiPAP) and obesity who presents with generalized weakness.   Patient reports developing worsening sinusitis over the last 3 weeks. He reports that his nasal discharge have been bloody mixed with mucous. About a week prior, his symptoms progressively became worse and 3 days prior to presentation he noticed left neck swelling. He tried to carry on with his ADLs but felt too weak and he collapsed on the bathroom floor, with no loss of consciousness. He reported having chills, and decreased po intake, but denied having cough, SOB or diarrhea. His wife is sick with similar symptoms.   In the ED, the patient was febrile (38.2), tachycardic (130s), tachypnic and requiring 2L of supplemental O2. Labs showed leukocytosis (38.9 - with neutrophilia), elevated creatinine (1.8 - baseline of 0.9), elevated LFTs (Tbili: 1.4, AST: 511, ALT: 122), elevated troponin (0.063), elevated BNP (142) and elevated procalcitonin (34.23). EKG showed SVT with PVCs. CXR showed mild increased interstitial attenuation (possible mild pulmonary edema). CT neck showed extensive left neck soft tissue swelling with inflammatory changes and edema with asymmetric heterogenous enlargement is seen of the left sternocleidomastoid & numerous enlarged cervical chain lymph nodes are seen throughout the region (cellulitis versus myositis or neoplastic process) without an abscess. He was given vancomycin, cefepime, sepsis bolus and 10 mg of diltiazem x2 and was started on a diltiazem drip. A transfer to [facility] for ENT evaluation was attempted, however, there were no bed availability. The patient was admitted to the ICU for further management. No sign of dental abscess,going frequently to dentist,  Hypotension  is resolved with IVF,BP is stable at this time  Afib is resolved with  Cardizem gtt and  patiens in sinus  and stable,echo. Is pending.  Has rhabdomyolysis on IVF,will monitor.  On IVF for ARF.casillas is in place.  Cc is neck swelling.      Past Medical History:   Diagnosis Date    Arthritis 1971    Bone Spurs in feet    Basal cell carcinoma 11/06/2018    left ear helix    Foreign body in conjunctival sac     Hernia, inguinal, right 2002    Joint pain 1971    Bones of feet    Keloid cicatrix 1958 2010 2018 2019    Hernia, Knee, Arm, Ear    Melanoma 09/14/2018    Right Arm 0.6mm    Severe sleep apnea        Past Surgical History:   Procedure Laterality Date    CLOSURE OF DEFECT OF MOHS PROCEDURE Left 1/3/2019    Procedure: CLOSURE, MOHS PROCEDURE DEFECT LEFT EAR;  Surgeon: Jeramy Meek MD;  Location: [facility] OR 2ND FLR;  Service: Plastics;  Laterality: Left;  plastics set    COLONOSCOPY N/A 6/30/2021    Procedure: COLONOSCOPY;  Surgeon: Juliano Santoyo MD;  Location: [facility] ENDO (2ND FLR);  Service: Endoscopy;  Laterality: N/A;  severe sleep apnea     fully vaccinated-GT    HERNIA REPAIR Left     3 years of age    HERNIA REPAIR N/A     Ventral wall at 3 year of age.    KNEE SURGERY Right 1984    Arthroscopic    NASAL SEPTUM SURGERY N/A 2009    SKIN BIOPSY  several over time       Review of patient's allergies indicates:  No Known Allergies    No current facility-administered medications on file prior to encounter.     Current Outpatient Medications on File Prior to Encounter   Medication Sig    azelastine (ASTELIN) 137 mcg (0.1 %) nasal spray 1 spray (137 mcg total) by Nasal route 2 (two) times daily.    calcium-magnesium-zinc Tab Take 2 tablets by mouth once daily.    ciclopirox (PENLAC) 8 % Soln Apply daily to affected nail. Must remove and restart weekly    guaiFENesin (MUCINEX) 600 mg 12 hr tablet Take 1,200 mg by mouth 2 (two) times daily.    ketoconazole (NIZORAL) 2 % cream AAA bid to both feet x 3 weeks    multivitamin capsule Take 1 capsule by mouth once  daily.    tamsulosin (FLOMAX) 0.4 mg Cap TAKE 1 CAPSULE BY MOUTH EVERY DAY    celecoxib (CELEBREX) 200 MG capsule Take 1 capsule (200 mg total) by mouth once daily. With food    sars-cov-2, covid-19 omicron, (MODERNA COVID-19) 50 mcg/0.5 mL injection Inject into the muscle.     Family History       Problem Relation (Age of Onset)    Aneurysm Mother    Cancer Father (72)    Eczema Brother    Gout Brother    Hypertension Mother    No Known Problems Daughter, Son, Daughter, Son    Psoriasis Brother    Stroke Mother          Tobacco Use    Smoking status: Never    Smokeless tobacco: Never    Tobacco comments:     Second hand smoke from mother. Cigarrette smoke inflames my sinuses.   Substance and Sexual Activity    Alcohol use: Not Currently     Alcohol/week: 1.0 standard drink     Types: 1 Glasses of wine per week     Comment: Rare    Drug use: No    Sexual activity: Yes     Partners: Female     Birth control/protection: None     Review of Systems   Constitutional:  Positive for activity change, appetite change and chills.   HENT:  Positive for facial swelling, sinus pressure and sinus pain. Negative for dental problem.    Eyes: Negative.    Respiratory: Negative.     Cardiovascular: Negative.    Gastrointestinal: Negative.    Endocrine: Negative.    Genitourinary: Negative.    Musculoskeletal: Negative.  Negative for neck pain.   Skin: Negative.    Allergic/Immunologic: Negative.    Neurological: Negative.    Psychiatric/Behavioral: Negative.     Objective:     Vital Signs (Most Recent):  Temp: 98.2 °F (36.8 °C) (10/25/22 0700)  Pulse: 83 (10/25/22 1000)  Resp: (!) 23 (10/25/22 1000)  BP: 120/64 (10/25/22 1000)  SpO2: 95 % (10/25/22 1000)   Vital Signs (24h Range):  Temp:  [97.7 °F (36.5 °C)-100.7 °F (38.2 °C)] 98.2 °F (36.8 °C)  Pulse:  [] 83  Resp:  [17-32] 23  SpO2:  [91 %-97 %] 95 %  BP: ()/(52-99) 120/64     Weight: 126.9 kg (279 lb 12.2 oz)  Body mass index is 39.02 kg/m².    Physical  Exam  Vitals and nursing note reviewed.   Constitutional:       General: He is not in acute distress.     Appearance: He is not ill-appearing.   HENT:      Head: Normocephalic and atraumatic.      Comments: Left neck swelling      Nose: Nose normal.      Mouth/Throat:      Mouth: Mucous membranes are dry.   Eyes:      Extraocular Movements: Extraocular movements intact.   Cardiovascular:      Rate and Rhythm: Normal rate and regular rhythm.      Pulses: Normal pulses.      Heart sounds: No murmur heard.  Pulmonary:      Effort: No respiratory distress.      Breath sounds: No rales.   Abdominal:      General: Abdomen is flat.      Palpations: Abdomen is soft.      Tenderness: There is no abdominal tenderness.   Musculoskeletal:      Right lower leg: No edema.      Left lower leg: No edema.      Comments: Left sided neck swelling, firm without fluctuance    Skin:     General: Skin is warm.   Neurological:      General: No focal deficit present.      Mental Status: He is alert.   Psychiatric:         Mood and Affect: Mood normal.           Significant Labs: All pertinent labs within the past 24 hours have been reviewed.    Significant Imaging: I have reviewed all pertinent imaging results/findings within the past 24 hours.      CC is neck swelling   Assessment/Plan:      * Sepsis  This patient does have evidence of infective focus  My overall impression is sepsis. Vital signs were reviewed and noted in progress note.  Antibiotics given-   Antibiotics (From admission, onward)    Start     Stop Route Frequency Ordered    10/25/22 0900  mupirocin 2 % ointment         10/30 0859 Nasl 2 times daily 10/25/22 0714    10/25/22 0830  clindamycin in D5W 900 mg/50 mL IVPB 900 mg         -- IV Every 8 hours (non-standard times) 10/25/22 0725    10/25/22 0815  cefepime in dextrose 5 % IVPB 2 g         -- IV Every 12 hours (non-standard times) 10/25/22 0713    10/24/22 2051  vancomycin - pharmacy to dose  (vancomycin IVPB)         See Hyperspace for full Linked Orders Report.    -- IV pharmacy to manage frequency 10/24/22 1952        Cultures were taken-   Microbiology Results (last 7 days)     Procedure Component Value Units Date/Time    Blood culture x two cultures. Draw prior to antibiotics. [[id number]] Collected: 10/24/22 1911    Order Status: Completed Specimen: Blood from Peripheral, Antecubital, Left Updated: 10/25/22 0312     Blood Culture, Routine No Growth to date    Narrative:      Aerobic and anaerobic    Blood culture x two cultures. Draw prior to antibiotics. [[id number]] Collected: 10/24/22 1910    Order Status: Completed Specimen: Blood from Peripheral, Hand, Right Updated: 10/25/22 0312     Blood Culture, Routine No Growth to date    Narrative:      Aerobic and anaerobic    Urine culture [[id number]] Collected: 10/25/22 0230    Order Status: No result Specimen: Urine Updated: 10/25/22 0253        Latest lactate reviewed, they are-  Recent Labs   Lab 10/24/22  2058 10/24/22  2306   LACTATE 2.4* 2.1       Organ dysfunction indicated by Acute liver injury  Source- Likely deep soft tissue neck infection related to possibly recent sinusitis infection or odontogenic in source     Plan:   Source control Achieved by- Antibiotics (Vancomycin,cefpeime and clindamycin.  ENT consult -  transfer to [facility] for evaluation, currently there is no concern for airway compromise ,stable on RA.        Non-traumatic rhabdomyolysis  No sign of trauma,on IVF and will monitor.      Acute renal failure  On IVF,has casillas,will monitor.      Neck swelling  Likely related to an underlying infection, see plan for sepsis .  Appear to be sinusitis as source,no sign of dental abscess,going frequently to dentist.  Need be seen by ENT,not available in this fascility ,aclleed transfer center.  If not resolving, consider exploring autoimmune or neoplastic processes       Elevated LFTs  Likely related to underlying muscle infection and sepsis   Check RUQ US  ,has fatty liver Hepatitis panel     Paroxysmal atrial fibrillation  - New onset   - [id number] Score: 1   - Will hold off on anticoagulation given low score   - Continue diltiazem drip   - Cardiology consult .  Off Cardizem,sinus at this time,echo. Is pending.    Morbid obesity  Body mass index is 38.91 kg/m². Morbid obesity complicates all aspects of disease management from diagnostic modalities to treatment. Weight loss encouraged and health benefits explained to patient.         BPH with urinary obstruction  Resume Flomax       Complex sleep apnea syndrome  Resume BiPAP         VTE Risk Mitigation (From admission, onward)         Ordered     heparin (porcine) injection 5,000 Units  Every 8 hours         10/25/22 0714     IP VTE HIGH RISK PATIENT  Once         10/24/22 2303     Place sequential compression device  Until discontinued         10/24/22 2303                Discharge Planning   [place]:      Code Status: Full Code   Is the patient medically ready for discharge?:     Reason for patient still in hospital (select all that apply): Patient trending condition               Critical care time spent on the evaluation and treatment of severe organ dysfunction, review of pertinent labs and imaging studies, discussions with consulting providers and discussions with patient/family: over 45  minutes.      Bob Mccollum MD  Department of Hospital Medicine   [facility] [place] - Intensive Care

## 2022-10-25 NOTE — ASSESSMENT & PLAN NOTE
This patient does have evidence of infective focus  My overall impression is sepsis. Vital signs were reviewed and noted in progress note.  Antibiotics given-   Antibiotics (From admission, onward)    Start     Stop Route Frequency Ordered    10/25/22 0900  mupirocin 2 % ointment         10/30 0859 Nasl 2 times daily 10/25/22 0714    10/25/22 0830  clindamycin in D5W 900 mg/50 mL IVPB 900 mg         -- IV Every 8 hours (non-standard times) 10/25/22 0725    10/25/22 0815  cefepime in dextrose 5 % IVPB 2 g         -- IV Every 12 hours (non-standard times) 10/25/22 0713    10/24/22 2051  vancomycin - pharmacy to dose  (vancomycin IVPB)        See Hyperspace for full Linked Orders Report.    -- IV pharmacy to manage frequency 10/24/22 1952        Cultures were taken-   Microbiology Results (last 7 days)     Procedure Component Value Units Date/Time    Blood culture x two cultures. Draw prior to antibiotics. [[id number]] Collected: 10/24/22 1911    Order Status: Completed Specimen: Blood from Peripheral, Antecubital, Left Updated: 10/25/22 0312     Blood Culture, Routine No Growth to date    Narrative:      Aerobic and anaerobic    Blood culture x two cultures. Draw prior to antibiotics. [[id number]] Collected: 10/24/22 1910    Order Status: Completed Specimen: Blood from Peripheral, Hand, Right Updated: 10/25/22 0312     Blood Culture, Routine No Growth to date    Narrative:      Aerobic and anaerobic    Urine culture [[id number]] Collected: 10/25/22 0230    Order Status: No result Specimen: Urine Updated: 10/25/22 0253        Latest lactate reviewed, they are-  Recent Labs   Lab 10/24/22  2058 10/24/22  2306   LACTATE 2.4* 2.1       Organ dysfunction indicated by Acute liver injury  Source- Likely deep soft tissue neck infection related to possibly recent sinusitis infection or odontogenic in source     Plan:   Source control Achieved by- Antibiotics (Vancomycin,cefpeime and clindamycin.  ENT consult -  transfer to [facility]  campus for evaluation, currently there is no concern for airway compromise ,stable on RA.

## 2022-10-25 NOTE — HPI
Elías Gay is a 66 yo male who presented with complaints of weakness.  Complaints of sinusitis/sinus congestion over the past few weeks.  This is a 67 year old male with a PMHx of BPH, TJ (on BiPAP) and obesity who presents with generalized weakness.  In the emergency room patient was febrile.  Tachypneic.  Tachycardic.Initial EKG poor quality.  Subsequent EKG appeared to be SVT.  Last EKG atrial fibrillation. Elevated bnp, minimally abnormal troponin. He was started on a diltiazem gtt. Currently in sinus rhythm. History of TJ.    Echocardiogram 10/25/2022:    TDS secondary to body habitus.  The left ventricle is normal in size with normal systolic function.  The estimated ejection fraction is 60%.  Indeterminate left ventricular diastolic function.  Mild left atrial enlargement.  Normal right ventricular size with normal right ventricular systolic function.  Intermediate central venous pressure (8 mmHg).  The estimated PA systolic pressure is 27 mmHg.  There is no pulmonary hypertension     Echocardiogram 04/24/2018:    CONCLUSIONS     1 - No wall motion abnormalities.     2 - Normal left ventricular systolic function (EF 60-65%).     3 - Concentric remodeling.     4 - Normal left ventricular diastolic function.     5 - Right atrial enlargement.     6 - Normal right ventricular systolic function .

## 2022-10-25 NOTE — SUBJECTIVE & OBJECTIVE
Past Medical History:   Diagnosis Date    Arthritis 1971    Bone Spurs in feet    Basal cell carcinoma 11/06/2018    left ear helix    Foreign body in conjunctival sac     Hernia, inguinal, right 2002    Joint pain 1971    Bones of feet    Keloid cicatrix 1958 2010 2018 2019    Hernia, Knee, Arm, Ear    Melanoma 09/14/2018    Right Arm 0.6mm    Severe sleep apnea        Past Surgical History:   Procedure Laterality Date    CLOSURE OF DEFECT OF MOHS PROCEDURE Left 1/3/2019    Procedure: CLOSURE, MOHS PROCEDURE DEFECT LEFT EAR;  Surgeon: Jeramy Meek MD;  Location: [facility] OR 2ND FLR;  Service: Plastics;  Laterality: Left;  plastics set    COLONOSCOPY N/A 6/30/2021    Procedure: COLONOSCOPY;  Surgeon: Juliano Santoyo MD;  Location: [facility] ENDO (2ND FLR);  Service: Endoscopy;  Laterality: N/A;  severe sleep apnea     fully vaccinated-GT    HERNIA REPAIR Left     3 years of age    HERNIA REPAIR N/A     Ventral wall at 3 year of age.    KNEE SURGERY Right 1984    Arthroscopic    NASAL SEPTUM SURGERY N/A 2009    SKIN BIOPSY  several over time       Review of patient's allergies indicates:  No Known Allergies    No current facility-administered medications on file prior to encounter.     Current Outpatient Medications on File Prior to Encounter   Medication Sig    azelastine (ASTELIN) 137 mcg (0.1 %) nasal spray 1 spray (137 mcg total) by Nasal route 2 (two) times daily.    calcium-magnesium-zinc Tab Take 2 tablets by mouth once daily.    ciclopirox (PENLAC) 8 % Soln Apply daily to affected nail. Must remove and restart weekly    guaiFENesin (MUCINEX) 600 mg 12 hr tablet Take 1,200 mg by mouth 2 (two) times daily.    ketoconazole (NIZORAL) 2 % cream AAA bid to both feet x 3 weeks    multivitamin capsule Take 1 capsule by mouth once daily.    tamsulosin (FLOMAX) 0.4 mg Cap TAKE 1 CAPSULE BY MOUTH EVERY DAY    celecoxib (CELEBREX) 200 MG capsule Take 1 capsule (200 mg total) by mouth once daily. With food    sars-cov-2,  covid-19 omicron, (MODERNA COVID-19) 50 mcg/0.5 mL injection Inject into the muscle.     Family History       Problem Relation (Age of Onset)    Aneurysm Mother    Cancer Father (72)    Eczema Brother    Gout Brother    Hypertension Mother    No Known Problems Daughter, Son, Daughter, Son    Psoriasis Brother    Stroke Mother          Tobacco Use    Smoking status: Never    Smokeless tobacco: Never    Tobacco comments:     Second hand smoke from mother. Cigarrette smoke inflames my sinuses.   Substance and Sexual Activity    Alcohol use: Not Currently     Alcohol/week: 1.0 standard drink     Types: 1 Glasses of wine per week     Comment: Rare    Drug use: No    Sexual activity: Yes     Partners: Female     Birth control/protection: None     Review of Systems   Constitutional:  Positive for activity change, appetite change and chills.   HENT:  Positive for facial swelling, sinus pressure and sinus pain. Negative for dental problem.    Eyes: Negative.    Respiratory: Negative.     Cardiovascular: Negative.    Gastrointestinal: Negative.    Endocrine: Negative.    Genitourinary: Negative.    Musculoskeletal: Negative.  Negative for neck pain.   Skin: Negative.    Allergic/Immunologic: Negative.    Neurological: Negative.    Psychiatric/Behavioral: Negative.     Objective:     Vital Signs (Most Recent):  Temp: 97.7 °F (36.5 °C) (10/24/22 2125)  Pulse: (!) 126 (10/24/22 2229)  Resp: (!) 26 (10/24/22 2229)  BP: (!) 148/69 (10/24/22 2229)  SpO2: 97 % (10/24/22 2229)   Vital Signs (24h Range):  Temp:  [97.7 °F (36.5 °C)-100.7 °F (38.2 °C)] 97.7 °F (36.5 °C)  Pulse:  [] 126  Resp:  [20-28] 26  SpO2:  [95 %-97 %] 97 %  BP: (105-148)/(58-90) 148/69     Weight: 126.6 kg (279 lb)  Body mass index is 38.91 kg/m².    Physical Exam  Vitals and nursing note reviewed.   Constitutional:       General: He is in acute distress.      Appearance: He is ill-appearing.   HENT:      Head: Normocephalic and atraumatic.      Nose: Nose  normal.      Mouth/Throat:      Mouth: Mucous membranes are dry.   Eyes:      Extraocular Movements: Extraocular movements intact.   Cardiovascular:      Rate and Rhythm: Tachycardia present. Rhythm irregular.      Pulses: Normal pulses.      Heart sounds: No murmur heard.  Pulmonary:      Effort: Respiratory distress present.      Breath sounds: Rales present.   Abdominal:      General: Abdomen is flat.      Palpations: Abdomen is soft.      Tenderness: There is no abdominal tenderness.   Musculoskeletal:      Right lower leg: No edema.      Left lower leg: No edema.      Comments: Left sided neck swelling, firm without fluctuance    Skin:     General: Skin is warm.   Neurological:      General: No focal deficit present.      Mental Status: He is alert.   Psychiatric:         Mood and Affect: Mood normal.           Significant Labs: All pertinent labs within the past 24 hours have been reviewed.    Significant Imaging: I have reviewed all pertinent imaging results/findings within the past 24 hours.

## 2022-10-25 NOTE — ED PROVIDER NOTES
"Encounter Date: 10/24/2022       History     Chief Complaint   Patient presents with    Fatigue    Neck Swelling     Pt reports left sided neck swelling and redness that started Friday and generalized weakness that started today. Pt reports "I was too weak to even stand today". Fever and tachycardia also reported per EMS.      68 yo male who denies medical problems other than hip flexor problems presents with fever and left neck swelling for one day. States he has had a sinus infection for 3 weeks that significantly worsened one week ago but the neck pain and swelling and fever started yesterday. No change in voice. No difficulty swallowing. No chest pain. Chart reviews shows patient has sleep apnea and history of melanoma.     Review of patient's allergies indicates:  No Known Allergies  Past Medical History:   Diagnosis Date    Arthritis 1971    Bone Spurs in feet    Basal cell carcinoma 11/06/2018    left ear helix    Foreign body in conjunctival sac     Hernia, inguinal, right 2002    Joint pain 1971    Bones of feet    Keloid cicatrix 1958 2010 2018 2019    Hernia, Knee, Arm, Ear    Melanoma 09/14/2018    Right Arm 0.6mm    Severe sleep apnea      Past Surgical History:   Procedure Laterality Date    CLOSURE OF DEFECT OF MOHS PROCEDURE Left 1/3/2019    Procedure: CLOSURE, MOHS PROCEDURE DEFECT LEFT EAR;  Surgeon: Jeramy Meek MD;  Location: [facility] OR [address] [place] [address];  Service: Plastics;  Laterality: Left;  plastics set    COLONOSCOPY N/A 6/30/2021    Procedure: COLONOSCOPY;  Surgeon: Juliano Santoyo MD;  Location: [facility] ([address] [place] [address]);  Service: Endoscopy;  Laterality: N/A;  severe sleep apnea     fully vaccinated-GT    HERNIA REPAIR Left     3 years of age    HERNIA REPAIR N/A     Ventral wall at 3 year of age.    KNEE SURGERY Right 1984    Arthroscopic    NASAL SEPTUM SURGERY N/A 2009    SKIN BIOPSY  several over time     Family History   Problem Relation Age of Onset    Hypertension Mother     Stroke Mother     " Aneurysm Mother     Cancer Father 72        Prostate and liver    No Known Problems Daughter     No Known Problems Son     Gout Brother     Eczema Brother     Psoriasis Brother     No Known Problems Daughter     No Known Problems Son     Cataracts Neg Hx     Glaucoma Neg Hx     Macular degeneration Neg Hx     Melanoma Neg Hx     Eczema Neg Hx     Lupus Neg Hx     Psoriasis Neg Hx      Social History     Tobacco Use    Smoking status: Never    Smokeless tobacco: Never    Tobacco comments:     Second hand smoke from mother. Cigarrette smoke inflames my sinuses.   Substance Use Topics    Alcohol use: Not Currently     Alcohol/week: 1.0 standard drink     Types: 1 Glasses of wine per week     Comment: Rare    Drug use: No     Review of Systems   Constitutional:  Positive for chills, fatigue and fever.   HENT:  Positive for congestion, postnasal drip, rhinorrhea, sinus pressure, sinus pain and sore throat. Negative for dental problem, drooling, ear pain, facial swelling, tinnitus, trouble swallowing and voice change.    Eyes:  Negative for photophobia, redness and visual disturbance.   Respiratory:  Negative for cough and shortness of breath.    Cardiovascular:  Negative for chest pain, palpitations and leg swelling.   Gastrointestinal:  Negative for abdominal pain, diarrhea, nausea and vomiting.   Endocrine: Positive for polydipsia. Negative for polyphagia and polyuria.   Genitourinary:  Negative for dysuria.   Musculoskeletal:  Positive for neck pain. Negative for back pain.   Skin:  Positive for rash.   Neurological:  Positive for light-headedness. Negative for dizziness and headaches.     Physical Exam     Initial Vitals [10/24/22 1809]   BP Pulse Resp Temp SpO2   105/65 (!) 137 20 (!) 100.7 °F (38.2 °C) 96 %      MAP       --         Physical Exam    Nursing note and vitals reviewed.  Constitutional: He appears well-developed and well-nourished. He appears distressed.   HENT:   Head: Atraumatic.   O/p erythematous  without exudate   Eyes: EOM are normal. Pupils are equal, round, and reactive to light.   Neck: Neck supple. No JVD present.   Normal range of motion.  Cardiovascular:            HR irregular and fluctuating between 150-190   Pulmonary/Chest: Breath sounds normal. No respiratory distress. He has no wheezes. He has no rhonchi. He has no rales.   Abdominal: Abdomen is soft. Bowel sounds are normal. There is no abdominal tenderness.   Musculoskeletal:         General: Normal range of motion.      Cervical back: Normal range of motion and neck supple.      Comments: Trace edema. Hemosiderin deposits bilateral lower legs indicating venous insufficiency.      Lymphadenopathy:     He has no cervical adenopathy.   Neurological: He is alert and oriented to person, place, and time. He has normal strength.   Skin: Skin is warm.   diaphoretic   Psychiatric: He has a normal mood and affect. Thought content normal.       ED Course   Critical Care    Date/Time: 10/24/2022 9:20 PM  Performed by: Justin Chatterjee MD  Authorized by: Justin Chatterjee MD   Direct patient critical care time: 20 minutes  Additional history critical care time: 10 minutes  Ordering / reviewing critical care time: 5 minutes  Documentation critical care time: 8 minutes  Consulting other physicians critical care time: 5 minutes  Total critical care time (exclusive of procedural time) : 48 minutes  Critical care time was exclusive of separately billable procedures and treating other patients and teaching time.  Critical care was necessary to treat or prevent imminent or life-threatening deterioration of the following conditions: circulatory failure and sepsis.  Critical care was time spent personally by me on the following activities: development of treatment plan with patient or surrogate, discussions with consultants, interpretation of cardiac output measurements, evaluation of patient's response to treatment, examination of patient, obtaining  history from patient or surrogate, ordering and performing treatments and interventions, ordering and review of laboratory studies, ordering and review of radiographic studies, pulse oximetry, re-evaluation of patient's condition and review of old charts.      Labs Reviewed   CBC W/ AUTO DIFFERENTIAL - Abnormal; Notable for the following components:       Result Value    WBC 38.94 (*)     MCH 31.4 (*)     Immature Granulocytes 3.6 (*)     Gran # (ANC) 35.0 (*)     Immature Grans (Abs) 1.41 (*)     Lymph # 0.6 (*)     Mono # 1.7 (*)     Gran % 89.9 (*)     Lymph % 1.5 (*)     All other components within normal limits   COMPREHENSIVE METABOLIC PANEL - Abnormal; Notable for the following components:    CO2 20 (*)     Creatinine 1.8 (*)     Albumin 2.9 (*)     Total Bilirubin 1.4 (*)      (*)      (*)     eGFR 40 (*)     All other components within normal limits   PHOSPHORUS - Abnormal; Notable for the following components:    Phosphorus 1.9 (*)     All other components within normal limits   PROTIME-INR - Abnormal; Notable for the following components:    Prothrombin Time 13.4 (*)     INR 1.3 (*)     All other components within normal limits   B-TYPE NATRIURETIC PEPTIDE - Abnormal; Notable for the following components:     (*)     All other components within normal limits   TROPONIN I - Abnormal; Notable for the following components:    Troponin I 0.063 (*)     All other components within normal limits   PROCALCITONIN - Abnormal; Notable for the following components:    Procalcitonin 34.23 (*)     All other components within normal limits   LACTIC ACID, PLASMA - Abnormal; Notable for the following components:    Lactate (Lactic Acid) 2.4 (*)     All other components within normal limits    Narrative:     redraw   CULTURE, BLOOD   CULTURE, BLOOD   MAGNESIUM   URINALYSIS, REFLEX TO URINE CULTURE   LACTIC ACID, PLASMA   SARS-COV-2 RDRP GENE   POCT GLUCOSE   POCT GLUCOSE MONITORING CONTINUOUS           Imaging Results              CT Soft Tissue Neck With Contrast (Final result)  Result time 10/24/22 20:49:11      Final result by Layla Borges MD (10/24/22 20:49:11)                   Impression:      Extensive left neck soft tissue swelling with inflammatory changes and edema.  Asymmetric heterogenous enlargement is seen of the left sternocleidomastoid.  Numerous enlarged cervical chain lymph nodes are seen throughout the region.  Findings are nonspecific but suggestive for infectious process with cellulitis and myositis.  No organized focal fluid collection or rim enhancing abscess seen.  Potential neoplastic process not excluded.      Electronically signed by: Layla Borges MD  Date:    10/24/2022  Time:    20:49               Narrative:    EXAMINATION:  CT SOFT TISSUE NECK WITH CONTRAST    CLINICAL HISTORY:  Soft tissue swelling, infection suspected, neck xray done;    TECHNIQUE:  Low dose axial images as well as sagittal and coronal reconstructions were performed from the skull base to the clavicles following the intravenous administration of 75 mL of Omnipaque 350.    COMPARISON:  None    FINDINGS:  Extensive subcutaneous soft tissue swelling with stranding/inflammatory changes and edema are seen throughout the left lateral and anterolateral aspect of the neck.  This extends superiorly from the level of the parotid throughout the neck inferiorly near the level of the sternoclavicular joint.  There is prominent asymmetric heterogenous enlargement of the left sternocleidomastoid.  Numerous enlarged lymph nodes are seen throughout the region.  No organized focal fluid collection or rim enhancing abscess seen.  No evidence of intramuscular rim enhancing abscess.  No evidence of peritonsillar or retropharyngeal fluid collections or abscess.  Epiglottis is normal in thickness.  Right thyroid lobe 1.2 cm nodule is seen.  Submandibular glands and parotid glands are normal and symmetric in size.  Visualized  portion of the brain shows no acute abnormalities.  Vasculature of the neck appears grossly patent.  No acute osseous abnormality identified.  Moderate multilevel degenerative changes are seen throughout the spine with extensive anterior spurring.  Visualized paranasal sinuses and mastoid air cells are clear.  Airway is patent.  Partially visualized lung apices are clear.                                       X-Ray Chest AP Portable (Final result)  Result time 10/24/22 19:15:13      Final result by Layla Borges MD (10/24/22 19:15:13)                   Impression:      Nonspecific mild increased interstitial attenuation which may relate to chronic change versus mild pulmonary interstitial edema.  No focal consolidation seen.      Electronically signed by: Lyala Borges MD  Date:    10/24/2022  Time:    19:15               Narrative:    EXAMINATION:  XR CHEST AP PORTABLE    CLINICAL HISTORY:  Sepsis;    TECHNIQUE:  Single frontal view of the chest was performed.    COMPARISON:  February 2011.    FINDINGS:  Cardiac silhouette is stable in size.  Lungs are symmetrically expanded.  Mild increased interstitial attenuation is seen.  No evidence of focal consolidative process, pneumothorax, or significant pleural effusion.  No acute osseous abnormality identified.                                       Medications   vancomycin - pharmacy to dose (has no administration in time range)   vancomycin (VANCOCIN) 2,500 mg in dextrose 5 % 500 mL IVPB (2,500 mg Intravenous New Bag 10/24/22 2057)   metronidazole IVPB 500 mg (500 mg Intravenous New Bag 10/24/22 2153)   lactated ringers bolus 3,798 mL (3,798 mLs Intravenous New Bag 10/24/22 1915)   acetaminophen tablet 1,000 mg (1,000 mg Oral Given 10/24/22 1918)   diltiaZEM injection 10 mg (10 mg Intravenous Given 10/24/22 1912)   cefepime in dextrose 5 % 1 gram/50 mL IVPB 1 g (0 g Intravenous Stopped 10/24/22 2118)   iohexoL (OMNIPAQUE 350) injection 70 mL (70 mLs Intravenous  Given 10/24/22 2034)   diltiaZEM injection 10 mg (10 mg Intravenous Given 10/24/22 2149)   diltiaZEM 125 mg in D5W 125 mL infusion (5 mg/hr Intravenous New Bag 10/24/22 2228)     Pt with significant leukocytosis and redness, swelling, and ttp to left neck. CT c/w cellulitis and myositis left sternocleidomastoid. Secure chat with Dr. Crawford, who states pt should be transferred for ENT or OMFS back up. Pt presumed to be in afib on arrival. Pt given Cardizem and now in sinus rate 80's. Pt flipped back into Afib with RVR HR as high as 170. Cardizem brought pt down to 130's. BP improved with HR control. Will place on Cardizem drip. [facility] and [facility] are on saturation and ED diversion. PT has no place to accept him tonight.                            Clinical Impression:   Final diagnoses:  [A41.9] Sepsis  [I48.91] Atrial fibrillation with RVR  [L03.221] Cellulitis of neck (Primary)  [M60.08] Infective myositis of other site  [I48.91] Atrial fibrillation        ED Disposition Condition    Transfer to Another Facility Stable                Justin Chatterjee MD  10/24/22 2121       Justin Chatterjee MD  10/24/22 2230

## 2022-10-25 NOTE — PT/OT/SLP EVAL
Occupational Therapy   Evaluation    Name: Elías Gay  MRN: [id number]  Admitting Diagnosis:  Sepsis  Recent Surgery: * No surgery found *      Recommendations:     Discharge Recommendations: home health OT (w/ family support)  Discharge Equipment Recommendations:   (TBD pending ongoing assessment.)  Barriers to discharge:   (Pt is at risk for falls, readmission and morbidity.)    Assessment:     Elías Gay is a 67 y.o. male with a medical diagnosis of Sepsis.  Performance deficits affecting function: weakness, impaired endurance, impaired self care skills, impaired functional mobility, gait instability, impaired balance, decreased upper extremity function, decreased lower extremity function, decreased coordination, decreased safety awareness, decreased ROM, impaired coordination.      Pt pleasant and willing to participate in tx session this date; pt w/ general weakness and decreased endurance, requiring max A for bed mobility to stand from EOB w/ min A to ambulate short distances in room w/ CGA and use of RW for performing functional transfers and ADLs. Pt tolerated tx session well and will continue to benefit from skilled acute OT services to maximize functional capacity for safe performance w/ ADLs and functional mobility.     Rehab Prognosis: Good; patient would benefit from acute skilled OT services to address these deficits and reach maximum level of function.       Plan:     Patient to be seen 5 x/week to address the above listed problems via self-care/home management, therapeutic activities, therapeutic exercises  Plan of Care Expires: 11/08/22  Plan of Care Reviewed with: patient, spouse, son    Subjective     Chief Complaint: general pain   Patient/Family Comments/goals: Ready to get out of bed and use toilet     Occupational Profile:  Living Environment: PTA pt lived with his wife in a 2 story house, bedroom downstairs and 5 steps to enter, (B) HRs.  Previous level of function: Pt was mod I w/ ADLs  and functional mobility with use of SPC.   Roles and Routines: None stated   Equipment Used at Home:  cane, straight (2 walking sticks)  Assistance upon Discharge: Pt will have assistance from wife and son.     Pain/Comfort:  Pain Rating 1:  (Pt did not rate.)  Location - Orientation 1: generalized    Patients cultural, spiritual, Moravian conflicts given the current situation: no    Objective:     Communicated with: Nurse prior to session.  Patient found HOB elevated with blood pressure cuff, casillas catheter, peripheral IV, pulse ox (continuous), telemetry upon OT entry to room.    General Precautions: Standard, fall   Orthopedic Precautions:N/A   Braces: N/A  Respiratory Status: Room air    Occupational Performance:    Bed Mobility:    Patient completed Scooting hips to EOB with maximal assistance  Patient completed Supine to Sit with maximal assistance x 2 persons     Functional Mobility/Transfers:  Patient completed Sit <> Stand Transfer with minimum assistance  with  rolling walker   Patient completed Toilet Transfer Step Transfer technique with contact guard assistance with  rolling walker  Functional Mobility: Pt was able to ambulate short distances ion room for transferring to toilet w/ CGA and use of RW. Pt mildly unsteady but no LOB.     Activities of Daily Living:  Upper Body Dressing: minimum assistance for donning gown over back   Lower Body Dressing: total assistance for donning socks for BLEs     Cognitive/Visual Perceptual:  Cognitive/Psychosocial Skills:     -       Oriented to: Person, Place, Time, and Situation   -       Follows Commands/attention:Follows multistep  commands  -       Communication: clear/fluent  -       Memory: No Deficits noted  -       Safety awareness/insight to disability: impaired     Physical Exam:  Balance:    -       good sitting balance; fair + standing   Postural examination/scapula alignment:    -       Rounded shoulders  -       Forward head  Skin integrity: L side of  neck w/  redness  Edema:  Moderate L side of neck   Sensation:    -       Intact  Upper Extremity Range of Motion:     -       Right Upper Extremity: WFL  -       Left Upper Extremity: WFL  Upper Extremity Strength:    -       Right Upper Extremity: WFL  -       Left Upper Extremity: WFL   Strength:    -       Right Upper Extremity: WFL  -       Left Upper Extremity: WFL    AMPAC 6 Click ADL:  AMPAC Total Score: 20    Treatment & Education:  -Initial eval complete.   -Pt educated on role of OT and POC.   -Pt educated on safe functional mobility and ADL performance.   -Questions and concerns addressed within OT scope.     Patient left  on toilet w/ nurse present  with all lines intact, call button in reach, and wife and nurse present; PT/OT agreed to check on pt after ~30 min as pt requested to sit on toilet for this period of time to have BM.     GOALS:   Multidisciplinary Problems       Occupational Therapy Goals          Problem: Occupational Therapy    Goal Priority Disciplines Outcome Interventions   Occupational Therapy Goal     OT, PT/OT Ongoing, Progressing    Description: Goals to be met by: 11/8/22     Patient will increase functional independence with ADLs by performing:    LE Dressing with Modified [place].  Grooming while standing at sink with Modified [place].  Toileting from bedside commode with Modified [place] for hygiene and clothing management.   Supine to sit with Modified [place].  Step transfer with Modified [place]  Toilet transfer to toilet with Modified [place].  Upper extremity exercise program x15 reps per handout, with independence.                         History:     Past Medical History:   Diagnosis Date    Acute renal failure 10/25/2022    Arthritis 1971    Bone Spurs in feet    Basal cell carcinoma 11/06/2018    left ear helix    Foreign body in conjunctival sac     Hernia, inguinal, right 2002    Joint pain 1971    Bones of feet    Keloid cicatrix  1958 2010 2018 2019    Hernia, Knee, Arm, Ear    Melanoma 09/14/2018    Right Arm 0.6mm    Severe sleep apnea          Past Surgical History:   Procedure Laterality Date    CLOSURE OF DEFECT OF MOHS PROCEDURE Left 1/3/2019    Procedure: CLOSURE, MOHS PROCEDURE DEFECT LEFT EAR;  Surgeon: Jeramy Meek MD;  [facility];  Service: Plastics;  Laterality: Left;  plastics set    COLONOSCOPY N/A 6/30/2021    Procedure: COLONOSCOPY;  Surgeon: Juliano Santoyo MD;  Location: [facility] ENDO (2ND FLR);  Service: Endoscopy;  Laterality: N/A;  severe sleep apnea     fully vaccinated-GT    HERNIA REPAIR Left     3 years of age    HERNIA REPAIR N/A     Ventral wall at 3 year of age.    KNEE SURGERY Right 1984    Arthroscopic    NASAL SEPTUM SURGERY N/A 2009    SKIN BIOPSY  several over time       Time Tracking:     OT Date of Treatment: 10/25/22  OT Start Time: 1210  OT Stop Time: 1235  OT Total Time (min): 25 min    Billable Minutes:Evaluation 25  Total Time 25 (co-eval w/ PT)     10/25/2022    Return to room @ [id number] (12 min/1 TA): Returned to room to assist pt off toilet in which he required min-mod A; pt was able to stand for total A w/ marti-care. Pt returned to bed and transitioned to supine w/ max A x 2 persons.

## 2022-10-26 ENCOUNTER — ANESTHESIA EVENT (OUTPATIENT)
Dept: SURGERY | Facility: HOSPITAL | Age: 67
DRG: 853 | End: 2022-10-26
Payer: MEDICARE

## 2022-10-26 LAB
ALBUMIN SERPL BCP-MCNC: 1.9 G/DL (ref 3.5–5.2)
ALP SERPL-CCNC: 80 U/L (ref 55–135)
ALT SERPL W/O P-5'-P-CCNC: 137 U/L (ref 10–44)
ANION GAP SERPL CALC-SCNC: 10 MMOL/L (ref 8–16)
AST SERPL-CCNC: 308 U/L (ref 10–40)
BASOPHILS # BLD AUTO: 0.08 K/UL (ref 0–0.2)
BASOPHILS NFR BLD: 0.3 % (ref 0–1.9)
BILIRUB SERPL-MCNC: 0.6 MG/DL (ref 0.1–1)
BUN SERPL-MCNC: 46 MG/DL (ref 8–23)
CALCIUM SERPL-MCNC: 7.3 MG/DL (ref 8.7–10.5)
CHLORIDE SERPL-SCNC: 101 MMOL/L (ref 95–110)
CK SERPL-CCNC: 8086 U/L (ref 20–200)
CO2 SERPL-SCNC: 21 MMOL/L (ref 23–29)
CREAT SERPL-MCNC: 3.5 MG/DL (ref 0.5–1.4)
DIFFERENTIAL METHOD: ABNORMAL
EOSINOPHIL # BLD AUTO: 0.3 K/UL (ref 0–0.5)
EOSINOPHIL NFR BLD: 1.2 % (ref 0–8)
ERYTHROCYTE [DISTWIDTH] IN BLOOD BY AUTOMATED COUNT: 13.5 % (ref 11.5–14.5)
EST. GFR  (NO RACE VARIABLE): 18 ML/MIN/1.73 M^2
GLUCOSE SERPL-MCNC: 88 MG/DL (ref 70–110)
HCT VFR BLD AUTO: 31.5 % (ref 40–54)
HGB BLD-MCNC: 10.5 G/DL (ref 14–18)
IMM GRANULOCYTES # BLD AUTO: 0.32 K/UL (ref 0–0.04)
IMM GRANULOCYTES NFR BLD AUTO: 1.1 % (ref 0–0.5)
LYMPHOCYTES # BLD AUTO: 0.9 K/UL (ref 1–4.8)
LYMPHOCYTES NFR BLD: 3.2 % (ref 18–48)
MCH RBC QN AUTO: 30.5 PG (ref 27–31)
MCHC RBC AUTO-ENTMCNC: 33.3 G/DL (ref 32–36)
MCV RBC AUTO: 92 FL (ref 82–98)
MONOCYTES # BLD AUTO: 0.8 K/UL (ref 0.3–1)
MONOCYTES NFR BLD: 2.7 % (ref 4–15)
NEUTROPHILS # BLD AUTO: 25.8 K/UL (ref 1.8–7.7)
NEUTROPHILS NFR BLD: 91.5 % (ref 38–73)
NRBC BLD-RTO: 0 /100 WBC
PLATELET # BLD AUTO: 206 K/UL (ref 150–450)
PMV BLD AUTO: 10 FL (ref 9.2–12.9)
POTASSIUM SERPL-SCNC: 3.5 MMOL/L (ref 3.5–5.1)
PROT SERPL-MCNC: 5.1 G/DL (ref 6–8.4)
RBC # BLD AUTO: 3.44 M/UL (ref 4.6–6.2)
SODIUM SERPL-SCNC: 132 MMOL/L (ref 136–145)
VANCOMYCIN SERPL-MCNC: 13.8 UG/ML
WBC # BLD AUTO: 28.16 K/UL (ref 3.9–12.7)

## 2022-10-26 PROCEDURE — 99223 1ST HOSP IP/OBS HIGH 75: CPT | Mod: 25,ICN,, | Performed by: OTOLARYNGOLOGY

## 2022-10-26 PROCEDURE — 88305 TISSUE EXAM BY PATHOLOGIST: ICD-10-PCS | Mod: 26,,, | Performed by: PATHOLOGY

## 2022-10-26 PROCEDURE — 21400001 HC TELEMETRY ROOM

## 2022-10-26 PROCEDURE — 25000003 PHARM REV CODE 250: Performed by: INTERNAL MEDICINE

## 2022-10-26 PROCEDURE — D9220A PRA ANESTHESIA: Mod: CRNA,,, | Performed by: NURSE ANESTHETIST, CERTIFIED REGISTERED

## 2022-10-26 PROCEDURE — 63600175 PHARM REV CODE 636 W HCPCS: Performed by: ANESTHESIOLOGY

## 2022-10-26 PROCEDURE — D9220A PRA ANESTHESIA: Mod: ANES,,, | Performed by: ANESTHESIOLOGY

## 2022-10-26 PROCEDURE — 97116 GAIT TRAINING THERAPY: CPT

## 2022-10-26 PROCEDURE — 25000003 PHARM REV CODE 250: Performed by: STUDENT IN AN ORGANIZED HEALTH CARE EDUCATION/TRAINING PROGRAM

## 2022-10-26 PROCEDURE — 87075 CULTR BACTERIA EXCEPT BLOOD: CPT | Performed by: OTOLARYNGOLOGY

## 2022-10-26 PROCEDURE — 63600175 PHARM REV CODE 636 W HCPCS: Performed by: HOSPITALIST

## 2022-10-26 PROCEDURE — 36000704 HC OR TIME LEV I 1ST 15 MIN: Performed by: OTOLARYNGOLOGY

## 2022-10-26 PROCEDURE — 37000009 HC ANESTHESIA EA ADD 15 MINS: Performed by: OTOLARYNGOLOGY

## 2022-10-26 PROCEDURE — 87205 SMEAR GRAM STAIN: CPT | Performed by: OTOLARYNGOLOGY

## 2022-10-26 PROCEDURE — D9220A PRA ANESTHESIA: ICD-10-PCS | Mod: ANES,,, | Performed by: ANESTHESIOLOGY

## 2022-10-26 PROCEDURE — 25000003 PHARM REV CODE 250: Performed by: HOSPITALIST

## 2022-10-26 PROCEDURE — A4216 STERILE WATER/SALINE, 10 ML: HCPCS | Performed by: HOSPITALIST

## 2022-10-26 PROCEDURE — 87147 CULTURE TYPE IMMUNOLOGIC: CPT | Performed by: OTOLARYNGOLOGY

## 2022-10-26 PROCEDURE — 25000003 PHARM REV CODE 250: Performed by: NURSE ANESTHETIST, CERTIFIED REGISTERED

## 2022-10-26 PROCEDURE — 97535 SELF CARE MNGMENT TRAINING: CPT

## 2022-10-26 PROCEDURE — 80202 ASSAY OF VANCOMYCIN: CPT | Performed by: HOSPITALIST

## 2022-10-26 PROCEDURE — 97530 THERAPEUTIC ACTIVITIES: CPT

## 2022-10-26 PROCEDURE — 82550 ASSAY OF CK (CPK): CPT | Performed by: HOSPITALIST

## 2022-10-26 PROCEDURE — 88305 TISSUE EXAM BY PATHOLOGIST: CPT | Performed by: PATHOLOGY

## 2022-10-26 PROCEDURE — 87206 SMEAR FLUORESCENT/ACID STAI: CPT | Performed by: OTOLARYNGOLOGY

## 2022-10-26 PROCEDURE — 85025 COMPLETE CBC W/AUTO DIFF WBC: CPT | Performed by: HOSPITALIST

## 2022-10-26 PROCEDURE — 37000008 HC ANESTHESIA 1ST 15 MINUTES: Performed by: OTOLARYNGOLOGY

## 2022-10-26 PROCEDURE — 63600175 PHARM REV CODE 636 W HCPCS: Performed by: NURSE ANESTHETIST, CERTIFIED REGISTERED

## 2022-10-26 PROCEDURE — 27201423 OPTIME MED/SURG SUP & DEVICES STERILE SUPPLY: Performed by: OTOLARYNGOLOGY

## 2022-10-26 PROCEDURE — 21501 PR I&D DEEP ABSC/HEMATOMA NECK/CHEST: ICD-10-PCS | Mod: ,,, | Performed by: OTOLARYNGOLOGY

## 2022-10-26 PROCEDURE — A4217 STERILE WATER/SALINE, 500 ML: HCPCS | Performed by: INTERNAL MEDICINE

## 2022-10-26 PROCEDURE — 87116 MYCOBACTERIA CULTURE: CPT | Performed by: OTOLARYNGOLOGY

## 2022-10-26 PROCEDURE — 88305 TISSUE EXAM BY PATHOLOGIST: CPT | Mod: 26,,, | Performed by: PATHOLOGY

## 2022-10-26 PROCEDURE — 80053 COMPREHEN METABOLIC PANEL: CPT | Performed by: HOSPITALIST

## 2022-10-26 PROCEDURE — 99291 CRITICAL CARE FIRST HOUR: CPT | Mod: 95,GC,, | Performed by: INTERNAL MEDICINE

## 2022-10-26 PROCEDURE — 99900035 HC TECH TIME PER 15 MIN (STAT)

## 2022-10-26 PROCEDURE — 36000705 HC OR TIME LEV I EA ADD 15 MIN: Performed by: OTOLARYNGOLOGY

## 2022-10-26 PROCEDURE — D9220A PRA ANESTHESIA: ICD-10-PCS | Mod: CRNA,,, | Performed by: NURSE ANESTHETIST, CERTIFIED REGISTERED

## 2022-10-26 PROCEDURE — 87070 CULTURE OTHR SPECIMN AEROBIC: CPT | Performed by: OTOLARYNGOLOGY

## 2022-10-26 PROCEDURE — 31575 PR LARYNGOSCOPY, FLEXIBLE; DIAGNOSTIC: ICD-10-PCS | Mod: 51,,, | Performed by: OTOLARYNGOLOGY

## 2022-10-26 PROCEDURE — 71000033 HC RECOVERY, INTIAL HOUR: Performed by: OTOLARYNGOLOGY

## 2022-10-26 PROCEDURE — 99291 PR CRITICAL CARE, E/M 30-74 MINUTES: ICD-10-PCS | Mod: 95,GC,, | Performed by: INTERNAL MEDICINE

## 2022-10-26 PROCEDURE — 31575 DIAGNOSTIC LARYNGOSCOPY: CPT | Mod: 51,,, | Performed by: OTOLARYNGOLOGY

## 2022-10-26 PROCEDURE — 21501 I&D DP ABSC/HMTMA SFT TS NCK: CPT | Mod: ,,, | Performed by: OTOLARYNGOLOGY

## 2022-10-26 PROCEDURE — 87102 FUNGUS ISOLATION CULTURE: CPT | Performed by: OTOLARYNGOLOGY

## 2022-10-26 PROCEDURE — 99223 PR INITIAL HOSPITAL CARE,LEVL III: ICD-10-PCS | Mod: 25,ICN,, | Performed by: OTOLARYNGOLOGY

## 2022-10-26 PROCEDURE — C1729 CATH, DRAINAGE: HCPCS | Performed by: OTOLARYNGOLOGY

## 2022-10-26 RX ORDER — LIDOCAINE HYDROCHLORIDE 20 MG/ML
INJECTION INTRAVENOUS
Status: DISCONTINUED | OUTPATIENT
Start: 2022-10-26 | End: 2022-10-26

## 2022-10-26 RX ORDER — EPHEDRINE SULFATE 50 MG/ML
INJECTION, SOLUTION INTRAVENOUS
Status: DISCONTINUED | OUTPATIENT
Start: 2022-10-26 | End: 2022-10-26

## 2022-10-26 RX ORDER — PROCHLORPERAZINE EDISYLATE 5 MG/ML
5 INJECTION INTRAMUSCULAR; INTRAVENOUS EVERY 30 MIN PRN
Status: DISCONTINUED | OUTPATIENT
Start: 2022-10-26 | End: 2022-10-31 | Stop reason: HOSPADM

## 2022-10-26 RX ORDER — ONDANSETRON 2 MG/ML
4 INJECTION INTRAMUSCULAR; INTRAVENOUS DAILY PRN
Status: DISCONTINUED | OUTPATIENT
Start: 2022-10-26 | End: 2022-10-31 | Stop reason: HOSPADM

## 2022-10-26 RX ORDER — SUCCINYLCHOLINE CHLORIDE 20 MG/ML
INJECTION INTRAMUSCULAR; INTRAVENOUS
Status: DISCONTINUED | OUTPATIENT
Start: 2022-10-26 | End: 2022-10-26

## 2022-10-26 RX ORDER — PHENYLEPHRINE HYDROCHLORIDE 10 MG/ML
INJECTION INTRAVENOUS
Status: DISCONTINUED | OUTPATIENT
Start: 2022-10-26 | End: 2022-10-26

## 2022-10-26 RX ORDER — ONDANSETRON 2 MG/ML
INJECTION INTRAMUSCULAR; INTRAVENOUS
Status: DISCONTINUED | OUTPATIENT
Start: 2022-10-26 | End: 2022-10-26

## 2022-10-26 RX ORDER — FENTANYL CITRATE 50 UG/ML
INJECTION, SOLUTION INTRAMUSCULAR; INTRAVENOUS
Status: DISCONTINUED | OUTPATIENT
Start: 2022-10-26 | End: 2022-10-26

## 2022-10-26 RX ORDER — POLYETHYLENE GLYCOL 3350 17 G/17G
17 POWDER, FOR SOLUTION ORAL 2 TIMES DAILY
Status: DISCONTINUED | OUTPATIENT
Start: 2022-10-26 | End: 2022-10-26

## 2022-10-26 RX ORDER — HYDROMORPHONE HYDROCHLORIDE 2 MG/ML
0.2 INJECTION, SOLUTION INTRAMUSCULAR; INTRAVENOUS; SUBCUTANEOUS EVERY 5 MIN PRN
Status: DISCONTINUED | OUTPATIENT
Start: 2022-10-26 | End: 2022-10-31 | Stop reason: HOSPADM

## 2022-10-26 RX ORDER — SODIUM CHLORIDE 0.9 % (FLUSH) 0.9 %
10 SYRINGE (ML) INJECTION
Status: DISCONTINUED | OUTPATIENT
Start: 2022-10-26 | End: 2022-10-31 | Stop reason: HOSPADM

## 2022-10-26 RX ORDER — CEFEPIME HYDROCHLORIDE 1 G/50ML
2 INJECTION, SOLUTION INTRAVENOUS
Status: DISCONTINUED | OUTPATIENT
Start: 2022-10-26 | End: 2022-10-31 | Stop reason: HOSPADM

## 2022-10-26 RX ORDER — PROPOFOL 10 MG/ML
VIAL (ML) INTRAVENOUS
Status: DISCONTINUED | OUTPATIENT
Start: 2022-10-26 | End: 2022-10-26

## 2022-10-26 RX ADMIN — EPHEDRINE SULFATE 5 MG: 50 INJECTION INTRAVENOUS at 04:10

## 2022-10-26 RX ADMIN — EPHEDRINE SULFATE 10 MG: 50 INJECTION INTRAVENOUS at 04:10

## 2022-10-26 RX ADMIN — HEPARIN SODIUM 5000 UNITS: 5000 INJECTION INTRAVENOUS; SUBCUTANEOUS at 06:10

## 2022-10-26 RX ADMIN — PHENYLEPHRINE HYDROCHLORIDE 100 MCG: 10 INJECTION INTRAVENOUS at 04:10

## 2022-10-26 RX ADMIN — ONDANSETRON 4 MG: 2 INJECTION, SOLUTION INTRAMUSCULAR; INTRAVENOUS at 04:10

## 2022-10-26 RX ADMIN — CLINDAMYCIN IN 5 PERCENT DEXTROSE 900 MG: 18 INJECTION, SOLUTION INTRAVENOUS at 04:10

## 2022-10-26 RX ADMIN — PHENYLEPHRINE HYDROCHLORIDE 200 MCG: 10 INJECTION INTRAVENOUS at 04:10

## 2022-10-26 RX ADMIN — HYDROMORPHONE HYDROCHLORIDE 0.2 MG: 2 INJECTION INTRAMUSCULAR; INTRAVENOUS; SUBCUTANEOUS at 05:10

## 2022-10-26 RX ADMIN — PROPOFOL 150 MG: 10 INJECTION, EMULSION INTRAVENOUS at 04:10

## 2022-10-26 RX ADMIN — MUPIROCIN: 20 OINTMENT TOPICAL at 08:10

## 2022-10-26 RX ADMIN — SUCCINYLCHOLINE CHLORIDE 140 MG: 20 INJECTION, SOLUTION INTRAMUSCULAR; INTRAVENOUS at 04:10

## 2022-10-26 RX ADMIN — HEPARIN SODIUM 5000 UNITS: 5000 INJECTION INTRAVENOUS; SUBCUTANEOUS at 01:10

## 2022-10-26 RX ADMIN — MUPIROCIN: 20 OINTMENT TOPICAL at 09:10

## 2022-10-26 RX ADMIN — SODIUM CHLORIDE, SODIUM LACTATE, POTASSIUM CHLORIDE, AND CALCIUM CHLORIDE: .6; .31; .03; .02 INJECTION, SOLUTION INTRAVENOUS at 04:10

## 2022-10-26 RX ADMIN — Medication 10 ML: at 12:10

## 2022-10-26 RX ADMIN — FAMOTIDINE 20 MG: 10 INJECTION INTRAVENOUS at 08:10

## 2022-10-26 RX ADMIN — CLINDAMYCIN IN 5 PERCENT DEXTROSE 900 MG: 18 INJECTION, SOLUTION INTRAVENOUS at 08:10

## 2022-10-26 RX ADMIN — Medication 10 ML: at 11:10

## 2022-10-26 RX ADMIN — METOPROLOL SUCCINATE 25 MG: 25 TABLET, EXTENDED RELEASE ORAL at 09:10

## 2022-10-26 RX ADMIN — CEFEPIME HYDROCHLORIDE 2 G: 2 INJECTION, SOLUTION INTRAVENOUS at 09:10

## 2022-10-26 RX ADMIN — SODIUM BICARBONATE: 84 INJECTION, SOLUTION INTRAVENOUS at 06:10

## 2022-10-26 RX ADMIN — HEPARIN SODIUM 5000 UNITS: 5000 INJECTION INTRAVENOUS; SUBCUTANEOUS at 09:10

## 2022-10-26 RX ADMIN — LIDOCAINE HYDROCHLORIDE 100 MG: 20 INJECTION, SOLUTION INTRAVENOUS at 04:10

## 2022-10-26 RX ADMIN — FENTANYL CITRATE 25 MCG: 50 INJECTION, SOLUTION INTRAMUSCULAR; INTRAVENOUS at 04:10

## 2022-10-26 RX ADMIN — VANCOMYCIN HYDROCHLORIDE 750 MG: 750 INJECTION, POWDER, LYOPHILIZED, FOR SOLUTION INTRAVENOUS at 09:10

## 2022-10-26 RX ADMIN — CLINDAMYCIN IN 5 PERCENT DEXTROSE 900 MG: 18 INJECTION, SOLUTION INTRAVENOUS at 12:10

## 2022-10-26 RX ADMIN — TAMSULOSIN HYDROCHLORIDE 0.4 MG: 0.4 CAPSULE ORAL at 08:10

## 2022-10-26 RX ADMIN — Medication 10 ML: at 06:10

## 2022-10-26 RX ADMIN — METOPROLOL SUCCINATE 25 MG: 25 TABLET, EXTENDED RELEASE ORAL at 08:10

## 2022-10-26 NOTE — ASSESSMENT & PLAN NOTE
Likely related to underlying muscle infection and sepsis ,rhabdomyolyisis,hep panel is negative,will monitor.  Check RUQ US ,has fatty liver ,

## 2022-10-26 NOTE — PROGRESS NOTES
[facility] Medicine  Progress Note    Patient Name: Elías Gay  MRN: [id number]  Patient Class: IP- Inpatient   Admission Date: 10/24/2022  Length of Stay: 2 days  Attending Physician: Bob Mccollum MD  Primary Care Provider: Kameron Diaz MD        Subjective:     Principal Problem:Sepsis        HPI:  This is a 67 year old male with a PMHx of BPH, TJ (on BiPAP) and obesity who presents with generalized weakness.     Patient reports developing worsening sinusitis over the last 3 weeks. He reports that his nasal discharge have been bloody mixed with mucous. About a week prior, his symptoms progressively became worse and 3 days prior to presentation he noticed left neck swelling. He tried to carry on with his ADLs but felt too weak and he collapsed on the bathroom floor, with no loss of consciousness. He reported having chills, and decreased po intake, but denied having cough, SOB or diarrhea. His wife is sick with similar symptoms.     In the ED, the patient was febrile (38.2), tachycardic (130s), tachypnic and requiring 2L of supplemental O2. Labs showed leukocytosis (38.9 - with neutrophilia), elevated creatinine (1.8 - baseline of 0.9), elevated LFTs (Tbili: 1.4, AST: 511, ALT: 122), elevated troponin (0.063), elevated BNP (142) and elevated procalcitonin (34.23). EKG showed SVT with PVCs. CXR showed mild increased interstitial attenuation (possible mild pulmonary edema). CT neck showed extensive left neck soft tissue swelling with inflammatory changes and edema with asymmetric heterogenous enlargement is seen of the left sternocleidomastoid & numerous enlarged cervical chain lymph nodes are seen throughout the region (cellulitis versus myositis or neoplastic process) without an abscess. He was given vancomycin, cefepime, sepsis bolus and 10 mg of diltiazem x2 and was started on a diltiazem drip. A transfer to [facility] for ENT/OMFS evaluation was attempted, however, there  were no bed availability. The patient was admitted to the ICU for further management.       Overview/Hospital Course:  This is a 67 year old male with a PMHx of BPH, TJ (on BiPAP) and obesity who presents with generalized weakness.  He reported having chills, and decreased po intake, but denied having cough, SOB or diarrhea. His wife is sick with similar symptoms.   CT neck showed extensive left neck soft tissue swelling with inflammatory changes and edema with asymmetric heterogenous enlargement is seen of the left sternocleidomastoid & numerous enlarged cervical chain lymph nodes are seen throughout the region (cellulitis versus myositis or neoplastic process) without an abscess. He was given vancomycin, cefepime, he was septic and hypotensive,improved with IVF,did not required vasopressors.  Was in Afib with RVR,bolus and 10 mg of diltiazem x2 and was started on a diltiazem drip.converted to sinus.  A transfer to [facility] for ENT evaluation was attempted, however, there were no bed availability. The patient was admitted to the ICU for further management.our ENT is back, saw patient today,possible intervention in AM.keep NPO past MN.  No sign of dental abscess,going frequently to dentist,  Hypotension  is resolved with IVF,BP is stable at this time  Afib is resolved with Cardizem gtt and  patiens in sinus  and stable,echo show preserved EF,per cardiology only on ASA and follow up as out patient for Holter monitoring.  Has rhabdomyolysis on IVF,will monitor.CPK is improving.  On IVF for ARF.casillas is in place.did not improved,consulted nephrology.  Cc is neck swelling.      Past Medical History:   Diagnosis Date    Acute renal failure 10/25/2022    Arthritis 1971    Bone Spurs in feet    Basal cell carcinoma 11/06/2018    left ear helix    Foreign body in conjunctival sac     Hernia, inguinal, right 2002    Joint pain 1971    Bones of feet    Keloid cicatrix 1958 2010 2018 2019    Hernia,  Knee, Arm, Ear    Melanoma 09/14/2018    Right Arm 0.6mm    Severe sleep apnea        Past Surgical History:   Procedure Laterality Date    CLOSURE OF DEFECT OF MOHS PROCEDURE Left 1/3/2019    Procedure: CLOSURE, MOHS PROCEDURE DEFECT LEFT EAR;  Surgeon: Jeramy Meek MD;  [facility];  Service: Plastics;  Laterality: Left;  plastics set    COLONOSCOPY N/A 6/30/2021    Procedure: COLONOSCOPY;  Surgeon: Juliano Santoyo MD;  Location: [facility] ENDO (2ND FLR);  Service: Endoscopy;  Laterality: N/A;  severe sleep apnea     fully vaccinated-GT    HERNIA REPAIR Left     3 years of age    HERNIA REPAIR N/A     Ventral wall at 3 year of age.    KNEE SURGERY Right 1984    Arthroscopic    NASAL SEPTUM SURGERY N/A 2009    SKIN BIOPSY  several over time       Review of patient's allergies indicates:  No Known Allergies    No current facility-administered medications on file prior to encounter.     Current Outpatient Medications on File Prior to Encounter   Medication Sig    azelastine (ASTELIN) 137 mcg (0.1 %) nasal spray 1 spray (137 mcg total) by Nasal route 2 (two) times daily.    calcium-magnesium-zinc Tab Take 2 tablets by mouth once daily.    ciclopirox (PENLAC) 8 % Soln Apply daily to affected nail. Must remove and restart weekly    guaiFENesin (MUCINEX) 600 mg 12 hr tablet Take 1,200 mg by mouth 2 (two) times daily.    ketoconazole (NIZORAL) 2 % cream AAA bid to both feet x 3 weeks    multivitamin capsule Take 1 capsule by mouth once daily.    tamsulosin (FLOMAX) 0.4 mg Cap TAKE 1 CAPSULE BY MOUTH EVERY DAY    celecoxib (CELEBREX) 200 MG capsule Take 1 capsule (200 mg total) by mouth once daily. With food    sars-cov-2, covid-19 omicron, (MODERNA COVID-19) 50 mcg/0.5 mL injection Inject into the muscle.     Family History       Problem Relation (Age of Onset)    Aneurysm Mother    Cancer Father (72)    Eczema Brother    Gout Brother    Hypertension Mother    No Known Problems Daughter,  Son, Daughter, Son    Psoriasis Brother    Stroke Mother          Tobacco Use    Smoking status: Never    Smokeless tobacco: Never    Tobacco comments:     Second hand smoke from mother. Cigarrette smoke inflames my sinuses.   Substance and Sexual Activity    Alcohol use: Not Currently     Alcohol/week: 1.0 standard drink     Types: 1 Glasses of wine per week     Comment: Rare    Drug use: No    Sexual activity: Yes     Partners: Female     Birth control/protection: None     Review of Systems   Constitutional:  Positive for activity change, appetite change and chills.   HENT:  Positive for facial swelling, sinus pressure and sinus pain. Negative for dental problem.    Eyes: Negative.    Respiratory: Negative.     Cardiovascular: Negative.    Gastrointestinal: Negative.    Endocrine: Negative.    Genitourinary: Negative.    Musculoskeletal: Negative.  Negative for neck pain.   Skin: Negative.    Allergic/Immunologic: Negative.    Neurological: Negative.    Psychiatric/Behavioral: Negative.     Objective:     Vital Signs (Most Recent):  Temp: 97.6 °F (36.4 °C) (10/26/22 0800)  Pulse: 77 (10/26/22 1000)  Resp: 20 (10/26/22 1000)  BP: 135/72 (10/26/22 1000)  SpO2: 95 % (10/26/22 1000)   Vital Signs (24h Range):  Temp:  [97.6 °F (36.4 °C)-98.9 °F (37.2 °C)] 97.6 °F (36.4 °C)  Pulse:  [74-90] 77  Resp:  [13-47] 20  SpO2:  [90 %-97 %] 95 %  BP: (101-140)/(52-90) 135/72     Weight: 133.6 kg (294 lb 8.6 oz)  Body mass index is 41.08 kg/m².    Physical Exam  Vitals and nursing note reviewed.   Constitutional:       General: He is not in acute distress.     Appearance: He is not ill-appearing.   HENT:      Head: Normocephalic and atraumatic.      Comments: Left neck swelling      Nose: Nose normal.      Mouth/Throat:      Mouth: Mucous membranes are dry.   Eyes:      Extraocular Movements: Extraocular movements intact.   Cardiovascular:      Rate and Rhythm: Normal rate and regular rhythm.      Pulses: Normal pulses.       Heart sounds: No murmur heard.  Pulmonary:      Effort: No respiratory distress.      Breath sounds: No rales.   Abdominal:      General: Abdomen is flat.      Palpations: Abdomen is soft.      Tenderness: There is no abdominal tenderness.   Musculoskeletal:      Right lower leg: No edema.      Left lower leg: No edema.      Comments: Left sided neck swelling, firm without fluctuance    Skin:     General: Skin is warm.   Neurological:      General: No focal deficit present.      Mental Status: He is alert.   Psychiatric:         Mood and Affect: Mood normal.           Significant Labs: All pertinent labs within the past 24 hours have been reviewed.    Significant Imaging: I have reviewed all pertinent imaging results/findings within the past 24 hours.      CC is neck swelling.  Assessment/Plan:      * Sepsis  This patient does have evidence of infective focus  My overall impression is sepsis. Vital signs were reviewed and noted in progress note.  Antibiotics given-   Antibiotics (From admission, onward)    Start     Stop Route Frequency Ordered    10/26/22 2114  cefepime in dextrose 5 % IVPB 2 g         -- IV Every 24 hours (non-standard times) 10/26/22 0757    10/25/22 0900  mupirocin 2 % ointment         10/30 0859 Nasl 2 times daily 10/25/22 0714    10/25/22 0830  clindamycin in D5W 900 mg/50 mL IVPB 900 mg         -- IV Every 8 hours (non-standard times) 10/25/22 0725    10/24/22 2051  vancomycin - pharmacy to dose  (vancomycin IVPB)        See Hyperspace for full Linked Orders Report.    -- IV pharmacy to manage frequency 10/24/22 1952        Cultures were taken-   Microbiology Results (last 7 days)     Procedure Component Value Units Date/Time    Urine culture [[id number]] Collected: 10/25/22 0230    Order Status: Completed Specimen: Urine Updated: 10/26/22 0950     Urine Culture, Routine No growth to date    Narrative:      Specimen Source->Urine    Blood culture x two cultures. Draw prior to antibiotics.  [[id number]] Collected: 10/24/22 1911    Order Status: Completed Specimen: Blood from Peripheral, Antecubital, Left Updated: 10/25/22 2103     Blood Culture, Routine No Growth to date      No Growth to date    Narrative:      Aerobic and anaerobic    Blood culture x two cultures. Draw prior to antibiotics. [[id number]] Collected: 10/24/22 1910    Order Status: Completed Specimen: Blood from Peripheral, Hand, Right Updated: 10/25/22 2103     Blood Culture, Routine No Growth to date      No Growth to date    Narrative:      Aerobic and anaerobic        Latest lactate reviewed, they are-  Recent Labs   Lab 10/24/22  2058 10/24/22  2306   LACTATE 2.4* 2.1       Organ dysfunction indicated by Acute liver injury  Source- Likely deep soft tissue neck infection related to possibly recent sinusitis infection or odontogenic in source     Plan:   Source control Achieved by- Antibiotics (Vancomycin,cefpeime and clindamycin.  ENT consult -  transfer to [facility] for evaluation, currently there is no concern for airway compromise ,stable on RA.our ENT is back,possible intervention in AM.      Non-traumatic rhabdomyolysis  No sign of trauma,on IVF and will monitor.improving.      Acute renal failure  On IVF,has vinny,will monitor.did not  improved,consulted nephrology.      Neck swelling  Likely related to an underlying infection, see plan for sepsis .  Appear to be sinusitis as source,no sign of dental abscess,going frequently to dentist.  Need be seen by ENT,[facility] fascility [facility],.  Our ENT is back today,possible intervention in AM.      Elevated LFTs  Likely related to underlying muscle infection and sepsis ,rhabdomyolyisis,hep panel is negative,will monitor.  Check RUQ US ,has fatty liver ,    Paroxysmal atrial fibrillation  - New onset   - [id number] Score: 1   - Will hold off on anticoagulation given low score   - Continue diltiazem drip   - Cardiology consult  .  Off Cardizem,sinus at this time,echo. Show preserved EF,per cardiology only ASA at this time and holter monitoring as out patient.    Morbid obesity  Body mass index is 41.08 kg/m². Morbid obesity complicates all aspects of disease management from diagnostic modalities to treatment. Weight loss encouraged and health benefits explained to patient.         BPH with urinary obstruction  Resume Flomax       Complex sleep apnea syndrome  Resume nightly BiPAP         VTE Risk Mitigation (From admission, onward)         Ordered     heparin (porcine) injection 5,000 Units  Every 8 hours         10/25/22 0714     IP VTE HIGH RISK PATIENT  Once         10/24/22 2303     Place sequential compression device  Until discontinued         10/24/22 2303                Discharge Planning   JEFF[place]:      Code Status: Full Code   Is the patient medically ready for discharge?:     Reason for patient still in hospital (select all that apply): Patient trending condition               Critical care time spent on the evaluation and treatment of severe organ dysfunction, review of pertinent labs and imaging studies, discussions with consulting providers and discussions with patient/family:  Over 45  minutes.      Bob Mccollum MD  Department of Hospital Medicine   [facility] [place] - Intensive Care

## 2022-10-26 NOTE — ASSESSMENT & PLAN NOTE
This patient does have evidence of infective focus  My overall impression is sepsis. Vital signs were reviewed and noted in progress note.  Antibiotics given-   Antibiotics (From admission, onward)    Start     Stop Route Frequency Ordered    10/26/22 2114  cefepime in dextrose 5 % IVPB 2 g         -- IV Every 24 hours (non-standard times) 10/26/22 0757    10/25/22 0900  mupirocin 2 % ointment         10/30 0859 Nasl 2 times daily 10/25/22 0714    10/25/22 0830  clindamycin in D5W 900 mg/50 mL IVPB 900 mg         -- IV Every 8 hours (non-standard times) 10/25/22 0725    10/24/22 2051  vancomycin - pharmacy to dose  (vancomycin IVPB)        See Hyperspace for full Linked Orders Report.    -- IV pharmacy to manage frequency 10/24/22 1952        Cultures were taken-   Microbiology Results (last 7 days)     Procedure Component Value Units Date/Time    Urine culture [[id number]] Collected: 10/25/22 0230    Order Status: Completed Specimen: Urine Updated: 10/26/22 0950     Urine Culture, Routine No growth to date    Narrative:      Specimen Source->Urine    Blood culture x two cultures. Draw prior to antibiotics. [[id number]] Collected: 10/24/22 1911    Order Status: Completed Specimen: Blood from Peripheral, Antecubital, Left Updated: 10/25/22 2103     Blood Culture, Routine No Growth to date      No Growth to date    Narrative:      Aerobic and anaerobic    Blood culture x two cultures. Draw prior to antibiotics. [[id number]] Collected: 10/24/22 1910    Order Status: Completed Specimen: Blood from Peripheral, Hand, Right Updated: 10/25/22 2103     Blood Culture, Routine No Growth to date      No Growth to date    Narrative:      Aerobic and anaerobic        Latest lactate reviewed, they are-  Recent Labs   Lab 10/24/22  2058 10/24/22  2306   LACTATE 2.4* 2.1       Organ dysfunction indicated by Acute liver injury  Source- Likely deep soft tissue neck infection related to possibly recent sinusitis infection or odontogenic  in source     Plan:   Source control Achieved by- Antibiotics (Vancomycin,cefpeime and clindamycin.  ENT consult -  transfer to main campus for evaluation, currently there is no concern for airway compromise ,stable on RA.our ENT is back,possible intervention in AM.

## 2022-10-26 NOTE — CONSULTS
[facility]  Otorhinolaryngology-Head & Neck Surgery  Consult Note    Patient Name: Elías Gay  MRN: [id number]  Code Status: Full Code  Admission Date: 10/24/2022  Hospital Length of Stay: 2 days  Attending Physician: Bob Mccollum MD  Primary Care Provider: Kameron Diaz MD    Consults  Subjective:     Chief Complaint/Reason for Admission: neck swelling    History of Present Illness: neck swelling started Sunday   Had had sinus symptoms for 3 weeks prior   Had syncopal episode on Monday and was down for over 6 hours and could not get up   He has noticed that swelling has improved since admission but still with significant swelling. Pain has improved.  Denies dental pain  No issue swallowing just has not had much appetite.      Medications:  Continuous Infusions:   lactated ringers Stopped (10/26/22 1251)     Scheduled Meds:   ceFEPime (MAXIPIME) IVPB  2 g Intravenous Q24H    clindamycin (CLEOCIN) IVPB  900 mg Intravenous Q8H    famotidine (PF)  20 mg Intravenous Daily    heparin (porcine)  5,000 Units Subcutaneous Q8H    metoprolol succinate  25 mg Oral BID    mupirocin   Nasal BID    sodium chloride 0.9%  10 mL Intravenous Q6H    tamsulosin  1 capsule Oral Daily     PRN Meds:acetaminophen, albuterol-ipratropium, calcium gluconate IVPB, calcium gluconate IVPB, calcium gluconate IVPB, HYDROcodone-acetaminophen, HYDROcodone-acetaminophen, magnesium sulfate IVPB, magnesium sulfate IVPB, melatonin, ondansetron, potassium chloride **AND** potassium chloride **AND** potassium chloride, prochlorperazine, Flushing PICC Protocol **AND** sodium chloride 0.9% **AND** sodium chloride 0.9%, sodium phosphate IVPB, sodium phosphate IVPB, sodium phosphate IVPB, Pharmacy to dose Vancomycin consult **AND** vancomycin - pharmacy to dose     No current facility-administered medications on file prior to encounter.     Current Outpatient Medications on File Prior to Encounter   Medication Sig    azelastine  (ASTELIN) 137 mcg (0.1 %) nasal spray 1 spray (137 mcg total) by Nasal route 2 (two) times daily.    calcium-magnesium-zinc Tab Take 2 tablets by mouth once daily.    ciclopirox (PENLAC) 8 % Soln Apply daily to affected nail. Must remove and restart weekly    guaiFENesin (MUCINEX) 600 mg 12 hr tablet Take 1,200 mg by mouth 2 (two) times daily.    ketoconazole (NIZORAL) 2 % cream AAA bid to both feet x 3 weeks    multivitamin capsule Take 1 capsule by mouth once daily.    tamsulosin (FLOMAX) 0.4 mg Cap TAKE 1 CAPSULE BY MOUTH EVERY DAY    celecoxib (CELEBREX) 200 MG capsule Take 1 capsule (200 mg total) by mouth once daily. With food    sars-cov-2, covid-19 omicron, (MODERNA COVID-19) 50 mcg/0.5 mL injection Inject into the muscle.       Review of patient's allergies indicates:  No Known Allergies    Past Medical History:   Diagnosis Date    Acute renal failure 10/25/2022    Arthritis 1971    Bone Spurs in feet    Basal cell carcinoma 11/06/2018    left ear helix    Foreign body in conjunctival sac     Hernia, inguinal, right 2002    Joint pain 1971    Bones of feet    Keloid cicatrix 1958 2010 2018 2019    Hernia, Knee, Arm, Ear    Melanoma 09/14/2018    Right Arm 0.6mm    Severe sleep apnea      Past Surgical History:   Procedure Laterality Date    CLOSURE OF DEFECT OF MOHS PROCEDURE Left 1/3/2019    Procedure: CLOSURE, MOHS PROCEDURE DEFECT LEFT EAR;  Surgeon: Jeramy Meek MD;  Location: [facility] OR [address] [place] [address];  Service: Plastics;  Laterality: Left;  plastics set    COLONOSCOPY N/A 6/30/2021    Procedure: COLONOSCOPY;  Surgeon: Juliano Santoyo MD;  Location: [facility] ENDO (2ND FLR);  Service: Endoscopy;  Laterality: N/A;  severe sleep apnea     fully vaccinated-GT    HERNIA REPAIR Left     3 years of age    HERNIA REPAIR N/A     Ventral wall at 3 year of age.    KNEE SURGERY Right 1984    Arthroscopic    NASAL SEPTUM SURGERY N/A 2009    SKIN BIOPSY  several over time     Family History       Problem Relation (Age of  Onset)    Aneurysm Mother    Cancer Father (72)    Eczema Brother    Gout Brother    Hypertension Mother    No Known Problems Daughter, Son, Daughter, Son    Psoriasis Brother    Stroke Mother          Tobacco Use    Smoking status: Never    Smokeless tobacco: Never    Tobacco comments:     Second hand smoke from mother. Cigarrette smoke inflames my sinuses.   Substance and Sexual Activity    Alcohol use: Not Currently     Alcohol/week: 1.0 standard drink     Types: 1 Glasses of wine per week     Comment: Rare    Drug use: No    Sexual activity: Yes     Partners: Female     Birth control/protection: None     Review of Systems   HENT:  Positive for congestion and sinus pain.    Gastrointestinal:  Positive for diarrhea.   Musculoskeletal:  Positive for neck pain and neck stiffness.   Skin:  Positive for color change.   Neurological:  Positive for dizziness.   Objective:     Vital Signs (Most Recent):  Temp: 97.4 °F (36.3 °C) (10/26/22 1418)  Pulse: 85 (10/26/22 1418)  Resp: 20 (10/26/22 1418)  BP: (!) 141/78 (10/26/22 1418)  SpO2: (!) 94 % (10/26/22 1418)   Vital Signs (24h Range):  Temp:  [97.4 °F (36.3 °C)-98.9 °F (37.2 °C)] 97.4 °F (36.3 °C)  Pulse:  [74-86] 85  Resp:  [13-47] 20  SpO2:  [90 %-96 %] 94 %  BP: (101-141)/(52-90) 141/78     Weight: 133.6 kg (294 lb 8.6 oz)  Body mass index is 41.08 kg/m².    Date 10/26/22 0700 - 10/27/22 0659   Shift 1733-6641 1830-9835 0049-8634 24 Hour Total   INTAKE   I.V.(mL/kg) 1320.6(9.9)   1320.6(9.9)   IV Piggyback 299.1   299.1   Shift Total(mL/kg) 1619.7(12.1)   1619.7(12.1)   OUTPUT   Urine(mL/kg/hr) 310(0.3)   310   Shift Total(mL/kg) 310(2.3)   310(2.3)   Weight (kg) 133.6 133.6 133.6 133.6       Physical Exam  HENT:      Head: Normocephalic.      Mouth/Throat:      Mouth: Mucous membranes are moist.   Neck:        Comments: Left neck Erythema but no darkening of tissues. No crepitus. SCM very firm and indurated . Slight restriction of head movement to the  left  Pulmonary:      Effort: Pulmonary effort is normal.      Breath sounds: No stridor.   Musculoskeletal:      Cervical back: Edema and erythema present.   Neurological:      Mental Status: He is alert.       Significant Labs:  CBC:   Recent Labs   Lab 10/26/22  0304   WBC 28.16*   RBC 3.44*   HGB 10.5*   HCT 31.5*      MCV 92   MCH 30.5   MCHC 33.3       Significant Diagnostics:  CT: I have reviewed all pertinent results/findings within the past 24 hours and my personal findings are:  significant muscle thickening and soft tissue stranding concerning for potential necrotizing fasciitis  multiple enlarged cervical lymph nodes                Assessment/Plan:     Active Diagnoses:    Diagnosis Date Noted POA    PRINCIPAL PROBLEM:  Sepsis [A41.9] 10/24/2022 Yes    Acute renal failure [N17.9] 10/25/2022 Yes    Non-traumatic rhabdomyolysis [M62.82] 10/25/2022 Yes    Paroxysmal atrial fibrillation [I48.0] 10/24/2022 Yes    Elevated LFTs [R79.89] 10/24/2022 Yes    Neck swelling [R22.1] 10/24/2022 Yes    Morbid obesity [E66.01] 12/10/2018 Yes    BPH with urinary obstruction [N40.1, N13.8] 10/31/2018 Yes     Chronic    Complex sleep apnea syndrome [G47.31]  Yes      Problems Resolved During this Admission:     VTE Risk Mitigation (From admission, onward)           Ordered     heparin (porcine) injection 5,000 Units  Every 8 hours         10/25/22 0714     IP VTE HIGH RISK PATIENT  Once         10/24/22 2303     Place sequential compression device  Until discontinued         10/24/22 2303                Pt last ate jello at 10 am . Has not eaten otherwise today per patient and confirmed with nurse in icu.   Recommend exploratory window to eval for nec fasc. Even though he feels he is improving, leukocytosis and elevated cpk concerning ( discussed cpk issue with dr. Bosch , seems high for syncopal episode issue pt described)   Discussed pros/cons of monitoring vs surgery . Pt agreed to surgical mgmt. Will make  exploratory window type of incision and if muscle and soft tissue have appearance concerning for nec fasc , will enlarge incision and do formal debridement. I think less likely given not worsening but I have seen some cases that can be stagnant before taking a significant downturn and with imaging findings I would prefer to get a better eval out of abundance of precaution. Discussed that if nec fasc would need additional washout procedures . Discussed risk to other structures like nerves that could lead to lip, tongue or shoulder weakness. It is unclear as this is my first time to examine the patient and [place] and patient has not been able to be transferred to main campus therefore even though improving subjectively, ct and wbc concerning ( has improved some but not a lot)     Given overall picture do not suspect neoplasm but this could be in differential diagnosis but would be unusual presentation. Will also flex scope patient and addend note with findings     Thank you for your consult. I will follow-up with patient. Please contact us if you have any additional questions.    Bethany Macdonald MD  Otorhinolaryngology-Head & Neck Surgery  [facility] [place] - Telemetry      Addendum  Pt has history of melanoma on arm and skin cancer on ear - postsurgical defect of left upper ear  PROCEDURE NOTE  NAME OF PROCEDURE: Flexible Laryngoscopy, diagnostic  INDICATIONS: gag reflex precludes mirror exam, lymphadenopathy, neck swelling  FINDINGS:   Airway patent , no mass or lesion      Consent: After procedure was explained in detail and all questions answered, verbal consent was obtained for performing flexible laryngoscopy.  Anesthesia: topical 4% lidocaine and neosynephrine  Procedure: With patient in seated position, the scope was inserted into the bilateral nasal passageway and advanced atraumatically into the nasopharynx to examine the following structures:  Nasal cavity: Turbinates with no  hypertrophy. no middle meatal edema. No purulent drainage. Crusting in right nare  Nasopharynx: no mass or lesion noted in nasopharynx.   Oropharynx: base of tongue without  mass or ulceration. Lingual tonsils normal in appearance  Hypopharynx: posterior pharyngeal wall without mass or lesion. No pooling of secretions. Pyriform sinuses visible without mass or lesion  Larynx: epiglottis normal without lesion. False vocal folds without edema/erythema/lesion. True vocal folds mobile and without lesion. nointerarytenoid edema no erythema . Postcricoid region with noedema no lesion   Subglottis: visualized portion of subglottis normal in appearance    After examination performed, the scope was removed atraumatically . The patient tolerated the procedure well. Photodocumentation obtained with representative images below, all images and/or videos uploaded in media section of epic.

## 2022-10-26 NOTE — PLAN OF CARE
[place] - Telemetry  Discharge Reassessment    Primary Care Provider: Kameron Diaz MD    Expected Discharge Date: pending    Reassessment (most recent)       Discharge Reassessment - 10/26/22 9254          Discharge Reassessment    Assessment Type Discharge Planning Reassessment     Did the patient's condition or plan change since previous assessment? Yes     Discharge Plan discussed with: Spouse/sig other;Patient     Name(s) and Number(s) Arpita Jason [phone]     Communicated JEFF[place] with patient/caregiver No     Discharge Plan A Home with family     Discharge Plan B Home Health     DME Needed Upon Discharge  none     Discharge Barriers Identified None     Why the patient remains in the hospital Requires continued medical care        Post-Acute Status    Coverage [facility]     Discharge Delays None known at this time                 This patient has been screened for Case Management needs.  Based on (documentation in medical record), patient stepped down from ICU to Tele this date.    Discharge plan:  Patient will need PCP and Cardiology f/u appts.  Only DME at this time is a CPAP.  Might benefit from PT/OT eval.  Has raised home.  Six steps to enter home.  Manages well at times.  Has hand rails.  Preferred pharmacy:  [facility].  Home and Emergency contact numbers updated in EPIC.    Case Management/Social Work remains available if a need arises, please enter consult for assistance.  For urgent needs contact Case Management Department/on-call at:  [phone]

## 2022-10-26 NOTE — INTERVAL H&P NOTE
The patient has been examined and the H&P has been reviewed:      Anesthesia/Surgery risks, benefits and alternative options discussed and understood by patient/family.          Active Hospital Problems    Diagnosis  POA    *Sepsis [A41.9]  Yes    Acute renal failure [N17.9]  Yes    Non-traumatic rhabdomyolysis [M62.82]  Yes    Paroxysmal atrial fibrillation [I48.0]  Yes    Elevated LFTs [R79.89]  Yes    Neck swelling [R22.1]  Yes    Morbid obesity [E66.01]  Yes     BMI 43      BPH with urinary obstruction [N40.1, N13.8]  Yes     Chronic    Complex sleep apnea syndrome [G47.31]  Yes      Resolved Hospital Problems   No resolved problems to display.

## 2022-10-26 NOTE — H&P (VIEW-ONLY)
[facility]  Otorhinolaryngology-Head & Neck Surgery  Consult Note    Patient Name: Elías Gay  MRN: [id number]  Code Status: Full Code  Admission Date: 10/24/2022  Hospital Length of Stay: 2 days  Attending Physician: Bob Mccollum MD  Primary Care Provider: Kameron Diaz MD    Consults  Subjective:     Chief Complaint/Reason for Admission: neck swelling    History of Present Illness: neck swelling started Sunday   Had had sinus symptoms for 3 weeks prior   Had syncopal episode on Monday and was down for over 6 hours and could not get up   He has noticed that swelling has improved since admission but still with significant swelling. Pain has improved.  Denies dental pain  No issue swallowing just has not had much appetite.      Medications:  Continuous Infusions:   lactated ringers Stopped (10/26/22 1251)     Scheduled Meds:   ceFEPime (MAXIPIME) IVPB  2 g Intravenous Q24H    clindamycin (CLEOCIN) IVPB  900 mg Intravenous Q8H    famotidine (PF)  20 mg Intravenous Daily    heparin (porcine)  5,000 Units Subcutaneous Q8H    metoprolol succinate  25 mg Oral BID    mupirocin   Nasal BID    sodium chloride 0.9%  10 mL Intravenous Q6H    tamsulosin  1 capsule Oral Daily     PRN Meds:acetaminophen, albuterol-ipratropium, calcium gluconate IVPB, calcium gluconate IVPB, calcium gluconate IVPB, HYDROcodone-acetaminophen, HYDROcodone-acetaminophen, magnesium sulfate IVPB, magnesium sulfate IVPB, melatonin, ondansetron, potassium chloride **AND** potassium chloride **AND** potassium chloride, prochlorperazine, Flushing PICC Protocol **AND** sodium chloride 0.9% **AND** sodium chloride 0.9%, sodium phosphate IVPB, sodium phosphate IVPB, sodium phosphate IVPB, Pharmacy to dose Vancomycin consult **AND** vancomycin - pharmacy to dose     No current facility-administered medications on file prior to encounter.     Current Outpatient Medications on File Prior to Encounter   Medication Sig    azelastine  (ASTELIN) 137 mcg (0.1 %) nasal spray 1 spray (137 mcg total) by Nasal route 2 (two) times daily.    calcium-magnesium-zinc Tab Take 2 tablets by mouth once daily.    ciclopirox (PENLAC) 8 % Soln Apply daily to affected nail. Must remove and restart weekly    guaiFENesin (MUCINEX) 600 mg 12 hr tablet Take 1,200 mg by mouth 2 (two) times daily.    ketoconazole (NIZORAL) 2 % cream AAA bid to both feet x 3 weeks    multivitamin capsule Take 1 capsule by mouth once daily.    tamsulosin (FLOMAX) 0.4 mg Cap TAKE 1 CAPSULE BY MOUTH EVERY DAY    celecoxib (CELEBREX) 200 MG capsule Take 1 capsule (200 mg total) by mouth once daily. With food    sars-cov-2, covid-19 omicron, (MODERNA COVID-19) 50 mcg/0.5 mL injection Inject into the muscle.       Review of patient's allergies indicates:  No Known Allergies    Past Medical History:   Diagnosis Date    Acute renal failure 10/25/2022    Arthritis 1971    Bone Spurs in feet    Basal cell carcinoma 11/06/2018    left ear helix    Foreign body in conjunctival sac     Hernia, inguinal, right 2002    Joint pain 1971    Bones of feet    Keloid cicatrix 1958 2010 2018 2019    Hernia, Knee, Arm, Ear    Melanoma 09/14/2018    Right Arm 0.6mm    Severe sleep apnea      Past Surgical History:   Procedure Laterality Date    CLOSURE OF DEFECT OF MOHS PROCEDURE Left 1/3/2019    Procedure: CLOSURE, MOHS PROCEDURE DEFECT LEFT EAR;  Surgeon: Jeramy Meek MD;  Location: [facility] OR [address] [place] [address];  Service: Plastics;  Laterality: Left;  plastics set    COLONOSCOPY N/A 6/30/2021    Procedure: COLONOSCOPY;  Surgeon: Juliano Santoyo MD;  Location: [facility] ENDO (2ND FLR);  Service: Endoscopy;  Laterality: N/A;  severe sleep apnea     fully vaccinated-GT    HERNIA REPAIR Left     3 years of age    HERNIA REPAIR N/A     Ventral wall at 3 year of age.    KNEE SURGERY Right 1984    Arthroscopic    NASAL SEPTUM SURGERY N/A 2009    SKIN BIOPSY  several over time     Family History       Problem Relation (Age of  Onset)    Aneurysm Mother    Cancer Father (72)    Eczema Brother    Gout Brother    Hypertension Mother    No Known Problems Daughter, Son, Daughter, Son    Psoriasis Brother    Stroke Mother          Tobacco Use    Smoking status: Never    Smokeless tobacco: Never    Tobacco comments:     Second hand smoke from mother. Cigarrette smoke inflames my sinuses.   Substance and Sexual Activity    Alcohol use: Not Currently     Alcohol/week: 1.0 standard drink     Types: 1 Glasses of wine per week     Comment: Rare    Drug use: No    Sexual activity: Yes     Partners: Female     Birth control/protection: None     Review of Systems   HENT:  Positive for congestion and sinus pain.    Gastrointestinal:  Positive for diarrhea.   Musculoskeletal:  Positive for neck pain and neck stiffness.   Skin:  Positive for color change.   Neurological:  Positive for dizziness.   Objective:     Vital Signs (Most Recent):  Temp: 97.4 °F (36.3 °C) (10/26/22 1418)  Pulse: 85 (10/26/22 1418)  Resp: 20 (10/26/22 1418)  BP: (!) 141/78 (10/26/22 1418)  SpO2: (!) 94 % (10/26/22 1418)   Vital Signs (24h Range):  Temp:  [97.4 °F (36.3 °C)-98.9 °F (37.2 °C)] 97.4 °F (36.3 °C)  Pulse:  [74-86] 85  Resp:  [13-47] 20  SpO2:  [90 %-96 %] 94 %  BP: (101-141)/(52-90) 141/78     Weight: 133.6 kg (294 lb 8.6 oz)  Body mass index is 41.08 kg/m².    Date 10/26/22 0700 - 10/27/22 0659   Shift 4920-0380 2795-5246 7460-5845 24 Hour Total   INTAKE   I.V.(mL/kg) 1320.6(9.9)   1320.6(9.9)   IV Piggyback 299.1   299.1   Shift Total(mL/kg) 1619.7(12.1)   1619.7(12.1)   OUTPUT   Urine(mL/kg/hr) 310(0.3)   310   Shift Total(mL/kg) 310(2.3)   310(2.3)   Weight (kg) 133.6 133.6 133.6 133.6       Physical Exam  HENT:      Head: Normocephalic.      Mouth/Throat:      Mouth: Mucous membranes are moist.   Neck:        Comments: Left neck Erythema but no darkening of tissues. No crepitus. SCM very firm and indurated . Slight restriction of head movement to the  left  Pulmonary:      Effort: Pulmonary effort is normal.      Breath sounds: No stridor.   Musculoskeletal:      Cervical back: Edema and erythema present.   Neurological:      Mental Status: He is alert.       Significant Labs:  CBC:   Recent Labs   Lab 10/26/22  0304   WBC 28.16*   RBC 3.44*   HGB 10.5*   HCT 31.5*      MCV 92   MCH 30.5   MCHC 33.3       Significant Diagnostics:  CT: I have reviewed all pertinent results/findings within the past 24 hours and my personal findings are:  significant muscle thickening and soft tissue stranding concerning for potential necrotizing fasciitis  multiple enlarged cervical lymph nodes                Assessment/Plan:     Active Diagnoses:    Diagnosis Date Noted POA    PRINCIPAL PROBLEM:  Sepsis [A41.9] 10/24/2022 Yes    Acute renal failure [N17.9] 10/25/2022 Yes    Non-traumatic rhabdomyolysis [M62.82] 10/25/2022 Yes    Paroxysmal atrial fibrillation [I48.0] 10/24/2022 Yes    Elevated LFTs [R79.89] 10/24/2022 Yes    Neck swelling [R22.1] 10/24/2022 Yes    Morbid obesity [E66.01] 12/10/2018 Yes    BPH with urinary obstruction [N40.1, N13.8] 10/31/2018 Yes     Chronic    Complex sleep apnea syndrome [G47.31]  Yes      Problems Resolved During this Admission:     VTE Risk Mitigation (From admission, onward)           Ordered     heparin (porcine) injection 5,000 Units  Every 8 hours         10/25/22 0714     IP VTE HIGH RISK PATIENT  Once         10/24/22 2303     Place sequential compression device  Until discontinued         10/24/22 2303                Pt last ate jello at 10 am . Has not eaten otherwise today per patient and confirmed with nurse in icu.   Recommend exploratory window to eval for nec fasc. Even though he feels he is improving, leukocytosis and elevated cpk concerning ( discussed cpk issue with dr. Bosch , seems high for syncopal episode issue pt described)   Discussed pros/cons of monitoring vs surgery . Pt agreed to surgical mgmt. Will make  exploratory window type of incision and if muscle and soft tissue have appearance concerning for nec fasc , will enlarge incision and do formal debridement. I think less likely given not worsening but I have seen some cases that can be stagnant before taking a significant downturn and with imaging findings I would prefer to get a better eval out of abundance of precaution. Discussed that if nec fasc would need additional washout procedures . Discussed risk to other structures like nerves that could lead to lip, tongue or shoulder weakness. It is unclear as this is my first time to examine the patient and [place] and patient has not been able to be transferred to main campus therefore even though improving subjectively, ct and wbc concerning ( has improved some but not a lot)     Given overall picture do not suspect neoplasm but this could be in differential diagnosis but would be unusual presentation. Will also flex scope patient and addend note with findings     Thank you for your consult. I will follow-up with patient. Please contact us if you have any additional questions.    Bethany Macdonald MD  Otorhinolaryngology-Head & Neck Surgery  [facility] - Telemetry      Addendum  Pt has history of melanoma on arm and skin cancer on ear - postsurgical defect of left upper ear  PROCEDURE NOTE  NAME OF PROCEDURE: Flexible Laryngoscopy, diagnostic  INDICATIONS: gag reflex precludes mirror exam, lymphadenopathy, neck swelling  FINDINGS:   Airway patent , no mass or lesion      Consent: After procedure was explained in detail and all questions answered, verbal consent was obtained for performing flexible laryngoscopy.  Anesthesia: topical 4% lidocaine and neosynephrine  Procedure: With patient in seated position, the scope was inserted into the bilateral nasal passageway and advanced atraumatically into the nasopharynx to examine the following structures:  Nasal cavity: Turbinates with no  hypertrophy. no middle meatal edema. No purulent drainage. Crusting in right nare  Nasopharynx: no mass or lesion noted in nasopharynx.   Oropharynx: base of tongue without  mass or ulceration. Lingual tonsils normal in appearance  Hypopharynx: posterior pharyngeal wall without mass or lesion. No pooling of secretions. Pyriform sinuses visible without mass or lesion  Larynx: epiglottis normal without lesion. False vocal folds without edema/erythema/lesion. True vocal folds mobile and without lesion. nointerarytenoid edema no erythema . Postcricoid region with noedema no lesion   Subglottis: visualized portion of subglottis normal in appearance    After examination performed, the scope was removed atraumatically . The patient tolerated the procedure well. Photodocumentation obtained with representative images below, all images and/or videos uploaded in media section of epic.

## 2022-10-26 NOTE — PROGRESS NOTES
[facility]  Cardiology  Progress Note    Patient Name: Elías Gay  MRN: [id number]  Admission Date: 10/24/2022  Hospital Length of Stay: 2 days  Code Status: Full Code   Attending Physician: Bob Mccollum MD   Primary Care Physician: Kameron Diaz MD  Expected Discharge Date:   Principal Problem:Sepsis    Subjective:     Hospital Course:   No notes on file    Interval History:     Review of Systems   Constitutional: Negative for diaphoresis and malaise/fatigue.   HENT:  Positive for congestion. Negative for ear pain, hoarse voice, nosebleeds, odynophagia, sore throat and stridor.    Cardiovascular:  Negative for chest pain, dyspnea on exertion, irregular heartbeat, leg swelling, near-syncope, orthopnea, palpitations, paroxysmal nocturnal dyspnea and syncope.   Respiratory:  Negative for shortness of breath, sputum production and wheezing.    Skin:  Positive for color change and rash.   Gastrointestinal:  Negative for abdominal pain, heartburn, hematemesis and melena.   Neurological:  Negative for dizziness, focal weakness, light-headedness, numbness, paresthesias, seizures and weakness.   Objective:     Vital Signs (Most Recent):  Temp: 97.6 °F (36.4 °C) (10/26/22 0800)  Pulse: 77 (10/26/22 0800)  Resp: (!) 29 (10/26/22 0800)  BP: 138/75 (10/26/22 0800)  SpO2: 95 % (10/26/22 0800)   Vital Signs (24h Range):  Temp:  [97.6 °F (36.4 °C)-98.9 °F (37.2 °C)] 97.6 °F (36.4 °C)  Pulse:  [75-90] 77  Resp:  [13-47] 29  SpO2:  [90 %-97 %] 95 %  BP: (101-154)/(52-99) 138/75     Weight: 133.6 kg (294 lb 8.6 oz)  Body mass index is 41.08 kg/m².     SpO2: 95 %  O2 Device (Oxygen Therapy): room air      Intake/Output Summary (Last 24 hours) at 10/26/2022 0821  Last data filed at 10/26/2022 0800  Gross per 24 hour   Intake 3736.81 ml   Output 945 ml   Net 2791.81 ml       Lines/Drains/Airways       Peripherally Inserted Central Catheter Line  Duration             PICC Triple Lumen 10/25/22 1030 right  basilic <1 day              Drain  Duration                  Urethral Catheter 10/25/22 0230 16 Fr. 1 day                    Physical Exam  Vitals reviewed.   Constitutional:       General: He is not in acute distress.     Appearance: He is obese. He is not diaphoretic.   Neck:      Vascular: No carotid bruit or JVD.   Cardiovascular:      Rate and Rhythm: Normal rate and regular rhythm.      Pulses: Normal pulses.   Pulmonary:      Effort: Pulmonary effort is normal.      Breath sounds: Normal breath sounds.   Abdominal:      General: Bowel sounds are normal.      Palpations: Abdomen is soft.      Tenderness: There is no abdominal tenderness.   Musculoskeletal:      Cervical back: Edema and erythema present.      Right lower le+ Edema present.      Left lower le+ Edema present.      Comments: Venous stasis dermatitis   Neurological:      Mental Status: He is alert and oriented to person, place, and time.   Psychiatric:         Speech: Speech normal.         Behavior: Behavior normal.       Significant Labs: CMP   Recent Labs   Lab 10/24/22  1909 10/25/22  0050 10/26/22  0304    135* 132*   K 3.5 3.7 3.5    101 101   CO2 20* 22* 21*   GLU 93 125* 88   BUN 23 29* 46*   CREATININE 1.8* 2.3* 3.5*   CALCIUM 8.8 8.4* 7.3*   PROT 7.2 6.7 5.1*   ALBUMIN 2.9* 2.6* 1.9*   BILITOT 1.4* 1.0 0.6   ALKPHOS 110 108 80   * 707* 308*   * 173* 137*   ANIONGAP 14 12 10   , CBC   Recent Labs   Lab 10/24/22  1909 10/25/22  0050 10/26/22  0304   WBC 38.94* 35.76* 28.16*   HGB 14.5 13.8* 10.5*   HCT 42.0 39.6* 31.5*    233 206   , Lipid Panel No results for input(s): CHOL, HDL, LDLCALC, TRIG, CHOLHDL in the last 48 hours., and Troponin   Recent Labs   Lab 10/24/22  1909   TROPONINI 0.063*       Significant Imaging: Echocardiogram: Transthoracic echo (TTE) complete (Cupid Only):   Results for orders placed or performed during the hospital encounter of 10/24/22   Echo   Result Value Ref Range    BSA  2.52 m2    TDI SEPTAL 0.10 m/s    LV LATERAL E/E' RATIO 11.43 m/s    LV SEPTAL E/E' RATIO 8.00 m/s    IVC diameter 2.59 cm    Left Ventricular Outflow Tract Mean Velocity 0.76 cm/s    Left Ventricular Outflow Tract Mean Gradient 2.56 mmHg    TDI LATERAL 0.07 m/s    PV PEAK VELOCITY 0.94 cm/s    LVIDd 5.66 3.5 - 6.0 cm    IVS 1.16 (A) 0.6 - 1.1 cm    Posterior Wall 1.05 0.6 - 1.1 cm    LVIDs 3.87 2.1 - 4.0 cm    FS 32 28 - 44 %    Sinus 3.55 cm    STJ 3.12 cm    Ascending aorta 3.89 cm    LV mass 255.29 g    LA size 4.81 cm    RVDD 4.88 cm    TAPSE 2.38 cm    RV S' 0.01 cm/s    Left Ventricle Relative Wall Thickness 0.37 cm    AV mean gradient 9 mmHg    AV valve area 2.44 cm2    AV Velocity Ratio 0.50     AV index (prosthetic) 0.55     MV valve area p 1/2 method 4.67 cm2    E/A ratio 1.01     Mean e' 0.09 m/s    E wave deceleration time 162.55 msec    IVRT 81.83 msec    LVOT diameter 2.38 cm    LVOT area 4.4 cm2    LVOT peak asif 0.99 m/s    LVOT peak VTI 19.90 cm    Ao peak asif 2.00 m/s    Ao VTI 36.2 cm    LVOT stroke volume 88.49 cm3    AV peak gradient 16 mmHg    TV peak gradient 0.57 mmHg    E/E' ratio 9.41 m/s    MV Peak E Asif 0.80 m/s    TR Max Asif 2.17 m/s    MV stenosis pressure 1/2 time 47.14 ms    MV Peak A Asif 0.79 m/s    LV Systolic Volume 64.67 mL    LV Systolic Volume Index 26.6 mL/m2    LV Diastolic Volume 157.45 mL    LV Diastolic Volume Index 64.79 mL/m2    LV Mass Index 105 g/m2    RA Major Axis 5.77 cm    Left Atrium Minor Axis 5.87 cm    Left Atrium Major Axis 5.60 cm    Triscuspid Valve Regurgitation Peak Gradient 19 mmHg    LA WIDTH 4.20 cm    LA volume 98.43 cm3    LA Volume Index 40.5 mL/m2    Right Atrial Pressure (from IVC) 8 mmHg    EF 60 %    TV rest pulmonary artery pressure 27 mmHg    Narrative    · TDS secondary to body habitus.  · The left ventricle is normal in size with normal systolic function.  · The estimated ejection fraction is 60%.  · Indeterminate left ventricular diastolic  function.  · Mild left atrial enlargement.  · Normal right ventricular size with normal right ventricular systolic   function.  · Intermediate central venous pressure (8 mmHg).  · The estimated PA systolic pressure is 27 mmHg.  · There is no pulmonary hypertension.        Assessment and Plan:     Brief HPI:     Paroxysmal atrial fibrillation  10/25/2022: [facility] - 1. Place patient on Toprol.  Discontinue to Diltiazem.  IIB indication for anticoagulation. Can take ASA. Outpatient event monitor.    10/26/2022: maintaining sinus. Continue toprol.         VTE Risk Mitigation (From admission, onward)         Ordered     heparin (porcine) injection 5,000 Units  Every 8 hours         10/25/22 0714     IP VTE HIGH RISK PATIENT  Once         10/24/22 2303     Place sequential compression device  Until discontinued         10/24/22 2303                Critical Care Time: 30 minutes     Critical care was time spent personally by me on the following activities: development of treatment plan with patient or surrogate and bedside caregivers, discussions with consultants, evaluation of patient's response to treatment, examination of patient, ordering and performing treatments and interventions, ordering and review of laboratory studies, ordering and review of radiographic studies, pulse oximetry, re-evaluation of patient's condition. This critical care time did not overlap with that of any other provider or involve time for any procedures.    Will sign off for now. Re-consult if needed    Warren Anthony MD  Cardiology  [facility] [place] [facility] Int[facility]

## 2022-10-26 NOTE — ASSESSMENT & PLAN NOTE
- New onset   - [id number] Score: 1   - Will hold off on anticoagulation given low score   - Continue diltiazem drip   - Cardiology consult .  Off Cardizem,sinus at this time,echo. Show preserved EF,per cardiology only ASA at this time and holter monitoring as out patient.

## 2022-10-26 NOTE — ASSESSMENT & PLAN NOTE
10/25/2022: [facility] - 1. Place patient on Toprol.  Discontinue to Diltiazem.  IIB indication for anticoagulation. Can take ASA. Outpatient event monitor.    10/26/2022: maintaining sinus. Continue toprol.

## 2022-10-26 NOTE — ASSESSMENT & PLAN NOTE
Body mass index is 41.08 kg/m². Morbid obesity complicates all aspects of disease management from diagnostic modalities to treatment. Weight loss encouraged and health benefits explained to patient.

## 2022-10-26 NOTE — CLINICAL REVIEW
IP Sepsis Screen (most recent)       Sepsis Screen (IP) - 10/26/22 6827       Is the patient's history or complaint suggestive of a possible infection? Yes  -LW    Are there at least two of the following signs and symptoms present? Yes  -LW    Sepsis signs/symptoms - Tachypnea Tachypnea     >20  -LW    Sepsis signs/symptoms - WBC WBC < 4,000 or WBC > 12,000  -LW    Are any of the following organ dysfunction criteria present and not considered to be due to a chronic condition? Yes  -LW    Organ Dysfunction Criteria Creatinine > 2.0  -LW    Organ Dysfunction Criteria Lactate > 2.0  -LW    Organ Dysfunction Criteria - Resp Comp Respiratory compromise requiring Bipap, Cpap, or Intubation  -LW    Initiate Sepsis Protocol No  -LW    Reason sepsis not considered Pt. receiving appropriate management  -LW              User Key  (r) = Recorded By, (t) = Taken By, (c) = Cosigned By      Initials Name    CAROLYN Meza RN

## 2022-10-26 NOTE — ANESTHESIA PROCEDURE NOTES
Intubation    Date/Time: 10/26/2022 4:11 PM  Performed by: Kris Roth CRNA  Authorized by: Seun Rasmussen MD     Intubation:     Induction:  Rapid sequence induction    Intubated:  Postinduction    Mask Ventilation:  N/a    Attempts:  1    Attempted By:  CRNA    Method of Intubation:  Video laryngoscopy    Blade:  Glidescope 3    Laryngeal View Grade: Grade I - full view of cords      Difficult Airway Encountered?: No      Complications:  None    Airway Device:  Oral endotracheal tube    Airway Device Size:  7.5    Style/Cuff Inflation:  Cuffed (inflated to minimal occlusive pressure)    Tube secured:  23    Secured at:  The lips    Placement Verified By:  Capnometry    Complicating Factors:  Obesity and oropharyngeal edema or fat    Findings Post-Intubation:  BS equal bilateral and atraumatic/condition of teeth unchanged

## 2022-10-26 NOTE — PLAN OF CARE
This is a 67 year old male with a PMHx of BPH, TJ (on BiPAP) and obesity who presents with generalized weakness.  He reported having chills, and decreased po intake, but denied having cough, SOB or diarrhea. His wife is sick with similar symptoms.   CT neck showed extensive left neck soft tissue swelling with inflammatory changes and edema with asymmetric heterogenous enlargement is seen of the left sternocleidomastoid & numerous enlarged cervical chain lymph nodes are seen throughout the region (cellulitis versus myositis or neoplastic process) without an abscess. He was given vancomycin, cefepime, he was septic and hypotensive,improved with IVF,did not required vasopressors.  Was in Afib with RVR,bolus and 10 mg of diltiazem x2 and was started on a diltiazem drip.converted to sinus.on BB at this time.  A transfer to [facility] for ENT evaluation was attempted, however, there were no bed availability. The patient was admitted to the ICU for further management.our ENT is back, saw patient today,possible intervention in AM.keep NPO past MN.  No sign of dental abscess,going frequently to dentist,  Hypotension  is resolved with IVF,BP is stable at this time  Afib is resolved with Cardizem gtt and  patiens in sinus  and stable,echo show preserved EF,per cardiology only on ASA and follow up as out patient for Holter monitoring.  Has rhabdomyolysis on IVF,will monitor.CPK is improving.monitor LFT.  On IVF for ARF.casillas is in place.did not improved,consulted nephrology.  Will stay for while in hospital.

## 2022-10-26 NOTE — PLAN OF CARE
SW attempted discharge planning assessment.  Patient brushing his teeth.  Wife at the bedside as well.  SW introduced self and updated patient's communication board.  SW will f/u with patient in the a.m. to complete his assessment.

## 2022-10-26 NOTE — OP NOTE
[facility]  Surgery Department  Operative Note    SUMMARY     Date of Procedure: 10/26/2022     Procedure: Procedure(s) (LRB):  INCISION AND DRAINAGE,NECK (Left)     Surgeon(s) and Role:     * Bethany Macdonald MD - Primary    Assisting Surgeon: None    Pre-Operative Diagnosis: necrotizing fasciitis vs neck abscess    Post-Operative Diagnosis: neck abscess    Anesthesia: General    Operative Findings (including complications, if any): bleeding tissue, no overt necrotic muscle but piece of muscle sent for path. Copious purulent material within scm abscess , cultures obtained    Description of Technical Procedures: the patient was taken back to the operating room and placed supine on the operating room table . After induction of general anesthesia, the patient was prepped and draped in sterile fashion. A time out was performed and the correct patient and procedure were identified and confirmed.     A horizontal incision was made on the left neck overlying area of firm sternocleidomastoid muscle where disease pathology was felt to be present. The skin and subcutaneous tissues incised using bovie electrocautery. The platysma was divided . The external jugular vein was identified. A tonsil was used to carefully spread through the muscle and an abscess cavity was entered with copious thick purulent drainage.  Culture swabs were used to collect material for culture. The wound bed was irrigated copiously with pulse evac instrument and saline. A less than 0.5 cm portion of muscle and fibrofatty tissue was removed with mets and pickup and had good bleeding response ( arguing against nec fasc) . This was sent for permanent pathology. A quarter inch penrose drain was then placed into the wound bed and through the incision in the neck. The drain was sutured to the skin with 3-0 vicryl suture. The incision was then closed using 3-0 vicryl suture for deep closure and the skin was closed with staples. The patient was  handed over to anesthesia for extubation which was done without issue.      Significant Surgical Tasks Conducted by the Assistant(s), if Applicable: none    Estimated Blood Loss (EBL): 25 mL           Implants/Drains: penrose drain     Specimens:   Specimen (24h ago, onward)       Start     Ordered    10/26/22 1645  Specimen to Pathology, Surgery ENT  Once        Comments: Pre-op Diagnosis: Atrial fibrillation [I48.91]Procedure(s):INCISION AND DRAINAGE,NECK Number of specimens: 1Name of specimens: left neck soft tissue, evaluate for necrotizing facsiitis     References:    Click here for ordering Quick Tip   Question Answer Comment   Procedure Type: ENT    Specimen Class: Routine/Screening    Which provider would you like to cc? JACKSON MONROY    Release to patient Immediate        10/26/22 1649                            Condition: Good    Disposition: PACU - hemodynamically stable.    Attestation: I was present and scrubbed for the entire procedure.

## 2022-10-26 NOTE — ASSESSMENT & PLAN NOTE
Likely related to an underlying infection, see plan for sepsis .  Appear to be sinusitis as source,no sign of dental abscess,going frequently to dentist.  Need be seen by ENT,our ENT not available in this fascility ,caled  transfer center.was no bed available,.  Our ENT is back today,possible intervention in AM.

## 2022-10-26 NOTE — PLAN OF CARE
Problem: Adult Inpatient Plan of Care  Goal: Plan of Care Review  Outcome: Ongoing, Progressing     Problem: Adjustment to Illness (Sepsis/Septic Shock)  Goal: Optimal Coping  Outcome: Ongoing, Progressing     Problem: Infection  Goal: Absence of Infection Signs and Symptoms  Outcome: Ongoing, Progressing     Problem: Impaired Wound Healing  Goal: Optimal Wound Healing  Outcome: Ongoing, Progressing

## 2022-10-26 NOTE — BRIEF OP NOTE
[facility] [place] - Telemetry  Brief Operative Note    SUMMARY     Surgery Date: 10/26/2022     Surgeon(s) and Role:     * Bethany Monroy MD - Primary    Assisting Surgeon: None    Pre-op Diagnosis:  possible necrotizing fascitis    Post-op Diagnosis:  left neck abscss    Procedure(s) (LRB):  INCISION AND DRAINAGE,NECK (Left)    Anesthesia: General    Operative Findings: bleeding tissue, no overt necrotic muscle but piece of muscle sent for path. Copious purulent material within scm abscess , cultures obtained    Estimated Blood Loss: 25 mL             Specimens:   Specimen (24h ago, onward)       Start     Ordered    10/26/22 1645  Specimen to Pathology, Surgery ENT  Once        Comments: Pre-op Diagnosis: Atrial fibrillation [I48.91]Procedure(s):INCISION AND DRAINAGE,NECK Number of specimens: 1Name of specimens: left neck soft tissue, evaluate for necrotizing facsiitis     References:    Click here for ordering Quick Tip   Question Answer Comment   Procedure Type: ENT    Specimen Class: Routine/Screening    Which provider would you like to cc? BETHANY MONROY    Release to patient Immediate        10/26/22 8686                    [id number]

## 2022-10-26 NOTE — PLAN OF CARE
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]    Admission Date: 10/24/2022  Expected Discharge Date:     Discharge Barriers Identified: None    Payor: [employer]ARE / Plan: my[employer]x 65 / Product Type: Medicare Advantage /     Extended Emergency Contact Information  Primary Emergency Contact: Arpita Gay  Address: [address] [place] [address] [facility]  Home Phone: [phone]  Work Phone: [phone]  Mobile Phone: [phone]  Relation: Spouse  Secondary Emergency Contact: Arpita Gay  Address: [address] [place] [address] [place]  Relation: Spouse    Discharge Plan A: Home with family  Discharge Plan B: Home with family, Other (TBD)      [id number] - [place] - [address] [address]  Phone: [phone] Fax: [phone]      Initial Assessment (most recent)       Adult Discharge Assessment - 10/26/22 1531          Discharge Assessment    Assessment Type Discharge Planning Assessment     Confirmed/corrected address, phone number and insurance Yes     Confirmed Demographics Correct on Facesheet     Source of Information patient     If unable to respond/provide information was family/caregiver contacted? No Contact Information Available     When was your last doctors appointment? --   Patient stated his last PCP appointment was in July of this year.    Communicated JEFFERSON with patient/caregiver Date not available/Unable to determine     Reason For Admission Atrial fibrillation     Lives With spouse     Facility Arrived From: Home     Do you expect to return to your current living situation? Yes     Prior to hospitilization cognitive status: Alert/Oriented      Current cognitive status: Alert/Oriented     Equipment Currently Used at Home cane, straight;CPAP     Patient currently being followed by outpatient case management? No     Do you currently have service(s) that help you manage your care at home? No     Do you take prescription medications? Yes     Do you have prescription coverage? Yes     Coverage PHN     Do you have any problems affording any of your prescribed medications? No     Is the patient taking medications as prescribed? yes     Who is going to help you get home at discharge? Arpita Gay (Spouse)   [phone]     How do you get to doctors appointments? car, drives self;family or friend will provide     Are you on dialysis? No     Do you take coumadin? No     Discharge Plan A Home with family     Discharge Plan B Home with family;Other   TBD    DME Needed Upon Discharge  other (see comments)   TBD    Discharge Plan discussed with: Patient     Discharge Barriers Identified None        Relationship/Environment    Name(s) of Who Lives With Patient Arpita Gay (Spouse)   [phone]                      Patient stated he plans to return home, and spouse will provide transportation at discharge.

## 2022-10-26 NOTE — PROGRESS NOTES
Pharmacokinetic Assessment Follow Up: IV Vancomycin    Vancomycin serum concentration assessment(s):    The random level was drawn correctly and can be used to guide therapy at this time. The measurement is within the desired definitive target range of 10 to 20 mcg/mL.    Vancomycin Regimen Plan:    Give 750 mg today.  Re-dose when the random level is less than 20 mcg/mL, next level to be drawn at 0300 on 10/27/2022    Drug levels (last 3 results):  Recent Labs   Lab Result Units 10/25/22  0919 10/26/22  0304   Vancomycin, Random ug/mL 21.0 13.8       Pharmacy will continue to follow and monitor vancomycin.    Please contact pharmacy at extension [phone] for questions regarding this assessment.    Thank you for the consult,   Trevon Sepulveda Jr       Patient brief summary:  Elías Gay is a 67 y.o. male initiated on antimicrobial therapy with IV Vancomycin for treatment of skin & soft tissue infection    Drug Allergies:   Review of patient's allergies indicates:  No Known Allergies    Actual Body Weight:   133.6 kg    Renal Function:   Estimated Creatinine Clearance: 27.8 mL/min (A) (based on SCr of 3.5 mg/dL (H)).,     Dialysis Method (if applicable):  N/A    CBC (last 72 hours):  Recent Labs   Lab Result Units 10/24/22  1909 10/25/22  0050 10/26/22  0304   WBC K/uL 38.94* 35.76* 28.16*   Hemoglobin g/dL 14.5 13.8* 10.5*   Hematocrit % 42.0 39.6* 31.5*   Platelets K/uL 262 233 206   Gran % % 89.9* 78.0* 91.5*   Lymph % % 1.5* 7.0* 3.2*   Mono % % 4.4 3.0* 2.7*   Eosinophil % % 0.1 0.0 1.2   Basophil % % 0.5 0.0 0.3   Differential Method  Automated Manual Automated       Metabolic Panel (last 72 hours):  Recent Labs   Lab Result Units 10/24/22  1909 10/25/22  0050 10/25/22  0230 10/26/22  0304   Sodium mmol/L 136 135*  --  132*   Potassium mmol/L 3.5 3.7  --  3.5   Chloride mmol/L 102 101  --  101   CO2 mmol/L 20* 22*  --  21*   Glucose mg/dL 93 125*  --  88   Glucose, UA   --   --  Negative  --    BUN mg/dL 23  29*  --  46*   Creatinine mg/dL 1.8* 2.3*  --  3.5*   Albumin g/dL 2.9* 2.6*  --  1.9*   Total Bilirubin mg/dL 1.4* 1.0  --  0.6   Alkaline Phosphatase U/L 110 108  --  80   AST U/L 511* 707*  --  308*   ALT U/L 122* 173*  --  137*   Magnesium mg/dL 1.9 1.9  --   --    Phosphorus mg/dL 1.9* 4.6*  --   --        Vancomycin Administrations:  vancomycin given in the last 96 hours                     vancomycin (VANCOCIN) 2,500 mg in dextrose 5 % 500 mL IVPB ()  Restarted 10/24/22 2348      Restarted  2304     2,500 mg New Bag  2057                    Microbiologic Results:  Microbiology Results (last 7 days)       Procedure Component Value Units Date/Time    Blood culture x two cultures. Draw prior to antibiotics. [[id number]] Collected: 10/24/22 1911    Order Status: Completed Specimen: Blood from Peripheral, Antecubital, Left Updated: 10/25/22 2103     Blood Culture, Routine No Growth to date      No Growth to date    Narrative:      Aerobic and anaerobic    Blood culture x two cultures. Draw prior to antibiotics. [[id number]] Collected: 10/24/22 1910    Order Status: Completed Specimen: Blood from Peripheral, Hand, Right Updated: 10/25/22 2103     Blood Culture, Routine No Growth to date      No Growth to date    Narrative:      Aerobic and anaerobic    Urine culture [[id number]] Collected: 10/25/22 0230    Order Status: No result Specimen: Urine Updated: 10/25/22 0253

## 2022-10-26 NOTE — PLAN OF CARE
Problem: Adjustment to Illness (Sepsis/Septic Shock)  Goal: Optimal Coping  Outcome: Ongoing, Progressing     Problem: Bleeding (Sepsis/Septic Shock)  Goal: Absence of Bleeding  Outcome: Ongoing, Progressing     Problem: Glycemic Control Impaired (Sepsis/Septic Shock)  Goal: Blood Glucose Level Within Desired Range  Outcome: Ongoing, Progressing     Problem: Nutrition Impaired (Sepsis/Septic Shock)  Goal: Optimal Nutrition Intake  Outcome: Ongoing, Progressing     Problem: Fluid and Electrolyte Imbalance (Acute Kidney Injury/Impairment)  Goal: Fluid and Electrolyte Balance  Outcome: Ongoing, Progressing     Problem: Oral Intake Inadequate (Acute Kidney Injury/Impairment)  Goal: Optimal Nutrition Intake  Outcome: Ongoing, Progressing

## 2022-10-26 NOTE — PT/OT/SLP PROGRESS
Occupational Therapy   Treatment    Name: Elías Gay  MRN: [id number]  Admitting Diagnosis:  Sepsis       Recommendations:     Discharge Recommendations: home health OT (w/ family support)  Discharge Equipment Recommendations:   (TBD pending ongoing.)  Barriers to discharge:   (Pt is at risk for falls, readmission, and morbidity if d/c home.)    Assessment:     Elías Gay is a 67 y.o. male with a medical diagnosis of Sepsis.  Performance deficits affecting function are weakness, impaired endurance, impaired functional mobility, impaired self care skills, gait instability, impaired balance, decreased upper extremity function, decreased lower extremity function, decreased coordination, decreased safety awareness, decreased ROM, edema, impaired skin, impaired cardiopulmonary response to activity.     Pt pleasant and willing to participate in tx session this date; pt was able to ambulate functional distances in room for performing functional transfers and ADLs. Pt still w/ decreased endurance and strength, requiring seated rest break while standing at sink when performing ADLs. Pt tolerated tx session well and will continue to benefit from skilled acute OT services to maximize functional capacity for safe performance w/ ADLs and functional mobility.     Rehab Prognosis:  Good; patient would benefit from acute skilled OT services to address these deficits and reach maximum level of function.       Plan:     Patient to be seen 5 x/week to address the above listed problems via self-care/home management, therapeutic activities, therapeutic exercises  Plan of Care Expires: 11/08/22  Plan of Care Reviewed with: patient    Subjective     Pain/Comfort:  Pain Rating 1: 0/10  Pain Rating Post-Intervention 1: 0/10    Objective:     Communicated with: Nurse prior to session.  Patient found  on toilet w/ PT present  with blood pressure cuff, casillas catheter, peripheral IV, pulse ox (continuous), SCD, telemetry upon OT entry to  room.    General Precautions: Standard, fall   Orthopedic Precautions:N/A   Braces: N/A  Respiratory Status: Room air     Occupational Performance:     Bed Mobility:    Pt found on toilet w/ PT; left in chair at end of session     Functional Mobility/Transfers:  Patient completed Sit <> Stand Transfer with minimum assistance and moderate assistance  with  rolling walker   Functional Mobility: Pt was able to ambulate from toilet>sink w/ CGA and use of RW; once standing at sink, pt w/ mild SOB and needed to sit in which chair was positioned behind himl; SPO2 95% on RA.     Activities of Daily Living:  Grooming: supervision for performing seated ADLs at sink   Upper Body Dressing: minimum assistance for donning gown over back   Toileting: total A for standing marti-care; pt required BUE support on RW for tolerating static standing       AMPA 6 Click ADL: 17    Treatment & Education:  -Pt performed ADLs and functional mobility as noted above.   -Pt educated on safe functional mobility and ADL performance.   -Pt educated on importance of PLB and energy conservation for preventing over-exertion.   -Pt educated on safe body mechanics for good performance w/ functional mobility.   -Pt encouraged to mobilize w/ assist from staff.   -Questions and concerns addressed within scope.     Patient left up in chair with all lines intact and call button in reach    GOALS:   Multidisciplinary Problems       Occupational Therapy Goals          Problem: Occupational Therapy    Goal Priority Disciplines Outcome Interventions   Occupational Therapy Goal     OT, PT/OT Ongoing, Progressing    Description: Goals to be met by: 11/8/22     Patient will increase functional independence with ADLs by performing:    LE Dressing with Modified [place].  Grooming while standing at sink with Modified [place].  Toileting from bedside commode with Modified [place] for hygiene and clothing management.   Supine to sit with Modified  [place].  Step transfer with Modified [place]  Toilet transfer to toilet with Modified [place].  Upper extremity exercise program x15 reps per handout, with independence.                         Time Tracking:     OT Date of Treatment: 10/26/22  OT Start Time: 1039  OT Stop Time: 1057  OT Total Time (min): 18 min    Billable Minutes:Self Care/Home Management 18  Total Time 18    OT/CASEY: OT          10/26/2022

## 2022-10-26 NOTE — PT/OT/SLP PROGRESS
"Physical Therapy Treatment    Patient Name:  Elías Gay   MRN:  [id number]    Recommendations:     Discharge Recommendations:  outpatient PT   Discharge Equipment Recommendations:  (TBD)     Assessment:     Elías Gay is a 67 y.o. male admitted with a medical diagnosis of Sepsis.  He presents with the following impairments/functional limitations:  impaired endurance, impaired functional mobility, gait instability, decreased lower extremity function .    Rehab Prognosis: Good; patient would benefit from acute skilled PT services to address these deficits and reach maximum level of function.    Recent Surgery: * No surgery found *      Plan:     During this hospitalization, patient to be seen 5 x/week to address the identified rehab impairments via gait training, therapeutic exercises, therapeutic activities and progress toward the following goals:    Plan of Care Expires:  11/01/22    Subjective     Chief Complaint: "I need to get to the toilet"  Patient/Family Comments/goals: Pt agreeable to PT treatment.  Pain/Comfort:  Pain Rating 1: 0/10      Objective:     Communicated with nurseZee prior to session.  Patient found supine with blood pressure cuff, casillas catheter, peripheral IV, pulse ox (continuous), SCD, telemetry upon PT entry to room.     General Precautions: Standard,     Orthopedic Precautions:N/A   Braces: N/A  Respiratory Status: Room air     Functional Mobility:  Bed Mobility:     Supine to Sit: moderate assistance  Transfers:     Sit to Stand:  contact guard assistance with rolling walker  Gait: 5' w/RW to toilet then 10' w/RW to sink to wash hands.      AM-PAC 6 CLICK MOBILITY  Turning over in bed (including adjusting bedclothes, sheets and blankets)?: 2  Sitting down on and standing up from a chair with arms (e.g., wheelchair, bedside commode, etc.): 3  Moving from lying on back to sitting on the side of the bed?: 2  Moving to and from a bed to a chair (including a wheelchair)?: 3  Need to " walk in hospital room?: 3  Climbing 3-5 steps with a railing?: 2  Basic Mobility Total Score: 15       Treatment & Education:  Assisted to toilet per request, able to stand with RW to be cleaned then ambulated to sink to wash hands.  Pt limited by loose stool.    Patient left  with Daniel, OT at sink for ADLs  with  pt seated in chair .    GOALS:   Multidisciplinary Problems       Physical Therapy Goals          Problem: Physical Therapy    Goal Priority Disciplines Outcome Goal Variances Interventions   Physical Therapy Goal     PT, PT/OT Ongoing, Progressing     Description: Goals to be met by: 22     Patient will increase functional independence with mobility by performin. Pt to be supervision with bed mobility.  2. Pt to transfer with supervision.  3. Pt to ambulate 150' w/RW SBA.  4. Pt to be (I) with written HEP.                         Time Tracking:     PT Received On: 10/26/22  PT Start Time: 1015     PT Stop Time: 1046  PT Total Time (min): 31 min     Billable Minutes: Gait Training 15 and Therapeutic Activity 16    Treatment Type: Treatment  PT/PTA: PT           10/26/2022

## 2022-10-26 NOTE — NURSING
Received report from DONALD Koch from ICU. Pt is AAOx4. /60, HR 86, O2 96% on RA, T 98.7, R 27. PICC line upper right arm. Awaiting pt's arrival to the floor.

## 2022-10-26 NOTE — PLAN OF CARE
Problem: Occupational Therapy  Goal: Occupational Therapy Goal  Description: Goals to be met by: 11/8/22     Patient will increase functional independence with ADLs by performing:    LE Dressing with Modified [place].  Grooming while standing at sink with Modified [place].  Toileting from bedside commode with Modified [place] for hygiene and clothing management.   Supine to sit with Modified [place].  Step transfer with Modified [place]  Toilet transfer to toilet with Modified [place].  Upper extremity exercise program x15 reps per handout, with independence.    Outcome: Ongoing, Progressing

## 2022-10-26 NOTE — SUBJECTIVE & OBJECTIVE
Past Medical History:   Diagnosis Date    Acute renal failure 10/25/2022    Arthritis 1971    Bone Spurs in feet    Basal cell carcinoma 11/06/2018    left ear helix    Foreign body in conjunctival sac     Hernia, inguinal, right 2002    Joint pain 1971    Bones of feet    Keloid cicatrix 1958 2010 2018 2019    Hernia, Knee, Arm, Ear    Melanoma 09/14/2018    Right Arm 0.6mm    Severe sleep apnea        Past Surgical History:   Procedure Laterality Date    CLOSURE OF DEFECT OF MOHS PROCEDURE Left 1/3/2019    Procedure: CLOSURE, MOHS PROCEDURE DEFECT LEFT EAR;  Surgeon: Jeramy Meek MD;  Location: [facility] OR [facility] FLR;  Service: Plastics;  Laterality: Left;  plastics set    COLONOSCOPY N/A 6/30/2021    Procedure: COLONOSCOPY;  Surgeon: Juliano Santoyo MD;  Location: [facility] ENDO (2ND FLR);  Service: Endoscopy;  Laterality: N/A;  severe sleep apnea     fully vaccinated-GT    HERNIA REPAIR Left     3 years of age    HERNIA REPAIR N/A     Ventral wall at 3 year of age.    KNEE SURGERY Right 1984    Arthroscopic    NASAL SEPTUM SURGERY N/A 2009    SKIN BIOPSY  several over time       Review of patient's allergies indicates:  No Known Allergies    No current facility-administered medications on file prior to encounter.     Current Outpatient Medications on File Prior to Encounter   Medication Sig    azelastine (ASTELIN) 137 mcg (0.1 %) nasal spray 1 spray (137 mcg total) by Nasal route 2 (two) times daily.    calcium-magnesium-zinc Tab Take 2 tablets by mouth once daily.    ciclopirox (PENLAC) 8 % Soln Apply daily to affected nail. Must remove and restart weekly    guaiFENesin (MUCINEX) 600 mg 12 hr tablet Take 1,200 mg by mouth 2 (two) times daily.    ketoconazole (NIZORAL) 2 % cream AAA bid to both feet x 3 weeks    multivitamin capsule Take 1 capsule by mouth once daily.    tamsulosin (FLOMAX) 0.4 mg Cap TAKE 1 CAPSULE BY MOUTH EVERY DAY    celecoxib (CELEBREX) 200 MG capsule Take 1 capsule (200  mg total) by mouth once daily. With food    sars-cov-2, covid-19 omicron, (MODERNA COVID-19) 50 mcg/0.5 mL injection Inject into the muscle.     Family History       Problem Relation (Age of Onset)    Aneurysm Mother    Cancer Father (72)    Eczema Brother    Gout Brother    Hypertension Mother    No Known Problems Daughter, Son, Daughter, Son    Psoriasis Brother    Stroke Mother          Tobacco Use    Smoking status: Never    Smokeless tobacco: Never    Tobacco comments:     Second hand smoke from mother. Cigarrette smoke inflames my sinuses.   Substance and Sexual Activity    Alcohol use: Not Currently     Alcohol/week: 1.0 standard drink     Types: 1 Glasses of wine per week     Comment: Rare    Drug use: No    Sexual activity: Yes     Partners: Female     Birth control/protection: None     Review of Systems   Constitutional:  Positive for activity change, appetite change and chills.   HENT:  Positive for facial swelling, sinus pressure and sinus pain. Negative for dental problem.    Eyes: Negative.    Respiratory: Negative.     Cardiovascular: Negative.    Gastrointestinal: Negative.    Endocrine: Negative.    Genitourinary: Negative.    Musculoskeletal: Negative.  Negative for neck pain.   Skin: Negative.    Allergic/Immunologic: Negative.    Neurological: Negative.    Psychiatric/Behavioral: Negative.     Objective:     Vital Signs (Most Recent):  Temp: 97.6 °F (36.4 °C) (10/26/22 0800)  Pulse: 77 (10/26/22 1000)  Resp: 20 (10/26/22 1000)  BP: 135/72 (10/26/22 1000)  SpO2: 95 % (10/26/22 1000)   Vital Signs (24h Range):  Temp:  [97.6 °F (36.4 °C)-98.9 °F (37.2 °C)] 97.6 °F (36.4 °C)  Pulse:  [74-90] 77  Resp:  [13-47] 20  SpO2:  [90 %-97 %] 95 %  BP: (101-140)/(52-90) 135/72     Weight: 133.6 kg (294 lb 8.6 oz)  Body mass index is 41.08 kg/m².    Physical Exam  Vitals and nursing note reviewed.   Constitutional:       General: He is not in acute distress.     Appearance: He is not ill-appearing.    HENT:      Head: Normocephalic and atraumatic.      Comments: Left neck swelling      Nose: Nose normal.      Mouth/Throat:      Mouth: Mucous membranes are dry.   Eyes:      Extraocular Movements: Extraocular movements intact.   Cardiovascular:      Rate and Rhythm: Normal rate and regular rhythm.      Pulses: Normal pulses.      Heart sounds: No murmur heard.  Pulmonary:      Effort: No respiratory distress.      Breath sounds: No rales.   Abdominal:      General: Abdomen is flat.      Palpations: Abdomen is soft.      Tenderness: There is no abdominal tenderness.   Musculoskeletal:      Right lower leg: No edema.      Left lower leg: No edema.      Comments: Left sided neck swelling, firm without fluctuance    Skin:     General: Skin is warm.   Neurological:      General: No focal deficit present.      Mental Status: He is alert.   Psychiatric:         Mood and Affect: Mood normal.           Significant Labs: All pertinent labs within the past 24 hours have been reviewed.    Significant Imaging: I have reviewed all pertinent imaging results/findings within the past 24 hours.

## 2022-10-26 NOTE — PROGRESS NOTES
10/26/22 1805   Goal: Minimized Risk/Safety Maintenance   Elevated Risk Identified none   Problem Identified none   Outcome Minimized Risk and Safety met   Goal: Physiologic Homeostasis   Elevated Risk Identified bleeding;postoperative nausea and vomiting;respiratory compromise   Problem Identified none   Airway/Ventilation Management airway patency maintained;pulmonary hygiene promoted   Nausea/Vomiting Interventions nausea triggers minimized   Outcome Physiologic Homeostasis met   Interventions   VTE Required Core Measure (SCDs) Sequential compression device initiated/maintained   VTE Prevention/Management intravenous hydration   Safety Promotion/Fall Prevention nonskid shoes/slippers when out of bed;fall prevention program maintained   Trust Relationship/Rapport care explained;questions encouraged   Goal: Optimal Comfort and Wellbeing   Elevated Risk Identified pain;situational anxiety   Problem Identified pain   Outcome Optimal Comfort and Wellbeing met   Goal: Anesthesia/Sedation Recovery   Outcome Anesthesia/Sedation Recovery criteria met for transfer   Outcome Summary   Plan of Care Reviewed With patient

## 2022-10-26 NOTE — TRANSFER OF CARE
"Anesthesia Transfer of Care Note    Patient: Elías Gay    Procedure(s) Performed: Procedure(s) (LRB):  INCISION AND DRAINAGE,NECK (Left)    Patient location: PACU    Anesthesia Type: general    Transport from OR: Transported from OR on room air with adequate spontaneous ventilation    Post pain: adequate analgesia    Post assessment: no apparent anesthetic complications and tolerated procedure well    Post vital signs: stable    Level of consciousness: awake    Nausea/Vomiting: no nausea/vomiting    Complications: none    Transfer of care protocol was followed      Last vitals:   Visit Vitals  BP (!) 102/56 (BP Location: Left arm)   Pulse 82   Temp 36.4 °C (97.5 °F) (Temporal)   Resp 18   Ht 5' 11" (1.803 m)   Wt 133.6 kg (294 lb 8.6 oz)   SpO2 100%   BMI 41.08 kg/m²     "

## 2022-10-26 NOTE — NURSING
Upon arrival to floor: cardiac monitor applied, patient oriented to room, call bell in reach and bed in lowest position. Patient arrived to floor in stable condition. Visualized patient and assessed patient's overall condition and appearance. No complaints. NAD noted. Will continue to monitor.

## 2022-10-26 NOTE — ANESTHESIA PREPROCEDURE EVALUATION
10/26/2022  Elías Gay is a 67 y.o., male.  To undergo Procedure(s) (LRB):  INCISION AND DRAINAGE,NECK (Left)     Denies CP/GERD/MI/CVA/URI symptoms.  Endorses SOB when lying flat, resolves when placed on side.  METS > 4  NPO > 8    Past Medical History:  Past Medical History:   Diagnosis Date    Acute renal failure 10/25/2022    Arthritis 1971    Bone Spurs in feet    Basal cell carcinoma 11/06/2018    left ear helix    Foreign body in conjunctival sac     Hernia, inguinal, right 2002    Joint pain 1971    Bones of feet    Keloid cicatrix 1958 2010 2018 2019    Hernia, Knee, Arm, Ear    Melanoma 09/14/2018    Right Arm 0.6mm    Severe sleep apnea        Past Surgical History:  Past Surgical History:   Procedure Laterality Date    CLOSURE OF DEFECT OF MOHS PROCEDURE Left 1/3/2019    Procedure: CLOSURE, MOHS PROCEDURE DEFECT LEFT EAR;  Surgeon: Jeramy Meek MD;  Location: [facility] OR [address] [place] [address];  Service: Plastics;  Laterality: Left;  plastics set    COLONOSCOPY N/A 6/30/2021    Procedure: COLONOSCOPY;  Surgeon: Juliano Santoyo MD;  [facility]  Service: Endoscopy;  Laterality: N/A;  severe sleep apnea     fully vaccinated-GT    HERNIA REPAIR Left     3 years of age    HERNIA REPAIR N/A     Ventral wall at 3 year of age.    KNEE SURGERY Right 1984    Arthroscopic    NASAL SEPTUM SURGERY N/A 2009    SKIN BIOPSY  several over time       Social History:  Social History     Socioeconomic History    Marital status:    Tobacco Use    Smoking status: Never    Smokeless tobacco: Never    Tobacco comments:     Second hand smoke from mother. Cigarrette smoke inflames my sinuses.   Substance and Sexual Activity    Alcohol use: Not Currently     Alcohol/week: 1.0 standard drink     Types: 1 Glasses of wine per week     Comment: Rare    Drug use: No    Sexual activity:  Yes     Partners: Female     Birth control/protection: None     Social Determinants of Health     Financial Resource Strain: Low Risk     Difficulty of Paying Living Expenses: Not hard at all   Food Insecurity: No Food Insecurity    Worried About Running Out of Food in the Last Year: Never true    Ran Out of Food in the Last Year: Never true   Transportation Needs: No Transportation Needs    Lack of Transportation (Medical): No    Lack of Transportation (Non-Medical): No   Physical Activity: Sufficiently Active    Days of Exercise per Week: 4 days    Minutes of Exercise per Session: 40 min   Stress: No Stress Concern Present    Feeling of Stress : Not at all   Social Connections: Unknown    Frequency of Communication with Friends and Family: More than three times a week    Frequency of Social Gatherings with Friends and Family: More than three times a week    Active Member of Clubs or Organizations: No    Attends Club or Organization Meetings: Never    Marital Status:    Housing Stability: Low Risk     Unable to Pay for Housing in the Last Year: No    Number of Places Lived in the Last Year: 1    Unstable Housing in the Last Year: No       Medications:  No current facility-administered medications on file prior to encounter.     Current Outpatient Medications on File Prior to Encounter   Medication Sig Dispense Refill    azelastine (ASTELIN) 137 mcg (0.1 %) nasal spray 1 spray (137 mcg total) by Nasal route 2 (two) times daily. 30 mL 11    calcium-magnesium-zinc Tab Take 2 tablets by mouth once daily.      ciclopirox (PENLAC) 8 % Soln Apply daily to affected nail. Must remove and restart weekly 1 each 5    guaiFENesin (MUCINEX) 600 mg 12 hr tablet Take 1,200 mg by mouth 2 (two) times daily.      ketoconazole (NIZORAL) 2 % cream AAA bid to both feet x 3 weeks 60 g 3    multivitamin capsule Take 1 capsule by mouth once daily.      tamsulosin (FLOMAX) 0.4 mg Cap TAKE 1 CAPSULE BY MOUTH  EVERY DAY 90 capsule 1    celecoxib (CELEBREX) 200 MG capsule Take 1 capsule (200 mg total) by mouth once daily. With food 30 capsule 0    sars-cov-2, covid-19 omicron, (MODERNA COVID-19) 50 mcg/0.5 mL injection Inject into the muscle. 0.5 mL 0       Allergies:  Review of patient's allergies indicates:  No Known Allergies    Active Problems:  Patient Active Problem List   Diagnosis    Hernia, inguinal, right    Complex sleep apnea syndrome    BPH with urinary obstruction    Morbid obesity    Malignant melanoma of upper arm, right    Colon cancer screening    Decreased range of motion of hip    Antalgic gait    Lower extremity weakness    Sepsis    Paroxysmal atrial fibrillation    Elevated LFTs    Neck swelling    Acute renal failure    Non-traumatic rhabdomyolysis       Diagnostic Studies:    CBC:  Recent Labs   Lab 10/26/22  0304   WBC 28.16*   RBC 3.44*   HGB 10.5*   HCT 31.5*      MCV 92   MCH 30.5   MCHC 33.3        CMP:  Recent Labs   Lab 10/26/22  0304   CALCIUM 7.3*   PROT 5.1*   *   K 3.5   CO2 21*      BUN 46*   CREATININE 3.5*   ALKPHOS 80   *   *   BILITOT 0.6     TTE (10/25/22):   TDS secondary to body habitus.   The left ventricle is normal in size with normal systolic function.   The estimated ejection fraction is 60%.   Indeterminate left ventricular diastolic function.   Mild left atrial enlargement.   Normal right ventricular size with normal right ventricular systolic function.   Intermediate central venous pressure (8 mmHg).   The estimated PA systolic pressure is 27 mmHg.   There is no pulmonary hypertension.    24 Hour Vitals:  Temp:  [36.3 °C (97.4 °F)-37.2 °C (98.9 °F)] 36.3 °C (97.4 °F)  Pulse:  [74-86] 85  Resp:  [13-47] 20  SpO2:  [90 %-96 %] 94 %  BP: (101-141)/(52-90) 141/78   See Nursing Charting For Additional Vitals      Pre-op Assessment    I have reviewed the Patient Summary Reports.     I have reviewed the Nursing Notes.        Review of Systems  Anesthesia Hx:  No problems with previous Anesthesia   Denies Personal Hx of Anesthesia complications.   Social:  Non-Smoker, Social Alcohol Use    Hematology/Oncology:         -- Anemia:   EENT/Dental:   Left neck infection   Cardiovascular:   Exercise tolerance: good Dysrhythmias Recent afib with conversion to NSR with diltiazem   Pulmonary:   Sleep Apnea    Renal/:   Chronic Renal Disease, ARF BPH    Hepatic/GI:  Hepatic/GI Normal    Musculoskeletal:   Arthritis     Neurological:  Neurology Normal    Endocrine:  Endocrine Normal  Morbid Obesity / BMI > 40      Physical Exam  General: Well nourished    Airway:  Mallampati: III   Mouth Opening: Normal  TM Distance: Normal      Dental:  Intact    Chest/Lungs:  Clear to auscultation, Normal Respiratory Rate    Heart:  Rate: Normal  Rhythm: Regular Rhythm        Anesthesia Plan  Type of Anesthesia, risks & benefits discussed:    Anesthesia Type: Gen ETT  Intra-op Monitoring Plan: Standard ASA Monitors  Post Op Pain Control Plan: multimodal analgesia and IV/PO Opioids PRN  Induction:  IV  Airway Plan: Direct and Video, Post-Induction  Informed Consent: Informed consent signed with the Patient and all parties understand the risks and agree with anesthesia plan.  All questions answered. Patient consented to blood products? Yes  ASA Score: 3  Anesthesia Plan Notes:   GA with OETT, RSI  Standard ASA monitors  Recovery in PACU  PONV: 2    Ready For Surgery From Anesthesia Perspective.     .

## 2022-10-26 NOTE — PROGRESS NOTES
Pharmacist Renal Dose Adjustment Note    Elías Gay is a 67 y.o. male being treated with the medication Cefepime    Patient Data:    Vital Signs (Most Recent):  Temp: 98.9 °F (37.2 °C) (10/26/22 0400)  Pulse: 82 (10/26/22 0600)  Resp: (!) 39 (10/26/22 0600)  BP: 139/71 (10/26/22 0600)  SpO2: (!) 93 % (10/26/22 0600)   Vital Signs (72h Range):  Temp:  [97.7 °F (36.5 °C)-100.7 °F (38.2 °C)]   Pulse:  []   Resp:  [13-47]   BP: ()/(52-99)   SpO2:  [90 %-97 %]      Recent Labs   Lab 10/24/22  1909 10/25/22  0050 10/26/22  0304   CREATININE 1.8* 2.3* 3.5*     Serum creatinine: 3.5 mg/dL (H) 10/26/22 0304  Estimated creatinine clearance: 27.8 mL/min (A)    Medication:Cefepime 2 grams every 12 hours will be changed to Cefepime 2 grams every 24 hours    Pharmacist's Name: Trevon Sepulveda Jr  Pharmacist's Extension: [phone]

## 2022-10-26 NOTE — CONSULTS
Reason for consultation:  Acute renal failure    HPI:  66 yo man with h/o BPH, TJ, melanoma presented to the hospital with c/o neck swelling and weakness.  He was admitted for sepsis.  His serum creatinine was also progressively worsening.  Nephrology is consulted for evaluation.    PMH:  As above.    Scheduled Meds:   ceFEPime (MAXIPIME) IVPB  2 g Intravenous Q24H    clindamycin (CLEOCIN) IVPB  900 mg Intravenous Q8H    famotidine (PF)  20 mg Intravenous Daily    heparin (porcine)  5,000 Units Subcutaneous Q8H    metoprolol succinate  25 mg Oral BID    mupirocin   Nasal BID    sodium chloride 0.9%  10 mL Intravenous Q6H    tamsulosin  1 capsule Oral Daily       Review of patient's allergies indicates:  No Known Allergies    Family history:  Non contributory    Social history:  No tobacco, no alcohol    Vital Signs Range (Last 24H):  Temp:  [97.6 °F (36.4 °C)-98.9 °F (37.2 °C)]   Pulse:  [74-90]   Resp:  [13-47]   BP: (101-140)/(52-90)   SpO2:  [90 %-96 %]     I & O (Last 24H):  Intake/Output Summary (Last 24 hours) at 10/26/2022 1116  Last data filed at 10/26/2022 0930  Gross per 24 hour   Intake 3221.28 ml   Output 1040 ml   Net 2181.28 ml           Physical Exam:  General appearance: well developed, well nourished, no distress  Lungs:  clear to auscultation bilaterally and normal respiratory effort  Heart: regular rate and rhythm  Abdomen:  soft, normal bowel sounds  Extremities: edema trace    Laboratory:  I have reviewed all pertinent lab results within the past 24 hours.  CBC:   Recent Labs   Lab 10/26/22  0304   WBC 28.16*   RBC 3.44*   HGB 10.5*   HCT 31.5*      MCV 92   MCH 30.5   MCHC 33.3     CMP:   Recent Labs   Lab 10/26/22  0304   GLU 88   CALCIUM 7.3*   ALBUMIN 1.9*   PROT 5.1*   *   K 3.5   CO2 21*      BUN 46*   CREATININE 3.5*   ALKPHOS 80   *   *   BILITOT 0.6     ABGs: No results for input(s): PH, PCO2, PO2, HCO3, POCSATURATED, BE in the last 168  hours.  Microbiology Results (last 7 days)       Procedure Component Value Units Date/Time    Urine culture [[id number]] Collected: 10/25/22 0230    Order Status: Completed Specimen: Urine Updated: 10/26/22 0950     Urine Culture, Routine No growth to date    Narrative:      Specimen Source->Urine    Blood culture x two cultures. Draw prior to antibiotics. [[id number]] Collected: 10/24/22 1911    Order Status: Completed Specimen: Blood from Peripheral, Antecubital, Left Updated: 10/25/22 2103     Blood Culture, Routine No Growth to date      No Growth to date    Narrative:      Aerobic and anaerobic    Blood culture x two cultures. Draw prior to antibiotics. [[id number]] Collected: 10/24/22 1910    Order Status: Completed Specimen: Blood from Peripheral, Hand, Right Updated: 10/25/22 2103     Blood Culture, Routine No Growth to date      No Growth to date    Narrative:      Aerobic and anaerobic          Recent Labs   Lab 10/25/22  0230   COLORU [place]*   SPECGRAV 1.020   PHUR 6.0   PROTEINUA 2+*   BACTERIA Few*   NITRITE Negative   LEUKOCYTESUR 2+*   UROBILINOGEN Negative   HYALINECASTS 4*       Assessment & Plan    VANGIE - ATN due to sepsis, rhabdomyolysis  Rhabdomyolysis  Sepsis  Neck swelling - ENT consulted, possible necrotizing fasciitis, to OR for exploration surgery  BPH with urinary obstruction  TJ  Anemia of chronic disease    Continue present Rx  Continue IVF   We'll change IVF to add bicarb  Watch renal function closely  We'll follow    Thank you for the courtesy of the consultation      Dodie Childress  10/26/2022

## 2022-10-26 NOTE — SUBJECTIVE & OBJECTIVE
Interval History:     Review of Systems   Constitutional: Negative for diaphoresis and malaise/fatigue.   HENT:  Positive for congestion. Negative for ear pain, hoarse voice, nosebleeds, odynophagia, sore throat and stridor.    Cardiovascular:  Negative for chest pain, dyspnea on exertion, irregular heartbeat, leg swelling, near-syncope, orthopnea, palpitations, paroxysmal nocturnal dyspnea and syncope.   Respiratory:  Negative for shortness of breath, sputum production and wheezing.    Skin:  Positive for color change and rash.   Gastrointestinal:  Negative for abdominal pain, heartburn, hematemesis and melena.   Neurological:  Negative for dizziness, focal weakness, light-headedness, numbness, paresthesias, seizures and weakness.   Objective:     Vital Signs (Most Recent):  Temp: 97.6 °F (36.4 °C) (10/26/22 0800)  Pulse: 77 (10/26/22 0800)  Resp: (!) 29 (10/26/22 0800)  BP: 138/75 (10/26/22 0800)  SpO2: 95 % (10/26/22 0800)   Vital Signs (24h Range):  Temp:  [97.6 °F (36.4 °C)-98.9 °F (37.2 °C)] 97.6 °F (36.4 °C)  Pulse:  [75-90] 77  Resp:  [13-47] 29  SpO2:  [90 %-97 %] 95 %  BP: (101-154)/(52-99) 138/75     Weight: 133.6 kg (294 lb 8.6 oz)  Body mass index is 41.08 kg/m².     SpO2: 95 %  O2 Device (Oxygen Therapy): room air      Intake/Output Summary (Last 24 hours) at 10/26/2022 0821  Last data filed at 10/26/2022 0800  Gross per 24 hour   Intake 3736.81 ml   Output 945 ml   Net 2791.81 ml       Lines/Drains/Airways       Peripherally Inserted Central Catheter Line  Duration             PICC Triple Lumen 10/25/22 1030 right basilic <1 day              Drain  Duration                  Urethral Catheter 10/25/22 0230 16 Fr. 1 day                    Physical Exam  Vitals reviewed.   Constitutional:       General: He is not in acute distress.     Appearance: He is obese. He is not diaphoretic.   Neck:      Vascular: No carotid bruit or JVD.   Cardiovascular:      Rate and Rhythm: Normal rate and regular rhythm.       Pulses: Normal pulses.   Pulmonary:      Effort: Pulmonary effort is normal.      Breath sounds: Normal breath sounds.   Abdominal:      General: Bowel sounds are normal.      Palpations: Abdomen is soft.      Tenderness: There is no abdominal tenderness.   Musculoskeletal:      Cervical back: Edema and erythema present.      Right lower le+ Edema present.      Left lower le+ Edema present.      Comments: Venous stasis dermatitis   Neurological:      Mental Status: He is alert and oriented to person, place, and time.   Psychiatric:         Speech: Speech normal.         Behavior: Behavior normal.       Significant Labs: CMP   Recent Labs   Lab 10/24/22  1909 10/25/22  0050 10/26/22  0304    135* 132*   K 3.5 3.7 3.5    101 101   CO2 20* 22* 21*   GLU 93 125* 88   BUN 23 29* 46*   CREATININE 1.8* 2.3* 3.5*   CALCIUM 8.8 8.4* 7.3*   PROT 7.2 6.7 5.1*   ALBUMIN 2.9* 2.6* 1.9*   BILITOT 1.4* 1.0 0.6   ALKPHOS 110 108 80   * 707* 308*   * 173* 137*   ANIONGAP 14 12 10   , CBC   Recent Labs   Lab 10/24/22  1909 10/25/22  0050 10/26/22  0304   WBC 38.94* 35.76* 28.16*   HGB 14.5 13.8* 10.5*   HCT 42.0 39.6* 31.5*    233 206   , Lipid Panel No results for input(s): CHOL, HDL, LDLCALC, TRIG, CHOLHDL in the last 48 hours., and Troponin   Recent Labs   Lab 10/24/22  1909   TROPONINI 0.063*       Significant Imaging: Echocardiogram: Transthoracic echo (TTE) complete (Cupid Only):   Results for orders placed or performed during the hospital encounter of 10/24/22   Echo   Result Value Ref Range    BSA 2.52 m2    TDI SEPTAL 0.10 m/s    LV LATERAL E/E' RATIO 11.43 m/s    LV SEPTAL E/E' RATIO 8.00 m/s    IVC diameter 2.59 cm    Left Ventricular Outflow Tract Mean Velocity 0.76 cm/s    Left Ventricular Outflow Tract Mean Gradient 2.56 mmHg    TDI LATERAL 0.07 m/s    PV PEAK VELOCITY 0.94 cm/s    LVIDd 5.66 3.5 - 6.0 cm    IVS 1.16 (A) 0.6 - 1.1 cm    Posterior Wall 1.05 0.6 - 1.1 cm    LVIDs  3.87 2.1 - 4.0 cm    FS 32 28 - 44 %    Sinus 3.55 cm    STJ 3.12 cm    Ascending aorta 3.89 cm    LV mass 255.29 g    LA size 4.81 cm    RVDD 4.88 cm    TAPSE 2.38 cm    RV S' 0.01 cm/s    Left Ventricle Relative Wall Thickness 0.37 cm    AV mean gradient 9 mmHg    AV valve area 2.44 cm2    AV Velocity Ratio 0.50     AV index (prosthetic) 0.55     MV valve area p 1/2 method 4.67 cm2    E/A ratio 1.01     Mean e' 0.09 m/s    E wave deceleration time 162.55 msec    IVRT 81.83 msec    LVOT diameter 2.38 cm    LVOT area 4.4 cm2    LVOT peak asif 0.99 m/s    LVOT peak VTI 19.90 cm    Ao peak asif 2.00 m/s    Ao VTI 36.2 cm    LVOT stroke volume 88.49 cm3    AV peak gradient 16 mmHg    TV peak gradient 0.57 mmHg    E/E' ratio 9.41 m/s    MV Peak E Asif 0.80 m/s    TR Max Asif 2.17 m/s    MV stenosis pressure 1/2 time 47.14 ms    MV Peak A Asif 0.79 m/s    LV Systolic Volume 64.67 mL    LV Systolic Volume Index 26.6 mL/m2    LV Diastolic Volume 157.45 mL    LV Diastolic Volume Index 64.79 mL/m2    LV Mass Index 105 g/m2    RA Major Axis 5.77 cm    Left Atrium Minor Axis 5.87 cm    Left Atrium Major Axis 5.60 cm    Triscuspid Valve Regurgitation Peak Gradient 19 mmHg    LA WIDTH 4.20 cm    LA volume 98.43 cm3    LA Volume Index 40.5 mL/m2    Right Atrial Pressure (from IVC) 8 mmHg    EF 60 %    TV rest pulmonary artery pressure 27 mmHg    Narrative    · TDS secondary to body habitus.  · The left ventricle is normal in size with normal systolic function.  · The estimated ejection fraction is 60%.  · Indeterminate left ventricular diastolic function.  · Mild left atrial enlargement.  · Normal right ventricular size with normal right ventricular systolic   function.  · Intermediate central venous pressure (8 mmHg).  · The estimated PA systolic pressure is 27 mmHg.  · There is no pulmonary hypertension.

## 2022-10-26 NOTE — PLAN OF CARE
Problem: Physical Therapy  Goal: Physical Therapy Goal  Description: Goals to be met by: 22     Patient will increase functional independence with mobility by performin. Pt to be supervision with bed mobility.  2. Pt to transfer with supervision.  3. Pt to ambulate 150' w/RW SBA.  4. Pt to be (I) with written HEP.    Outcome: Ongoing, Progressing   Pt assisted to toilet per request, able to stand with RW to be cleaned.

## 2022-10-26 NOTE — NURSING
Received report from DONALD Cruz from PACU. Pt with I&D to the (L) side of neck. Muscle biopsy taken. No necrotic tissue noted. Incision dressed with abd pads, 4x4's, med[place] tape and reinforced with silk tape. /60, HR 80, O2 96% on RA, R 18, T 97.9. Awaiting pt's arrival to the floor.

## 2022-10-27 ENCOUNTER — ANESTHESIA (OUTPATIENT)
Dept: SURGERY | Facility: HOSPITAL | Age: 67
DRG: 853 | End: 2022-10-27
Payer: MEDICARE

## 2022-10-27 LAB
ALBUMIN SERPL BCP-MCNC: 2.2 G/DL (ref 3.5–5.2)
ALP SERPL-CCNC: 93 U/L (ref 55–135)
ALT SERPL W/O P-5'-P-CCNC: 148 U/L (ref 10–44)
ANION GAP SERPL CALC-SCNC: 11 MMOL/L (ref 8–16)
AST SERPL-CCNC: 213 U/L (ref 10–40)
BACTERIA UR CULT: NO GROWTH
BASOPHILS # BLD AUTO: 0.04 K/UL (ref 0–0.2)
BASOPHILS NFR BLD: 0.2 % (ref 0–1.9)
BILIRUB SERPL-MCNC: 0.7 MG/DL (ref 0.1–1)
BUN SERPL-MCNC: 61 MG/DL (ref 8–23)
CALCIUM SERPL-MCNC: 7.1 MG/DL (ref 8.7–10.5)
CHLORIDE SERPL-SCNC: 94 MMOL/L (ref 95–110)
CK SERPL-CCNC: 4351 U/L (ref 20–200)
CO2 SERPL-SCNC: 26 MMOL/L (ref 23–29)
CREAT SERPL-MCNC: 4.6 MG/DL (ref 0.5–1.4)
DIFFERENTIAL METHOD: ABNORMAL
EOSINOPHIL # BLD AUTO: 0.1 K/UL (ref 0–0.5)
EOSINOPHIL NFR BLD: 0.5 % (ref 0–8)
ERYTHROCYTE [DISTWIDTH] IN BLOOD BY AUTOMATED COUNT: 13.6 % (ref 11.5–14.5)
EST. GFR  (NO RACE VARIABLE): 13 ML/MIN/1.73 M^2
GLUCOSE SERPL-MCNC: 87 MG/DL (ref 70–110)
GRAM STN SPEC: NORMAL
GRAM STN SPEC: NORMAL
HCT VFR BLD AUTO: 34.3 % (ref 40–54)
HGB BLD-MCNC: 12.1 G/DL (ref 14–18)
IMM GRANULOCYTES # BLD AUTO: 0.37 K/UL (ref 0–0.04)
IMM GRANULOCYTES NFR BLD AUTO: 1.7 % (ref 0–0.5)
LYMPHOCYTES # BLD AUTO: 0.9 K/UL (ref 1–4.8)
LYMPHOCYTES NFR BLD: 3.9 % (ref 18–48)
MCH RBC QN AUTO: 31.8 PG (ref 27–31)
MCHC RBC AUTO-ENTMCNC: 35.3 G/DL (ref 32–36)
MCV RBC AUTO: 90 FL (ref 82–98)
MONOCYTES # BLD AUTO: 0.7 K/UL (ref 0.3–1)
MONOCYTES NFR BLD: 3.3 % (ref 4–15)
NEUTROPHILS # BLD AUTO: 19.9 K/UL (ref 1.8–7.7)
NEUTROPHILS NFR BLD: 90.4 % (ref 38–73)
NRBC BLD-RTO: 0 /100 WBC
PLATELET # BLD AUTO: 261 K/UL (ref 150–450)
PMV BLD AUTO: 10.7 FL (ref 9.2–12.9)
POTASSIUM SERPL-SCNC: 3.3 MMOL/L (ref 3.5–5.1)
PROT SERPL-MCNC: 5.9 G/DL (ref 6–8.4)
RBC # BLD AUTO: 3.81 M/UL (ref 4.6–6.2)
SODIUM SERPL-SCNC: 131 MMOL/L (ref 136–145)
VANCOMYCIN SERPL-MCNC: 15.5 UG/ML
WBC # BLD AUTO: 22.06 K/UL (ref 3.9–12.7)

## 2022-10-27 PROCEDURE — 63600175 PHARM REV CODE 636 W HCPCS: Performed by: HOSPITALIST

## 2022-10-27 PROCEDURE — A4217 STERILE WATER/SALINE, 500 ML: HCPCS | Performed by: INTERNAL MEDICINE

## 2022-10-27 PROCEDURE — A4216 STERILE WATER/SALINE, 10 ML: HCPCS | Performed by: HOSPITALIST

## 2022-10-27 PROCEDURE — 99900035 HC TECH TIME PER 15 MIN (STAT)

## 2022-10-27 PROCEDURE — 25000003 PHARM REV CODE 250: Performed by: INTERNAL MEDICINE

## 2022-10-27 PROCEDURE — 25000003 PHARM REV CODE 250: Performed by: HOSPITALIST

## 2022-10-27 PROCEDURE — 63600175 PHARM REV CODE 636 W HCPCS: Performed by: INTERNAL MEDICINE

## 2022-10-27 PROCEDURE — 80053 COMPREHEN METABOLIC PANEL: CPT | Performed by: HOSPITALIST

## 2022-10-27 PROCEDURE — 80202 ASSAY OF VANCOMYCIN: CPT | Performed by: HOSPITALIST

## 2022-10-27 PROCEDURE — 21400001 HC TELEMETRY ROOM

## 2022-10-27 PROCEDURE — 97530 THERAPEUTIC ACTIVITIES: CPT

## 2022-10-27 PROCEDURE — 82550 ASSAY OF CK (CPK): CPT | Performed by: HOSPITALIST

## 2022-10-27 PROCEDURE — 85025 COMPLETE CBC W/AUTO DIFF WBC: CPT | Performed by: HOSPITALIST

## 2022-10-27 PROCEDURE — 97535 SELF CARE MNGMENT TRAINING: CPT

## 2022-10-27 RX ORDER — POTASSIUM CHLORIDE 20 MEQ/1
40 TABLET, EXTENDED RELEASE ORAL ONCE
Status: COMPLETED | OUTPATIENT
Start: 2022-10-27 | End: 2022-10-27

## 2022-10-27 RX ORDER — POTASSIUM CHLORIDE 7.45 MG/ML
10 INJECTION INTRAVENOUS
Status: DISCONTINUED | OUTPATIENT
Start: 2022-10-27 | End: 2022-10-27

## 2022-10-27 RX ADMIN — METOPROLOL SUCCINATE 25 MG: 25 TABLET, EXTENDED RELEASE ORAL at 08:10

## 2022-10-27 RX ADMIN — CEFEPIME HYDROCHLORIDE 2 G: 2 INJECTION, SOLUTION INTRAVENOUS at 09:10

## 2022-10-27 RX ADMIN — SODIUM BICARBONATE: 84 INJECTION, SOLUTION INTRAVENOUS at 03:10

## 2022-10-27 RX ADMIN — CLINDAMYCIN IN 5 PERCENT DEXTROSE 900 MG: 18 INJECTION, SOLUTION INTRAVENOUS at 05:10

## 2022-10-27 RX ADMIN — METOPROLOL SUCCINATE 25 MG: 25 TABLET, EXTENDED RELEASE ORAL at 09:10

## 2022-10-27 RX ADMIN — POTASSIUM CHLORIDE 40 MEQ: 1500 TABLET, EXTENDED RELEASE ORAL at 10:10

## 2022-10-27 RX ADMIN — CLINDAMYCIN IN 5 PERCENT DEXTROSE 900 MG: 18 INJECTION, SOLUTION INTRAVENOUS at 08:10

## 2022-10-27 RX ADMIN — MUPIROCIN: 20 OINTMENT TOPICAL at 09:10

## 2022-10-27 RX ADMIN — MINERAL OIL, PETROLATUM, PHENYLEPHRINE HCL 1 APPLICATOR: 2.5; 140; 749 OINTMENT TOPICAL at 09:10

## 2022-10-27 RX ADMIN — Medication 10 ML: at 05:10

## 2022-10-27 RX ADMIN — Medication 10 ML: at 06:10

## 2022-10-27 RX ADMIN — HEPARIN SODIUM 5000 UNITS: 5000 INJECTION INTRAVENOUS; SUBCUTANEOUS at 05:10

## 2022-10-27 RX ADMIN — TAMSULOSIN HYDROCHLORIDE 0.4 MG: 0.4 CAPSULE ORAL at 08:10

## 2022-10-27 RX ADMIN — Medication 10 ML: at 12:10

## 2022-10-27 RX ADMIN — FAMOTIDINE 20 MG: 10 INJECTION INTRAVENOUS at 08:10

## 2022-10-27 RX ADMIN — CLINDAMYCIN IN 5 PERCENT DEXTROSE 900 MG: 18 INJECTION, SOLUTION INTRAVENOUS at 12:10

## 2022-10-27 RX ADMIN — HEPARIN SODIUM 5000 UNITS: 5000 INJECTION INTRAVENOUS; SUBCUTANEOUS at 09:10

## 2022-10-27 RX ADMIN — VANCOMYCIN HYDROCHLORIDE 500 MG: 500 INJECTION, POWDER, LYOPHILIZED, FOR SOLUTION INTRAVENOUS at 10:10

## 2022-10-27 RX ADMIN — HEPARIN SODIUM 5000 UNITS: 5000 INJECTION INTRAVENOUS; SUBCUTANEOUS at 02:10

## 2022-10-27 NOTE — PT/OT/SLP PROGRESS
"Occupational Therapy   Treatment    Name: Elías Gay  MRN: [id number]  Admitting Diagnosis:  Sepsis  1 Day Post-Op    Recommendations:     Discharge Recommendations: home health OT (w/ family support)  Discharge Equipment Recommendations:  rolling walker, BSC  Barriers to discharge:   (Pt is at risk for falls, readmission, and morbidity if d/c home.)    Assessment:     Elías Gay is a 67 y.o. male with a medical diagnosis of Sepsis.  Performance deficits affecting function are weakness, impaired endurance, impaired functional mobility, impaired self care skills, gait instability, impaired balance, decreased upper extremity function, decreased lower extremity function, decreased safety awareness, decreased coordination, pain, decreased ROM, edema, impaired skin.     Pt pleasant and willing to participate in tx session this date; pt was able to ambulate functional distances in room for performing functional transfers and ADLs. Pt still w/ decreased endurance and strength but demo'd improved tolerance with OOB activities this date. Pt tolerated tx session well and will continue to benefit from skilled acute OT services to maximize functional capacity for safe performance w/ ADLs and functional mobility.     Rehab Prognosis:  Good; patient would benefit from acute skilled OT services to address these deficits and reach maximum level of function.       Plan:     Patient to be seen 5 x/week to address the above listed problems via self-care/home management, therapeutic activities, therapeutic exercises  Plan of Care Expires: 12/08/22  Plan of Care Reviewed with: patient    Subjective     "I feel nauseous, like the food is sitting on my chest."    Pain/Comfort:  Pain Rating 1: 0/10 (Complained of indegestion, constipation.)  Pain Rating Post-Intervention 1: 0/10    Objective:     Communicated with: Nurse prior to session.  Patient found sitting edge of bed with casillas catheter, peripheral IV upon OT entry to " room.    General Precautions: Standard, fall   Orthopedic Precautions:N/A   Braces: N/A  Respiratory Status: Room air w/ SPO2 95%      Occupational Performance:     Bed Mobility:    Pt found sitting EOB and sat up in chair by end of session      Functional Mobility/Transfers:  Patient completed Sit <> Stand Transfer with contact guard assistance  with  rolling walker   Patient completed Bed <> Chair Transfer using Step Transfer technique with contact guard assistance with rolling walker  Patient completed Toilet Transfer Step Transfer technique with contact guard assistance with  rolling walker  Functional Mobility: Pt was able to ambulate from EOB>toilet>chair w/ CGA and use of RW; pt w/ mild instability but no LOB occurred.     Activities of Daily Living:  Grooming: supervision for performing seated ADLs at sink   Upper Body Dressing: minimum assistance for doffing soiled gown from front for donning gown; pt sitting EOB, unsupported   Toileting: total A for standing marti-care; pt required BUE support on RW for tolerating static standing; pt required increased time on toilet for attempting to have a BM     [facility] 6 Click ADL: 17    Treatment & Education:  -Pt performed ADLs and functional mobility as noted above.   -Pt educated on safe functional mobility and ADL performance.   -Pt educated on importance of PLB and energy conservation for preventing over-exertion.   -Pt educated on safe body mechanics for good performance w/ functional mobility.   -Pt performed seated BUE AROM for 1 set x 10 reps while sitting at EOB unsupported for increasing UB strength and endurance for safe performance w/ ADLs and functional mobility:    -shoulder flexion    -forward punches    -elbow flexion   -pt declined further exercises 2* indigestion w/ movement.   -Pt encouraged to mobilize w/ assist from staff.   -Questions and concerns addressed within scope.     Patient left up in chair with all lines intact and call button in  reach    GOALS:   Multidisciplinary Problems       Occupational Therapy Goals          Problem: Occupational Therapy    Goal Priority Disciplines Outcome Interventions   Occupational Therapy Goal     OT, PT/OT Ongoing, Progressing    Description: Goals to be met by: 11/8/22     Patient will increase functional independence with ADLs by performing:    LE Dressing with Modified [place].  Grooming while standing at sink with Modified [place].  Toileting from bedside commode with Modified [place] for hygiene and clothing management.   Supine to sit with Modified [place].  Step transfer with Modified [place]  Toilet transfer to toilet with Modified [place].  Upper extremity exercise program x15 reps per handout, with independence.                         Time Tracking:     OT Date of Treatment: 10/27/22  OT Start Time: 1128  OT Stop Time: 1201  OT Total Time (min): 33 min    Billable Minutes:Self Care/Home Management 15  Therapeutic Activity 18  Total Time 33    OT/CASEY: OT          10/27/2022

## 2022-10-27 NOTE — NURSING
Bedside Report given to night nurse. DONALD Nobel. Walking rounds completed. Visualized and assessed patient NAD noted. Safety precautions maintained and call light within reach.     Chart check completed.

## 2022-10-27 NOTE — ASSESSMENT & PLAN NOTE
This patient does have evidence of infective focus  My overall impression is sepsis. Vital signs were reviewed and noted in progress note.  Antibiotics given-   Antibiotics (From admission, onward)    Start     Stop Route Frequency Ordered    10/27/22 0915  vancomycin 500 mg in dextrose 5 % 100 mL IVPB (ready to mix system)         -- IV Once 10/27/22 0810    10/26/22 2114  cefepime in dextrose 5 % IVPB 2 g         -- IV Every 24 hours (non-standard times) 10/26/22 0757    10/25/22 0900  mupirocin 2 % ointment         10/30 0859 Nasl 2 times daily 10/25/22 0714    10/25/22 0830  clindamycin in D5W 900 mg/50 mL IVPB 900 mg         -- IV Every 8 hours (non-standard times) 10/25/22 0725    10/24/22 2051  vancomycin - pharmacy to dose  (vancomycin IVPB)        See Hyperspace for full Linked Orders Report.    -- IV pharmacy to manage frequency 10/24/22 1952        Cultures were taken-   Microbiology Results (last 7 days)     Procedure Component Value Units Date/Time    Aerobic culture [[id number]] Collected: 10/26/22 1630    Order Status: Completed Specimen: Abscess from Neck Updated: 10/27/22 1014     Aerobic Bacterial Culture No growth    Narrative:      LEFT NECK ABSCESS    Gram stain [[id number]] Collected: 10/26/22 1630    Order Status: Completed Specimen: Abscess from Neck Updated: 10/27/22 0831     Gram Stain Result Few WBC's      No organisms seen    Narrative:      LEFT NECK ABSCESS    Urine culture [[id number]] Collected: 10/25/22 0230    Order Status: Completed Specimen: Urine Updated: 10/27/22 0627     Urine Culture, Routine No growth    Narrative:      Specimen Source->Urine    Blood culture x two cultures. Draw prior to antibiotics. [[id number]] Collected: 10/24/22 1910    Order Status: Completed Specimen: Blood from Peripheral, Hand, Right Updated: 10/26/22 2103     Blood Culture, Routine No Growth to date      No Growth to date      No Growth to date    Narrative:      Aerobic and anaerobic    Blood culture x  two cultures. Draw prior to antibiotics. [[id number]] Collected: 10/24/22 1911    Order Status: Completed Specimen: Blood from Peripheral, Antecubital, Left Updated: 10/26/22 2103     Blood Culture, Routine No Growth to date      No Growth to date      No Growth to date    Narrative:      Aerobic and anaerobic    Culture, Anaerobe [[id number]] Collected: 10/26/22 1630    Order Status: Sent Specimen: Abscess from Neck Updated: 10/26/22 1833    Fungus culture [[id number]] Collected: 10/26/22 1630    Order Status: Sent Specimen: Abscess from Neck Updated: 10/26/22 1832    AFB Culture & Smear [[id number]] Collected: 10/26/22 1630    Order Status: Sent Specimen: Abscess from Neck Updated: 10/26/22 1832        Latest lactate reviewed, they are-  Recent Labs   Lab 10/24/22  2058 10/24/22  2306   LACTATE 2.4* 2.1       Organ dysfunction indicated by Acute liver injury  Source- Likely deep soft tissue neck infection related to possibly recent sinusitis infection or odontogenic in source     Plan:   Source control Achieved by- Antibiotics (Vancomycin,cefpeime and clindamycin.  ENT consult -  transfer to [facility] for evaluation, currently there is no concern for airway compromise ,stable on RA.our ENT is back,possible intervention in AM.    I and D of neck Abscess on 10/27. Cultures sent. Follow ENT recs. Abx

## 2022-10-27 NOTE — PLAN OF CARE
Problem: Adult Inpatient Plan of Care  Goal: Plan of Care Review  Outcome: Ongoing, Progressing  Goal: Optimal Comfort and Wellbeing  Outcome: Ongoing, Progressing  Intervention: Monitor Pain and Promote Comfort  Flowsheets (Taken 10/27/2022 1671)  Pain Management Interventions:   care clustered   pillow support provided   position adjusted

## 2022-10-27 NOTE — PROGRESS NOTES
Elías Gay is a 67 y.o. male patient.    Follow for VANGIE, rhabdomyolysis    No new c/o, comfortable    Scheduled Meds:   ceFEPime (MAXIPIME) IVPB  2 g Intravenous Q24H    clindamycin (CLEOCIN) IVPB  900 mg Intravenous Q8H    famotidine (PF)  20 mg Intravenous Daily    heparin (porcine)  5,000 Units Subcutaneous Q8H    metoprolol succinate  25 mg Oral BID    mupirocin   Nasal BID    sodium chloride 0.9%  10 mL Intravenous Q6H    tamsulosin  1 capsule Oral Daily    vancomycin (VANCOCIN) IVPB  500 mg Intravenous Once       Review of patient's allergies indicates:  No Known Allergies      Vital Signs Range (Last 24H):  Temp:  [97.2 °F (36.2 °C)-98.5 °F (36.9 °C)]   Pulse:  [77-86]   Resp:  [17-27]   BP: (102-141)/(56-78)   SpO2:  [93 %-100 %]     I & O (Last 24H):  Intake/Output Summary (Last 24 hours) at 10/27/2022 0948  Last data filed at 10/27/2022 0538  Gross per 24 hour   Intake 2319.74 ml   Output 925 ml   Net 1394.74 ml           Physical Exam:  General appearance: well developed, well nourished, no distress  Lungs:  clear to auscultation bilaterally and normal respiratory effort  Heart: regular rate and rhythm  Abdomen:  soft, normal bowel sounds  Extremities: edema trace    Laboratory:  I have reviewed all pertinent lab results within the past 24 hours.  CBC:   Recent Labs   Lab 10/27/22  0604   WBC 22.06*   RBC 3.81*   HGB 12.1*   HCT 34.3*      MCV 90   MCH 31.8*   MCHC 35.3     CMP:   Recent Labs   Lab 10/27/22  0604   GLU 87   CALCIUM 7.1*   ALBUMIN 2.2*   PROT 5.9*   *   K 3.3*   CO2 26   CL 94*   BUN 61*   CREATININE 4.6*   ALKPHOS 93   *   *   BILITOT 0.7     Cardiac markers:   Recent Labs   Lab 10/24/22  1909   TROPONINI 0.063*       Imp/Plan    VANGIE - creatinine up, ATN  Rhabdomyolysis - CPK trending down  Sepsis  Neck swelling   BPH with urinary obstruction  TJ  Anemia    Continue present Rx  Replace K  Watch renal function closely  Discussed with patient and  family      Dodie Childress  10/27/2022

## 2022-10-27 NOTE — PROGRESS NOTES
Pharmacokinetic Assessment Follow Up: IV Vancomycin    Vancomycin serum concentration assessment(s):    The random level was drawn correctly and can be used to guide therapy at this time. The measurement is within the desired definitive target range of 10 to 20 mcg/mL.    Vancomycin Regimen Plan:    Give 500 mg today.  Re-dose when the random level is less than 20 mcg/mL, next level to be drawn at 0400 on 10/28/2022    Drug levels (last 3 results):  Recent Labs   Lab Result Units 10/25/22  0919 10/26/22  0304 10/27/22  0604   Vancomycin, Random ug/mL 21.0 13.8 15.5       Pharmacy will continue to follow and monitor vancomycin.    Please contact pharmacy at extension [phone] for questions regarding this assessment.    Thank you for the consult,   Trevon Sepulveda Jr       Patient brief summary:  Elías Gay is a 67 y.o. male initiated on antimicrobial therapy with IV Vancomycin for treatment of skin & soft tissue infection    Drug Allergies:   Review of patient's allergies indicates:  No Known Allergies    Actual Body Weight:   133.6 kg    Renal Function:   Estimated Creatinine Clearance: 21.1 mL/min (A) (based on SCr of 4.6 mg/dL (H)).,     Dialysis Method (if applicable):  N/A    CBC (last 72 hours):  Recent Labs   Lab Result Units 10/24/22  1909 10/25/22  0050 10/26/22  0304 10/27/22  0604   WBC K/uL 38.94* 35.76* 28.16* 22.06*   Hemoglobin g/dL 14.5 13.8* 10.5* 12.1*   Hematocrit % 42.0 39.6* 31.5* 34.3*   Platelets K/uL 262 233 206 261   Gran % % 89.9* 78.0* 91.5* 90.4*   Lymph % % 1.5* 7.0* 3.2* 3.9*   Mono % % 4.4 3.0* 2.7* 3.3*   Eosinophil % % 0.1 0.0 1.2 0.5   Basophil % % 0.5 0.0 0.3 0.2   Differential Method  Automated Manual Automated Automated       Metabolic Panel (last 72 hours):  Recent Labs   Lab Result Units 10/24/22  1909 10/25/22  0050 10/25/22  0230 10/26/22  0304 10/27/22  0604   Sodium mmol/L 136 135*  --  132* 131*   Potassium mmol/L 3.5 3.7  --  3.5 3.3*   Chloride mmol/L 102 101  --  101  94*   CO2 mmol/L 20* 22*  --  21* 26   Glucose mg/dL 93 125*  --  88 87   Glucose, UA   --   --  Negative  --   --    BUN mg/dL 23 29*  --  46* 61*   Creatinine mg/dL 1.8* 2.3*  --  3.5* 4.6*   Albumin g/dL 2.9* 2.6*  --  1.9* 2.2*   Total Bilirubin mg/dL 1.4* 1.0  --  0.6 0.7   Alkaline Phosphatase U/L 110 108  --  80 93   AST U/L 511* 707*  --  308* 213*   ALT U/L 122* 173*  --  137* 148*   Magnesium mg/dL 1.9 1.9  --   --   --    Phosphorus mg/dL 1.9* 4.6*  --   --   --        Vancomycin Administrations:  vancomycin given in the last 96 hours                     vancomycin 750 mg in dextrose 5 % 250 mL IVPB (ready to mix system) (mg) 750 mg New Bag 10/26/22 0935    vancomycin (VANCOCIN) 2,500 mg in dextrose 5 % 500 mL IVPB ()  Restarted 10/24/22 2348      Restarted  2304     2,500 mg New Bag  2057                    Microbiologic Results:  Microbiology Results (last 7 days)       Procedure Component Value Units Date/Time    Urine culture [[id number]] Collected: 10/25/22 0230    Order Status: Completed Specimen: Urine Updated: 10/27/22 0627     Urine Culture, Routine No growth    Narrative:      Specimen Source->Urine    Blood culture x two cultures. Draw prior to antibiotics. [[id number]] Collected: 10/24/22 1910    Order Status: Completed Specimen: Blood from Peripheral, Hand, Right Updated: 10/26/22 2103     Blood Culture, Routine No Growth to date      No Growth to date      No Growth to date    Narrative:      Aerobic and anaerobic    Blood culture x two cultures. Draw prior to antibiotics. [[id number]] Collected: 10/24/22 1911    Order Status: Completed Specimen: Blood from Peripheral, Antecubital, Left Updated: 10/26/22 2103     Blood Culture, Routine No Growth to date      No Growth to date      No Growth to date    Narrative:      Aerobic and anaerobic    Culture, Anaerobe [[id number]] Collected: 10/26/22 1630    Order Status: Sent Specimen: Abscess from Neck Updated: 10/26/22 1833    Aerobic culture  [[id number]] Collected: 10/26/22 1630    Order Status: Sent Specimen: Abscess from Neck Updated: 10/26/22 1833    Fungus culture [[id number]] Collected: 10/26/22 1630    Order Status: Sent Specimen: Abscess from Neck Updated: 10/26/22 1832    AFB Culture & Smear [[id number]] Collected: 10/26/22 1630    Order Status: Sent Specimen: Abscess from Neck Updated: 10/26/22 1832    Gram stain [[id number]] Collected: 10/26/22 1630    Order Status: Sent Specimen: Abscess from Neck Updated: 10/26/22 1831

## 2022-10-27 NOTE — PROGRESS NOTES
[facility]  Otorhinolaryngology-Head & Neck Surgery  Progress Note    Patient Name: Elías Gay  MRN: [id number]  Code Status: Full Code  Admission Date: 10/24/2022  Hospital Length of Stay: 3 days  Attending Physician: Wilbert Estrada MD  Primary Care Provider: Kameron Diaz MD    Subjective:     Interval History: has had some drainage overnight from neck , no bleeding. Neck feeling better.     Post-Op Info:  Procedure(s) (LRB):  INCISION AND DRAINAGE,NECK (Left)   1 Day Post-Op  Hospital Day: 4      Medications:  Continuous Infusions:   sodium bicarbonate drip 125 mL/hr at 10/27/22 0322     Scheduled Meds:   ceFEPime (MAXIPIME) IVPB  2 g Intravenous Q24H    clindamycin (CLEOCIN) IVPB  900 mg Intravenous Q8H    famotidine (PF)  20 mg Intravenous Daily    heparin (porcine)  5,000 Units Subcutaneous Q8H    metoprolol succinate  25 mg Oral BID    mupirocin   Nasal BID    sodium chloride 0.9%  10 mL Intravenous Q6H    tamsulosin  1 capsule Oral Daily     PRN Meds:acetaminophen, albuterol-ipratropium, calcium gluconate IVPB, calcium gluconate IVPB, calcium gluconate IVPB, HYDROcodone-acetaminophen, HYDROcodone-acetaminophen, HYDROmorphone, magnesium sulfate IVPB, magnesium sulfate IVPB, melatonin, ondansetron, ondansetron, potassium chloride **AND** potassium chloride **AND** potassium chloride, prochlorperazine, prochlorperazine, Flushing PICC Protocol **AND** sodium chloride 0.9% **AND** sodium chloride 0.9%, sodium chloride 0.9%, sodium phosphate IVPB, sodium phosphate IVPB, sodium phosphate IVPB, Pharmacy to dose Vancomycin consult **AND** vancomycin - pharmacy to dose     Objective:     Vital Signs (24h Range):  Temp:  [97.2 °F (36.2 °C)-98.5 °F (36.9 °C)] 97.2 °F (36.2 °C)  Pulse:  [74-86] 77  Resp:  [17-27] 20  SpO2:  [93 %-100 %] 95 %  BP: (102-141)/(56-79) 138/76       Lines/Drains/Airways       Peripherally Inserted Central Catheter Line  Duration             PICC Triple Lumen 10/25/22 1039  right basilic 1 day              Drain  Duration                  Urethral Catheter 10/25/22 0230 16 Fr. 2 days                    Physical Exam  Neck:      Comments: Left neck erythema improved significantly, still with some swelling but expected for postop   Penrose pulled back slightly- serous drainage. No purulent drainage and none expressed with palpation   Incision intact with staples  Neurological:      Mental Status: He is alert.       Significant Labs:  CBC:   Recent Labs   Lab 10/27/22  0604   WBC 22.06*   RBC 3.81*   HGB 12.1*   HCT 34.3*      MCV 90   MCH 31.8*   MCHC 35.3     CMP:   Recent Labs   Lab 10/27/22  0604   GLU 87   CALCIUM 7.1*   ALBUMIN 2.2*   PROT 5.9*   *   K 3.3*   CO2 26   CL 94*   BUN 61*   CREATININE 4.6*   ALKPHOS 93   *   *   BILITOT 0.7           Assessment/Plan:     Active Diagnoses:    Diagnosis Date Noted POA    PRINCIPAL PROBLEM:  Sepsis [A41.9] 10/24/2022 Yes    Acute renal failure [N17.9] 10/25/2022 Yes    Non-traumatic rhabdomyolysis [M62.82] 10/25/2022 Yes    Paroxysmal atrial fibrillation [I48.0] 10/24/2022 Yes    Elevated LFTs [R79.89] 10/24/2022 Yes    Neck swelling [R22.1] 10/24/2022 Yes    Morbid obesity [E66.01] 12/10/2018 Yes    BPH with urinary obstruction [N40.1, N13.8] 10/31/2018 Yes     Chronic    Complex sleep apnea syndrome [G47.31]  Yes      Problems Resolved During this Admission:   S/p surgical I+D 10-26-22  Continue iv abx  Suspect neck abscsess over nec fasc but tissue sent to ensure, path pending. Intraop findings suggestive of abscess only. Unclear reason developed this. Sinus ct and dental eval negative   Discussed I have seen this happen rarely with pharyngitis from viral etiology and less likely malignancy but as mentioned in prior notes overall picture favors infectious etiology   Will continue to follow  Cultures pending    Bethany Macdonald MD  Otorhinolaryngology-Head & Neck Surgery  [facility]

## 2022-10-27 NOTE — SUBJECTIVE & OBJECTIVE
Interval History: No new issues     Review of Systems   Constitutional:  Negative for activity change, appetite change and chills.   HENT:  Negative for congestion and dental problem.    Eyes:  Negative for discharge and itching.   Respiratory:  Negative for apnea and chest tightness.    Cardiovascular:  Negative for chest pain.   Gastrointestinal:  Negative for abdominal distention and abdominal pain.   Endocrine: Negative for cold intolerance and heat intolerance.   Genitourinary:  Negative for difficulty urinating and dysuria.   Musculoskeletal:  Negative for arthralgias.   Skin:  Negative for color change and pallor.   Neurological:  Negative for dizziness and facial asymmetry.   Psychiatric/Behavioral:  Negative for agitation and behavioral problems.    Objective:     Vital Signs (Most Recent):  Temp: 97.2 °F (36.2 °C) (10/27/22 0732)  Pulse: 77 (10/27/22 0732)  Resp: 20 (10/27/22 0732)  BP: 138/76 (10/27/22 0732)  SpO2: 95 % (10/27/22 0732) Vital Signs (24h Range):  Temp:  [97.2 °F (36.2 °C)-98.5 °F (36.9 °C)] 97.2 °F (36.2 °C)  Pulse:  [77-86] 77  Resp:  [17-27] 20  SpO2:  [93 %-100 %] 95 %  BP: (102-141)/(56-78) 138/76     Weight: 133.6 kg (294 lb 8.6 oz)  Body mass index is 41.08 kg/m².    Intake/Output Summary (Last 24 hours) at 10/27/2022 1018  Last data filed at 10/27/2022 0538  Gross per 24 hour   Intake 2319.74 ml   Output 925 ml   Net 1394.74 ml      Physical Exam  Vitals and nursing note reviewed.   Constitutional:       General: He is not in acute distress.     Appearance: Normal appearance. He is not ill-appearing, toxic-appearing or diaphoretic.   HENT:      Head: Normocephalic and atraumatic.      Nose: No congestion.   Eyes:      Conjunctiva/sclera: Conjunctivae normal.   Cardiovascular:      Rate and Rhythm: Normal rate and regular rhythm.   Pulmonary:      Effort: Pulmonary effort is normal.      Breath sounds: Normal breath sounds.   Abdominal:      General: Bowel sounds are normal.   Skin:      General: Skin is warm and dry.   Neurological:      Mental Status: He is alert and oriented to person, place, and time.   Psychiatric:         Mood and Affect: Mood normal.         Behavior: Behavior normal.       Significant Labs: All pertinent labs within the past 24 hours have been reviewed.  BMP:   Recent Labs   Lab 10/27/22  0604   GLU 87   *   K 3.3*   CL 94*   CO2 26   BUN 61*   CREATININE 4.6*   CALCIUM 7.1*     CBC:   Recent Labs   Lab 10/26/22  0304 10/27/22  0604   WBC 28.16* 22.06*   HGB 10.5* 12.1*   HCT 31.5* 34.3*    261       Significant Imaging:

## 2022-10-27 NOTE — ANESTHESIA POSTPROCEDURE EVALUATION
Anesthesia Post Evaluation    Patient: Elías Gay    Procedure(s) Performed: Procedure(s) (LRB):  INCISION AND DRAINAGE,NECK (Left)    Final Anesthesia Type: general      Patient location during evaluation: PACU  Patient participation: Yes- Able to Participate  Level of consciousness: awake and alert and oriented  Post-procedure vital signs: reviewed and stable  Pain management: adequate  Airway patency: patent    PONV status at discharge: No PONV  Anesthetic complications: no      Cardiovascular status: hemodynamically stable and blood pressure returned to baseline  Respiratory status: spontaneous ventilation, room air and unassisted  Hydration status: euvolemic  Follow-up not needed.          Vitals Value Taken Time   /72 10/27/22 0434   Temp 36.8 °C (98.3 °F) 10/27/22 0434   Pulse 77 10/27/22 0434   Resp 20 10/27/22 0434   SpO2 94 % 10/27/22 0434         Event Time   Out of Recovery 10/26/2022 18:17:52         Pain/Karrie Score: Pain Rating Prior to Med Admin: 6 (10/26/2022  5:50 PM)  Karrie Score: 10 (10/26/2022  7:58 PM)

## 2022-10-27 NOTE — PLAN OF CARE
Problem: Adult Inpatient Plan of Care  Goal: Plan of Care Review  Outcome: Ongoing, Progressing     Problem: Infection  Goal: Absence of Infection Signs and Symptoms  Outcome: Ongoing, Progressing     Problem: Impaired Wound Healing  Goal: Optimal Wound Healing  Outcome: Ongoing, Progressing     Problem: Renal Function Impairment (Acute Kidney Injury/Impairment)  Goal: Effective Renal Function  Outcome: Ongoing, Progressing

## 2022-10-27 NOTE — NURSING
Report given to oncoming nurse at bedside. NAD noted. Safety precautions maintained. Call bell in reach.   Chart check completed.

## 2022-10-27 NOTE — PLAN OF CARE
Problem: Occupational Therapy  Goal: Occupational Therapy Goal  Description: Goals to be met by: 11/8/22     Patient will increase functional independence with ADLs by performing:    LE Dressing with Modified [place].  Grooming while standing at sink with Modified [place].  Toileting from bedside commode with Modified [place] for hygiene and clothing management.   Supine to sit with Modified [place].  Step transfer with Modified [place]  Toilet transfer to toilet with Modified [place].  Upper extremity exercise program x15 reps per handout, with independence.    Outcome: Ongoing, Progressing

## 2022-10-27 NOTE — PROGRESS NOTES
[facility] Medicine  Progress Note    Patient Name: Elías Gay  MRN: [id number]  Patient Class: IP- Inpatient   Admission Date: 10/24/2022  Length of Stay: 3 days  Attending Physician: Wilbert Estrada MD  Primary Care Provider: Kameron Diaz MD        Subjective:     Principal Problem:Sepsis        HPI:  This is a 67 year old male with a PMHx of BPH, TJ (on BiPAP) and obesity who presents with generalized weakness.     Patient reports developing worsening sinusitis over the last 3 weeks. He reports that his nasal discharge have been bloody mixed with mucous. About a week prior, his symptoms progressively became worse and 3 days prior to presentation he noticed left neck swelling. He tried to carry on with his ADLs but felt too weak and he collapsed on the bathroom floor, with no loss of consciousness. He reported having chills, and decreased po intake, but denied having cough, SOB or diarrhea. His wife is sick with similar symptoms.     In the ED, the patient was febrile (38.2), tachycardic (130s), tachypnic and requiring 2L of supplemental O2. Labs showed leukocytosis (38.9 - with neutrophilia), elevated creatinine (1.8 - baseline of 0.9), elevated LFTs (Tbili: 1.4, AST: 511, ALT: 122), elevated troponin (0.063), elevated BNP (142) and elevated procalcitonin (34.23). EKG showed SVT with PVCs. CXR showed mild increased interstitial attenuation (possible mild pulmonary edema). CT neck showed extensive left neck soft tissue swelling with inflammatory changes and edema with asymmetric heterogenous enlargement is seen of the left sternocleidomastoid & numerous enlarged cervical chain lymph nodes are seen throughout the region (cellulitis versus myositis or neoplastic process) without an abscess. He was given vancomycin, cefepime, sepsis bolus and 10 mg of diltiazem x2 and was started on a diltiazem drip. A transfer to [facility] for ENT/OMFS evaluation was attempted, however, there were no  bed availability. The patient was admitted to the ICU for further management.       Overview/Hospital Course:  This is a 67 year old male with a PMHx of BPH, TJ (on BiPAP) and obesity who presents with generalized weakness.  He reported having chills, and decreased po intake, but denied having cough, SOB or diarrhea. His wife is sick with similar symptoms.   CT neck showed extensive left neck soft tissue swelling with inflammatory changes and edema with asymmetric heterogenous enlargement is seen of the left sternocleidomastoid & numerous enlarged cervical chain lymph nodes are seen throughout the region (cellulitis versus myositis or neoplastic process) without an abscess. He was given vancomycin, cefepime, he was septic and hypotensive,improved with IVF,did not required vasopressors.  Was in Afib with RVR,bolus and 10 mg of diltiazem x2 and was started on a diltiazem drip.converted to sinus.  A transfer to [facility] for ENT evaluation was attempted, however, there were no bed availability. The patient was admitted to the ICU for further management.our ENT is back, saw patient today,possible intervention in AM.keep NPO past MN.  No sign of dental abscess,going frequently to dentist,  Hypotension  is resolved with IVF,BP is stable at this time  Afib is resolved with Cardizem gtt and  patiens in sinus  and stable,echo show preserved EF,per cardiology only on ASA and follow up as out patient for Holter monitoring.  Has rhabdomyolysis on IVF,will monitor.CPK is improving.  On IVF for ARF.casillas is in place.did not improved,consulted nephrology.  Cc is neck swelling.  Patient went for I and D of neck abscess on 10/26. Cultures sent. ENT continued to follow.       Interval History: No new issues     Review of Systems   Constitutional:  Negative for activity change, appetite change and chills.   HENT:  Negative for congestion and dental problem.    Eyes:  Negative for discharge and itching.   Respiratory:   Negative for apnea and chest tightness.    Cardiovascular:  Negative for chest pain.   Gastrointestinal:  Negative for abdominal distention and abdominal pain.   Endocrine: Negative for cold intolerance and heat intolerance.   Genitourinary:  Negative for difficulty urinating and dysuria.   Musculoskeletal:  Negative for arthralgias.   Skin:  Negative for color change and pallor.   Neurological:  Negative for dizziness and facial asymmetry.   Psychiatric/Behavioral:  Negative for agitation and behavioral problems.    Objective:     Vital Signs (Most Recent):  Temp: 97.2 °F (36.2 °C) (10/27/22 0732)  Pulse: 77 (10/27/22 0732)  Resp: 20 (10/27/22 0732)  BP: 138/76 (10/27/22 0732)  SpO2: 95 % (10/27/22 0732) Vital Signs (24h Range):  Temp:  [97.2 °F (36.2 °C)-98.5 °F (36.9 °C)] 97.2 °F (36.2 °C)  Pulse:  [77-86] 77  Resp:  [17-27] 20  SpO2:  [93 %-100 %] 95 %  BP: (102-141)/(56-78) 138/76     Weight: 133.6 kg (294 lb 8.6 oz)  Body mass index is 41.08 kg/m².    Intake/Output Summary (Last 24 hours) at 10/27/2022 1018  Last data filed at 10/27/2022 0538  Gross per 24 hour   Intake 2319.74 ml   Output 925 ml   Net 1394.74 ml      Physical Exam  Vitals and nursing note reviewed.   Constitutional:       General: He is not in acute distress.     Appearance: Normal appearance. He is not ill-appearing, toxic-appearing or diaphoretic.   HENT:      Head: Normocephalic and atraumatic.      Nose: No congestion.   Eyes:      Conjunctiva/sclera: Conjunctivae normal.   Cardiovascular:      Rate and Rhythm: Normal rate and regular rhythm.   Pulmonary:      Effort: Pulmonary effort is normal.      Breath sounds: Normal breath sounds.   Abdominal:      General: Bowel sounds are normal.   Skin:     General: Skin is warm and dry.   Neurological:      Mental Status: He is alert and oriented to person, place, and time.   Psychiatric:         Mood and Affect: Mood normal.         Behavior: Behavior normal.       Significant Labs: All  pertinent labs within the past 24 hours have been reviewed.  BMP:   Recent Labs   Lab 10/27/22  0604   GLU 87   *   K 3.3*   CL 94*   CO2 26   BUN 61*   CREATININE 4.6*   CALCIUM 7.1*     CBC:   Recent Labs   Lab 10/26/22  0304 10/27/22  0604   WBC 28.16* 22.06*   HGB 10.5* 12.1*   HCT 31.5* 34.3*    261       Significant Imaging:       Assessment/Plan:      * Sepsis  This patient does have evidence of infective focus  My overall impression is sepsis. Vital signs were reviewed and noted in progress note.  Antibiotics given-   Antibiotics (From admission, onward)    Start     Stop Route Frequency Ordered    10/27/22 0915  vancomycin 500 mg in dextrose 5 % 100 mL IVPB (ready to mix system)         -- IV Once 10/27/22 0810    10/26/22 2114  cefepime in dextrose 5 % IVPB 2 g         -- IV Every 24 hours (non-standard times) 10/26/22 0757    10/25/22 0900  mupirocin 2 % ointment         10/30 0859 Nasl 2 times daily 10/25/22 0714    10/25/22 0830  clindamycin in D5W 900 mg/50 mL IVPB 900 mg         -- IV Every 8 hours (non-standard times) 10/25/22 0725    10/24/22 2051  vancomycin - pharmacy to dose  (vancomycin IVPB)        See Hyperspace for full Linked Orders Report.    -- IV pharmacy to manage frequency 10/24/22 1952        Cultures were taken-   Microbiology Results (last 7 days)     Procedure Component Value Units Date/Time    Aerobic culture [[id number]] Collected: 10/26/22 1630    Order Status: Completed Specimen: Abscess from Neck Updated: 10/27/22 1014     Aerobic Bacterial Culture No growth    Narrative:      LEFT NECK ABSCESS    Gram stain [[id number]] Collected: 10/26/22 1630    Order Status: Completed Specimen: Abscess from Neck Updated: 10/27/22 0831     Gram Stain Result Few WBC's      No organisms seen    Narrative:      LEFT NECK ABSCESS    Urine culture [[id number]] Collected: 10/25/22 0230    Order Status: Completed Specimen: Urine Updated: 10/27/22 0627     Urine Culture, Routine No growth     Narrative:      Specimen Source->Urine    Blood culture x two cultures. Draw prior to antibiotics. [[id number]] Collected: 10/24/22 1910    Order Status: Completed Specimen: Blood from Peripheral, Hand, Right Updated: 10/26/22 2103     Blood Culture, Routine No Growth to date      No Growth to date      No Growth to date    Narrative:      Aerobic and anaerobic    Blood culture x two cultures. Draw prior to antibiotics. [[id number]] Collected: 10/24/22 1911    Order Status: Completed Specimen: Blood from Peripheral, Antecubital, Left Updated: 10/26/22 2103     Blood Culture, Routine No Growth to date      No Growth to date      No Growth to date    Narrative:      Aerobic and anaerobic    Culture, Anaerobe [[id number]] Collected: 10/26/22 1630    Order Status: Sent Specimen: Abscess from Neck Updated: 10/26/22 1833    Fungus culture [[id number]] Collected: 10/26/22 1630    Order Status: Sent Specimen: Abscess from Neck Updated: 10/26/22 1832    AFB Culture & Smear [[id number]] Collected: 10/26/22 1630    Order Status: Sent Specimen: Abscess from Neck Updated: 10/26/22 1832        Latest lactate reviewed, they are-  Recent Labs   Lab 10/24/22  2058 10/24/22  2306   LACTATE 2.4* 2.1       Organ dysfunction indicated by Acute liver injury  Source- Likely deep soft tissue neck infection related to possibly recent sinusitis infection or odontogenic in source     Plan:   Source control Achieved by- Antibiotics (Vancomycin,cefpeime and clindamycin.  ENT consult -  transfer to [facility] for evaluation, currently there is no concern for airway compromise ,stable on RA.our ENT is back,possible intervention in AM.    I and D of neck Abscess on 10/27. Cultures sent. Follow ENT recs. Abx      Non-traumatic rhabdomyolysis  No sign of trauma,on IVF and will monitor.improving.      Acute renal failure  On IVF,has vinny,will monitor.did not  improved,consulted nephrology.    From rhabdo?      Neck swelling  Likely related to an  underlying infection, see plan for sepsis .  Appear to be sinusitis as source,no sign of dental abscess,going frequently to dentist.  Need be seen by ENT,our ENT not available in this fascility ,caled  transfer center.was no bed available,.  Our ENT is back today,possible intervention in AM.      Elevated LFTs  Likely related to underlying muscle infection and sepsis ,rhabdomyolyisis,hep panel is negative,will monitor.  Check RUQ US ,has fatty liver ,    Paroxysmal atrial fibrillation  - New onset   - [id number] Score: 1   - Will hold off on anticoagulation given low score   - Continue diltiazem drip   - Cardiology consult .  Off Cardizem,sinus at this time,echo. Show preserved EF,per cardiology only ASA at this time and holter monitoring as out patient.    Morbid obesity  Body mass index is 41.08 kg/m². Morbid obesity complicates all aspects of disease management from diagnostic modalities to treatment. Weight loss encouraged and health benefits explained to patient.         BPH with urinary obstruction  Resume Flomax       Complex sleep apnea syndrome  Resume nightly BiPAP         VTE Risk Mitigation (From admission, onward)         Ordered     heparin (porcine) injection 5,000 Units  Every 8 hours         10/25/22 0714     IP VTE HIGH RISK PATIENT  Once         10/24/22 2303     Place sequential compression device  Until discontinued         10/24/22 2303                Discharge Planning   JEFFERSON:      Code Status: Full Code   Is the patient medically ready for discharge?:     Reason for patient still in hospital (select all that apply): Patient unstable  Discharge Plan A: Home with family   Discharge Delays: None known at this time              Wilbert Polanco MD  Department of Hospital Medicine   [facility]

## 2022-10-27 NOTE — PT/OT/SLP PROGRESS
"Physical Therapy      Patient Name:  Elías Gay   MRN:  [id number]    1645 Patient not seen today secondary to on BSC having his first "BM in a week.". Will follow-up.    "

## 2022-10-27 NOTE — NURSING
Report received from night nurse DONALD Noble. Visualized patient and assessed patient's overall condition and appearance. No acute distress noted. Will continue to monitor

## 2022-10-28 LAB
CK SERPL-CCNC: 2298 U/L (ref 20–200)
VANCOMYCIN SERPL-MCNC: 16.9 UG/ML

## 2022-10-28 PROCEDURE — 25000003 PHARM REV CODE 250: Performed by: HOSPITALIST

## 2022-10-28 PROCEDURE — 97116 GAIT TRAINING THERAPY: CPT

## 2022-10-28 PROCEDURE — 63600175 PHARM REV CODE 636 W HCPCS: Performed by: INTERNAL MEDICINE

## 2022-10-28 PROCEDURE — A4216 STERILE WATER/SALINE, 10 ML: HCPCS | Performed by: HOSPITALIST

## 2022-10-28 PROCEDURE — 97110 THERAPEUTIC EXERCISES: CPT

## 2022-10-28 PROCEDURE — 97530 THERAPEUTIC ACTIVITIES: CPT

## 2022-10-28 PROCEDURE — 80202 ASSAY OF VANCOMYCIN: CPT | Performed by: HOSPITALIST

## 2022-10-28 PROCEDURE — 63600175 PHARM REV CODE 636 W HCPCS: Performed by: HOSPITALIST

## 2022-10-28 PROCEDURE — 82550 ASSAY OF CK (CPK): CPT | Performed by: HOSPITALIST

## 2022-10-28 PROCEDURE — 25000003 PHARM REV CODE 250: Performed by: INTERNAL MEDICINE

## 2022-10-28 PROCEDURE — A4217 STERILE WATER/SALINE, 500 ML: HCPCS | Performed by: INTERNAL MEDICINE

## 2022-10-28 PROCEDURE — 21400001 HC TELEMETRY ROOM

## 2022-10-28 PROCEDURE — 36415 COLL VENOUS BLD VENIPUNCTURE: CPT | Performed by: HOSPITALIST

## 2022-10-28 PROCEDURE — 99900035 HC TECH TIME PER 15 MIN (STAT)

## 2022-10-28 RX ADMIN — SODIUM BICARBONATE: 84 INJECTION, SOLUTION INTRAVENOUS at 09:10

## 2022-10-28 RX ADMIN — CLINDAMYCIN IN 5 PERCENT DEXTROSE 900 MG: 18 INJECTION, SOLUTION INTRAVENOUS at 11:10

## 2022-10-28 RX ADMIN — HEPARIN SODIUM 5000 UNITS: 5000 INJECTION INTRAVENOUS; SUBCUTANEOUS at 04:10

## 2022-10-28 RX ADMIN — HEPARIN SODIUM 5000 UNITS: 5000 INJECTION INTRAVENOUS; SUBCUTANEOUS at 10:10

## 2022-10-28 RX ADMIN — Medication 10 ML: at 11:10

## 2022-10-28 RX ADMIN — FAMOTIDINE 20 MG: 10 INJECTION INTRAVENOUS at 08:10

## 2022-10-28 RX ADMIN — TAMSULOSIN HYDROCHLORIDE 0.4 MG: 0.4 CAPSULE ORAL at 08:10

## 2022-10-28 RX ADMIN — VANCOMYCIN HYDROCHLORIDE 500 MG: 500 INJECTION, POWDER, LYOPHILIZED, FOR SOLUTION INTRAVENOUS at 07:10

## 2022-10-28 RX ADMIN — CEFEPIME HYDROCHLORIDE 2 G: 2 INJECTION, SOLUTION INTRAVENOUS at 10:10

## 2022-10-28 RX ADMIN — METOPROLOL SUCCINATE 25 MG: 25 TABLET, EXTENDED RELEASE ORAL at 10:10

## 2022-10-28 RX ADMIN — CLINDAMYCIN IN 5 PERCENT DEXTROSE 900 MG: 18 INJECTION, SOLUTION INTRAVENOUS at 08:10

## 2022-10-28 RX ADMIN — SODIUM BICARBONATE: 84 INJECTION, SOLUTION INTRAVENOUS at 12:10

## 2022-10-28 RX ADMIN — MUPIROCIN: 20 OINTMENT TOPICAL at 08:10

## 2022-10-28 RX ADMIN — HEPARIN SODIUM 5000 UNITS: 5000 INJECTION INTRAVENOUS; SUBCUTANEOUS at 01:10

## 2022-10-28 RX ADMIN — CLINDAMYCIN IN 5 PERCENT DEXTROSE 900 MG: 18 INJECTION, SOLUTION INTRAVENOUS at 03:10

## 2022-10-28 RX ADMIN — MUPIROCIN: 20 OINTMENT TOPICAL at 10:10

## 2022-10-28 RX ADMIN — Medication 10 ML: at 06:10

## 2022-10-28 RX ADMIN — CLINDAMYCIN IN 5 PERCENT DEXTROSE 900 MG: 18 INJECTION, SOLUTION INTRAVENOUS at 12:10

## 2022-10-28 RX ADMIN — METOPROLOL SUCCINATE 25 MG: 25 TABLET, EXTENDED RELEASE ORAL at 08:10

## 2022-10-28 NOTE — PROGRESS NOTES
Elías Gay is a 67 y.o. male patient.    Follow for VANGIE, rhabdomyolysis    No new c/o, feeling better  Comfortable    Scheduled Meds:   ceFEPime (MAXIPIME) IVPB  2 g Intravenous Q24H    clindamycin (CLEOCIN) IVPB  900 mg Intravenous Q8H    famotidine (PF)  20 mg Intravenous Daily    heparin (porcine)  5,000 Units Subcutaneous Q8H    metoprolol succinate  25 mg Oral BID    mupirocin   Nasal BID    sodium chloride 0.9%  10 mL Intravenous Q6H    tamsulosin  1 capsule Oral Daily       Review of patient's allergies indicates:  No Known Allergies      Vital Signs Range (Last 24H):  Temp:  [97.2 °F (36.2 °C)-98 °F (36.7 °C)]   Pulse:  [69-76]   Resp:  [17-20]   BP: (118-134)/(61-83)   SpO2:  [93 %-97 %]     I & O (Last 24H):  Intake/Output Summary (Last 24 hours) at 10/28/2022 1226  Last data filed at 10/28/2022 0849  Gross per 24 hour   Intake 720 ml   Output 600 ml   Net 120 ml           Physical Exam:  General appearance: well developed, well nourished, no distress  Lungs:  clear to auscultation bilaterally and normal respiratory effort  Heart: regular rate and rhythm  Abdomen:  soft, normal bowel sounds  Extremities: edema (+)    Laboratory:  I have reviewed all pertinent lab results within the past 24 hours.  CBC:   Recent Labs   Lab 10/27/22  0604   WBC 22.06*   RBC 3.81*   HGB 12.1*   HCT 34.3*      MCV 90   MCH 31.8*   MCHC 35.3     CMP:   Recent Labs   Lab 10/27/22  0604   GLU 87   CALCIUM 7.1*   ALBUMIN 2.2*   PROT 5.9*   *   K 3.3*   CO2 26   CL 94*   BUN 61*   CREATININE 4.6*   ALKPHOS 93   *   *   BILITOT 0.7         Imp/Plan    VANGIE - good urine output, today results still pending  Rhabdomyolysis  Neck swelling  Sepsis  BPH with urinary obstruction  TJ  Anemia    Continue present RX  CMP, CPK in am          Trac T Le  10/28/2022

## 2022-10-28 NOTE — SUBJECTIVE & OBJECTIVE
Interval History: No New issues     Review of Systems   Constitutional:  Negative for activity change, appetite change and chills.   HENT:  Negative for congestion and dental problem.    Eyes:  Negative for discharge and itching.   Respiratory:  Negative for apnea and chest tightness.    Cardiovascular:  Negative for chest pain.   Gastrointestinal:  Negative for abdominal distention and abdominal pain.   Endocrine: Negative for cold intolerance and heat intolerance.   Genitourinary:  Negative for difficulty urinating and dysuria.   Musculoskeletal:  Negative for arthralgias.   Skin:  Negative for color change and pallor.   Neurological:  Negative for dizziness and facial asymmetry.   Psychiatric/Behavioral:  Negative for agitation and behavioral problems.    Objective:     Vital Signs (Most Recent):  Temp: 97.8 °F (36.6 °C) (10/28/22 0731)  Pulse: 70 (10/28/22 0731)  Resp: 20 (10/28/22 0731)  BP: 127/61 (10/28/22 0731)  SpO2: (!) 93 % (10/28/22 0731)   Vital Signs (24h Range):  Temp:  [97.2 °F (36.2 °C)-98 °F (36.7 °C)] 97.8 °F (36.6 °C)  Pulse:  [69-75] 70  Resp:  [17-20] 20  SpO2:  [93 %-97 %] 93 %  BP: (118-134)/(61-83) 127/61     Weight: 133.6 kg (294 lb 8.6 oz)  Body mass index is 41.08 kg/m².    Intake/Output Summary (Last 24 hours) at 10/28/2022 [facility]  Last data filed at 10/28/2022 0849  Gross per 24 hour   Intake 720 ml   Output 600 ml   Net 120 ml      Physical Exam  Vitals and nursing note reviewed.   Constitutional:       General: He is not in acute distress.     Appearance: Normal appearance. He is not ill-appearing, toxic-appearing or diaphoretic.   HENT:      Head: Normocephalic and atraumatic.      Nose: No congestion.   Eyes:      Conjunctiva/sclera: Conjunctivae normal.   Cardiovascular:      Rate and Rhythm: Normal rate and regular rhythm.   Pulmonary:      Effort: Pulmonary effort is normal.      Breath sounds: Normal breath sounds.   Abdominal:      General: Bowel sounds are normal.   Skin:      General: Skin is warm and dry.   Neurological:      Mental Status: He is alert and oriented to person, place, and time.   Psychiatric:         Mood and Affect: Mood normal.         Behavior: Behavior normal.       Significant Labs: All pertinent labs within the past 24 hours have been reviewed.  BMP:   Recent Labs   Lab 10/27/22  0604   GLU 87   *   K 3.3*   CL 94*   CO2 26   BUN 61*   CREATININE 4.6*   CALCIUM 7.1*     CBC:   Recent Labs   Lab 10/27/22  0604   WBC 22.06*   HGB 12.1*   HCT 34.3*          Significant Imaging:

## 2022-10-28 NOTE — PLAN OF CARE
[place] - Telemetry  Discharge Reassessment    Primary Care Provider: Kameron Diaz MD    Expected Discharge Date:     Reassessment (most recent)       Discharge Reassessment - 10/28/22 1401          Discharge Reassessment    Assessment Type Discharge Planning Reassessment     Communicated JEFFERSON with patient/caregiver Date not available/Unable to determine     Discharge Plan A Home Health     Discharge Plan B Home Health;Home with family     DME Needed Upon Discharge  walker, rolling     Discharge Barriers Identified None        Post-Acute Status    Coverage PHN     Discharge Delays None known at this time

## 2022-10-28 NOTE — PROGRESS NOTES
[facility]  Otorhinolaryngology-Head & Neck Surgery  Progress Note    Patient Name: Elías Gay  MRN: [id number]  Code Status: Full Code  Admission Date: 10/24/2022  Hospital Length of Stay: 4 days  Attending Physician: Wilbert Estrada MD  Primary Care Provider: Kameron Diaz MD    Subjective:     Interval History: neck continues to feel better. Movement improving    Post-Op Info:  Procedure(s) (LRB):  INCISION AND DRAINAGE,NECK (Left)   2 Days Post-Op  Hospital Day: 5      Medications:  Continuous Infusions:   sodium bicarbonate drip 125 mL/hr at 10/28/22 0019     Scheduled Meds:   ceFEPime (MAXIPIME) IVPB  2 g Intravenous Q24H    clindamycin (CLEOCIN) IVPB  900 mg Intravenous Q8H    famotidine (PF)  20 mg Intravenous Daily    heparin (porcine)  5,000 Units Subcutaneous Q8H    metoprolol succinate  25 mg Oral BID    mupirocin   Nasal BID    sodium chloride 0.9%  10 mL Intravenous Q6H    tamsulosin  1 capsule Oral Daily     PRN Meds:acetaminophen, albuterol-ipratropium, calcium gluconate IVPB, calcium gluconate IVPB, calcium gluconate IVPB, HYDROcodone-acetaminophen, HYDROcodone-acetaminophen, HYDROmorphone, magnesium sulfate IVPB, magnesium sulfate IVPB, melatonin, ondansetron, ondansetron, phenyleph-min oil-petrolatum, potassium chloride **AND** potassium chloride **AND** potassium chloride, prochlorperazine, prochlorperazine, sodium chloride 0.9%, sodium phosphate IVPB, sodium phosphate IVPB, sodium phosphate IVPB, Pharmacy to dose Vancomycin consult **AND** vancomycin - pharmacy to dose     Objective:     Vital Signs (24h Range):  Temp:  [97.2 °F (36.2 °C)-98 °F (36.7 °C)] 97.8 °F (36.6 °C)  Pulse:  [69-75] 70  Resp:  [17-20] 20  SpO2:  [93 %-97 %] 93 %  BP: (118-134)/(61-83) 127/61     Date 10/28/22 0700 - 10/29/22 0659   Shift 7220-3967 0159-8718 7998-1916 24 Hour Total   INTAKE   P.O. 120   120   Shift Total(mL/kg) 120(0.9)   120(0.9)   OUTPUT   Shift Total(mL/kg)       Weight (kg) 133.6  133.6 133.6 133.6     Lines/Drains/Airways       Peripherally Inserted Central Catheter Line  Duration             PICC Triple Lumen 10/25/22 1030 right basilic 2 days                    Physical Exam  HENT:      Head: Normocephalic.   Neck:      Comments: Softer, no induration, erythema essentially resolved. Penorse backed out , no purulent drainage incision c/d/I otherwise with staples  Lymphadenopathy:      Cervical: No cervical adenopathy.   Neurological:      Mental Status: He is alert.       Significant Labs:  CBC:   Recent Labs   Lab 10/27/22  0604   WBC 22.06*   RBC 3.81*   HGB 12.1*   HCT 34.3*      MCV 90   MCH 31.8*   MCHC 35.3         Assessment/Plan:     Active Diagnoses:    Diagnosis Date Noted POA    PRINCIPAL PROBLEM:  Sepsis [A41.9] 10/24/2022 Yes    Acute renal failure [N17.9] 10/25/2022 Yes    Non-traumatic rhabdomyolysis [M62.82] 10/25/2022 Yes    Paroxysmal atrial fibrillation [I48.0] 10/24/2022 Yes    Elevated LFTs [R79.89] 10/24/2022 Yes    Neck swelling [R22.1] 10/24/2022 Yes    Morbid obesity [E66.01] 12/10/2018 Yes    BPH with urinary obstruction [N40.1, N13.8] 10/31/2018 Yes     Chronic    Complex sleep apnea syndrome [G47.31]  Yes      Problems Resolved During this Admission:     Cbc ordered to trend wbc  Will continue to pull penrose out - will recheck this afternoon and if cbc improving and still with no purulent drainage, may take out this afternoon , otherwise will likely take out buzz am  Have been slowly removing drain when I examine patient bid  Will continue to follow  Cultures so far no growth     Bethany Macdonald MD  Otorhinolaryngology-Head & Neck Surgery  [facility]

## 2022-10-28 NOTE — PLAN OF CARE
Problem: Physical Therapy  Goal: Physical Therapy Goal  Description: Goals to be met by: 22     Patient will increase functional independence with mobility by performin. Pt to be supervision with bed mobility.  2. Pt to transfer with supervision.  3. Pt to ambulate 150' w/RW SBA.  4. Pt to be (I) with written HEP.    Outcome: Ongoing, Progressing   Pt making steady progress.

## 2022-10-28 NOTE — NURSING
Bedside report given to night nurse DONALD Noble. Walking rounds completed. Visualized and assessed patient. NAD noted. Safety precautions maintained and call light within reach.    Chart check completed.

## 2022-10-28 NOTE — PROGRESS NOTES
[facility] Medicine  Progress Note    Patient Name: Elías Gay  MRN: [id number]  Patient Class: IP- Inpatient   Admission Date: 10/24/2022  Length of Stay: 4 days  Attending Physician: Wilbert Estrada MD  Primary Care Provider: Kameron Diaz MD        Subjective:     Principal Problem:Sepsis        HPI:  This is a 67 year old male with a PMHx of BPH, TJ (on BiPAP) and obesity who presents with generalized weakness.     Patient reports developing worsening sinusitis over the last 3 weeks. He reports that his nasal discharge have been bloody mixed with mucous. About a week prior, his symptoms progressively became worse and 3 days prior to presentation he noticed left neck swelling. He tried to carry on with his ADLs but felt too weak and he collapsed on the bathroom floor, with no loss of consciousness. He reported having chills, and decreased po intake, but denied having cough, SOB or diarrhea. His wife is sick with similar symptoms.     In the ED, the patient was febrile (38.2), tachycardic (130s), tachypnic and requiring 2L of supplemental O2. Labs showed leukocytosis (38.9 - with neutrophilia), elevated creatinine (1.8 - baseline of 0.9), elevated LFTs (Tbili: 1.4, AST: 511, ALT: 122), elevated troponin (0.063), elevated BNP (142) and elevated procalcitonin (34.23). EKG showed SVT with PVCs. CXR showed mild increased interstitial attenuation (possible mild pulmonary edema). CT neck showed extensive left neck soft tissue swelling with inflammatory changes and edema with asymmetric heterogenous enlargement is seen of the left sternocleidomastoid & numerous enlarged cervical chain lymph nodes are seen throughout the region (cellulitis versus myositis or neoplastic process) without an abscess. He was given vancomycin, cefepime, sepsis bolus and 10 mg of diltiazem x2 and was started on a diltiazem drip. A transfer to [facility] for ENT/OMFS evaluation was attempted, however, there were no  bed availability. The patient was admitted to the ICU for further management.       Overview/Hospital Course:  This is a 67 year old male with a PMHx of BPH, TJ (on BiPAP) and obesity who presents with generalized weakness.  He reported having chills, and decreased po intake, but denied having cough, SOB or diarrhea. His wife is sick with similar symptoms.   CT neck showed extensive left neck soft tissue swelling with inflammatory changes and edema with asymmetric heterogenous enlargement is seen of the left sternocleidomastoid & numerous enlarged cervical chain lymph nodes are seen throughout the region (cellulitis versus myositis or neoplastic process) without an abscess. He was given vancomycin, cefepime, he was septic and hypotensive,improved with IVF,did not required vasopressors.  Was in Afib with RVR,bolus and 10 mg of diltiazem x2 and was started on a diltiazem drip.converted to sinus.  A transfer to [facility] for ENT evaluation was attempted, however, there were no bed availability. The patient was admitted to the ICU for further management.our ENT is back, saw patient today,possible intervention in AM.keep NPO past MN.  No sign of dental abscess,going frequently to dentist,  Hypotension  is resolved with IVF,BP is stable at this time  Afib is resolved with Cardizem gtt and  patiens in sinus  and stable,echo show preserved EF,per cardiology only on ASA and follow up as out patient for Holter monitoring.  Has rhabdomyolysis on IVF,will monitor.CPK is improving.  On IVF for ARF.casillas is in place.did not improved,consulted nephrology.  Cc is neck swelling.  Patient went for I and D of neck abscess on 10/26. Cultures sent. ENT continued to follow.       Interval History: No New issues     Review of Systems   Constitutional:  Negative for activity change, appetite change and chills.   HENT:  Negative for congestion and dental problem.    Eyes:  Negative for discharge and itching.   Respiratory:   Negative for apnea and chest tightness.    Cardiovascular:  Negative for chest pain.   Gastrointestinal:  Negative for abdominal distention and abdominal pain.   Endocrine: Negative for cold intolerance and heat intolerance.   Genitourinary:  Negative for difficulty urinating and dysuria.   Musculoskeletal:  Negative for arthralgias.   Skin:  Negative for color change and pallor.   Neurological:  Negative for dizziness and facial asymmetry.   Psychiatric/Behavioral:  Negative for agitation and behavioral problems.    Objective:     Vital Signs (Most Recent):  Temp: 97.8 °F (36.6 °C) (10/28/22 0731)  Pulse: 70 (10/28/22 0731)  Resp: 20 (10/28/22 0731)  BP: 127/61 (10/28/22 0731)  SpO2: (!) 93 % (10/28/22 0731)   Vital Signs (24h Range):  Temp:  [97.2 °F (36.2 °C)-98 °F (36.7 °C)] 97.8 °F (36.6 °C)  Pulse:  [69-75] 70  Resp:  [17-20] 20  SpO2:  [93 %-97 %] 93 %  BP: (118-134)/(61-83) 127/61     Weight: 133.6 kg (294 lb 8.6 oz)  Body mass index is 41.08 kg/m².    Intake/Output Summary (Last 24 hours) at 10/28/2022 1016  Last data filed at 10/28/2022 0849  Gross per 24 hour   Intake 720 ml   Output 600 ml   Net 120 ml      Physical Exam  Vitals and nursing note reviewed.   Constitutional:       General: He is not in acute distress.     Appearance: Normal appearance. He is not ill-appearing, toxic-appearing or diaphoretic.   HENT:      Head: Normocephalic and atraumatic.      Nose: No congestion.   Eyes:      Conjunctiva/sclera: Conjunctivae normal.   Cardiovascular:      Rate and Rhythm: Normal rate and regular rhythm.   Pulmonary:      Effort: Pulmonary effort is normal.      Breath sounds: Normal breath sounds.   Abdominal:      General: Bowel sounds are normal.   Skin:     General: Skin is warm and dry.   Neurological:      Mental Status: He is alert and oriented to person, place, and time.   Psychiatric:         Mood and Affect: Mood normal.         Behavior: Behavior normal.       Significant Labs: All pertinent  labs within the past 24 hours have been reviewed.  BMP:   Recent Labs   Lab 10/27/22  0604   GLU 87   *   K 3.3*   CL 94*   CO2 26   BUN 61*   CREATININE 4.6*   CALCIUM 7.1*     CBC:   Recent Labs   Lab 10/27/22  0604   WBC 22.06*   HGB 12.1*   HCT 34.3*          Significant Imaging:       Assessment/Plan:      * Sepsis  This patient does have evidence of infective focus  My overall impression is sepsis. Vital signs were reviewed and noted in progress note.  Antibiotics given-   Antibiotics (From admission, onward)    Start     Stop Route Frequency Ordered    10/27/22 0915  vancomycin 500 mg in dextrose 5 % 100 mL IVPB (ready to mix system)         -- IV Once 10/27/22 0810    10/26/22 2114  cefepime in dextrose 5 % IVPB 2 g         -- IV Every 24 hours (non-standard times) 10/26/22 0757    10/25/22 0900  mupirocin 2 % ointment         10/30 0859 Nasl 2 times daily 10/25/22 0714    10/25/22 0830  clindamycin in D5W 900 mg/50 mL IVPB 900 mg         -- IV Every 8 hours (non-standard times) 10/25/22 0725    10/24/22 2051  vancomycin - pharmacy to dose  (vancomycin IVPB)        See Hyperspace for full Linked Orders Report.    -- IV pharmacy to manage frequency 10/24/22 1952        Cultures were taken-   Microbiology Results (last 7 days)     Procedure Component Value Units Date/Time    Aerobic culture [[id number]] Collected: 10/26/22 1630    Order Status: Completed Specimen: Abscess from Neck Updated: 10/27/22 1014     Aerobic Bacterial Culture No growth    Narrative:      LEFT NECK ABSCESS    Gram stain [[id number]] Collected: 10/26/22 1630    Order Status: Completed Specimen: Abscess from Neck Updated: 10/27/22 0831     Gram Stain Result Few WBC's      No organisms seen    Narrative:      LEFT NECK ABSCESS    Urine culture [[id number]] Collected: 10/25/22 0230    Order Status: Completed Specimen: Urine Updated: 10/27/22 0627     Urine Culture, Routine No growth    Narrative:      Specimen Source->Urine     Blood culture x two cultures. Draw prior to antibiotics. [[id number]] Collected: 10/24/22 1910    Order Status: Completed Specimen: Blood from Peripheral, Hand, Right Updated: 10/26/22 2103     Blood Culture, Routine No Growth to date      No Growth to date      No Growth to date    Narrative:      Aerobic and anaerobic    Blood culture x two cultures. Draw prior to antibiotics. [[id number]] Collected: 10/24/22 1911    Order Status: Completed Specimen: Blood from Peripheral, Antecubital, Left Updated: 10/26/22 2103     Blood Culture, Routine No Growth to date      No Growth to date      No Growth to date    Narrative:      Aerobic and anaerobic    Culture, Anaerobe [[id number]] Collected: 10/26/22 1630    Order Status: Sent Specimen: Abscess from Neck Updated: 10/26/22 1833    Fungus culture [[id number]] Collected: 10/26/22 1630    Order Status: Sent Specimen: Abscess from Neck Updated: 10/26/22 1832    AFB Culture & Smear [[id number]] Collected: 10/26/22 1630    Order Status: Sent Specimen: Abscess from Neck Updated: 10/26/22 1832        Latest lactate reviewed, they are-  Recent Labs   Lab 10/24/22  2058 10/24/22  2306   LACTATE 2.4* 2.1       Organ dysfunction indicated by Acute liver injury  Source- Likely deep soft tissue neck infection related to possibly recent sinusitis infection or odontogenic in source     Plan:   Source control Achieved by- Antibiotics (Vancomycin,cefpeime and clindamycin.  ENT consult -  transfer to [facility] for evaluation, currently there is no concern for airway compromise ,stable on RA.our ENT is back,possible intervention in AM.    I and D of neck Abscess on 10/27. Cultures sent. Follow ENT recs. Abx      Non-traumatic rhabdomyolysis  No sign of trauma,on IVF and will monitor.improving.      Acute renal failure  On IVF,has vinny,will monitor.did not  improved,consulted nephrology.    From rhabdo?      Neck swelling  Likely related to an underlying infection, see plan for sepsis  .  Appear to be sinusitis as source,no sign of dental abscess,going frequently to dentist.  Need be seen by ENT,our ENT not available in this fascility ,caled  transfer center.was no bed available,.  Our ENT is back today,possible intervention in AM.      Elevated LFTs  Likely related to underlying muscle infection and sepsis ,rhabdomyolyisis,hep panel is negative,will monitor.  Check RUQ US ,has fatty liver ,    Paroxysmal atrial fibrillation  - New onset   - [id number] Score: 1   - Will hold off on anticoagulation given low score   - Continue diltiazem drip   - Cardiology consult .  Off Cardizem,sinus at this time,echo. Show preserved EF,per cardiology only ASA at this time and holter monitoring as out patient.    Morbid obesity  Body mass index is 41.08 kg/m². Morbid obesity complicates all aspects of disease management from diagnostic modalities to treatment. Weight loss encouraged and health benefits explained to patient.         BPH with urinary obstruction  Resume Flomax       Complex sleep apnea syndrome  Resume nightly BiPAP         VTE Risk Mitigation (From admission, onward)         Ordered     heparin (porcine) injection 5,000 Units  Every 8 hours         10/25/22 0714     IP VTE HIGH RISK PATIENT  Once         10/24/22 2303     Place sequential compression device  Until discontinued         10/24/22 2303                Discharge Planning   JEFFERSON:      Code Status: Full Code   Is the patient medically ready for discharge?:     Reason for patient still in hospital (select all that apply): Patient unstable  Discharge Plan A: Home with family   Discharge Delays: None known at this time              Wilbert Polanco MD  Department of Hospital Medicine   [facility]

## 2022-10-28 NOTE — RESPIRATORY THERAPY
Patient's daughter states Mr. Gay does not tolerate the cpap/bipap machines used in hospital and states that he has to wear his own with humidity. Patient agrees and states he feel more comfortable with his own. Reported findings to Keshav Perry NP on call. Order was written for home usage. [employer] will check machine for safety.

## 2022-10-28 NOTE — PT/OT/SLP PROGRESS
Occupational Therapy   Treatment    Name: Elías Gay  MRN: [id number]  Admitting Diagnosis:  Sepsis  2 Days Post-Op    Recommendations:     Discharge Recommendations: home health OT (w/ family support)  Discharge Equipment Recommendations:  bedside commode, walker, rolling  Barriers to discharge:  None    Assessment:     Elías Gay is a 67 y.o. male with a medical diagnosis of Sepsis.   Performance deficits affecting function are weakness, impaired endurance, impaired self care skills, impaired functional mobility, gait instability, impaired balance, decreased upper extremity function, decreased lower extremity function, decreased coordination, decreased safety awareness, impaired cardiopulmonary response to activity.     Pt very pleasant and willing to participate in tx session this date; pt w/ good progress as he was able to ambulate functional household/community distances w/ CGA-SBA and use of RW; pt w/ SPO2 of 97% on RA and HR 83 bpm with activity. Pt w/ decreased endurance, requiring brief standing/seated rest breaks as needed. Pt demo'd good understanding of BUE exercise program for increasing UB strength and endurance for safe performance w/ ADLs and functional mobility. Pt tolerated tx session well and will continue to benefit from skilled acute OT services to maximize functional capacity for safe performance w/ ADLs and functional mobility.     Rehab Prognosis:  Good; patient would benefit from acute skilled OT services to address these deficits and reach maximum level of function.       Plan:     Patient to be seen 5 x/week to address the above listed problems via self-care/home management, therapeutic activities, therapeutic exercises  Plan of Care Expires: 11/08/22  Plan of Care Reviewed with: patient, daughter    Subjective     Pain/Comfort:  Pain Rating 1: 0/10  Pain Rating Post-Intervention 1: 0/10    Objective:     Communicated with: Nurse prior to session.  Patient found sitting edge of bed  with telemetry upon OT entry to room; pt reports he just performed marti-care w/ dtr providing supervision after using toilet and ambulated back to EOB.     General Precautions: Standard, fall   Orthopedic Precautions:N/A   Braces: N/A  Respiratory Status: Room air     Occupational Performance:     Bed Mobility:    Sitting EOB at entry; left sitting in chair      Functional Mobility/Transfers:  Patient completed Sit <> Stand Transfer x 1 trail from EOB and x 1 trial from chair with contact guard assistance  with  rolling walker   Patient completed Bed <> Chair Transfer using Step Transfer technique with stand by assistance and contact guard assistance with rolling walker  Functional Mobility: Pt was able to ambulate functional distances in room and in hallway w/ CGA-SBA and use of RW. Pt required x 3 standing rest breaks and x 1 sitting rest breaks due to mils SOB and decreased endurance.     Activities of Daily Living:  Upper Body Dressing: minimum assistance for donning gown over back       [facility] 6 Click ADL: 20    Treatment & Education:  -Pt performed ADLs and functional mobility as noted above.   -Pt educated on safe functional mobility and ADL performance.   -Pt educated on importance of taking rest breaks and implemeting energy conservation strategies into routines to prevent over-exertion.   -Pt performed seated BUE TE w/ use of blue thera-band for 1 set x 10 reps to increase UB strength and endurance for safe performance w/ ADLs and functional mobility:    -shoulder abd/add   -diagonal shoulder raises    -shoulder IR/ER   -elbow flexion/ext    -tricep ext   -Questions and concerns addressed within scope.     Patient left up in chair with all lines intact and call button in reach    GOALS:   Multidisciplinary Problems       Occupational Therapy Goals          Problem: Occupational Therapy    Goal Priority Disciplines Outcome Interventions   Occupational Therapy Goal     OT, PT/OT Ongoing, Progressing     Description: Goals to be met by: 11/8/22     Patient will increase functional independence with ADLs by performing:    LE Dressing with Modified [place].  Grooming while standing at sink with Modified [place].  Toileting from bedside commode with Modified [place] for hygiene and clothing management.   Supine to sit with Modified [place].  Step transfer with Modified [place]  Toilet transfer to toilet with Modified [place].  Upper extremity exercise program x15 reps per handout, with independence.                         Time Tracking:     OT Date of Treatment: 10/28/22  OT Start Time: 1009  OT Stop Time: 1042  OT Total Time (min): 33 min    Billable Minutes:Therapeutic Activity 18  Therapeutic Exercise 15  Total Time 33    OT/CASEY: OT          10/28/2022

## 2022-10-28 NOTE — PLAN OF CARE
Problem: Adult Inpatient Plan of Care  Goal: Plan of Care Review  Outcome: Ongoing, Progressing  Goal: Patient-Specific Goal (Individualized)  Outcome: Ongoing, Progressing  Goal: Absence of Hospital-Acquired Illness or Injury  Outcome: Ongoing, Progressing  Goal: Optimal Comfort and Wellbeing  Outcome: Ongoing, Progressing     Problem: Adjustment to Illness (Sepsis/Septic Shock)  Goal: Optimal Coping  Outcome: Ongoing, Progressing     Problem: Infection Progression (Sepsis/Septic Shock)  Goal: Absence of Infection Signs and Symptoms  Outcome: Ongoing, Progressing     Problem: Impaired Wound Healing  Goal: Optimal Wound Healing  Outcome: Ongoing, Progressing     Problem: Oral Intake Inadequate (Acute Kidney Injury/Impairment)  Goal: Optimal Nutrition Intake  Outcome: Ongoing, Progressing     Problem: Renal Function Impairment (Acute Kidney Injury/Impairment)  Goal: Effective Renal Function  Outcome: Ongoing, Progressing

## 2022-10-28 NOTE — PLAN OF CARE
Problem: Occupational Therapy  Goal: Occupational Therapy Goal  Description: Goals to be met by: 11/8/22     Patient will increase functional independence with ADLs by performing:    LE Dressing with Modified [place].  Grooming while standing at sink with Modified [place].  Toileting from bedside commode with Modified [place] for hygiene and clothing management.   Supine to sit with Modified [place].  Step transfer with Modified [place]  Toilet transfer to toilet with Modified [place].  Upper extremity exercise program x15 reps per handout, with independence.    Outcome: Ongoing, Progressing    Pt very pleasant and willing to participate in tx session this date; pt w/ good progress as he was able to ambulate functional household/community distances w/ CGA-SBA and use of RW; pt w/ SPO2 of 97% on RA and HR 83 bpm with activity. Pt w/ decreased endurance, requiring brief standing/seated rest breaks as needed. Pt demo'd good understanding of BUE exercise program for increasing UB strength and endurance for safe performance w/ ADLs and functional mobility. Pt tolerated tx session well and will continue to benefit from skilled acute OT services to maximize functional capacity for safe performance w/ ADLs and functional mobility.

## 2022-10-28 NOTE — PT/OT/SLP PROGRESS
"Physical Therapy Treatment    Patient Name:  Elías Gay   MRN:  [id number]    Recommendations:     Discharge Recommendations: Home health PT  Discharge Equipment Recommendations: RW and BSC   Barriers to discharge: None    Assessment:     Elías Gay is a 67 y.o. male admitted with a medical diagnosis of Sepsis.  He presents with the following impairments/functional limitations:  impaired endurance, impaired functional mobility, gait instability, decreased lower extremity function .    Rehab Prognosis: Good; patient would benefit from acute skilled PT services to address these deficits and reach maximum level of function.    Recent Surgery: Procedure(s) (LRB):  INCISION AND DRAINAGE,NECK (Left) 2 Days Post-Op    Plan:     During this hospitalization, patient to be seen 5 x/week to address the identified rehab impairments via gait training, therapeutic exercises, therapeutic activities and progress toward the following goals:    Plan of Care Expires:  11/01/22    Subjective     Chief Complaint: "I need to go to the BR..."  Patient/Family Comments/goals: Pt agreeable to PT.  Pain/Comfort:  Pain Rating 1: 0/10      Objective:     Communicated with nurseMaryjo prior to session.  Patient found sitting edge of bed with telemetry upon PT entry to room.     General Precautions: Standard,     Orthopedic Precautions:N/A   Braces: N/A  Respiratory Status: Room air     Functional Mobility:  Transfers:     Sit to Stand:  stand by assistance with rolling walker  Gait: 10' w/RW CGA.      AM-PAC 6 CLICK MOBILITY  Turning over in bed (including adjusting bedclothes, sheets and blankets)?: 4  Sitting down on and standing up from a chair with arms (e.g., wheelchair, bedside commode, etc.): 4  Moving from lying on back to sitting on the side of the bed?: 4  Moving to and from a bed to a chair (including a wheelchair)?: 3  Need to walk in hospital room?: 3  Climbing 3-5 steps with a railing?: 3  Basic Mobility Total Score: " 21       Treatment & Education:  Pt requested to go to toilet to urinate; stood then stated he needed to sit.  Pt left seated on toilet per request, OT to follow.    Patient left  on toilet  with call button in reach, daughter present, and OT to follow .    GOALS:   Multidisciplinary Problems       Physical Therapy Goals          Problem: Physical Therapy    Goal Priority Disciplines Outcome Goal Variances Interventions   Physical Therapy Goal     PT, PT/OT Ongoing, Progressing     Description: Goals to be met by: 22     Patient will increase functional independence with mobility by performin. Pt to be supervision with bed mobility.  2. Pt to transfer with supervision.  3. Pt to ambulate 150' w/RW SBA.  4. Pt to be (I) with written HEP.                         Time Tracking:     PT Received On: 10/28/22  PT Start Time: 937     PT Stop Time: 948  PT Total Time (min): 11 min     Billable Minutes: Gait Training 11    Treatment Type: Treatment  PT/PTA: PT           10/28/2022

## 2022-10-28 NOTE — PROGRESS NOTES
Pharmacokinetic Assessment Follow Up: IV Vancomycin    Vancomycin serum concentration assessment(s):    The random level was drawn correctly and can be used to guide therapy at this time. The measurement is within the desired definitive target range of 10 to 20 mcg/mL.    Vancomycin Regimen Plan:    Vancomycin 500mg dose scheduled.    Re-dose when the random level is less than 20 mcg/mL, next level to be drawn at 0400 on 10/29/22    Drug levels (last 3 results):  Recent Labs   Lab Result Units 10/26/22  0304 10/27/22  0604 10/28/22  0503   Vancomycin, Random ug/mL 13.8 15.5 16.9       Pharmacy will continue to follow and monitor vancomycin.    Please contact pharmacy at extension [phone] for questions regarding this assessment.    Thank you for the consult,   Hao Meza       Patient brief summary:  Elías Gay is a 67 y.o. male initiated on antimicrobial therapy with IV Vancomycin for treatment of skin & soft tissue infection    The patient's current regimen is random pulse dosing    Drug Allergies:   Review of patient's allergies indicates:  No Known Allergies    Actual Body Weight:   133.6 kg    Renal Function:   Estimated Creatinine Clearance: 21.1 mL/min (A) (based on SCr of 4.6 mg/dL (H)).,     Dialysis Method (if applicable):  N/A    CBC (last 72 hours):  Recent Labs   Lab Result Units 10/26/22  0304 10/27/22  0604   WBC K/uL 28.16* 22.06*   Hemoglobin g/dL 10.5* 12.1*   Hematocrit % 31.5* 34.3*   Platelets K/uL 206 261   Gran % % 91.5* 90.4*   Lymph % % 3.2* 3.9*   Mono % % 2.7* 3.3*   Eosinophil % % 1.2 0.5   Basophil % % 0.3 0.2   Differential Method  Automated Automated       Metabolic Panel (last 72 hours):  Recent Labs   Lab Result Units 10/26/22  0304 10/27/22  0604   Sodium mmol/L 132* 131*   Potassium mmol/L 3.5 3.3*   Chloride mmol/L 101 94*   CO2 mmol/L 21* 26   Glucose mg/dL 88 87   BUN mg/dL 46* 61*   Creatinine mg/dL 3.5* 4.6*   Albumin g/dL 1.9* 2.2*   Total Bilirubin mg/dL 0.6 0.7    Alkaline Phosphatase U/L 80 93   AST U/L 308* 213*   ALT U/L 137* 148*       Vancomycin Administrations:  vancomycin given in the last 96 hours                     vancomycin 500 mg in dextrose 5 % 100 mL IVPB (ready to mix system) (mg) 500 mg New Bag 10/27/22 1022    vancomycin 750 mg in dextrose 5 % 250 mL IVPB (ready to mix system) (mg) 750 mg New Bag 10/26/22 0935    vancomycin (VANCOCIN) 2,500 mg in dextrose 5 % 500 mL IVPB ()  Restarted 10/24/22 2348      Restarted  2304     2,500 mg New Bag  2057                    Microbiologic Results:  Microbiology Results (last 7 days)       Procedure Component Value Units Date/Time    Blood culture x two cultures. Draw prior to antibiotics. [[id number]] Collected: 10/24/22 1911    Order Status: Completed Specimen: Blood from Peripheral, Antecubital, Left Updated: 10/27/22 2103     Blood Culture, Routine No Growth to date      No Growth to date      No Growth to date      No Growth to date    Narrative:      Aerobic and anaerobic    Blood culture x two cultures. Draw prior to antibiotics. [[id number]] Collected: 10/24/22 1910    Order Status: Completed Specimen: Blood from Peripheral, Hand, Right Updated: 10/27/22 2103     Blood Culture, Routine No Growth to date      No Growth to date      No Growth to date      No Growth to date    Narrative:      Aerobic and anaerobic    AFB Culture & Smear [[id number]] Collected: 10/26/22 1630    Order Status: Sent Specimen: Abscess from Neck Updated: 10/27/22 1146    Aerobic culture [[id number]] Collected: 10/26/22 1630    Order Status: Completed Specimen: Abscess from Neck Updated: 10/27/22 1014     Aerobic Bacterial Culture No growth    Narrative:      LEFT NECK ABSCESS    Gram stain [[id number]] Collected: 10/26/22 1630    Order Status: Completed Specimen: Abscess from Neck Updated: 10/27/22 0831     Gram Stain Result Few WBC's      No organisms seen    Narrative:      LEFT NECK ABSCESS    Urine culture [[id number]] Collected:  10/25/22 0230    Order Status: Completed Specimen: Urine Updated: 10/27/22 0627     Urine Culture, Routine No growth    Narrative:      Specimen Source->Urine    Culture, Anaerobe [[id number]] Collected: 10/26/22 1630    Order Status: Sent Specimen: Abscess from Neck Updated: 10/26/22 1833    Fungus culture [[id number]] Collected: 10/26/22 1630    Order Status: Sent Specimen: Abscess from Neck Updated: 10/26/22 1832

## 2022-10-29 LAB
ALBUMIN SERPL BCP-MCNC: 2.4 G/DL (ref 3.5–5.2)
ALP SERPL-CCNC: 119 U/L (ref 55–135)
ALT SERPL W/O P-5'-P-CCNC: 132 U/L (ref 10–44)
ANION GAP SERPL CALC-SCNC: 10 MMOL/L (ref 8–16)
AST SERPL-CCNC: 105 U/L (ref 10–40)
BACTERIA BLD CULT: NORMAL
BACTERIA BLD CULT: NORMAL
BACTERIA SPEC AEROBE CULT: ABNORMAL
BASOPHILS # BLD AUTO: 0.1 K/UL (ref 0–0.2)
BASOPHILS # BLD AUTO: 0.1 K/UL (ref 0–0.2)
BASOPHILS NFR BLD: 0.9 % (ref 0–1.9)
BASOPHILS NFR BLD: 0.9 % (ref 0–1.9)
BILIRUB SERPL-MCNC: 0.7 MG/DL (ref 0.1–1)
BUN SERPL-MCNC: 71 MG/DL (ref 8–23)
CALCIUM SERPL-MCNC: 7.5 MG/DL (ref 8.7–10.5)
CHLORIDE SERPL-SCNC: 100 MMOL/L (ref 95–110)
CK SERPL-CCNC: 1302 U/L (ref 20–200)
CO2 SERPL-SCNC: 28 MMOL/L (ref 23–29)
CREAT SERPL-MCNC: 4.9 MG/DL (ref 0.5–1.4)
DIFFERENTIAL METHOD: ABNORMAL
DIFFERENTIAL METHOD: ABNORMAL
EOSINOPHIL # BLD AUTO: 0.4 K/UL (ref 0–0.5)
EOSINOPHIL # BLD AUTO: 0.4 K/UL (ref 0–0.5)
EOSINOPHIL NFR BLD: 3.5 % (ref 0–8)
EOSINOPHIL NFR BLD: 3.5 % (ref 0–8)
ERYTHROCYTE [DISTWIDTH] IN BLOOD BY AUTOMATED COUNT: 13.7 % (ref 11.5–14.5)
ERYTHROCYTE [DISTWIDTH] IN BLOOD BY AUTOMATED COUNT: 13.7 % (ref 11.5–14.5)
EST. GFR  (NO RACE VARIABLE): 12 ML/MIN/1.73 M^2
GLUCOSE SERPL-MCNC: 79 MG/DL (ref 70–110)
HCT VFR BLD AUTO: 36.2 % (ref 40–54)
HCT VFR BLD AUTO: 36.2 % (ref 40–54)
HGB BLD-MCNC: 12.3 G/DL (ref 14–18)
HGB BLD-MCNC: 12.3 G/DL (ref 14–18)
IMM GRANULOCYTES # BLD AUTO: 0.45 K/UL (ref 0–0.04)
IMM GRANULOCYTES # BLD AUTO: 0.45 K/UL (ref 0–0.04)
IMM GRANULOCYTES NFR BLD AUTO: 4.2 % (ref 0–0.5)
IMM GRANULOCYTES NFR BLD AUTO: 4.2 % (ref 0–0.5)
LYMPHOCYTES # BLD AUTO: 1.3 K/UL (ref 1–4.8)
LYMPHOCYTES # BLD AUTO: 1.3 K/UL (ref 1–4.8)
LYMPHOCYTES NFR BLD: 12 % (ref 18–48)
LYMPHOCYTES NFR BLD: 12 % (ref 18–48)
MCH RBC QN AUTO: 31.1 PG (ref 27–31)
MCH RBC QN AUTO: 31.1 PG (ref 27–31)
MCHC RBC AUTO-ENTMCNC: 34 G/DL (ref 32–36)
MCHC RBC AUTO-ENTMCNC: 34 G/DL (ref 32–36)
MCV RBC AUTO: 91 FL (ref 82–98)
MCV RBC AUTO: 91 FL (ref 82–98)
MONOCYTES # BLD AUTO: 0.7 K/UL (ref 0.3–1)
MONOCYTES # BLD AUTO: 0.7 K/UL (ref 0.3–1)
MONOCYTES NFR BLD: 6.5 % (ref 4–15)
MONOCYTES NFR BLD: 6.5 % (ref 4–15)
NEUTROPHILS # BLD AUTO: 7.8 K/UL (ref 1.8–7.7)
NEUTROPHILS # BLD AUTO: 7.8 K/UL (ref 1.8–7.7)
NEUTROPHILS NFR BLD: 72.9 % (ref 38–73)
NEUTROPHILS NFR BLD: 72.9 % (ref 38–73)
NRBC BLD-RTO: 0 /100 WBC
NRBC BLD-RTO: 0 /100 WBC
PLATELET # BLD AUTO: 293 K/UL (ref 150–450)
PLATELET # BLD AUTO: 293 K/UL (ref 150–450)
PMV BLD AUTO: 10 FL (ref 9.2–12.9)
PMV BLD AUTO: 10 FL (ref 9.2–12.9)
POTASSIUM SERPL-SCNC: 3.9 MMOL/L (ref 3.5–5.1)
PROT SERPL-MCNC: 6.5 G/DL (ref 6–8.4)
RBC # BLD AUTO: 3.96 M/UL (ref 4.6–6.2)
RBC # BLD AUTO: 3.96 M/UL (ref 4.6–6.2)
SODIUM SERPL-SCNC: 138 MMOL/L (ref 136–145)
VANCOMYCIN SERPL-MCNC: 16.9 UG/ML
WBC # BLD AUTO: 10.72 K/UL (ref 3.9–12.7)
WBC # BLD AUTO: 10.72 K/UL (ref 3.9–12.7)

## 2022-10-29 PROCEDURE — 85025 COMPLETE CBC W/AUTO DIFF WBC: CPT | Performed by: OTOLARYNGOLOGY

## 2022-10-29 PROCEDURE — 36568 INSJ PICC <5 YR W/O IMAGING: CPT

## 2022-10-29 PROCEDURE — A4216 STERILE WATER/SALINE, 10 ML: HCPCS | Performed by: HOSPITALIST

## 2022-10-29 PROCEDURE — A4217 STERILE WATER/SALINE, 500 ML: HCPCS | Performed by: INTERNAL MEDICINE

## 2022-10-29 PROCEDURE — 25000003 PHARM REV CODE 250: Performed by: HOSPITALIST

## 2022-10-29 PROCEDURE — 21400001 HC TELEMETRY ROOM

## 2022-10-29 PROCEDURE — 94761 N-INVAS EAR/PLS OXIMETRY MLT: CPT

## 2022-10-29 PROCEDURE — 63600175 PHARM REV CODE 636 W HCPCS: Performed by: INTERNAL MEDICINE

## 2022-10-29 PROCEDURE — 80202 ASSAY OF VANCOMYCIN: CPT | Performed by: HOSPITALIST

## 2022-10-29 PROCEDURE — 25000003 PHARM REV CODE 250: Performed by: INTERNAL MEDICINE

## 2022-10-29 PROCEDURE — 36415 COLL VENOUS BLD VENIPUNCTURE: CPT | Performed by: HOSPITALIST

## 2022-10-29 PROCEDURE — 82550 ASSAY OF CK (CPK): CPT | Performed by: HOSPITALIST

## 2022-10-29 PROCEDURE — 80053 COMPREHEN METABOLIC PANEL: CPT | Performed by: INTERNAL MEDICINE

## 2022-10-29 PROCEDURE — 63600175 PHARM REV CODE 636 W HCPCS: Performed by: HOSPITALIST

## 2022-10-29 PROCEDURE — 99900035 HC TECH TIME PER 15 MIN (STAT)

## 2022-10-29 RX ADMIN — SODIUM BICARBONATE: 84 INJECTION, SOLUTION INTRAVENOUS at 06:10

## 2022-10-29 RX ADMIN — CEFEPIME HYDROCHLORIDE 2 G: 2 INJECTION, SOLUTION INTRAVENOUS at 08:10

## 2022-10-29 RX ADMIN — MUPIROCIN: 20 OINTMENT TOPICAL at 08:10

## 2022-10-29 RX ADMIN — METOPROLOL SUCCINATE 25 MG: 25 TABLET, EXTENDED RELEASE ORAL at 08:10

## 2022-10-29 RX ADMIN — TAMSULOSIN HYDROCHLORIDE 0.4 MG: 0.4 CAPSULE ORAL at 08:10

## 2022-10-29 RX ADMIN — SODIUM BICARBONATE: 84 INJECTION, SOLUTION INTRAVENOUS at 07:10

## 2022-10-29 RX ADMIN — HEPARIN SODIUM 5000 UNITS: 5000 INJECTION INTRAVENOUS; SUBCUTANEOUS at 01:10

## 2022-10-29 RX ADMIN — FAMOTIDINE 20 MG: 10 INJECTION INTRAVENOUS at 08:10

## 2022-10-29 RX ADMIN — CLINDAMYCIN IN 5 PERCENT DEXTROSE 900 MG: 18 INJECTION, SOLUTION INTRAVENOUS at 07:10

## 2022-10-29 RX ADMIN — HEPARIN SODIUM 5000 UNITS: 5000 INJECTION INTRAVENOUS; SUBCUTANEOUS at 05:10

## 2022-10-29 RX ADMIN — HEPARIN SODIUM 5000 UNITS: 5000 INJECTION INTRAVENOUS; SUBCUTANEOUS at 08:10

## 2022-10-29 RX ADMIN — Medication 10 ML: at 06:10

## 2022-10-29 RX ADMIN — VANCOMYCIN HYDROCHLORIDE 500 MG: 500 INJECTION, POWDER, LYOPHILIZED, FOR SOLUTION INTRAVENOUS at 06:10

## 2022-10-29 RX ADMIN — CLINDAMYCIN IN 5 PERCENT DEXTROSE 900 MG: 18 INJECTION, SOLUTION INTRAVENOUS at 04:10

## 2022-10-29 RX ADMIN — Medication 6 MG: at 08:10

## 2022-10-29 NOTE — PROGRESS NOTES
Pharmacokinetic Assessment Follow Up: IV Vancomycin    Vancomycin serum concentration assessment(s):    The random level was drawn correctly and can be used to guide therapy at this time. The measurement is within the desired definitive target range of 10 to 20 mcg/mL.    Vancomycin Regimen Plan:    Vancomycin 500mg dose scheduled.    Re-dose when the random level is less than 20 mcg/mL, next level to be drawn at 0400 on 10/30/22    Drug levels (last 3 results):  Recent Labs   Lab Result Units 10/27/22  0604 10/28/22  0503 10/29/22  0404   Vancomycin, Random ug/mL 15.5 16.9 16.9       Pharmacy will continue to follow and monitor vancomycin.    Please contact pharmacy at extension [phone] for questions regarding this assessment.    Thank you for the consult,   Hao Meza       Patient brief summary:  Elías Gay is a 67 y.o. male initiated on antimicrobial therapy with IV Vancomycin for treatment of skin & soft tissue infection    The patient's current regimen is random pulse dosing    Drug Allergies:   Review of patient's allergies indicates:  No Known Allergies    Actual Body Weight:   133.6 kg    Renal Function:   Estimated Creatinine Clearance: 19.8 mL/min (A) (based on SCr of 4.9 mg/dL (H)).,     Dialysis Method (if applicable):  N/A    CBC (last 72 hours):  Recent Labs   Lab Result Units 10/27/22  0604 10/29/22  0404   WBC K/uL 22.06* 10.72  10.72   Hemoglobin g/dL 12.1* 12.3*  12.3*   Hematocrit % 34.3* 36.2*  36.2*   Platelets K/uL 261 293  293   Gran % % 90.4* 72.9  72.9   Lymph % % 3.9* 12.0*  12.0*   Mono % % 3.3* 6.5  6.5   Eosinophil % % 0.5 3.5  3.5   Basophil % % 0.2 0.9  0.9   Differential Method  Automated Automated  Automated       Metabolic Panel (last 72 hours):  Recent Labs   Lab Result Units 10/27/22  0604 10/29/22  0404   Sodium mmol/L 131* 138   Potassium mmol/L 3.3* 3.9   Chloride mmol/L 94* 100   CO2 mmol/L 26 28   Glucose mg/dL 87 79   BUN mg/dL 61* 71*   Creatinine  mg/dL 4.6* 4.9*   Albumin g/dL 2.2* 2.4*   Total Bilirubin mg/dL 0.7 0.7   Alkaline Phosphatase U/L 93 119   AST U/L 213* 105*   ALT U/L 148* 132*       Vancomycin Administrations:  vancomycin given in the last 96 hours                     vancomycin 500 mg in dextrose 5 % 100 mL IVPB (ready to mix system) (mg) 500 mg New Bag 10/28/22 0739    vancomycin 500 mg in dextrose 5 % 100 mL IVPB (ready to mix system) (mg) 500 mg New Bag 10/27/22 1022    vancomycin 750 mg in dextrose 5 % 250 mL IVPB (ready to mix system) (mg) 750 mg New Bag 10/26/22 0935                    Microbiologic Results:  Microbiology Results (last 7 days)       Procedure Component Value Units Date/Time    Blood culture x two cultures. Draw prior to antibiotics. [[id number]] Collected: 10/24/22 1911    Order Status: Completed Specimen: Blood from Peripheral, Antecubital, Left Updated: 10/29/22 0303     Blood Culture, Routine No Growth after 4 days.    Narrative:      Aerobic and anaerobic    Blood culture x two cultures. Draw prior to antibiotics. [[id number]] Collected: 10/24/22 1910    Order Status: Completed Specimen: Blood from Peripheral, Hand, Right Updated: 10/29/22 0303     Blood Culture, Routine No Growth after 4 days.    Narrative:      Aerobic and anaerobic    AFB Culture & Smear [[id number]] Collected: 10/26/22 1630    Order Status: Completed Specimen: Abscess from Neck Updated: 10/28/22 1358     AFB CULTURE STAIN No acid fast bacilli seen.    Narrative:      LEFT NECK ABSCESS    Aerobic culture [[id number]] Collected: 10/26/22 1630    Order Status: Completed Specimen: Abscess from Neck Updated: 10/28/22 0904     Aerobic Bacterial Culture No growth      Further report to follow    Narrative:      LEFT NECK ABSCESS    Culture, Anaerobe [[id number]] Collected: 10/26/22 1630    Order Status: Completed Specimen: Abscess from Neck Updated: 10/28/22 0851     Anaerobic Culture Culture in progress    Narrative:      LEFT NECK ABSCESS    Gram stain  [[id number]] Collected: 10/26/22 1630    Order Status: Completed Specimen: Abscess from Neck Updated: 10/27/22 0831     Gram Stain Result Few WBC's      No organisms seen    Narrative:      LEFT NECK ABSCESS    Urine culture [[id number]] Collected: 10/25/22 0230    Order Status: Completed Specimen: Urine Updated: 10/27/22 0627     Urine Culture, Routine No growth    Narrative:      Specimen Source->Urine    Fungus culture [[id number]] Collected: 10/26/22 1630    Order Status: Sent Specimen: Abscess from Neck Updated: 10/26/22 1835

## 2022-10-29 NOTE — SUBJECTIVE & OBJECTIVE
Interval History: No new issues     Review of Systems   Constitutional:  Negative for activity change, appetite change and chills.   HENT:  Negative for congestion and dental problem.    Eyes:  Negative for discharge and itching.   Respiratory:  Negative for apnea and chest tightness.    Cardiovascular:  Negative for chest pain.   Gastrointestinal:  Negative for abdominal distention and abdominal pain.   Endocrine: Negative for cold intolerance and heat intolerance.   Genitourinary:  Negative for difficulty urinating and dysuria.   Musculoskeletal:  Negative for arthralgias.   Skin:  Negative for color change and pallor.   Neurological:  Negative for dizziness and facial asymmetry.   Psychiatric/Behavioral:  Negative for agitation and behavioral problems.    Objective:     Vital Signs (Most Recent):  Temp: 97.6 °F (36.4 °C) (10/29/22 0503)  Pulse: 63 (10/29/22 0503)  Resp: 14 (10/29/22 0503)  BP: 137/67 (10/29/22 0503)  SpO2: 97 % (10/29/22 0503) Vital Signs (24h Range):  Temp:  [97.5 °F (36.4 °C)-98.1 °F (36.7 °C)] 97.6 °F (36.4 °C)  Pulse:  [63-76] 63  Resp:  [14-20] 14  SpO2:  [93 %-97 %] 97 %  BP: (105-137)/(55-74) 137/67     Weight: 133.6 kg (294 lb 8.6 oz)  Body mass index is 41.08 kg/m².    Intake/Output Summary (Last 24 hours) at 10/29/2022 0637  Last data filed at 10/29/2022 0632  Gross per 24 hour   Intake 5773.41 ml   Output --   Net 5773.41 ml      Physical Exam  Vitals and nursing note reviewed.   Constitutional:       General: He is not in acute distress.     Appearance: Normal appearance. He is not ill-appearing, toxic-appearing or diaphoretic.   HENT:      Head: Normocephalic and atraumatic.      Nose: No congestion.   Eyes:      Conjunctiva/sclera: Conjunctivae normal.   Cardiovascular:      Rate and Rhythm: Normal rate and regular rhythm.   Pulmonary:      Effort: Pulmonary effort is normal.      Breath sounds: Normal breath sounds.   Abdominal:      General: Bowel sounds are normal.   Skin:      General: Skin is warm and dry.   Neurological:      Mental Status: He is alert and oriented to person, place, and time.   Psychiatric:         Mood and Affect: Mood normal.         Behavior: Behavior normal.       Significant Labs: All pertinent labs within the past 24 hours have been reviewed.  BMP:   Recent Labs   Lab 10/29/22  0404   GLU 79      K 3.9      CO2 28   BUN 71*   CREATININE 4.9*   CALCIUM 7.5*     CBC:   Recent Labs   Lab 10/29/22  0404   WBC 10.72  10.72   HGB 12.3*  12.3*   HCT 36.2*  36.2*     293       Significant Imaging:

## 2022-10-29 NOTE — NURSING
Patient resting in bed with no acute distress noted.  Denies chest pain or SOB.  Dr. Macdonald at the bedside, penrose drain removed, staples remain intact, dressing applied.

## 2022-10-29 NOTE — NURSING
Bedside report given to night nurse DONALD Fernando. Walking rounds completed. Visualized and assessed patient. NAD noted. Safety precautions maintained and call light within reach.    Chart check completed.

## 2022-10-29 NOTE — PROGRESS NOTES
[facility]  Otorhinolaryngology-Head & Neck Surgery  Progress Note    Patient Name: Elías Gay  MRN: [id number]  Code Status: Full Code  Admission Date: 10/24/2022  Hospital Length of Stay: 5 days  Attending Physician: Wilbert Estrada MD  Primary Care Provider: Kameron Diaz MD    Subjective:     Interval History: doing well. Neck without pain     Post-Op Info:  Procedure(s) (LRB):  INCISION AND DRAINAGE,NECK (Left)   3 Days Post-Op  Hospital Day: 6      Medications:  Continuous Infusions:   sodium bicarbonate drip 125 mL/hr at 10/29/22 0709     Scheduled Meds:   ceFEPime (MAXIPIME) IVPB  2 g Intravenous Q24H    clindamycin (CLEOCIN) IVPB  900 mg Intravenous Q8H    famotidine (PF)  20 mg Intravenous Daily    heparin (porcine)  5,000 Units Subcutaneous Q8H    metoprolol succinate  25 mg Oral BID    mupirocin   Nasal BID    sodium chloride 0.9%  10 mL Intravenous Q6H    tamsulosin  1 capsule Oral Daily     PRN Meds:acetaminophen, albuterol-ipratropium, calcium gluconate IVPB, calcium gluconate IVPB, calcium gluconate IVPB, HYDROcodone-acetaminophen, HYDROcodone-acetaminophen, HYDROmorphone, magnesium sulfate IVPB, magnesium sulfate IVPB, melatonin, ondansetron, ondansetron, phenyleph-min oil-petrolatum, potassium chloride **AND** potassium chloride **AND** potassium chloride, prochlorperazine, prochlorperazine, sodium chloride 0.9%, sodium phosphate IVPB, sodium phosphate IVPB, sodium phosphate IVPB, Pharmacy to dose Vancomycin consult **AND** vancomycin - pharmacy to dose     Objective:     Vital Signs (24h Range):  Temp:  [97.5 °F (36.4 °C)-98.1 °F (36.7 °C)] 97.6 °F (36.4 °C)  Pulse:  [63-76] 66  Resp:  [14-20] 20  SpO2:  [95 %-97 %] 96 %  BP: (105-137)/(55-80) 130/80     Date 10/29/22 0700 - 10/30/22 0659   Shift 4745-0956 4741-0630 3243-2992 24 Hour Total   INTAKE   P.O. 120   120   Shift Total(mL/kg) 120(0.9)   120(0.9)   OUTPUT   Shift Total(mL/kg)       Weight (kg) 133.6 133.6 133.6 133.6      Lines/Drains/Airways       Peripherally Inserted Central Catheter Line  Duration             PICC Triple Lumen 10/25/22 1030 right basilic 3 days                    Physical Exam  Neck:      Comments: Incision c/d/I with staples. Penrose drain and suture removed. No purulent drainage, neck soft. No erythema  Neurological:      Mental Status: He is alert.       Significant Labs:  CBC:   Recent Labs   Lab 10/29/22  0404   WBC 10.72  10.72   RBC 3.96*  3.96*   HGB 12.3*  12.3*   HCT 36.2*  36.2*     293   MCV 91  91   MCH 31.1*  31.1*   MCHC 34.0  34.0       Culture: strep pyo    Assessment/Plan:     Active Diagnoses:    Diagnosis Date Noted POA    PRINCIPAL PROBLEM:  Sepsis [A41.9] 10/24/2022 Yes    Acute renal failure [N17.9] 10/25/2022 Yes    Non-traumatic rhabdomyolysis [M62.82] 10/25/2022 Yes    Paroxysmal atrial fibrillation [I48.0] 10/24/2022 Yes    Elevated LFTs [R79.89] 10/24/2022 Yes    Neck swelling [R22.1] 10/24/2022 Yes    Morbid obesity [E66.01] 12/10/2018 Yes    BPH with urinary obstruction [N40.1, N13.8] 10/31/2018 Yes     Chronic    Complex sleep apnea syndrome [G47.31]  Yes      Problems Resolved During this Admission:     Penrose drain removed today  Ok to dc per ent - f/u in my clinic next Wednesday for staple removal   Bethany Macdonald MD  Otorhinolaryngology-Head & Neck Surgery  [facility] [place] [facility]

## 2022-10-29 NOTE — PROGRESS NOTES
[facility] Medicine  Progress Note    Patient Name: Elías Gay  MRN: [id number]  Patient Class: IP- Inpatient   Admission Date: 10/24/2022  Length of Stay: 5 days  Attending Physician: Wilbert Estrada MD  Primary Care Provider: Kameron Diaz MD        Subjective:     Principal Problem:Sepsis        HPI:  This is a 67 year old male with a PMHx of BPH, TJ (on BiPAP) and obesity who presents with generalized weakness.     Patient reports developing worsening sinusitis over the last 3 weeks. He reports that his nasal discharge have been bloody mixed with mucous. About a week prior, his symptoms progressively became worse and 3 days prior to presentation he noticed left neck swelling. He tried to carry on with his ADLs but felt too weak and he collapsed on the bathroom floor, with no loss of consciousness. He reported having chills, and decreased po intake, but denied having cough, SOB or diarrhea. His wife is sick with similar symptoms.     In the ED, the patient was febrile (38.2), tachycardic (130s), tachypnic and requiring 2L of supplemental O2. Labs showed leukocytosis (38.9 - with neutrophilia), elevated creatinine (1.8 - baseline of 0.9), elevated LFTs (Tbili: 1.4, AST: 511, ALT: 122), elevated troponin (0.063), elevated BNP (142) and elevated procalcitonin (34.23). EKG showed SVT with PVCs. CXR showed mild increased interstitial attenuation (possible mild pulmonary edema). CT neck showed extensive left neck soft tissue swelling with inflammatory changes and edema with asymmetric heterogenous enlargement is seen of the left sternocleidomastoid & numerous enlarged cervical chain lymph nodes are seen throughout the region (cellulitis versus myositis or neoplastic process) without an abscess. He was given vancomycin, cefepime, sepsis bolus and 10 mg of diltiazem x2 and was started on a diltiazem drip. A transfer to [facility] for ENT/OMFS evaluation was attempted, however, there were no  bed availability. The patient was admitted to the ICU for further management.       Overview/Hospital Course:  This is a 67 year old male with a PMHx of BPH, TJ (on BiPAP) and obesity who presents with generalized weakness.  He reported having chills, and decreased po intake, but denied having cough, SOB or diarrhea. His wife is sick with similar symptoms.   CT neck showed extensive left neck soft tissue swelling with inflammatory changes and edema with asymmetric heterogenous enlargement is seen of the left sternocleidomastoid & numerous enlarged cervical chain lymph nodes are seen throughout the region (cellulitis versus myositis or neoplastic process) without an abscess. He was given vancomycin, cefepime, he was septic and hypotensive,improved with IVF,did not required vasopressors.  Was in Afib with RVR,bolus and 10 mg of diltiazem x2 and was started on a diltiazem drip.converted to sinus.  A transfer to [facility] for ENT evaluation was attempted, however, there were no bed availability. The patient was admitted to the ICU for further management.our ENT is back, saw patient today,possible intervention in AM.keep NPO past MN.  No sign of dental abscess,going frequently to dentist,  Hypotension  is resolved with IVF,BP is stable at this time  Afib is resolved with Cardizem gtt and  patiens in sinus  and stable,echo show preserved EF,per cardiology only on ASA and follow up as out patient for Holter monitoring.  Has rhabdomyolysis on IVF,will monitor.CPK is improving.  On IVF for ARF.casillas is in place.did not improved,consulted nephrology.  Cc is neck swelling.  Patient went for I and D of neck abscess on 10/26. Cultures sent. ENT continued to follow.       Interval History: No new issues     Review of Systems   Constitutional:  Negative for activity change, appetite change and chills.   HENT:  Negative for congestion and dental problem.    Eyes:  Negative for discharge and itching.   Respiratory:   Negative for apnea and chest tightness.    Cardiovascular:  Negative for chest pain.   Gastrointestinal:  Negative for abdominal distention and abdominal pain.   Endocrine: Negative for cold intolerance and heat intolerance.   Genitourinary:  Negative for difficulty urinating and dysuria.   Musculoskeletal:  Negative for arthralgias.   Skin:  Negative for color change and pallor.   Neurological:  Negative for dizziness and facial asymmetry.   Psychiatric/Behavioral:  Negative for agitation and behavioral problems.    Objective:     Vital Signs (Most Recent):  Temp: 97.6 °F (36.4 °C) (10/29/22 0503)  Pulse: 63 (10/29/22 0503)  Resp: 14 (10/29/22 0503)  BP: 137/67 (10/29/22 0503)  SpO2: 97 % (10/29/22 0503) Vital Signs (24h Range):  Temp:  [97.5 °F (36.4 °C)-98.1 °F (36.7 °C)] 97.6 °F (36.4 °C)  Pulse:  [63-76] 63  Resp:  [14-20] 14  SpO2:  [93 %-97 %] 97 %  BP: (105-137)/(55-74) 137/67     Weight: 133.6 kg (294 lb 8.6 oz)  Body mass index is 41.08 kg/m².    Intake/Output Summary (Last 24 hours) at 10/29/2022 0637  Last data filed at 10/29/2022 0632  Gross per 24 hour   Intake 5773.41 ml   Output --   Net 5773.41 ml      Physical Exam  Vitals and nursing note reviewed.   Constitutional:       General: He is not in acute distress.     Appearance: Normal appearance. He is not ill-appearing, toxic-appearing or diaphoretic.   HENT:      Head: Normocephalic and atraumatic.      Nose: No congestion.   Eyes:      Conjunctiva/sclera: Conjunctivae normal.   Cardiovascular:      Rate and Rhythm: Normal rate and regular rhythm.   Pulmonary:      Effort: Pulmonary effort is normal.      Breath sounds: Normal breath sounds.   Abdominal:      General: Bowel sounds are normal.   Skin:     General: Skin is warm and dry.   Neurological:      Mental Status: He is alert and oriented to person, place, and time.   Psychiatric:         Mood and Affect: Mood normal.         Behavior: Behavior normal.       Significant Labs: All pertinent  labs within the past 24 hours have been reviewed.  BMP:   Recent Labs   Lab 10/29/22  0404   GLU 79      K 3.9      CO2 28   BUN 71*   CREATININE 4.9*   CALCIUM 7.5*     CBC:   Recent Labs   Lab 10/29/22  0404   WBC 10.72  10.72   HGB 12.3*  12.3*   HCT 36.2*  36.2*     293       Significant Imaging:       Assessment/Plan:      * Sepsis  This patient does have evidence of infective focus  My overall impression is sepsis. Vital signs were reviewed and noted in progress note.  Antibiotics given-   Antibiotics (From admission, onward)    Start     Stop Route Frequency Ordered    10/27/22 0915  vancomycin 500 mg in dextrose 5 % 100 mL IVPB (ready to mix system)         -- IV Once 10/27/22 0810    10/26/22 2114  cefepime in dextrose 5 % IVPB 2 g         -- IV Every 24 hours (non-standard times) 10/26/22 0757    10/25/22 0900  mupirocin 2 % ointment         10/30 0859 Nasl 2 times daily 10/25/22 0714    10/25/22 0830  clindamycin in D5W 900 mg/50 mL IVPB 900 mg         -- IV Every 8 hours (non-standard times) 10/25/22 0725    10/24/22 2051  vancomycin - pharmacy to dose  (vancomycin IVPB)        See Hyperspace for full Linked Orders Report.    -- IV pharmacy to manage frequency 10/24/22 1952        Cultures were taken-   Microbiology Results (last 7 days)     Procedure Component Value Units Date/Time    Aerobic culture [[id number]] Collected: 10/26/22 1630    Order Status: Completed Specimen: Abscess from Neck Updated: 10/27/22 1014     Aerobic Bacterial Culture No growth    Narrative:      LEFT NECK ABSCESS    Gram stain [[id number]] Collected: 10/26/22 1630    Order Status: Completed Specimen: Abscess from Neck Updated: 10/27/22 0831     Gram Stain Result Few WBC's      No organisms seen    Narrative:      LEFT NECK ABSCESS    Urine culture [[id number]] Collected: 10/25/22 0230    Order Status: Completed Specimen: Urine Updated: 10/27/22 0627     Urine Culture, Routine No growth    Narrative:       Specimen Source->Urine    Blood culture x two cultures. Draw prior to antibiotics. [[id number]] Collected: 10/24/22 1910    Order Status: Completed Specimen: Blood from Peripheral, Hand, Right Updated: 10/26/22 2103     Blood Culture, Routine No Growth to date      No Growth to date      No Growth to date    Narrative:      Aerobic and anaerobic    Blood culture x two cultures. Draw prior to antibiotics. [[id number]] Collected: 10/24/22 1911    Order Status: Completed Specimen: Blood from Peripheral, Antecubital, Left Updated: 10/26/22 2103     Blood Culture, Routine No Growth to date      No Growth to date      No Growth to date    Narrative:      Aerobic and anaerobic    Culture, Anaerobe [[id number]] Collected: 10/26/22 1630    Order Status: Sent Specimen: Abscess from Neck Updated: 10/26/22 1833    Fungus culture [[id number]] Collected: 10/26/22 1630    Order Status: Sent Specimen: Abscess from Neck Updated: 10/26/22 1832    AFB Culture & Smear [[id number]] Collected: 10/26/22 1630    Order Status: Sent Specimen: Abscess from Neck Updated: 10/26/22 1832        Latest lactate reviewed, they are-  Recent Labs   Lab 10/24/22  2058 10/24/22  2306   LACTATE 2.4* 2.1       Organ dysfunction indicated by Acute liver injury  Source- Likely deep soft tissue neck infection related to possibly recent sinusitis infection or odontogenic in source     Plan:   Source control Achieved by- Antibiotics (Vancomycin,cefpeime and clindamycin.  ENT consult -  transfer to [facility] for evaluation, currently there is no concern for airway compromise ,stable on RA.our ENT is back,possible intervention in AM.    I and D of neck Abscess on 10/27. Cultures sent. Follow ENT recs. Abx      Non-traumatic rhabdomyolysis  No sign of trauma,on IVF and will monitor.improving.      Acute renal failure  On IVF,has vinny,will monitor.did not  improved,consulted nephrology.    From rhabdo?      Neck swelling  Likely related to an underlying infection,  see plan for sepsis .  Appear to be sinusitis as source,no sign of dental abscess,going frequently to dentist.  Need be seen by ENT,our ENT not available in this fascility ,caled  transfer center.was no bed available,.  Our ENT is back today,possible intervention in AM.      Elevated LFTs  Likely related to underlying muscle infection and sepsis ,rhabdomyolyisis,hep panel is negative,will monitor.  Check RUQ US ,has fatty liver ,    Paroxysmal atrial fibrillation  - New onset   - [id number] Score: 1   - Will hold off on anticoagulation given low score   - Continue diltiazem drip   - Cardiology consult .  Off Cardizem,sinus at this time,echo. Show preserved EF,per cardiology only ASA at this time and holter monitoring as out patient.    Morbid obesity  Body mass index is 41.08 kg/m². Morbid obesity complicates all aspects of disease management from diagnostic modalities to treatment. Weight loss encouraged and health benefits explained to patient.         BPH with urinary obstruction  Resume Flomax       Complex sleep apnea syndrome  Resume nightly BiPAP         VTE Risk Mitigation (From admission, onward)         Ordered     heparin (porcine) injection 5,000 Units  Every 8 hours         10/25/22 0714     IP VTE HIGH RISK PATIENT  Once         10/24/22 2303     Place sequential compression device  Until discontinued         10/24/22 2303                Discharge Planning   JEFFERSON:      Code Status: Full Code   Is the patient medically ready for discharge?:     Reason for patient still in hospital (select all that apply): Patient unstable  Discharge Plan A: Home Health   Discharge Delays: None known at this time              Wilbert Polanco MD  Department of Hospital Medicine   [facility]

## 2022-10-30 LAB
ALBUMIN SERPL BCP-MCNC: 2.3 G/DL (ref 3.5–5.2)
ALP SERPL-CCNC: 117 U/L (ref 55–135)
ALT SERPL W/O P-5'-P-CCNC: 97 U/L (ref 10–44)
ANION GAP SERPL CALC-SCNC: 12 MMOL/L (ref 8–16)
AST SERPL-CCNC: 61 U/L (ref 10–40)
BACTERIA SPEC ANAEROBE CULT: NORMAL
BASOPHILS # BLD AUTO: ABNORMAL K/UL (ref 0–0.2)
BASOPHILS NFR BLD: 0 % (ref 0–1.9)
BILIRUB SERPL-MCNC: 0.4 MG/DL (ref 0.1–1)
BUN SERPL-MCNC: 71 MG/DL (ref 8–23)
CALCIUM SERPL-MCNC: 7.6 MG/DL (ref 8.7–10.5)
CHLORIDE SERPL-SCNC: 101 MMOL/L (ref 95–110)
CK SERPL-CCNC: 714 U/L (ref 20–200)
CO2 SERPL-SCNC: 29 MMOL/L (ref 23–29)
CREAT SERPL-MCNC: 4 MG/DL (ref 0.5–1.4)
DIFFERENTIAL METHOD: ABNORMAL
EOSINOPHIL # BLD AUTO: ABNORMAL K/UL (ref 0–0.5)
EOSINOPHIL NFR BLD: 5 % (ref 0–8)
ERYTHROCYTE [DISTWIDTH] IN BLOOD BY AUTOMATED COUNT: 13.9 % (ref 11.5–14.5)
EST. GFR  (NO RACE VARIABLE): 15 ML/MIN/1.73 M^2
GLUCOSE SERPL-MCNC: 103 MG/DL (ref 70–110)
HCT VFR BLD AUTO: 34.5 % (ref 40–54)
HGB BLD-MCNC: 11.9 G/DL (ref 14–18)
IMM GRANULOCYTES # BLD AUTO: ABNORMAL K/UL (ref 0–0.04)
IMM GRANULOCYTES NFR BLD AUTO: ABNORMAL % (ref 0–0.5)
LYMPHOCYTES # BLD AUTO: ABNORMAL K/UL (ref 1–4.8)
LYMPHOCYTES NFR BLD: 18 % (ref 18–48)
MCH RBC QN AUTO: 31.6 PG (ref 27–31)
MCHC RBC AUTO-ENTMCNC: 34.5 G/DL (ref 32–36)
MCV RBC AUTO: 92 FL (ref 82–98)
METAMYELOCYTES NFR BLD MANUAL: 1 %
MONOCYTES # BLD AUTO: ABNORMAL K/UL (ref 0.3–1)
MONOCYTES NFR BLD: 7 % (ref 4–15)
NEUTROPHILS NFR BLD: 68 % (ref 38–73)
NEUTS BAND NFR BLD MANUAL: 1 %
NRBC BLD-RTO: 0 /100 WBC
PLATELET # BLD AUTO: 297 K/UL (ref 150–450)
PLATELET BLD QL SMEAR: ABNORMAL
PMV BLD AUTO: 9.4 FL (ref 9.2–12.9)
POTASSIUM SERPL-SCNC: 4 MMOL/L (ref 3.5–5.1)
PROT SERPL-MCNC: 6.4 G/DL (ref 6–8.4)
RBC # BLD AUTO: 3.76 M/UL (ref 4.6–6.2)
SODIUM SERPL-SCNC: 142 MMOL/L (ref 136–145)
VANCOMYCIN SERPL-MCNC: 15.2 UG/ML
WBC # BLD AUTO: 9.27 K/UL (ref 3.9–12.7)

## 2022-10-30 PROCEDURE — 25000003 PHARM REV CODE 250: Performed by: HOSPITALIST

## 2022-10-30 PROCEDURE — 85007 BL SMEAR W/DIFF WBC COUNT: CPT | Performed by: STUDENT IN AN ORGANIZED HEALTH CARE EDUCATION/TRAINING PROGRAM

## 2022-10-30 PROCEDURE — 63600175 PHARM REV CODE 636 W HCPCS: Performed by: INTERNAL MEDICINE

## 2022-10-30 PROCEDURE — 85027 COMPLETE CBC AUTOMATED: CPT | Performed by: STUDENT IN AN ORGANIZED HEALTH CARE EDUCATION/TRAINING PROGRAM

## 2022-10-30 PROCEDURE — A4216 STERILE WATER/SALINE, 10 ML: HCPCS | Performed by: HOSPITALIST

## 2022-10-30 PROCEDURE — 80053 COMPREHEN METABOLIC PANEL: CPT | Performed by: STUDENT IN AN ORGANIZED HEALTH CARE EDUCATION/TRAINING PROGRAM

## 2022-10-30 PROCEDURE — 21400001 HC TELEMETRY ROOM

## 2022-10-30 PROCEDURE — 25000003 PHARM REV CODE 250: Performed by: INTERNAL MEDICINE

## 2022-10-30 PROCEDURE — 36415 COLL VENOUS BLD VENIPUNCTURE: CPT | Performed by: STUDENT IN AN ORGANIZED HEALTH CARE EDUCATION/TRAINING PROGRAM

## 2022-10-30 PROCEDURE — 25000003 PHARM REV CODE 250: Performed by: ANESTHESIOLOGY

## 2022-10-30 PROCEDURE — 80202 ASSAY OF VANCOMYCIN: CPT | Performed by: HOSPITALIST

## 2022-10-30 PROCEDURE — 82550 ASSAY OF CK (CPK): CPT | Performed by: HOSPITALIST

## 2022-10-30 PROCEDURE — A4216 STERILE WATER/SALINE, 10 ML: HCPCS | Performed by: ANESTHESIOLOGY

## 2022-10-30 PROCEDURE — 63600175 PHARM REV CODE 636 W HCPCS: Performed by: HOSPITALIST

## 2022-10-30 PROCEDURE — A4217 STERILE WATER/SALINE, 500 ML: HCPCS | Performed by: INTERNAL MEDICINE

## 2022-10-30 RX ORDER — SODIUM CHLORIDE 9 MG/ML
INJECTION, SOLUTION INTRAVENOUS CONTINUOUS
Status: DISCONTINUED | OUTPATIENT
Start: 2022-10-30 | End: 2022-10-31 | Stop reason: HOSPADM

## 2022-10-30 RX ADMIN — CLINDAMYCIN IN 5 PERCENT DEXTROSE 900 MG: 18 INJECTION, SOLUTION INTRAVENOUS at 12:10

## 2022-10-30 RX ADMIN — FAMOTIDINE 20 MG: 10 INJECTION INTRAVENOUS at 09:10

## 2022-10-30 RX ADMIN — METOPROLOL SUCCINATE 25 MG: 25 TABLET, EXTENDED RELEASE ORAL at 09:10

## 2022-10-30 RX ADMIN — TAMSULOSIN HYDROCHLORIDE 0.4 MG: 0.4 CAPSULE ORAL at 09:10

## 2022-10-30 RX ADMIN — Medication 10 ML: at 11:10

## 2022-10-30 RX ADMIN — HEPARIN SODIUM 5000 UNITS: 5000 INJECTION INTRAVENOUS; SUBCUTANEOUS at 01:10

## 2022-10-30 RX ADMIN — Medication 10 ML: at 12:10

## 2022-10-30 RX ADMIN — CLINDAMYCIN IN 5 PERCENT DEXTROSE 900 MG: 18 INJECTION, SOLUTION INTRAVENOUS at 03:10

## 2022-10-30 RX ADMIN — CEFEPIME HYDROCHLORIDE 2 G: 2 INJECTION, SOLUTION INTRAVENOUS at 09:10

## 2022-10-30 RX ADMIN — CLINDAMYCIN IN 5 PERCENT DEXTROSE 900 MG: 18 INJECTION, SOLUTION INTRAVENOUS at 08:10

## 2022-10-30 RX ADMIN — Medication 10 ML: at 06:10

## 2022-10-30 RX ADMIN — SODIUM BICARBONATE: 84 INJECTION, SOLUTION INTRAVENOUS at 03:10

## 2022-10-30 RX ADMIN — VANCOMYCIN HYDROCHLORIDE 500 MG: 500 INJECTION, POWDER, LYOPHILIZED, FOR SOLUTION INTRAVENOUS at 06:10

## 2022-10-30 RX ADMIN — SODIUM CHLORIDE: 0.9 INJECTION, SOLUTION INTRAVENOUS at 06:10

## 2022-10-30 RX ADMIN — HEPARIN SODIUM 5000 UNITS: 5000 INJECTION INTRAVENOUS; SUBCUTANEOUS at 06:10

## 2022-10-30 RX ADMIN — HEPARIN SODIUM 5000 UNITS: 5000 INJECTION INTRAVENOUS; SUBCUTANEOUS at 09:10

## 2022-10-30 NOTE — PROGRESS NOTES
Pharmacokinetic Assessment Follow Up: IV Vancomycin    Vancomycin serum concentration assessment(s):    The random level was drawn correctly and can be used to guide therapy at this time. The measurement is within the desired definitive target range of 10 to 20 mcg/mL.    Vancomycin Regimen Plan:    Re-dose when the random level is less than 20 mcg/mL, next level to be drawn at 0400 on 10/31/22    Drug levels (last 3 results):  Recent Labs   Lab Result Units 10/28/22  0503 10/29/22  0404 10/30/22  0404   Vancomycin, Random ug/mL 16.9 16.9 15.2       Pharmacy will continue to follow and monitor vancomycin.    Please contact pharmacy at extension [phone] for questions regarding this assessment.    Thank you for the consult,   Hao Meza       Patient brief summary:  Elías Gay is a 67 y.o. male initiated on antimicrobial therapy with IV Vancomycin for treatment of skin & soft tissue infection    The patient's current regimen is random pulse dosing    Drug Allergies:   Review of patient's allergies indicates:  No Known Allergies    Actual Body Weight:   133.6 kg    Renal Function:   Estimated Creatinine Clearance: 19.8 mL/min (A) (based on SCr of 4.9 mg/dL (H)).,     Dialysis Method (if applicable):  N/A    CBC (last 72 hours):  Recent Labs   Lab Result Units 10/27/22  0604 10/29/22  0404   WBC K/uL 22.06* 10.72  10.72   Hemoglobin g/dL 12.1* 12.3*  12.3*   Hematocrit % 34.3* 36.2*  36.2*   Platelets K/uL 261 293  293   Gran % % 90.4* 72.9  72.9   Lymph % % 3.9* 12.0*  12.0*   Mono % % 3.3* 6.5  6.5   Eosinophil % % 0.5 3.5  3.5   Basophil % % 0.2 0.9  0.9   Differential Method  Automated Automated  Automated       Metabolic Panel (last 72 hours):  Recent Labs   Lab Result Units 10/27/22  0604 10/29/22  0404   Sodium mmol/L 131* 138   Potassium mmol/L 3.3* 3.9   Chloride mmol/L 94* 100   CO2 mmol/L 26 28   Glucose mg/dL 87 79   BUN mg/dL 61* 71*   Creatinine mg/dL 4.6* 4.9*   Albumin g/dL 2.2*  2.4*   Total Bilirubin mg/dL 0.7 0.7   Alkaline Phosphatase U/L 93 119   AST U/L 213* 105*   ALT U/L 148* 132*       Vancomycin Administrations:  vancomycin given in the last 96 hours                     vancomycin 500 mg in dextrose 5 % 100 mL IVPB (ready to mix system) (mg) 500 mg New Bag 10/29/22 0610    vancomycin 500 mg in dextrose 5 % 100 mL IVPB (ready to mix system) (mg) 500 mg New Bag 10/28/22 0739    vancomycin 500 mg in dextrose 5 % 100 mL IVPB (ready to mix system) (mg) 500 mg New Bag 10/27/22 1022    vancomycin 750 mg in dextrose 5 % 250 mL IVPB (ready to mix system) (mg) 750 mg New Bag 10/26/22 0935                    Microbiologic Results:  Microbiology Results (last 7 days)       Procedure Component Value Units Date/Time    Aerobic culture [[id number]]  (Abnormal) Collected: 10/26/22 1630    Order Status: Completed Specimen: Abscess from Neck Updated: 10/29/22 0928     Aerobic Bacterial Culture STREPTOCOCCUS PYOGENES (GROUP A)  Rare  Beta-hemolytic streptococci are routinely susceptible to   penicillins,cephalosporins and carbapenems.      Narrative:      LEFT NECK ABSCESS    AFB Culture & Smear [[id number]] Collected: 10/26/22 1630    Order Status: Completed Specimen: Abscess from Neck Updated: 10/29/22 0927     AFB Culture & Smear Culture in progress     AFB CULTURE STAIN No acid fast bacilli seen.    Narrative:      LEFT NECK ABSCESS    Blood culture x two cultures. Draw prior to antibiotics. [[id number]] Collected: 10/24/22 1911    Order Status: Completed Specimen: Blood from Peripheral, Antecubital, Left Updated: 10/29/22 0303     Blood Culture, Routine No Growth after 4 days.    Narrative:      Aerobic and anaerobic    Blood culture x two cultures. Draw prior to antibiotics. [[id number]] Collected: 10/24/22 1910    Order Status: Completed Specimen: Blood from Peripheral, Hand, Right Updated: 10/29/22 0303     Blood Culture, Routine No Growth after 4 days.    Narrative:      Aerobic and anaerobic     Culture, Anaerobe [[id number]] Collected: 10/26/22 1630    Order Status: Completed Specimen: Abscess from Neck Updated: 10/28/22 0851     Anaerobic Culture Culture in progress    Narrative:      LEFT NECK ABSCESS    Gram stain [[id number]] Collected: 10/26/22 1630    Order Status: Completed Specimen: Abscess from Neck Updated: 10/27/22 0831     Gram Stain Result Few WBC's      No organisms seen    Narrative:      LEFT NECK ABSCESS    Urine culture [[id number]] Collected: 10/25/22 0230    Order Status: Completed Specimen: Urine Updated: 10/27/22 0627     Urine Culture, Routine No growth    Narrative:      Specimen Source->Urine    Fungus culture [[id number]] Collected: 10/26/22 1630    Order Status: Sent Specimen: Abscess from Neck Updated: 10/26/22 1835

## 2022-10-30 NOTE — NURSING
Dr. Diop at bedside, new orders noted for BMP in am, strict I&Os, discontinue sodium bicarb infusion and to initiate normal saline IV at 100ml/hr.  Updated patient on new orders.  Will continue to monitor.

## 2022-10-30 NOTE — NURSING
Report given to oncoming nurse.  Patient sitting on the side of the bed, no acute distress noted.  Call bell in reach.

## 2022-10-30 NOTE — PROGRESS NOTES
[facility]  Nephrology  Progress Note     Patient Name: Elías Gay  MRN: [id number]  Admission Date: 10/24/2022  Hospital Length of Stay: 6 days  Attending Provider: Rodrigo Ramos MD   Primary Care Physician: Kameron Diaz MD  Principal Problem: Sepsis    Subjective:     Interval History: Seen at the bedside no event overnight       Review of patient's allergies indicates:  No Known Allergies   Current Facility-Administered Medications   Medication Frequency    acetaminophen tablet 650 mg Q4H PRN    albuterol-ipratropium 2.5 mg-0.5 mg/3 mL nebulizer solution 3 mL Q4H PRN    calcium gluconate 1 g in NS IVPB (premixed) PRN    calcium gluconate 1 g in NS IVPB (premixed) PRN    calcium gluconate 1 g in NS IVPB (premixed) PRN    cefepime in dextrose 5 % IVPB 2 g Q24H    clindamycin in D5W 900 mg/50 mL IVPB 900 mg Q8H    famotidine (PF) injection 20 mg Daily    heparin (porcine) injection 5,000 Units Q8H    HYDROcodone-acetaminophen  mg per tablet 1 tablet Q4H PRN    HYDROcodone-acetaminophen 5-325 mg per tablet 1 tablet Q4H PRN    HYDROmorphone (PF) injection 0.2 mg Q5 Min PRN    magnesium sulfate 2g in water 50mL IVPB (premix) PRN    magnesium sulfate 2g in water 50mL IVPB (premix) PRN    melatonin tablet 6 mg Nightly PRN    metoprolol succinate (TOPROL-XL) 24 hr tablet 25 mg BID    ondansetron injection 4 mg Q8H PRN    ondansetron injection 4 mg Daily PRN    phenyleph-min oil-petrolatum 0.25-14-74.9 % ointment 1 applicator QID PRN    potassium chloride 10 mEq in 100 mL IVPB PRN    And    potassium chloride 10 mEq in 100 mL IVPB PRN    And    potassium chloride 10 mEq in 100 mL IVPB PRN    prochlorperazine injection Soln 5 mg Q6H PRN    prochlorperazine injection Soln 5 mg Q30 Min PRN    sodium bicarbonate 100 mEq in sterile water 1,000 mL infusion Continuous    sodium chloride 0.9% flush 10 mL Q6H    sodium chloride 0.9% flush 10 mL PRN    sodium phosphate 15 mmol in dextrose 5 % 250  mL IVPB PRN    sodium phosphate 20.01 mmol in dextrose 5 % 250 mL IVPB PRN    sodium phosphate 30 mmol in dextrose 5 % 250 mL IVPB PRN    tamsulosin 24 hr capsule 0.4 mg Daily       Objective:     Vital Signs (Most Recent):  Temp: 97.1 °F (36.2 °C) (10/30/22 1209)  Pulse: 63 (10/30/22 1209)  Resp: 20 (10/30/22 1209)  BP: (!) 151/74 (10/30/22 1209)  SpO2: 95 % (10/30/22 1209)  O2 Device (Oxygen Therapy): room air (10/29/22 1924)   Vital Signs (24h Range):  Temp:  [97.1 °F (36.2 °C)-98.8 °F (37.1 °C)] 97.1 °F (36.2 °C)  Pulse:  [60-69] 63  Resp:  [18-20] 20  SpO2:  [94 %-98 %] 95 %  BP: (121-151)/(57-77) 151/74   Weight: 133.6 kg (294 lb 8.6 oz) (10/26/22 0615)  Body mass index is 41.08 kg/m².  Body surface area is 2.59 meters squared.      Intake/Output Summary (Last 24 hours) at 10/30/2022 1606  Last data filed at 10/30/2022 0806  Gross per 24 hour   Intake 3428.19 ml   Output 300 ml   Net 3128.19 ml     I/O last 3 completed shifts:  In: 9381.6 [P.O.:1660; I.V.:7173.2; IV Piggyback:548.4]  Out: 600 [Urine:600]  Net IO Since Admission: 15,859.73 mL [10/30/22 1606]     Physical Exam  Constitutional:       Appearance: He is ill-appearing.   Cardiovascular:      Rate and Rhythm: Normal rate.      Pulses: Normal pulses.   Pulmonary:      Effort: Pulmonary effort is normal.   Abdominal:      General: Abdomen is flat.   Musculoskeletal:         General: Normal range of motion.   Skin:     General: Skin is warm.   Neurological:      Mental Status: He is alert and oriented to person, place, and time.   Psychiatric:         Mood and Affect: Mood normal.       Recent Labs   Lab 10/27/22  0604 10/29/22  0404 10/30/22  1500   WBC 22.06* 10.72  10.72 9.27   HGB 12.1* 12.3*  12.3* 11.9*   HCT 34.3* 36.2*  36.2* 34.5*    293  293 297   MONO 3.3*  0.7 6.5  6.5  0.7  0.7 7.0  CANCELED      Recent Labs   Lab 10/27/22  0604 10/29/22  0404 10/30/22  1500   * 138 142   K 3.3* 3.9 4.0   CL 94* 100 101   CO2 26 28 29    BUN 61* 71* 71*   CREATININE 4.6* 4.9* 4.0*   CALCIUM 7.1* 7.5* 7.6*   PROT 5.9* 6.5 6.4   BILITOT 0.7 0.7 0.4   ALKPHOS 93 119 117   * 132* 97*   * 105* 61*      No results for input(s): PH, PCO2, PO2, HCO3, POCSATURATED, BE in the last 72 hours.    Assessment/Plan:       Sepsis    BPH with urinary obstruction    Morbid obesity    Paroxysmal atrial fibrillation    Acute renal failure    Non-traumatic rhabdomyolysis       Cr improving   Can hydrate with NS   IV antibiotics as per primary   - Strict I/O and chart  - Avoid nephrotoxic medications, NSAIDs, IV contrast, etc.  - Daily weights and chart  - Medication doses adjusted to GFR  - Maintain MAP > 65  - Hb > 7 gm/dL  - Will follow closely    Thank you for your consult. I will follow-up with patient. Please contact us if you have any additional questions.    Symone Diop MD  Nephrology

## 2022-10-30 NOTE — PLAN OF CARE
Problem: Adult Inpatient Plan of Care  Goal: Plan of Care Review  Outcome: Ongoing, Not Progressing  Goal: Patient-Specific Goal (Individualized)  Outcome: Ongoing, Not Progressing  Goal: Absence of Hospital-Acquired Illness or Injury  Outcome: Ongoing, Not Progressing  Goal: Optimal Comfort and Wellbeing  Outcome: Ongoing, Not Progressing  Goal: Readiness for Transition of Care  Outcome: Ongoing, Not Progressing     Problem: Adjustment to Illness (Sepsis/Septic Shock)  Goal: Optimal Coping  Outcome: Ongoing, Not Progressing     Problem: Infection Progression (Sepsis/Septic Shock)  Goal: Absence of Infection Signs and Symptoms  Outcome: Ongoing, Not Progressing     Problem: Impaired Wound Healing  Goal: Optimal Wound Healing  Outcome: Ongoing, Not Progressing     Problem: Bariatric Environmental Safety  Goal: Safety Maintained with Care  Outcome: Ongoing, Not Progressing

## 2022-10-30 NOTE — NURSING
Patient resting in bed with no acute distress noted.  Denies chest pain or SOB at the present time. Call light in reach.  Will continue to monitor.

## 2022-10-30 NOTE — CONSULTS
Thank you for your consult to [facility]. We have reviewed the patient chart. This patient does meet criteria for [facility] service at this time. Will assume care on 10/30/22 at 6AM

## 2022-10-30 NOTE — HPI
"67M with h/o TJ admitted 10/24 with left sided neck swelling and generalized weakness.  Reports sinus congestion x 3 weeks, acutely worsening before arrival. Says it felt "like his sinuses and throat were on fire". Reports a collapse without LOC prior to arrival. Reports chills, denies fever. Denies chest pain. Reports toenail fungus. Reports boils in abdominal pannus that have been lanced before, denies boils for past several weeks. Denies indwelling hardware. Reports dental extraction and cap placed 8/2022. Denies acute dental issues       Ct 10/24  Extensive left neck soft tissue swelling with inflammatory changes and edema.  Asymmetric heterogenous enlargement is seen of the left sternocleidomastoid.  Numerous enlarged cervical chain lymph nodes are seen throughout the region.  Findings are nonspecific but suggestive for infectious process with cellulitis and myositis.  No organized focal fluid collection or rim enhancing abscess seen.  Potential neoplastic process not excluded.      Underwent I&D of neck abscess on 10/26 with ENT. Op cultures with GAS.    Specimen Information: Neck; Abscess   0 Result Notes  Component 4 d ago    Aerobic Bacterial Culture  Abnormal   STREPTOCOCCUS PYOGENES (GROUP A)   Rare   Beta-hemolytic streptococci are routinely susceptible to   penicillins,cephalosporins and carbapenems.         Bcx 10/24 NGTD x 2    2d echo  TDS secondary to body habitus.  The left ventricle is normal in size with normal systolic function.  The estimated ejection fraction is 60%.  Indeterminate left ventricular diastolic function.  Mild left atrial enlargement.  Normal right ventricular size with normal right ventricular systolic function.  Intermediate central venous pressure (8 mmHg).  The estimated PA systolic pressure is 27 mmHg.  There is no pulmonary hypertension.    Day 7 cefepime/clinda/vanc    ID consulted for "neck abscess"  "

## 2022-10-31 VITALS
SYSTOLIC BLOOD PRESSURE: 168 MMHG | TEMPERATURE: 98 F | HEIGHT: 71 IN | OXYGEN SATURATION: 93 % | HEART RATE: 68 BPM | BODY MASS INDEX: 41.24 KG/M2 | DIASTOLIC BLOOD PRESSURE: 82 MMHG | WEIGHT: 294.56 LBS | RESPIRATION RATE: 18 BRPM

## 2022-10-31 PROBLEM — A49.1 INFECTION DUE TO STREPTOCOCCUS PYOGENES: Status: ACTIVE | Noted: 2022-10-31

## 2022-10-31 LAB
ANION GAP SERPL CALC-SCNC: 9 MMOL/L (ref 8–16)
BUN SERPL-MCNC: 68 MG/DL (ref 8–23)
CALCIUM SERPL-MCNC: 7.4 MG/DL (ref 8.7–10.5)
CHLORIDE SERPL-SCNC: 102 MMOL/L (ref 95–110)
CK SERPL-CCNC: 472 U/L (ref 20–200)
CO2 SERPL-SCNC: 30 MMOL/L (ref 23–29)
CREAT SERPL-MCNC: 3.6 MG/DL (ref 0.5–1.4)
EST. GFR  (NO RACE VARIABLE): 18 ML/MIN/1.73 M^2
FINAL PATHOLOGIC DIAGNOSIS: NORMAL
GLUCOSE SERPL-MCNC: 92 MG/DL (ref 70–110)
GROSS: NORMAL
Lab: NORMAL
POTASSIUM SERPL-SCNC: 3.9 MMOL/L (ref 3.5–5.1)
SODIUM SERPL-SCNC: 141 MMOL/L (ref 136–145)

## 2022-10-31 PROCEDURE — 25000003 PHARM REV CODE 250: Performed by: HOSPITALIST

## 2022-10-31 PROCEDURE — 97535 SELF CARE MNGMENT TRAINING: CPT | Mod: CO

## 2022-10-31 PROCEDURE — 36415 COLL VENOUS BLD VENIPUNCTURE: CPT | Performed by: INTERNAL MEDICINE

## 2022-10-31 PROCEDURE — 63600175 PHARM REV CODE 636 W HCPCS: Performed by: HOSPITALIST

## 2022-10-31 PROCEDURE — 99223 PR INITIAL HOSPITAL CARE,LEVL III: ICD-10-PCS | Mod: ,,, | Performed by: INTERNAL MEDICINE

## 2022-10-31 PROCEDURE — 80048 BASIC METABOLIC PNL TOTAL CA: CPT | Performed by: INTERNAL MEDICINE

## 2022-10-31 PROCEDURE — 25000003 PHARM REV CODE 250: Performed by: INTERNAL MEDICINE

## 2022-10-31 PROCEDURE — 82550 ASSAY OF CK (CPK): CPT | Performed by: HOSPITALIST

## 2022-10-31 PROCEDURE — A4216 STERILE WATER/SALINE, 10 ML: HCPCS | Performed by: HOSPITALIST

## 2022-10-31 PROCEDURE — 97116 GAIT TRAINING THERAPY: CPT

## 2022-10-31 PROCEDURE — 99223 1ST HOSP IP/OBS HIGH 75: CPT | Mod: ,,, | Performed by: INTERNAL MEDICINE

## 2022-10-31 RX ORDER — CEFADROXIL 500 MG/1
500 CAPSULE ORAL EVERY 12 HOURS
Qty: 42 CAPSULE | Refills: 0 | Status: SHIPPED | OUTPATIENT
Start: 2022-10-31 | End: 2022-11-21

## 2022-10-31 RX ADMIN — SODIUM CHLORIDE: 0.9 INJECTION, SOLUTION INTRAVENOUS at 05:10

## 2022-10-31 RX ADMIN — HEPARIN SODIUM 5000 UNITS: 5000 INJECTION INTRAVENOUS; SUBCUTANEOUS at 05:10

## 2022-10-31 RX ADMIN — Medication 10 ML: at 12:10

## 2022-10-31 RX ADMIN — Medication 10 ML: at 06:10

## 2022-10-31 RX ADMIN — CLINDAMYCIN IN 5 PERCENT DEXTROSE 900 MG: 18 INJECTION, SOLUTION INTRAVENOUS at 08:10

## 2022-10-31 RX ADMIN — Medication 10 ML: at 11:10

## 2022-10-31 RX ADMIN — METOPROLOL SUCCINATE 25 MG: 25 TABLET, EXTENDED RELEASE ORAL at 08:10

## 2022-10-31 RX ADMIN — TAMSULOSIN HYDROCHLORIDE 0.4 MG: 0.4 CAPSULE ORAL at 08:10

## 2022-10-31 RX ADMIN — CLINDAMYCIN IN 5 PERCENT DEXTROSE 900 MG: 18 INJECTION, SOLUTION INTRAVENOUS at 01:10

## 2022-10-31 RX ADMIN — FAMOTIDINE 20 MG: 10 INJECTION INTRAVENOUS at 08:10

## 2022-10-31 NOTE — PLAN OF CARE
10/31/22 1450   Medicare Message   Important Message from Medicare regarding Discharge Appeal Rights Given to patient/caregiver;Explained to patient/caregiver;Signed/date by patient/caregiver   Date IMM was signed 10/31/22   Time IMM was signed 2352

## 2022-10-31 NOTE — PROGRESS NOTES
Awake alert oriented NAD    Wants to go home    Denies CNS ENT CP GI  RHEUM OR DERM SX  Past Medical History:   Diagnosis Date    Acute renal failure 10/25/2022    Arthritis 1971    Bone Spurs in feet    Basal cell carcinoma 11/06/2018    left ear helix    Foreign body in conjunctival sac     Hernia, inguinal, right 2002    Joint pain 1971    Bones of feet    Keloid cicatrix 1958 2010 2018 2019    Hernia, Knee, Arm, Ear    Melanoma 09/14/2018    Right Arm 0.6mm    Severe sleep apnea      Past Surgical History:   Procedure Laterality Date    CLOSURE OF DEFECT OF MOHS PROCEDURE Left 1/3/2019    Procedure: CLOSURE, MOHS PROCEDURE DEFECT LEFT EAR;  Surgeon: Jeramy Meek MD;  [facility];  Service: Plastics;  Laterality: Left;  plastics set    COLONOSCOPY N/A 6/30/2021    Procedure: COLONOSCOPY;  Surgeon: Juliano Santoyo MD;  [facility] ([facility]);  Service: Endoscopy;  Laterality: N/A;  severe sleep apnea     fully vaccinated-GT    HERNIA REPAIR Left     3 years of age    HERNIA REPAIR N/A     Ventral wall at 3 year of age.    INCISION AND DRAINAGE, NECK Left 10/26/2022    Procedure: INCISION AND DRAINAGE,NECK;  Surgeon: Bethany Macdonald MD;  Location: [facility];  Service: ENT;  Laterality: Left;    KNEE SURGERY Right 1984    Arthroscopic    NASAL SEPTUM SURGERY N/A 2009    SKIN BIOPSY  several over time     Review of patient's allergies indicates:  No Known Allergies    Current Facility-Administered Medications   Medication    0.9%  NaCl infusion    acetaminophen tablet 650 mg    albuterol-ipratropium 2.5 mg-0.5 mg/3 mL nebulizer solution 3 mL    calcium gluconate 1 g in NS IVPB (premixed)    calcium gluconate 1 g in NS IVPB (premixed)    calcium gluconate 1 g in NS IVPB (premixed)    cefepime in dextrose 5 % IVPB 2 g    clindamycin in D5W 900 mg/50 mL IVPB 900 mg    famotidine (PF) injection 20 mg    heparin (porcine) injection 5,000 Units    HYDROcodone-acetaminophen  mg per tablet  1 tablet    HYDROcodone-acetaminophen 5-325 mg per tablet 1 tablet    HYDROmorphone (PF) injection 0.2 mg    magnesium sulfate 2g in water 50mL IVPB (premix)    magnesium sulfate 2g in water 50mL IVPB (premix)    melatonin tablet 6 mg    metoprolol succinate (TOPROL-XL) 24 hr tablet 25 mg    ondansetron injection 4 mg    ondansetron injection 4 mg    phenyleph-min oil-petrolatum 0.25-14-74.9 % ointment 1 applicator    potassium chloride 10 mEq in 100 mL IVPB    And    potassium chloride 10 mEq in 100 mL IVPB    And    potassium chloride 10 mEq in 100 mL IVPB    prochlorperazine injection Soln 5 mg    prochlorperazine injection Soln 5 mg    sodium chloride 0.9% flush 10 mL    sodium chloride 0.9% flush 10 mL    sodium phosphate 15 mmol in dextrose 5 % 250 mL IVPB    sodium phosphate 20.01 mmol in dextrose 5 % 250 mL IVPB    sodium phosphate 30 mmol in dextrose 5 % 250 mL IVPB    tamsulosin 24 hr capsule 0.4 mg       LABS    Recent Results (from the past 24 hour(s))   CBC Auto Differential    Collection Time: 10/30/22  3:00 PM   Result Value Ref Range    WBC 9.27 3.90 - 12.70 K/uL    RBC 3.76 (L) 4.60 - 6.20 M/uL    Hemoglobin 11.9 (L) 14.0 - 18.0 g/dL    Hematocrit 34.5 (L) 40.0 - 54.0 %    MCV 92 82 - 98 fL    MCH 31.6 (H) 27.0 - 31.0 pg    MCHC 34.5 32.0 - 36.0 g/dL    RDW 13.9 11.5 - 14.5 %    Platelets 297 150 - 450 K/uL    MPV 9.4 9.2 - 12.9 fL    Immature Granulocytes CANCELED 0.0 - 0.5 %    Immature Grans (Abs) CANCELED 0.00 - 0.04 K/uL    Lymph # CANCELED 1.0 - 4.8 K/uL    Mono # CANCELED 0.3 - 1.0 K/uL    Eos # CANCELED 0.0 - 0.5 K/uL    Baso # CANCELED 0.00 - 0.20 K/uL    nRBC 0 0 /100 WBC    Gran % 68.0 38.0 - 73.0 %    Lymph % 18.0 18.0 - 48.0 %    Mono % 7.0 4.0 - 15.0 %    Eosinophil % 5.0 0.0 - 8.0 %    Basophil % 0.0 0.0 - 1.9 %    Bands 1.0 %    Metamyelocytes 1.0 %    Platelet Estimate Appears normal     Differential Method Manual    Comprehensive Metabolic Panel    Collection Time: 10/30/22  3:00  PM   Result Value Ref Range    Sodium 142 136 - 145 mmol/L    Potassium 4.0 3.5 - 5.1 mmol/L    Chloride 101 95 - 110 mmol/L    CO2 29 23 - 29 mmol/L    Glucose 103 70 - 110 mg/dL    BUN 71 (H) 8 - 23 mg/dL    Creatinine 4.0 (H) 0.5 - 1.4 mg/dL    Calcium 7.6 (L) 8.7 - 10.5 mg/dL    Total Protein 6.4 6.0 - 8.4 g/dL    Albumin 2.3 (L) 3.5 - 5.2 g/dL    Total Bilirubin 0.4 0.1 - 1.0 mg/dL    Alkaline Phosphatase 117 55 - 135 U/L    AST 61 (H) 10 - 40 U/L    ALT 97 (H) 10 - 44 U/L    Anion Gap 12 8 - 16 mmol/L    eGFR 15 (A) >60 mL/min/1.73 m^2   CK    Collection Time: 10/31/22  4:29 AM   Result Value Ref Range     (H) 20 - 200 U/L   Basic metabolic panel    Collection Time: 10/31/22  4:29 AM   Result Value Ref Range    Sodium 141 136 - 145 mmol/L    Potassium 3.9 3.5 - 5.1 mmol/L    Chloride 102 95 - 110 mmol/L    CO2 30 (H) 23 - 29 mmol/L    Glucose 92 70 - 110 mg/dL    BUN 68 (H) 8 - 23 mg/dL    Creatinine 3.6 (H) 0.5 - 1.4 mg/dL    Calcium 7.4 (L) 8.7 - 10.5 mg/dL    Anion Gap 9 8 - 16 mmol/L    eGFR 18 (A) >60 mL/min/1.73 m^2       I/O last 3 completed shifts:  In: 8687.6 [P.O.:3600; I.V.:4768; IV Piggyback:319.6]  Out: 3525 [Urine:3525]    Vitals:    10/31/22 0013 10/31/22 0341 10/31/22 0757 10/31/22 1122   BP: (!) 114/59 128/68 (!) 173/87 (!) 168/82   Pulse: 65 61 67 68   Resp: 18 18 18 18   Temp: 97.9 °F (36.6 °C) 97.6 °F (36.4 °C) 97.7 °F (36.5 °C) 98.1 °F (36.7 °C)   TempSrc: Oral Oral Oral Oral   SpO2: (!) 94% (!) 94% (!) 93% (!) 93%   Weight:       Height:           No Jvd, Thyromegaly or Lymphadenopathy  Lungs: Fairly clear anteriorly and laterally  Cor: RRR no G or rubs  Abd: Soft benign good bowel sounds non tender  Ext: pos edema    A)    Sepsis improving  BPH with obstruction  Improving VANGIE  Obesity   PAF  Rhabdom (ck 472)  Hepato-splenomegaly???    P)    Renal Diet  Home meds  Adjust all meds to the degree of renal fx  Close follow up I/O and weights  Maintain Hydration   OK to go home with  close renal monitoring

## 2022-10-31 NOTE — CONSULTS
"[facility] [place] - Telemetry  Infectious Disease  Consult Note    Patient Name: Elías Gay  MRN: [id number]  Admission Date: 10/24/2022  Hospital Length of Stay: 7 days  Attending Physician: Rodrigo Ramos MD  Primary Care Provider: Kameron Diaz MD     Isolation Status: No active isolations    Patient information was obtained from patient and ER records.      Inpatient consult to Infectious Diseases  Consult performed by: Angela Hicks MD  Consult ordered by: Rodrigo Ramos MD        Assessment/Plan:     Infection due to Streptococcus pyogenes  67M with h/o TJ  And obesity (BMI 41) admitted 10/24 with left sided neck swelling and generalized weakness.  Found to have strep pyogenes L sideded neck abscess s/p OR drainage 10/26. Bcx 10/24 NGTD x 2. 2d echo without vegetations. Clinically improved with drainage and Day 7 cefepime/clinda/vanc. ID consulted for "neck abscess"    Unclear etiology of neck abscess- seeded by bacteremia (from foot or boil infection) or direct spread (from sinuses or dental issues?). Given possibility of preceding bacteremia, discussed with patient a course of iv abx, but he declined 2/2 risk of picc line complications (notably infection and blood clot)    Recommendation:  - on d/c, transition to po cefadroxil 500mg po bid for at least 2-3 weeks from washout (est end date 11/15)  - wound care per ent  - routine hiv screening per cdc guidelines     Follow-up appointment will be arranged by the ID clinic and will be found in the patient's appointments tab.     Prior to discharge, please ensure the patient's follow-up has been scheduled.     If there is still no follow-up scheduled prior to discharge, please send an EPIC message to Shelli Roper in Infectious Diseases.    Discussed with hospitalist                  Thank you for your consult. I will sign off. Please contact us if you have any additional questions.    Angela Hicks MD  Infectious Disease  [facility] [place] - " "Telemetry    Subjective:     Principal Problem: Sepsis    HPI:   67M with h/o TJ admitted 10/24 with left sided neck swelling and generalized weakness.  Reports sinus congestion x 3 weeks, acutely worsening before arrival. Says it felt "like his sinuses and throat were on fire". Reports a collapse without LOC prior to arrival. Reports chills, denies fever. Denies chest pain. Reports toenail fungus. Reports boils in abdominal pannus that have been lanced before, denies boils for past several weeks. Denies indwelling hardware. Reports dental extraction and cap placed 8/2022. Denies acute dental issues       Ct 10/24  Extensive left neck soft tissue swelling with inflammatory changes and edema.  Asymmetric heterogenous enlargement is seen of the left sternocleidomastoid.  Numerous enlarged cervical chain lymph nodes are seen throughout the region.  Findings are nonspecific but suggestive for infectious process with cellulitis and myositis.  No organized focal fluid collection or rim enhancing abscess seen.  Potential neoplastic process not excluded.      Underwent I&D of neck abscess on 10/26 with ENT. Op cultures with GAS.    Specimen Information: Neck; Abscess    0 Result Notes  Component 4 d ago    Aerobic Bacterial Culture  Abnormal   STREPTOCOCCUS PYOGENES (GROUP A)   Rare   Beta-hemolytic streptococci are routinely susceptible to   penicillins,cephalosporins and carbapenems.         Bcx 10/24 NGTD x 2    2d echo   TDS secondary to body habitus.   The left ventricle is normal in size with normal systolic function.   The estimated ejection fraction is 60%.   Indeterminate left ventricular diastolic function.   Mild left atrial enlargement.   Normal right ventricular size with normal right ventricular systolic function.   Intermediate central venous pressure (8 mmHg).   The estimated PA systolic pressure is 27 mmHg.   There is no pulmonary hypertension.    Day 7 cefepime/clinda/vanc    ID consulted for " ""neck abscess"      Past Medical History:   Diagnosis Date    Acute renal failure 10/25/2022    Arthritis 1971    Bone Spurs in feet    Basal cell carcinoma 11/06/2018    left ear helix    Foreign body in conjunctival sac     Hernia, inguinal, right 2002    Joint pain 1971    Bones of feet    Keloid cicatrix 1958 2010 2018 2019    Hernia, Knee, Arm, Ear    Melanoma 09/14/2018    Right Arm 0.6mm    Severe sleep apnea        Past Surgical History:   Procedure Laterality Date    CLOSURE OF DEFECT OF MOHS PROCEDURE Left 1/3/2019    Procedure: CLOSURE, MOHS PROCEDURE DEFECT LEFT EAR;  Surgeon: Jeramy Meek MD;  [facility];  Service: Plastics;  Laterality: Left;  plastics set    COLONOSCOPY N/A 6/30/2021    Procedure: COLONOSCOPY;  Surgeon: Juliano Santoyo MD;  [facility]  Service: Endoscopy;  Laterality: N/A;  severe sleep apnea     fully vaccinated-GT    HERNIA REPAIR Left     3 years of age    HERNIA REPAIR N/A     Ventral wall at 3 year of age.    INCISION AND DRAINAGE, NECK Left 10/26/2022    Procedure: INCISION AND DRAINAGE,NECK;  Surgeon: Bethany Macdonald MD;  Location: [facility] OR;  Service: ENT;  Laterality: Left;    KNEE SURGERY Right 1984    Arthroscopic    NASAL SEPTUM SURGERY N/A 2009    SKIN BIOPSY  several over time       Review of patient's allergies indicates:  No Known Allergies    No current facility-administered medications on file prior to encounter.     Current Outpatient Medications on File Prior to Encounter   Medication Sig    azelastine (ASTELIN) 137 mcg (0.1 %) nasal spray 1 spray (137 mcg total) by Nasal route 2 (two) times daily.    calcium-magnesium-zinc Tab Take 2 tablets by mouth once daily.    ciclopirox (PENLAC) 8 % Soln Apply daily to affected nail. Must remove and restart weekly    guaiFENesin (MUCINEX) 600 mg 12 hr tablet Take 1,200 mg by mouth 2 (two) times daily.    ketoconazole (NIZORAL) 2 % cream AAA bid to both feet x 3 " weeks    multivitamin capsule Take 1 capsule by mouth once daily.    celecoxib (CELEBREX) 200 MG capsule Take 1 capsule (200 mg total) by mouth once daily. With food    sars-cov-2, covid-19 omicron, (MODERNA COVID-19) 50 mcg/0.5 mL injection Inject into the muscle.     Family History       Problem Relation (Age of Onset)    Aneurysm Mother    Cancer Father (72)    Eczema Brother    Gout Brother    Hypertension Mother    No Known Problems Daughter, Son, Daughter, Son    Psoriasis Brother    Stroke Mother          Tobacco Use    Smoking status: Never    Smokeless tobacco: Never    Tobacco comments:     Second hand smoke from mother. Cigarrette smoke inflames my sinuses.   Substance and Sexual Activity    Alcohol use: Not Currently     Alcohol/week: 1.0 standard drink     Types: 1 Glasses of wine per week     Comment: Rare    Drug use: No    Sexual activity: Yes     Partners: Female     Birth control/protection: None     Review of Systems   Constitutional:  Positive for activity change, appetite change and chills.   HENT:  Positive for facial swelling, sinus pressure and sinus pain. Negative for dental problem.    Eyes: Negative.    Respiratory: Negative.     Cardiovascular: Negative.    Gastrointestinal: Negative.    Endocrine: Negative.    Genitourinary: Negative.    Musculoskeletal: Negative.  Negative for neck pain.   Skin: Negative.    Allergic/Immunologic: Negative.    Neurological: Negative.    Psychiatric/Behavioral: Negative.     Objective:     Vital Signs (Most Recent):  Temp: 98.1 °F (36.7 °C) (10/31/22 1122)  Pulse: 68 (10/31/22 1122)  Resp: 18 (10/31/22 1122)  BP: (!) 168/82 (10/31/22 1122)  SpO2: (!) 93 % (10/31/22 1122)   Vital Signs (24h Range):  Temp:  [97.1 °F (36.2 °C)-98.1 °F (36.7 °C)] 98.1 °F (36.7 °C)  Pulse:  [61-68] 68  Resp:  [18-20] 18  SpO2:  [93 %-96 %] 93 %  BP: (114-173)/(59-87) 168/82     Weight: 133.6 kg (294 lb 8.6 oz)  Body mass index is 41.08 kg/m².    Physical Exam  Vitals  and nursing note reviewed.   HENT:      Head: Normocephalic and atraumatic.      Nose: Nose normal.      Mouth/Throat:      Mouth: Mucous membranes are dry.   Eyes:      Extraocular Movements: Extraocular movements intact.   Neck:      Comments: Surgical incision on L neck with staples. No fluctuance or redeness or drainage  Cardiovascular:      Rate and Rhythm: Normal rate and regular rhythm.      Pulses: Normal pulses.      Heart sounds: No murmur heard.  Pulmonary:      Effort: Pulmonary effort is normal.      Breath sounds: Normal breath sounds. No wheezing.   Abdominal:      General: Abdomen is flat.      Palpations: Abdomen is soft.      Tenderness: There is no abdominal tenderness.   Musculoskeletal:         General: No swelling.      Right lower leg: No edema.      Left lower leg: No edema.      Comments: Left sided neck swelling, firm without fluctuance    Skin:     General: Skin is warm.      Comments: No boils in pannus. +onchomycosis   Neurological:      General: No focal deficit present.      Mental Status: He is alert.      Motor: No weakness.   Psychiatric:         Mood and Affect: Mood normal.           Significant Labs: All pertinent labs within the past 24 hours have been reviewed.    Significant Imaging: I have reviewed all pertinent imaging results/findings within the past 24 hours.

## 2022-10-31 NOTE — NURSING
Report given to oncoming nurse.  Patient resting in bed with no acute distress noted.  Patient denies chest pain or SOB.  Call bell in reach.

## 2022-10-31 NOTE — NURSING
Patient discharged to home with family. Patient given discharge instructions and medication record.Educated on all contents. Verbalized understanding of all instructions.

## 2022-10-31 NOTE — PT/OT/SLP PROGRESS
"Physical Therapy Treatment    Patient Name:  Elías Gay   MRN:  [id number]    Recommendations:     Discharge Recommendations:  home health PT   Discharge Equipment Recommendations: RW and BSC  Barriers to discharge: None    Assessment:     Elías Gay is a 67 y.o. male admitted with a medical diagnosis of Sepsis.  He presents with the following impairments/functional limitations:  gait instability, decreased lower extremity function .    Rehab Prognosis: Good; patient would benefit from acute skilled PT services to address these deficits and reach maximum level of function.    Recent Surgery: Procedure(s) (LRB):  INCISION AND DRAINAGE,NECK (Left) 5 Days Post-Op    Plan:     During this hospitalization, patient to be seen 5 x/week to address the identified rehab impairments via gait training, therapeutic exercises, therapeutic activities and progress toward the following goals:    Plan of Care Expires:  11/01/22    Subjective     Chief Complaint: "I'm ready to go home"  Patient/Family Comments/goals: "You can't get any rest around here..."      Objective:     Communicated with nurseNorma prior to session.  Patient found sitting edge of bed with telemetry upon PT entry to room.     General Precautions: Standard,     Orthopedic Precautions:N/A   Braces: N/A  Respiratory Status: Room air     Functional Mobility:  Transfers:     Sit to Stand:  supervision with rolling walker  Gait: 250' w/RW SBA      AM-PAC 6 CLICK MOBILITY  Turning over in bed (including adjusting bedclothes, sheets and blankets)?: 4  Sitting down on and standing up from a chair with arms (e.g., wheelchair, bedside commode, etc.): 3  Moving from lying on back to sitting on the side of the bed?: 4  Moving to and from a bed to a chair (including a wheelchair)?: 3  Need to walk in hospital room?: 4  Climbing 3-5 steps with a railing?: 3  Basic Mobility Total Score: 21       Treatment & Education:  Pt stood with RW to use urinal mod I, attempted exs " however, pt declined until he goes home.    Patient left sitting edge of bed with all lines intact, call button in reach, and pt instructed to lie back down to elevate legs to decrease swelling .    GOALS:   Multidisciplinary Problems       Physical Therapy Goals       Not on file              Multidisciplinary Problems (Resolved)          Problem: Physical Therapy    Goal Priority Disciplines Outcome Goal Variances Interventions   Physical Therapy Goal   (Resolved)     PT, PT/OT Met     Description: Goals to be met by: 22     Patient will increase functional independence with mobility by performin. Pt to be supervision with bed mobility.  2. Pt to transfer with supervision.  3. Pt to ambulate 150' w/RW SBA.  4. Pt to be (I) with written HEP.                         Time Tracking:     PT Received On: 10/31/22  PT Start Time: 947     PT Stop Time: 1005  PT Total Time (min): 18 min     Billable Minutes: Gait Training 18    Treatment Type: Treatment  PT/PTA: PT           10/31/2022

## 2022-10-31 NOTE — ASSESSMENT & PLAN NOTE
This patient does have evidence of infective focus  My overall impression is sepsis. Vital signs were reviewed and noted in progress note.  Antibiotics given-   Antibiotics (From admission, onward)    Start     Stop Route Frequency Ordered    10/26/22 2114  cefepime in dextrose 5 % IVPB 2 g         -- IV Every 24 hours (non-standard times) 10/26/22 0757    10/25/22 0900  mupirocin 2 % ointment         10/30 0859 Nasl 2 times daily 10/25/22 0714    10/25/22 0830  clindamycin in D5W 900 mg/50 mL IVPB 900 mg         -- IV Every 8 hours (non-standard times) 10/25/22 0725        Cultures were taken-   Microbiology Results (last 7 days)     Procedure Component Value Units Date/Time    Culture, Anaerobe [[id number]] Collected: 10/26/22 1630    Order Status: Completed Specimen: Abscess from Neck Updated: 10/30/22 1155     Anaerobic Culture No anaerobes isolated    Narrative:      LEFT NECK ABSCESS    Aerobic culture [[id number]]  (Abnormal) Collected: 10/26/22 1630    Order Status: Completed Specimen: Abscess from Neck Updated: 10/29/22 0928     Aerobic Bacterial Culture STREPTOCOCCUS PYOGENES (GROUP A)  Rare  Beta-hemolytic streptococci are routinely susceptible to   penicillins,cephalosporins and carbapenems.      Narrative:      LEFT NECK ABSCESS    AFB Culture & Smear [[id number]] Collected: 10/26/22 1630    Order Status: Completed Specimen: Abscess from Neck Updated: 10/29/22 0927     AFB Culture & Smear Culture in progress     AFB CULTURE STAIN No acid fast bacilli seen.    Narrative:      LEFT NECK ABSCESS    Blood culture x two cultures. Draw prior to antibiotics. [[id number]] Collected: 10/24/22 1911    Order Status: Completed Specimen: Blood from Peripheral, Antecubital, Left Updated: 10/29/22 0303     Blood Culture, Routine No Growth after 4 days.    Narrative:      Aerobic and anaerobic    Blood culture x two cultures. Draw prior to antibiotics. [[id number]] Collected: 10/24/22 1910    Order Status: Completed  Specimen: Blood from Peripheral, Hand, Right Updated: 10/29/22 0303     Blood Culture, Routine No Growth after 4 days.    Narrative:      Aerobic and anaerobic    Gram stain [[id number]] Collected: 10/26/22 1630    Order Status: Completed Specimen: Abscess from Neck Updated: 10/27/22 0831     Gram Stain Result Few WBC's      No organisms seen    Narrative:      LEFT NECK ABSCESS    Urine culture [[id number]] Collected: 10/25/22 0230    Order Status: Completed Specimen: Urine Updated: 10/27/22 0627     Urine Culture, Routine No growth    Narrative:      Specimen Source->Urine    Fungus culture [[id number]] Collected: 10/26/22 1630    Order Status: Sent Specimen: Abscess from Neck Updated: 10/26/22 1832        Latest lactate reviewed, they are-  No results for input(s): LACTATE in the last 72 hours.    Organ dysfunction indicated by Acute liver injury  Source- Likely deep soft tissue neck infection related to possibly recent sinusitis infection or odontogenic in source     Plan:   Source control Achieved by- Antibiotics (Vancomycin,cefpeime and clindamycin.  ENT consult -  transfer to [facility] for evaluation, currently there is no concern for airway compromise    I and D of neck Abscess on 10/27. Cultures sent. ID consulted

## 2022-10-31 NOTE — PLAN OF CARE
Problem: Infection Progression (Sepsis/Septic Shock)  Goal: Absence of Infection Signs and Symptoms  Outcome: Ongoing, Progressing  Intervention: Promote Stabilization  Flowsheets (Taken 10/30/2022 1910)  Fever Reduction/Comfort Measures: lightweight bedding  Fluid/Electrolyte Management:   fluids adjusted   fluids provided   intravenous fluid replacement initiated   other (see comments)     Problem: Infection  Goal: Absence of Infection Signs and Symptoms  Outcome: Ongoing, Progressing     Problem: Fluid and Electrolyte Imbalance (Acute Kidney Injury/Impairment)  Goal: Fluid and Electrolyte Balance  Outcome: Ongoing, Progressing  Intervention: Monitor and Manage Fluid and Electrolyte Balance  Flowsheets (Taken 10/30/2022 1910)  Fluid/Electrolyte Management:   fluids adjusted   fluids provided   intravenous fluid replacement initiated   other (see comments)  New orders noted for NS@100ml/hr  Strict I&Os

## 2022-10-31 NOTE — ASSESSMENT & PLAN NOTE
Likely related to an underlying infection, see plan for sepsis .  Appear to be sinusitis as source,no sign of dental abscess,going frequently to dentist.  Need be seen by ENT,our ENT not available in this fascility ,caled  transfer center.was no bed available,.  ENT evaluated and okay for d/c

## 2022-10-31 NOTE — PLAN OF CARE
Problem: Adult Inpatient Plan of Care  Goal: Plan of Care Review  Outcome: Met  Goal: Absence of Hospital-Acquired Illness or Injury  Outcome: Met  Goal: Optimal Comfort and Wellbeing  Outcome: Met  Goal: Readiness for Transition of Care  Outcome: Met     Problem: Adjustment to Illness (Sepsis/Septic Shock)  Goal: Optimal Coping  Outcome: Met     Problem: Bleeding (Sepsis/Septic Shock)  Goal: Absence of Bleeding  Outcome: Met     Problem: Glycemic Control Impaired (Sepsis/Septic Shock)  Goal: Blood Glucose Level Within Desired Range  Outcome: Met     Problem: Infection Progression (Sepsis/Septic Shock)  Goal: Absence of Infection Signs and Symptoms  Outcome: Met     Problem: Nutrition Impaired (Sepsis/Septic Shock)  Goal: Optimal Nutrition Intake  Outcome: Met     Problem: Infection  Goal: Absence of Infection Signs and Symptoms  Outcome: Met     Problem: Impaired Wound Healing  Goal: Optimal Wound Healing  Outcome: Met     Problem: Fluid and Electrolyte Imbalance (Acute Kidney Injury/Impairment)  Goal: Fluid and Electrolyte Balance  Outcome: Met     Problem: Oral Intake Inadequate (Acute Kidney Injury/Impairment)  Goal: Optimal Nutrition Intake  Outcome: Met     Problem: Renal Function Impairment (Acute Kidney Injury/Impairment)  Goal: Effective Renal Function  Outcome: Met

## 2022-10-31 NOTE — ASSESSMENT & PLAN NOTE
"67M with h/o TJ  And obesity (BMI 41) admitted 10/24 with left sided neck swelling and generalized weakness.  Found to have strep pyogenes L sideded neck abscess s/p OR drainage 10/26. Bcx 10/24 NGTD x 2. 2d echo without vegetations. Clinically improved with drainage and Day 7 cefepime/clinda/vanc. ID consulted for "neck abscess"    Unclear etiology of neck abscess- seeded by bacteremia (from foot or boil infection) or direct spread (from sinuses or dental issues?). Given possibility of preceding bacteremia, discussed with patient a course of iv abx, but he declined 2/2 risk of picc line complications (notably infection and blood clot)    Recommendation:  - on d/c, transition to po cefadroxil 500mg po bid for at least 2-3 weeks from washout (est end date 11/15)  - wound care per ent  - routine hiv screening per cdc guidelines     Follow-up appointment will be arranged by the ID clinic and will be found in the patient's appointments tab.     Prior to discharge, please ensure the patient's follow-up has been scheduled.     If there is still no follow-up scheduled prior to discharge, please send an EPIC message to Shelli Roper in Infectious Diseases.    Discussed with hospitalist            "

## 2022-10-31 NOTE — PROGRESS NOTES
[facility]                    Pharmacy       Discharge Medication Education    Patient ACCEPTED medication education. Pharmacy has provided education on the name, indication, and possible side effects of the medication(s) prescribed, using teach-back method.     The following medications have also been discussed, during this admission.        Medication List        START taking these medications      cefadroxil 500 MG Cap  Commonly known as: DURICEF  Take 1 capsule (500 mg total) by mouth every 12 (twelve) hours. for 21 days            CONTINUE taking these medications      azelastine 137 mcg (0.1 %) nasal spray  Commonly known as: ASTELIN  1 spray (137 mcg total) by Nasal route 2 (two) times daily.     calcium-magnesium-zinc Tab     ciclopirox 8 % Soln  Commonly known as: PENLAC  Apply daily to affected nail. Must remove and restart weekly     guaiFENesin 600 mg 12 hr tablet  Commonly known as: MUCINEX     ketoconazole 2 % cream  Commonly known as: NIZORAL  AAA bid to both feet x 3 weeks     MODERNA COVID BIVAL(18Y UP)-PF 50 mcg/0.5 mL injection  Generic drug: sars-cov-2 (covid-19 omicron)  Inject into the muscle.     multivitamin capsule     tamsulosin 0.4 mg Cap  Commonly known as: FLOMAX  TAKE 1 CAPSULE BY MOUTH EVERY DAY            STOP taking these medications      celecoxib 200 MG capsule  Commonly known as: CeleBREX               Where to Get Your Medications        These medications were sent to [facility]/pharmacy #[id number] - [place] - [address] [facility]  [address] [facility] [address]      Phone: [phone]   tamsulosin 0.4 mg Cap       These medications were sent to [facility]  [address] [place] [address]      Hours: Mon-Fri, 8a-5:30p Phone: [phone]   cefadroxil 500 MG Cap          Thank you  Giuliana Gates, PharmD  [phone]

## 2022-10-31 NOTE — PROGRESS NOTES
[facility]  Telemedicine Progress Note    Patient Name: Elías Gay  MRN: [id number]  Patient Class: IP- Inpatient   Admission Date: 10/24/2022  Length of Stay: 6 days  Attending Physician: Rodrigo Ramos MD  Primary Care Provider: Kameron Diaz MD          Subjective:     Principal Problem:Sepsis        HPI:  This is a 67 year old male with a PMHx of BPH, TJ (on BiPAP) and obesity who presents with generalized weakness.     Patient reports developing worsening sinusitis over the last 3 weeks. He reports that his nasal discharge have been bloody mixed with mucous. About a week prior, his symptoms progressively became worse and 3 days prior to presentation he noticed left neck swelling. He tried to carry on with his ADLs but felt too weak and he collapsed on the bathroom floor, with no loss of consciousness. He reported having chills, and decreased po intake, but denied having cough, SOB or diarrhea. His wife is sick with similar symptoms.     In the ED, the patient was febrile (38.2), tachycardic (130s), tachypnic and requiring 2L of supplemental O2. Labs showed leukocytosis (38.9 - with neutrophilia), elevated creatinine (1.8 - baseline of 0.9), elevated LFTs (Tbili: 1.4, AST: 511, ALT: 122), elevated troponin (0.063), elevated BNP (142) and elevated procalcitonin (34.23). EKG showed SVT with PVCs. CXR showed mild increased interstitial attenuation (possible mild pulmonary edema). CT neck showed extensive left neck soft tissue swelling with inflammatory changes and edema with asymmetric heterogenous enlargement is seen of the left sternocleidomastoid & numerous enlarged cervical chain lymph nodes are seen throughout the region (cellulitis versus myositis or neoplastic process) without an abscess. He was given vancomycin, cefepime, sepsis bolus and 10 mg of diltiazem x2 and was started on a diltiazem drip. A transfer to [facility] for ENT/OMFS evaluation was attempted,  however, there were no bed availability. The patient was admitted to the ICU for further management.       Overview/Hospital Course:  This is a 67 year old male with a PMHx of BPH, TJ (on BiPAP) and obesity who presents with generalized weakness.  He reported having chills, and decreased po intake, but denied having cough, SOB or diarrhea. His wife is sick with similar symptoms.   CT neck showed extensive left neck soft tissue swelling with inflammatory changes and edema with asymmetric heterogenous enlargement is seen of the left sternocleidomastoid & numerous enlarged cervical chain lymph nodes are seen throughout the region (cellulitis versus myositis or neoplastic process) without an abscess. He was given vancomycin, cefepime, he was septic and hypotensive,improved with IVF,did not required vasopressors.  Was in Afib with RVR,bolus and 10 mg of diltiazem x2 and was started on a diltiazem drip.converted to sinus.  A transfer to [facility] for ENT evaluation was attempted, however, there were no bed availability. The patient was admitted to the ICU for further management.our ENT is back, saw patient today,possible intervention in AM.keep NPO past MN.  No sign of dental abscess,going frequently to dentist,  Hypotension  is resolved with IVF,BP is stable at this time  Afib is resolved with Cardizem gtt and  patiens in sinus  and stable,echo show preserved EF,per cardiology only on ASA and follow up as out patient for Holter monitoring.  Has rhabdomyolysis on IVF,will monitor.CPK is improving.  On IVF for ARF.casillas is in place.did not improved,consulted nephrology.  Cc is neck swelling.  Patient went for I and D of neck abscess on 10/26. Cultures sent. ENT continued to follow.       Interval History: No new issues. sCr slightly improved, nephrology following and transitioned from sodium bicarb gtt to NS gtt. Awaiting ID recs regarding abscess and abx.     Review of Systems   Constitutional:  Negative  for activity change, appetite change and chills.   HENT:  Negative for congestion and dental problem.    Eyes:  Negative for discharge and itching.   Respiratory:  Negative for apnea and chest tightness.    Cardiovascular:  Negative for chest pain.   Gastrointestinal:  Negative for abdominal distention and abdominal pain.   Endocrine: Negative for cold intolerance and heat intolerance.   Genitourinary:  Negative for difficulty urinating and dysuria.   Musculoskeletal:  Negative for arthralgias.   Skin:  Negative for color change and pallor.   Neurological:  Negative for dizziness and facial asymmetry.   Psychiatric/Behavioral:  Negative for agitation and behavioral problems.    Objective:     Vital Signs (Most Recent):  Temp: 97.7 °F (36.5 °C) (10/30/22 1924)  Pulse: 67 (10/30/22 1924)  Resp: 18 (10/30/22 1924)  BP: (!) 153/74 (10/30/22 1924)  SpO2: 96 % (10/30/22 1924) Vital Signs (24h Range):  Temp:  [97.1 °F (36.2 °C)-98 °F (36.7 °C)] 97.7 °F (36.5 °C)  Pulse:  [60-67] 67  Resp:  [18-20] 18  SpO2:  [94 %-96 %] 96 %  BP: (121-154)/(57-77) 153/74     Weight: 133.6 kg (294 lb 8.6 oz)  Body mass index is 41.08 kg/m².    Intake/Output Summary (Last 24 hours) at 10/30/2022 2304  Last data filed at 10/30/2022 2303  Gross per 24 hour   Intake 7123.57 ml   Output 1775 ml   Net 5348.57 ml        Physical Exam  Vitals and nursing note reviewed.   Constitutional:       General: He is not in acute distress.     Appearance: Normal appearance.   HENT:      Head: Normocephalic and atraumatic.      Nose: No congestion.   Eyes:      Conjunctiva/sclera: Conjunctivae normal.   Pulmonary:      Effort: Pulmonary effort is normal. No respiratory distress.   Skin:     General: Skin is warm and dry.   Neurological:      Mental Status: He is alert and oriented to person, place, and time.   Psychiatric:         Mood and Affect: Mood normal.         Behavior: Behavior normal.       Significant Labs: All pertinent labs within the past 24  hours have been reviewed.  BMP:   Recent Labs   Lab 10/30/22  1500         K 4.0      CO2 29   BUN 71*   CREATININE 4.0*   CALCIUM 7.6*       CBC:   Recent Labs   Lab 10/29/22  0404 10/30/22  1500   WBC 10.72  10.72 9.27   HGB 12.3*  12.3* 11.9*   HCT 36.2*  36.2* 34.5*     293 297         Significant Imaging:       Assessment/Plan:      * Sepsis  This patient does have evidence of infective focus  My overall impression is sepsis. Vital signs were reviewed and noted in progress note.  Antibiotics given-   Antibiotics (From admission, onward)    Start     Stop Route Frequency Ordered    10/26/22 2114  cefepime in dextrose 5 % IVPB 2 g         -- IV Every 24 hours (non-standard times) 10/26/22 0757    10/25/22 0900  mupirocin 2 % ointment         10/30 0859 Nasl 2 times daily 10/25/22 0714    10/25/22 0830  clindamycin in D5W 900 mg/50 mL IVPB 900 mg         -- IV Every 8 hours (non-standard times) 10/25/22 0725        Cultures were taken-   Microbiology Results (last 7 days)     Procedure Component Value Units Date/Time    Culture, Anaerobe [[id number]] Collected: 10/26/22 1630    Order Status: Completed Specimen: Abscess from Neck Updated: 10/30/22 1155     Anaerobic Culture No anaerobes isolated    Narrative:      LEFT NECK ABSCESS    Aerobic culture [[id number]]  (Abnormal) Collected: 10/26/22 1630    Order Status: Completed Specimen: Abscess from Neck Updated: 10/29/22 0928     Aerobic Bacterial Culture STREPTOCOCCUS PYOGENES (GROUP A)  Rare  Beta-hemolytic streptococci are routinely susceptible to   penicillins,cephalosporins and carbapenems.      Narrative:      LEFT NECK ABSCESS    AFB Culture & Smear [[id number]] Collected: 10/26/22 1630    Order Status: Completed Specimen: Abscess from Neck Updated: 10/29/22 0927     AFB Culture & Smear Culture in progress     AFB CULTURE STAIN No acid fast bacilli seen.    Narrative:      LEFT NECK ABSCESS    Blood culture x two cultures. Draw  prior to antibiotics. [[id number]] Collected: 10/24/22 1911    Order Status: Completed Specimen: Blood from Peripheral, Antecubital, Left Updated: 10/29/22 0303     Blood Culture, Routine No Growth after 4 days.    Narrative:      Aerobic and anaerobic    Blood culture x two cultures. Draw prior to antibiotics. [[id number]] Collected: 10/24/22 1910    Order Status: Completed Specimen: Blood from Peripheral, Hand, Right Updated: 10/29/22 0303     Blood Culture, Routine No Growth after 4 days.    Narrative:      Aerobic and anaerobic    Gram stain [[id number]] Collected: 10/26/22 1630    Order Status: Completed Specimen: Abscess from Neck Updated: 10/27/22 0831     Gram Stain Result Few WBC's      No organisms seen    Narrative:      LEFT NECK ABSCESS    Urine culture [[id number]] Collected: 10/25/22 0230    Order Status: Completed Specimen: Urine Updated: 10/27/22 0627     Urine Culture, Routine No growth    Narrative:      Specimen Source->Urine    Fungus culture [[id number]] Collected: 10/26/22 1630    Order Status: Sent Specimen: Abscess from Neck Updated: 10/26/22 1832        Latest lactate reviewed, they are-  No results for input(s): LACTATE in the last 72 hours.    Organ dysfunction indicated by Acute liver injury  Source- Likely deep soft tissue neck infection related to possibly recent sinusitis infection or odontogenic in source     Plan:   Source control Achieved by- Antibiotics (Vancomycin,cefpeime and clindamycin.  ENT consult -  transfer to [facility] for evaluation, currently there is no concern for airway compromise    I and D of neck Abscess on 10/27. Cultures sent. ID consulted      Non-traumatic rhabdomyolysis  No sign of trauma,on IVF and will monitor.improving.      Acute renal failure  On IVF,has casillas,will monitor.did not  improved,consulted nephrology.      Neck swelling  Likely related to an underlying infection, see plan for sepsis .  Appear to be sinusitis as source,no sign of dental  abscess,going frequently to dentist.  Need be seen by ENT,[facility],.  ENT evaluated and okay for d/c      Elevated LFTs  Likely related to underlying muscle infection and sepsis ,rhabdomyolyisis,hep panel is negative,will monitor.  Check RUQ US ,has fatty liver ,    Paroxysmal atrial fibrillation  - New onset   - [id number] Score: 1   - Will hold off on anticoagulation given low score   - Continue diltiazem drip   - Cardiology consult .  Off Cardizem,sinus at this time,echo. Show preserved EF,per cardiology only ASA at this time and holter monitoring as out patient.    Morbid obesity  Body mass index is 41.08 kg/m². Morbid obesity complicates all aspects of disease management from diagnostic modalities to treatment. Weight loss encouraged and health benefits explained to patient.         BPH with urinary obstruction  Resume Flomax       Complex sleep apnea syndrome  Resume nightly BiPAP         VTE Risk Mitigation (From admission, onward)         Ordered     heparin (porcine) injection 5,000 Units  Every 8 hours         10/25/22 0714     IP VTE HIGH RISK PATIENT  Once         10/24/22 2303     Place sequential compression device  Until discontinued         10/24/22 2303                      I have completed this tele-visit without the assistance of a telepresenter.    The attending portion of this evaluation, treatment, and documentation was performed per Rodrigo Ramos MD via Telemedicine AudioVisual using the secure [employer] software platform with 2 way audio/video. The provider was located off-site and the patient is located in the hospital. The aforementioned video software was utilized to document the relevant history and physical exam    Rodrigo Ramos MD  Department of Hospital Medicine   [facility]

## 2022-10-31 NOTE — PT/OT/SLP PROGRESS
Occupational Therapy   Treatment    Name: Elías Gay  MRN: [id number]  Admitting Diagnosis:  Sepsis  5 Days Post-Op    Recommendations:     Discharge Recommendations: home health OT (w/ family support)  Discharge Equipment Recommendations:  bedside commode, walker, rolling  Barriers to discharge: none      Assessment:     Elías Gay is a 67 y.o. male with a medical diagnosis of Sepsis.  He presents with the following performance deficits affecting function: weakness, gait instability, impaired balance, impaired self care skills, decreased lower extremity function, decreased upper extremity function.     Rehab Prognosis:  Good; patient would benefit from acute skilled OT services to address these deficits and reach maximum level of function.       Plan:     Patient to be seen 5 x/week to address the above listed problems via self-care/home management, therapeutic activities, therapeutic exercises  Plan of Care Expires: 11/08/22  Plan of Care Reviewed with: patient, daughter    Subjective   Pt agreeable to therapy.    Pain/Comfort:  Pain Rating 1: 0/10    Objective:     Communicated with: Nurse prior to session.  Patient found HOB elevated with telemetry upon OT entry to room.    General Precautions: Standard, fall   Orthopedic Precautions:N/A   Braces: N/A    Respiratory Status: Room air     Occupational Performance:     Bed Mobility:    Patient completed Scooting/Bridging with supervision  Patient completed Supine to Sit with supervision  Patient completed Sit to Supine with supervision     Functional Mobility/Transfers:  Patient completed Sit <> Stand Transfer with supervision  with  rolling walker  x 2 trials from EOB  Functional Mobility: Pt able to ambulate household distances within room for ADL's with SBA, RW. Reports ambulating to restroom without difficulties. Declined sitting in chair 2* sacral spine discomfort in chair.    Activities of Daily Living:  Grooming: supervision standing at sink to perform  oral care  Lower Body Dressing: stand by assistance to don slip on shoes seated EOB  Toileting: Pt declined any needs        [facility] 6 Click ADL: 21    Treatment & Education:  Bed mobility, functional transfer/mobility , and ADL completed as noted above.   Pt educated on safety awareness with all OOB mobility and ADL .   Encouraged increased OOB activity throughout day to maximize recovery.   Pt previously provided BUE HEP with theraband. Denied any questions regarding HEP; declined to perform during session 2* fatigue.  Educated on DME recs and home safety. Lengthy education on safety in shower and bathroom at home. Pt may benefit from TTB at home if pts bathroom can accommodate. All questions/concerns addressed within OT scope of practice.     Patient left HOB elevated with all lines intact, call button in reach, and nurse notified    GOALS:   Multidisciplinary Problems       Occupational Therapy Goals          Problem: Occupational Therapy    Goal Priority Disciplines Outcome Interventions   Occupational Therapy Goal     OT, PT/OT Ongoing, Progressing    Description: Goals to be met by: 11/8/22     Patient will increase functional independence with ADLs by performing:    LE Dressing with Modified [place].  Grooming while standing at sink with Modified [place].  Toileting from bedside commode with Modified [place] for hygiene and clothing management.   Supine to sit with Modified [place].  Step transfer with Modified [place]  Toilet transfer to toilet with Modified [place].  Upper extremity exercise program x15 reps per handout, with independence.                         Time Tracking:     OT Date of Treatment: 10/31/22  OT Start Time: 1026  OT Stop Time: 1049  OT Total Time (min): 23 min    Billable Minutes:Self Care/Home Management 23    OT/CASEY: CASEY     CASEY Visit Number: 1    10/31/2022

## 2022-10-31 NOTE — PLAN OF CARE
Problem: Occupational Therapy  Goal: Occupational Therapy Goal  Description: Goals to be met by: 11/8/22     Patient will increase functional independence with ADLs by performing:    LE Dressing with Modified [place].  Grooming while standing at sink with Modified [place].  Toileting from bedside commode with Modified [place] for hygiene and clothing management.   Supine to sit with Modified [place].  Step transfer with Modified [place]  Toilet transfer to toilet with Modified [place].  Upper extremity exercise program x15 reps per handout, with independence.    Outcome: Ongoing, Progressing

## 2022-10-31 NOTE — PLAN OF CARE
Problem: Adult Inpatient Plan of Care  Goal: Plan of Care Review  Outcome: Ongoing, Progressing  Goal: Patient-Specific Goal (Individualized)  Outcome: Ongoing, Progressing  Goal: Absence of Hospital-Acquired Illness or Injury  Outcome: Ongoing, Progressing  Goal: Optimal Comfort and Wellbeing  Outcome: Ongoing, Progressing  Goal: Readiness for Transition of Care  Outcome: Ongoing, Progressing     Problem: Adjustment to Illness (Sepsis/Septic Shock)  Goal: Optimal Coping  Outcome: Ongoing, Progressing     Problem: Bleeding (Sepsis/Septic Shock)  Goal: Absence of Bleeding  Outcome: Ongoing, Progressing     Problem: Infection  Goal: Absence of Infection Signs and Symptoms  Outcome: Ongoing, Progressing     Problem: Oral Intake Inadequate (Acute Kidney Injury/Impairment)  Goal: Optimal Nutrition Intake  Outcome: Ongoing, Progressing     Problem: Renal Function Impairment (Acute Kidney Injury/Impairment)  Goal: Effective Renal Function  Outcome: Ongoing, Progressing  Remain SR with 1st degree Block  on telemetry monitor. PRN medication effective. Explained plan of care, verbalized understanding. Educated pt .on new medicine . No injury during shift, Side rails up x 2, call light by bedside.  Family member remained at bedside

## 2022-10-31 NOTE — SUBJECTIVE & OBJECTIVE
Interval History: No new issues. sCr slightly improved, nephrology following and transitioned from sodium bicarb gtt to NS gtt. Awaiting ID recs regarding abscess and abx.     Review of Systems   Constitutional:  Negative for activity change, appetite change and chills.   HENT:  Negative for congestion and dental problem.    Eyes:  Negative for discharge and itching.   Respiratory:  Negative for apnea and chest tightness.    Cardiovascular:  Negative for chest pain.   Gastrointestinal:  Negative for abdominal distention and abdominal pain.   Endocrine: Negative for cold intolerance and heat intolerance.   Genitourinary:  Negative for difficulty urinating and dysuria.   Musculoskeletal:  Negative for arthralgias.   Skin:  Negative for color change and pallor.   Neurological:  Negative for dizziness and facial asymmetry.   Psychiatric/Behavioral:  Negative for agitation and behavioral problems.    Objective:     Vital Signs (Most Recent):  Temp: 97.7 °F (36.5 °C) (10/30/22 1924)  Pulse: 67 (10/30/22 1924)  Resp: 18 (10/30/22 1924)  BP: (!) 153/74 (10/30/22 1924)  SpO2: 96 % (10/30/22 1924) Vital Signs (24h Range):  Temp:  [97.1 °F (36.2 °C)-98 °F (36.7 °C)] 97.7 °F (36.5 °C)  Pulse:  [60-67] 67  Resp:  [18-20] 18  SpO2:  [94 %-96 %] 96 %  BP: (121-154)/(57-77) 153/74     Weight: 133.6 kg (294 lb 8.6 oz)  Body mass index is 41.08 kg/m².    Intake/Output Summary (Last 24 hours) at 10/30/2022 2304  Last data filed at 10/30/2022 2303  Gross per 24 hour   Intake 7123.57 ml   Output 1775 ml   Net 5348.57 ml        Physical Exam  Vitals and nursing note reviewed.   Constitutional:       General: He is not in acute distress.     Appearance: Normal appearance.   HENT:      Head: Normocephalic and atraumatic.      Nose: No congestion.   Eyes:      Conjunctiva/sclera: Conjunctivae normal.   Pulmonary:      Effort: Pulmonary effort is normal. No respiratory distress.   Skin:     General: Skin is warm and dry.   Neurological:       Mental Status: He is alert and oriented to person, place, and time.   Psychiatric:         Mood and Affect: Mood normal.         Behavior: Behavior normal.       Significant Labs: All pertinent labs within the past 24 hours have been reviewed.  BMP:   Recent Labs   Lab 10/30/22  1500         K 4.0      CO2 29   BUN 71*   CREATININE 4.0*   CALCIUM 7.6*       CBC:   Recent Labs   Lab 10/29/22  0404 10/30/22  1500   WBC 10.72  10.72 9.27   HGB 12.3*  12.3* 11.9*   HCT 36.2*  36.2* 34.5*     293 297         Significant Imaging:

## 2022-10-31 NOTE — PLAN OF CARE
[facility] - Telemetry  Discharge Final Note    Primary Care Provider: Kameron Diaz MD    Expected Discharge Date: 10/31/2022    Final Discharge Note (most recent)       Final Note - 10/31/22 1500          Final Note    Assessment Type Final Discharge Note     Anticipated Discharge Disposition Home-Health Care [facility]     What phone number can be called within the next 1-3 days to see how you are doing after discharge? [phone]     Hospital Resources/Appts/Education Provided Appointments scheduled and added to AVS;Appointments scheduled by Navigator/Coordinator        Post-Acute Status    Post-Acute Authorization Home Health;HME     HME Status Set-up Complete/Auth obtained     Home Health Status Set-up Complete/Auth obtained     Coverage PHN     Discharge Delays None known at this time                     Important Message from Medicare  Important Message from Medicare regarding Discharge Appeal Rights: Given to patient/caregiver, Explained to patient/caregiver, Signed/date by patient/caregiver     Date IMM was signed: 10/31/22  Time IMM was signed: 7154    Contact Info       Kameron Diaz MD   Specialty: Internal Medicine   Relationship: PCP - General    [address]  [place] [address]   Phone: [phone]       Next Steps: Follow up in 1 week(s)    [facility]   Specialty: DME Provider    [address]  [place] [address]   Phone: [phone]       Next Steps: Call    Instructions: Please contact [facility] with any question/concern with rolling walker or bedside commode.    [facility]   Specialty: DME Provider, Home Health Services    [address]  [address]  [place] [address]   Phone: [phone]       Next Steps: Call    Instructions: Please call for any question/concern with Home Health agency.          MARLEN spoke with Crystal ([facility]) re: patient's HH Plan of Care. Crystal confirmed receiving HH Plan of Care and will working on arranging HH.    MARLEN met with patient at  bedside. MARLEN explained follow up appointments will be on discharge instructions. Patient voiced understanding.     MARLEN informed  (nurse) that patient cleared from case management standpoint.

## 2022-10-31 NOTE — PLAN OF CARE
[facility] - Telemetry      HOME HEALTH ORDERS  FACE TO FACE ENCOUNTER    Patient Name: Elías Gay  YOB: 1955    PCP: Kameron Diaz MD   PCP Address[facility] [address] [facility] [place] [address]  PCP Phone Number: [phone]  PCP Fax: [phone]    Encounter Date: 10/24/22    Admit to Home Health    Diagnoses:  Active Hospital Problems    Diagnosis  POA    *Sepsis [A41.9]  Yes    Infection due to Streptococcus pyogenes [A49.1]  Unknown    Acute renal failure [N17.9]  Yes    Non-traumatic rhabdomyolysis [M62.82]  Yes    Paroxysmal atrial fibrillation [I48.0]  Yes    Elevated LFTs [R79.89]  Yes    Neck swelling [R22.1]  Yes    Morbid obesity [E66.01]  Yes     BMI 43      BPH with urinary obstruction [N40.1, N13.8]  Yes     Chronic    Complex sleep apnea syndrome [G47.31]  Yes      Resolved Hospital Problems   No resolved problems to display.       Follow Up Appointments:  Future Appointments   Date Time Provider Department Center   11/2/2022  9:15 AM Bethany Macdonald MD [facility] ENT [facility]   11/11/2022  3:00 PM Angela Hicks MD [facility] ID [address]       Allergies:Review of patient's allergies indicates:  No Known Allergies    Medications: Review discharge medications with patient and family and provide education.    Current Facility-Administered Medications   Medication Dose Route Frequency Provider Last Rate Last Admin    0.9%  NaCl infusion   Intravenous Continuous Symone Diop  mL/hr at 10/31/22 0644 Rate Verify at 10/31/22 0644    acetaminophen tablet 650 mg  650 mg Oral Q4H PRN Bob Mccollum MD        albuterol-ipratropium 2.5 mg-0.5 mg/3 mL nebulizer solution 3 mL  3 mL Nebulization Q4H PRN Bob Mccollum MD        calcium gluconate 1 g in NS IVPB (premixed)  1 g Intravenous PRN Bob Mccollum MD        calcium gluconate 1 g in NS IVPB (premixed)  2 g Intravenous PRN Bob Mccollum MD        calcium gluconate 1 g in NS IVPB (premixed)  3 g  Intravenous PRN Bob Mccollum MD        cefepime in dextrose 5 % IVPB 2 g  2 g Intravenous Q24H Bob Mccollum MD   Stopped at 10/30/22 2157    clindamycin in D5W 900 mg/50 mL IVPB 900 mg  900 mg Intravenous Q8H Bob Mccollum MD   Stopped at 10/31/22 0920    famotidine (PF) injection 20 mg  20 mg Intravenous Daily Bob Mccollum MD   20 mg at 10/31/22 0813    heparin (porcine) injection 5,000 Units  5,000 Units Subcutaneous Q8H Bob Mccollum MD   5,000 Units at 10/31/22 0556    HYDROcodone-acetaminophen  mg per tablet 1 tablet  1 tablet Oral Q4H PRN Bob Mccollum MD        HYDROcodone-acetaminophen 5-325 mg per tablet 1 tablet  1 tablet Oral Q4H PRN Bob Mccollum MD        HYDROmorphone (PF) injection 0.2 mg  0.2 mg Intravenous Q5 Min PRN Seun Rasmussen MD   0.2 mg at 10/26/22 1750    magnesium sulfate 2g in water 50mL IVPB (premix)  2 g Intravenous PRN Bob Mccollum MD        magnesium sulfate 2g in water 50mL IVPB (premix)  4 g Intravenous PRN Bob Mccollum MD        melatonin tablet 6 mg  6 mg Oral Nightly PRN Bob Mccollum MD   6 mg at 10/29/22 2045    metoprolol succinate (TOPROL-XL) 24 hr tablet 25 mg  25 mg Oral BID Bob Mccollum MD   25 mg at 10/31/22 0812    ondansetron injection 4 mg  4 mg Intravenous Q8H PRN Bob Mccollum MD        ondansetron injection 4 mg  4 mg Intravenous Daily PRN Seun Rasmussen MD        phenyleph-min oil-petrolatum 0.25-14-74.9 % ointment 1 applicator  1 applicator Rectal QID PRN Wilbert Estrada MD   1 applicator at 10/27/22 0058    potassium chloride 10 mEq in 100 mL IVPB  40 mEq Intravenous PRN Bob Mccollum MD        And    potassium chloride 10 mEq in 100 mL IVPB  60 mEq Intravenous PRN Bob Mccollum MD        And    potassium chloride 10 mEq in 100 mL IVPB  80 mEq Intravenous PRN Bob Mccollum MD        prochlorperazine injection  Soln 5 mg  5 mg Intravenous Q6H PRN Bob Mccollum MD        prochlorperazine injection Soln 5 mg  5 mg Intravenous Q30 Min PRN Seun Rasmussen MD        sodium chloride 0.9% flush 10 mL  10 mL Intravenous Q6H Bob Mccollum MD   10 mL at 10/31/22 1112    sodium chloride 0.9% flush 10 mL  10 mL Intravenous PRN Seun Rasmussen MD   10 mL at 10/30/22 2304    sodium phosphate 15 mmol in dextrose 5 % 250 mL IVPB  15 mmol Intravenous PRN Bob Mccollum MD        sodium phosphate 20.01 mmol in dextrose 5 % 250 mL IVPB  20.01 mmol Intravenous PRN Bob Mccollum MD        sodium phosphate 30 mmol in dextrose 5 % 250 mL IVPB  30 mmol Intravenous PRN Bob Mccollum MD        tamsulosin 24 hr capsule 0.4 mg  1 capsule Oral Daily Bob Mccollum MD   0.4 mg at 10/31/22 0812     Current Discharge Medication List        START taking these medications    Details   cefadroxil (DURICEF) 500 MG Cap Take 1 capsule (500 mg total) by mouth every 12 (twelve) hours. for 21 days  Qty: 42 capsule, Refills: 0           CONTINUE these medications which have NOT CHANGED    Details   azelastine (ASTELIN) 137 mcg (0.1 %) nasal spray 1 spray (137 mcg total) by Nasal route 2 (two) times daily.  Qty: 30 mL, Refills: 11      calcium-magnesium-zinc Tab Take 2 tablets by mouth once daily.      ciclopirox (PENLAC) 8 % Soln Apply daily to affected nail. Must remove and restart weekly  Qty: 1 each, Refills: 5    Associated Diagnoses: Onychomycosis      guaiFENesin (MUCINEX) 600 mg 12 hr tablet Take 1,200 mg by mouth 2 (two) times daily.      ketoconazole (NIZORAL) 2 % cream AAA bid to both feet x 3 weeks  Qty: 60 g, Refills: 3    Associated Diagnoses: Tinea pedis of both feet      multivitamin capsule Take 1 capsule by mouth once daily.      sars-cov-2, covid-19 omicron, (MODERNA COVID-19) 50 mcg/0.5 mL injection Inject into the muscle.  Qty: 0.5 mL, Refills: 0      tamsulosin (FLOMAX) 0.4 mg Cap TAKE 1  CAPSULE BY MOUTH EVERY DAY  Qty: 90 capsule, Refills: 1    Associated Diagnoses: BPH with urinary obstruction           STOP taking these medications       celecoxib (CELEBREX) 200 MG capsule Comments:   Reason for Stopping:                 I have seen and examined this patient within the last 30 days. My clinical findings that support the need for the home health skilled services and home bound status are the following:no   Weakness/numbness causing balance and gait disturbance due to Weakness/Debility making it taxing to leave home.     Diet:   renal diet    Labs:  SN to perform labs:  BMP: once this week; . and Report Lab results to PCP.    Referrals/ Consults  Physical Therapy to evaluate and treat. Evaluate for home safety and equipment needs; Establish/upgrade home exercise program. Perform / instruct on therapeutic exercises, gait training, transfer training, and Range of Motion.  Occupational Therapy to evaluate and treat. Evaluate home environment for safety and equipment needs. Perform/Instruct on transfers, ADL training, ROM, and therapeutic exercises.    Activities:   activity as tolerated    Nursing:   Agency to admit patient within 24 hours of hospital discharge unless specified on physician order or at patient request    SN to complete comprehensive assessment including routine vital signs. Instruct on disease process and s/s of complications to report to MD. Review/verify medication list sent home with the patient at time of discharge  and instruct patient/caregiver as needed. Frequency may be adjusted depending on start of care date.     Skilled nurse to perform up to 3 visits PRN for symptoms related to diagnosis    Notify MD if SBP > 160 or < 90; DBP > 90 or < 50; HR > 120 or < 50; Temp > 101; O2 < 88%; Other:       Ok to schedule additional visits based on staff availability and patient request on consecutive days within the home health episode.    When multiple disciplines ordered:    Start of  Care occurs on Sunday - Wednesday schedule remaining discipline evaluations as ordered on separate consecutive days following the start of care.    Thursday SOC -schedule subsequent evaluations Friday and Monday the following week.     Friday - Saturday SOC - schedule subsequent discipline evaluations on consecutive days starting Monday of the following week.    For all post-discharge communication and subsequent orders please contact patient's primary care physician.     Miscellaneous   N/A    Home Health Aide:  Physical Therapy Three times weekly and Occupational Therapy Three times weekly    Wound Care Orders  no    I certify that this patient is confined to his home and needs physical therapy and occupational therapy.

## 2022-10-31 NOTE — SUBJECTIVE & OBJECTIVE
Past Medical History:   Diagnosis Date    Acute renal failure 10/25/2022    Arthritis 1971    Bone Spurs in feet    Basal cell carcinoma 11/06/2018    left ear helix    Foreign body in conjunctival sac     Hernia, inguinal, right 2002    Joint pain 1971    Bones of feet    Keloid cicatrix 1958 2010 2018 2019    Hernia, Knee, Arm, Ear    Melanoma 09/14/2018    Right Arm 0.6mm    Severe sleep apnea        Past Surgical History:   Procedure Laterality Date    CLOSURE OF DEFECT OF MOHS PROCEDURE Left 1/3/2019    Procedure: CLOSURE, MOHS PROCEDURE DEFECT LEFT EAR;  Surgeon: Jeramy Meek MD;  [facility];  Service: Plastics;  Laterality: Left;  plastics set    COLONOSCOPY N/A 6/30/2021    Procedure: COLONOSCOPY;  Surgeon: Juliano Santoyo MD;  Location: [facility] ENDO ([facility]);  Service: Endoscopy;  Laterality: N/A;  severe sleep apnea     fully vaccinated-GT    HERNIA REPAIR Left     3 years of age    HERNIA REPAIR N/A     Ventral wall at 3 year of age.    INCISION AND DRAINAGE, NECK Left 10/26/2022    Procedure: INCISION AND DRAINAGE,NECK;  Surgeon: Bethany Macdonald MD;  Location: [facility] OR;  Service: ENT;  Laterality: Left;    KNEE SURGERY Right 1984    Arthroscopic    NASAL SEPTUM SURGERY N/A 2009    SKIN BIOPSY  several over time       Review of patient's allergies indicates:  No Known Allergies    No current facility-administered medications on file prior to encounter.     Current Outpatient Medications on File Prior to Encounter   Medication Sig    azelastine (ASTELIN) 137 mcg (0.1 %) nasal spray 1 spray (137 mcg total) by Nasal route 2 (two) times daily.    calcium-magnesium-zinc Tab Take 2 tablets by mouth once daily.    ciclopirox (PENLAC) 8 % Soln Apply daily to affected nail. Must remove and restart weekly    guaiFENesin (MUCINEX) 600 mg 12 hr tablet Take 1,200 mg by mouth 2 (two) times daily.    ketoconazole (NIZORAL) 2 % cream AAA bid to both feet x 3 weeks    multivitamin capsule Take 1  capsule by mouth once daily.    celecoxib (CELEBREX) 200 MG capsule Take 1 capsule (200 mg total) by mouth once daily. With food    sars-cov-2, covid-19 omicron, (MODERNA COVID-19) 50 mcg/0.5 mL injection Inject into the muscle.     Family History       Problem Relation (Age of Onset)    Aneurysm Mother    Cancer Father (72)    Eczema Brother    Gout Brother    Hypertension Mother    No Known Problems Daughter, Son, Daughter, Son    Psoriasis Brother    Stroke Mother          Tobacco Use    Smoking status: Never    Smokeless tobacco: Never    Tobacco comments:     Second hand smoke from mother. Cigarrette smoke inflames my sinuses.   Substance and Sexual Activity    Alcohol use: Not Currently     Alcohol/week: 1.0 standard drink     Types: 1 Glasses of wine per week     Comment: Rare    Drug use: No    Sexual activity: Yes     Partners: Female     Birth control/protection: None     Review of Systems   Constitutional:  Positive for activity change, appetite change and chills.   HENT:  Positive for facial swelling, sinus pressure and sinus pain. Negative for dental problem.    Eyes: Negative.    Respiratory: Negative.     Cardiovascular: Negative.    Gastrointestinal: Negative.    Endocrine: Negative.    Genitourinary: Negative.    Musculoskeletal: Negative.  Negative for neck pain.   Skin: Negative.    Allergic/Immunologic: Negative.    Neurological: Negative.    Psychiatric/Behavioral: Negative.     Objective:     Vital Signs (Most Recent):  Temp: 98.1 °F (36.7 °C) (10/31/22 1122)  Pulse: 68 (10/31/22 1122)  Resp: 18 (10/31/22 1122)  BP: (!) 168/82 (10/31/22 1122)  SpO2: (!) 93 % (10/31/22 1122)   Vital Signs (24h Range):  Temp:  [97.1 °F (36.2 °C)-98.1 °F (36.7 °C)] 98.1 °F (36.7 °C)  Pulse:  [61-68] 68  Resp:  [18-20] 18  SpO2:  [93 %-96 %] 93 %  BP: (114-173)/(59-87) 168/82     Weight: 133.6 kg (294 lb 8.6 oz)  Body mass index is 41.08 kg/m².    Physical Exam  Vitals and nursing note reviewed.   HENT:      Head:  Normocephalic and atraumatic.      Nose: Nose normal.      Mouth/Throat:      Mouth: Mucous membranes are dry.   Eyes:      Extraocular Movements: Extraocular movements intact.   Neck:      Comments: Surgical incision on L neck with staples. No fluctuance or redeness or drainage  Cardiovascular:      Rate and Rhythm: Normal rate and regular rhythm.      Pulses: Normal pulses.      Heart sounds: No murmur heard.  Pulmonary:      Effort: Pulmonary effort is normal.      Breath sounds: Normal breath sounds. No wheezing.   Abdominal:      General: Abdomen is flat.      Palpations: Abdomen is soft.      Tenderness: There is no abdominal tenderness.   Musculoskeletal:         General: No swelling.      Right lower leg: No edema.      Left lower leg: No edema.      Comments: Left sided neck swelling, firm without fluctuance    Skin:     General: Skin is warm.      Comments: No boils in pannus. +onchomycosis   Neurological:      General: No focal deficit present.      Mental Status: He is alert.      Motor: No weakness.   Psychiatric:         Mood and Affect: Mood normal.           Significant Labs: All pertinent labs within the past 24 hours have been reviewed.    Significant Imaging: I have reviewed all pertinent imaging results/findings within the past 24 hours.

## 2022-11-01 PROCEDURE — G0180 MD CERTIFICATION HHA PATIENT: HCPCS | Mod: ,,, | Performed by: STUDENT IN AN ORGANIZED HEALTH CARE EDUCATION/TRAINING PROGRAM

## 2022-11-01 PROCEDURE — G0180 PR HOME HEALTH MD CERTIFICATION: ICD-10-PCS | Mod: ,,, | Performed by: STUDENT IN AN ORGANIZED HEALTH CARE EDUCATION/TRAINING PROGRAM

## 2022-11-02 ENCOUNTER — PATIENT OUTREACH (OUTPATIENT)
Dept: ADMINISTRATIVE | Facility: CLINIC | Age: 67
End: 2022-11-02
Payer: MEDICARE

## 2022-11-02 ENCOUNTER — OFFICE VISIT (OUTPATIENT)
Dept: OTOLARYNGOLOGY | Facility: CLINIC | Age: 67
End: 2022-11-02
Payer: MEDICARE

## 2022-11-02 VITALS — HEIGHT: 71 IN | WEIGHT: 294 LBS | BODY MASS INDEX: 41.16 KG/M2

## 2022-11-02 DIAGNOSIS — Z09 S/P NECK SURGERY, FOLLOW-UP EXAM: ICD-10-CM

## 2022-11-02 DIAGNOSIS — J30.2 SEASONAL ALLERGIC RHINITIS, UNSPECIFIED TRIGGER: ICD-10-CM

## 2022-11-02 DIAGNOSIS — J34.89 NASAL SEPTAL SPUR: ICD-10-CM

## 2022-11-02 DIAGNOSIS — K21.9 LARYNGOPHARYNGEAL REFLUX (LPR): ICD-10-CM

## 2022-11-02 DIAGNOSIS — R09.82 POSTNASAL DRIP: ICD-10-CM

## 2022-11-02 DIAGNOSIS — R07.0 THROAT PAIN IN ADULT: Primary | ICD-10-CM

## 2022-11-02 DIAGNOSIS — J34.89 PERFORATION OF NASAL SEPTUM: ICD-10-CM

## 2022-11-02 PROCEDURE — 3008F PR BODY MASS INDEX (BMI) DOCUMENTED: ICD-10-PCS | Mod: CPTII,S$GLB,, | Performed by: OTOLARYNGOLOGY

## 2022-11-02 PROCEDURE — 1125F AMNT PAIN NOTED PAIN PRSNT: CPT | Mod: CPTII,S$GLB,, | Performed by: OTOLARYNGOLOGY

## 2022-11-02 PROCEDURE — 1111F PR DISCHARGE MEDS RECONCILED W/ CURRENT OUTPATIENT MED LIST: ICD-10-PCS | Mod: CPTII,S$GLB,, | Performed by: OTOLARYNGOLOGY

## 2022-11-02 PROCEDURE — 31575 DIAGNOSTIC LARYNGOSCOPY: CPT | Mod: 79,S$GLB,, | Performed by: OTOLARYNGOLOGY

## 2022-11-02 PROCEDURE — 3288F PR FALLS RISK ASSESSMENT DOCUMENTED: ICD-10-PCS | Mod: CPTII,S$GLB,, | Performed by: OTOLARYNGOLOGY

## 2022-11-02 PROCEDURE — 1125F PR PAIN SEVERITY QUANTIFIED, PAIN PRESENT: ICD-10-PCS | Mod: CPTII,S$GLB,, | Performed by: OTOLARYNGOLOGY

## 2022-11-02 PROCEDURE — 3008F BODY MASS INDEX DOCD: CPT | Mod: CPTII,S$GLB,, | Performed by: OTOLARYNGOLOGY

## 2022-11-02 PROCEDURE — 1111F DSCHRG MED/CURRENT MED MERGE: CPT | Mod: CPTII,S$GLB,, | Performed by: OTOLARYNGOLOGY

## 2022-11-02 PROCEDURE — 3288F FALL RISK ASSESSMENT DOCD: CPT | Mod: CPTII,S$GLB,, | Performed by: OTOLARYNGOLOGY

## 2022-11-02 PROCEDURE — 1101F PR PT FALLS ASSESS DOC 0-1 FALLS W/OUT INJ PAST YR: ICD-10-PCS | Mod: CPTII,S$GLB,, | Performed by: OTOLARYNGOLOGY

## 2022-11-02 PROCEDURE — 99213 PR OFFICE/OUTPT VISIT, EST, LEVL III, 20-29 MIN: ICD-10-PCS | Mod: 25,S$GLB,, | Performed by: OTOLARYNGOLOGY

## 2022-11-02 PROCEDURE — 1101F PT FALLS ASSESS-DOCD LE1/YR: CPT | Mod: CPTII,S$GLB,, | Performed by: OTOLARYNGOLOGY

## 2022-11-02 PROCEDURE — 99213 OFFICE O/P EST LOW 20 MIN: CPT | Mod: 25,S$GLB,, | Performed by: OTOLARYNGOLOGY

## 2022-11-02 PROCEDURE — 31575 PR LARYNGOSCOPY, FLEXIBLE; DIAGNOSTIC: ICD-10-PCS | Mod: 79,S$GLB,, | Performed by: OTOLARYNGOLOGY

## 2022-11-02 RX ORDER — FLUTICASONE PROPIONATE 50 MCG
2 SPRAY, SUSPENSION (ML) NASAL 2 TIMES DAILY
Qty: 18.2 ML | Refills: 3 | Status: SHIPPED | OUTPATIENT
Start: 2022-11-02 | End: 2023-04-03

## 2022-11-02 NOTE — PROGRESS NOTES
OTOLARYNGOLOGY CLINIC NOTE  Date:  11/02/2022     Chief complaint:  Chief Complaint   Patient presents with    Hospital Follow Up     From neck surgery       History of Present Illness  Elías Gay is a 67 y.o. male  presenting today for a followup.  Underwent incision and drainage of neck abscess 10-26-22 . He is also complaining of new problem today of throat irritation and sinus problems.     Forehead pressure and postnasal drainage (PND); pnd hits throat area   +allergies seasonally, used to get sinus infection 1-2 times year        Past Medical History  Past Medical History:   Diagnosis Date    Acute renal failure 10/25/2022    Arthritis 1971    Bone Spurs in feet    Basal cell carcinoma 11/06/2018    left ear helix    Foreign body in conjunctival sac     Hernia, inguinal, right 2002    Joint pain 1971    Bones of feet    Keloid cicatrix 1958 2010 2018 2019    Hernia, Knee, Arm, Ear    Melanoma 09/14/2018    Right Arm 0.6mm    Severe sleep apnea         Past Surgical History  Past Surgical History:   Procedure Laterality Date    CLOSURE OF DEFECT OF MOHS PROCEDURE Left 1/3/2019    Procedure: CLOSURE, MOHS PROCEDURE DEFECT LEFT EAR;  Surgeon: Jeramy Meek MD;  Location: [address];  Service: Plastics;  Laterality: Left;  plastics set    COLONOSCOPY N/A 6/30/2021    Procedure: COLONOSCOPY;  Surgeon: Juliano Santoyo MD;  Location: [facility] ([address] [place] [address]);  Service: Endoscopy;  Laterality: N/A;  severe sleep apnea     fully vaccinated-GT    HERNIA REPAIR Left     3 years of age    HERNIA REPAIR N/A     Ventral wall at 3 year of age.    INCISION AND DRAINAGE, NECK Left 10/26/2022    Procedure: INCISION AND DRAINAGE,NECK;  Surgeon: Bethany Macdonald MD;  Location: [facility];  Service: ENT;  Laterality: Left;    KNEE SURGERY Right 1984    Arthroscopic    NASAL SEPTUM SURGERY N/A 2009    SKIN BIOPSY  several over time        Medications  Current Outpatient Medications on File Prior to Visit   Medication Sig  Dispense Refill    calcium-magnesium-zinc Tab Take 2 tablets by mouth once daily.      cefadroxil (DURICEF) 500 MG Cap Take 1 capsule (500 mg total) by mouth every 12 (twelve) hours. for 21 days 42 capsule 0    ciclopirox (PENLAC) 8 % Soln Apply daily to affected nail. Must remove and restart weekly 1 each 5    guaiFENesin (MUCINEX) 600 mg 12 hr tablet Take 1,200 mg by mouth 2 (two) times daily.      ketoconazole (NIZORAL) 2 % cream AAA bid to both feet x 3 weeks 60 g 3    multivitamin capsule Take 1 capsule by mouth once daily.      sars-cov-2, covid-19 omicron, (MODERNA COVID-19) 50 mcg/0.5 mL injection Inject into the muscle. 0.5 mL 0    tamsulosin (FLOMAX) 0.4 mg Cap TAKE 1 CAPSULE BY MOUTH EVERY DAY 90 capsule 1    azelastine (ASTELIN) 137 mcg (0.1 %) nasal spray 1 spray (137 mcg total) by Nasal route 2 (two) times daily. (Patient not taking: Reported on 11/2/2022) 30 mL 11     No current facility-administered medications on file prior to visit.       Review of Systems  Review of Systems   Constitutional:  Positive for malaise/fatigue.   HENT: Negative.     Genitourinary: Negative.    Musculoskeletal: Negative.    Skin: Negative.    Endo/Heme/Allergies:  Bruises/bleeds easily.      Social History   reports that he has never smoked. He has never used smokeless tobacco. He reports that he does not currently use alcohol after a past usage of about 1.0 standard drink per week. He reports that he does not use drugs.     Family History  Family History   Problem Relation Age of Onset    Hypertension Mother     Stroke Mother     Aneurysm Mother     Cancer Father 72        Prostate and liver    No Known Problems Daughter     No Known Problems Son     Gout Brother     Eczema Brother     Psoriasis Brother     No Known Problems Daughter     No Known Problems Son     Cataracts Neg Hx     Glaucoma Neg Hx     Macular degeneration Neg Hx     Melanoma Neg Hx     Eczema Neg Hx     Lupus Neg Hx     Psoriasis Neg Hx      "    Physical Exam   There were no vitals filed for this visit. Body mass index is 41 kg/m².  Weight: 133.4 kg (294 lb)   Height: 5' 11" (180.3 cm)     GENERAL: no acute distress.  HEAD: normocephalic.   EYES: No scleral icterus  EARS: external ear without lesion, normal pinna shape and position.    NOSE: external nose without significant bony abnormality  ORAL CAVITY/OROPHARYNX: tongue mobile.   NECK: trachea midline. Neck c/d/I with staples, no swelling nor erythema. Staples removed  LYMPH NODES:No cervical lymphadenopathy.  RESPIRATORY: no stridor, no stertor. Voice normal. Respirations nonlabored.  NEURO: alert, responds to questions appropriately.    PSYCH:mood appropriate      PROCEDURE NOTE  NAME OF PROCEDURE: Flexible Laryngoscopy, diagnostic  INDICATIONS: gag reflex precludes mirror exam, throat pain in adult   FINDINGS: small anterior septal perforation, spur of left septum. Turbinate hypertrophy, pseudosulcus. Normal vocal fold motion, no malignancy     Consent: After procedure was explained in detail and all questions answered, verbal consent was obtained for performing flexible laryngoscopy.  Anesthesia: topical 4% lidocaine and neosynephrine  Procedure: With patient in seated position, the scope was inserted into the bilateral nasal passageway and advanced atraumatically into the nasopharynx to examine the following structures:  Nasal cavity: Turbinates with  hypertrophy. mild middle meatal edema. No purulent drainage.   Nasopharynx: no mass or lesion noted in nasopharynx.   Oropharynx: base of tongue without  mass or ulceration. Lingual tonsils normal in appearance  Hypopharynx: posterior pharyngeal wall without mass or lesion. No pooling of secretions. Pyriform sinuses visible without mass or lesion  Larynx: epiglottis normal without lesion. False vocal folds without edema/erythema/lesion. True vocal folds mobile and without lesion. Mild interarytenoid edema no erythema . Pseudosulcus.  Postcricoid " region with mild edema no lesion   Subglottis: visualized portion of subglottis normal in appearance    After examination performed, the scope was removed atraumatically . The patient tolerated the procedure well. Photodocumentation obtained with representative images below, all images and/or videos uploaded in media section of epic.            Imaging:  The patient does not have any new imaging of the head and neck since last visit.     Labs:  CBC  Recent Labs   Lab 10/27/22  0604 10/29/22  0404 10/30/22  1500   WBC 22.06 H 10.72  10.72 9.27   Hemoglobin 12.1 L 12.3 L  12.3 L 11.9 L   Hematocrit 34.3 L 36.2 L  36.2 L 34.5 L   MCV 90 91  91 92   Platelets 261 293  293 297     BMP  Recent Labs   Lab 10/24/22  1909 10/25/22  0050 10/26/22  0304 10/29/22  0404 10/30/22  1500 10/31/22  0429   Glucose 93 125 H   < > 79 103 92   Sodium 136 135 L   < > 138 142 141   Potassium 3.5 3.7   < > 3.9 4.0 3.9   Chloride 102 101   < > 100 101 102   CO2 20 L 22 L   < > 28 29 30 H   BUN 23 29 H   < > 71 H 71 H 68 H   Creatinine 1.8 H 2.3 H   < > 4.9 H 4.0 H 3.6 H   Calcium 8.8 8.4 L   < > 7.5 L 7.6 L 7.4 L   Phosphorus 1.9 L 4.6 H  --   --   --   --    Magnesium 1.9 1.9  --   --   --   --     < > = values in this interval not displayed.     COAGS  Recent Labs   Lab 10/24/22  1929   INR 1.3 H       Assessment  1. Throat pain in adult    2. Postnasal drip    3. Seasonal allergic rhinitis, unspecified trigger    4. Laryngopharyngeal reflux (LPR)    5. Perforation of nasal septum    6. Nasal septal spur    7. S/P neck surgery, follow-up exam     Plan:  Discussed plan of care with patient in detail and all questions answered. Patient reported understanding of plan of care.     Discsussed ppi but do not recommend unless aprroved by kidney dr , he would prefer to avoid anyways  Trial of nasal sprays for now- saline and flonase, can add astelin if needed    Staples removed.  Reviewed path results with patient, some necrosis present but  pt clinically improving and no evidence of persistent infection     A 79 modifier used for separate E&M not related to postoperative care within the global period.     I spent a total of 20 minutes on the day of the visit.  This includes face to face time and non-face to face time preparing to see the patient (eg, review of tests), obtaining and/or reviewing separately obtained history, documenting clinical information in the electronic or other health record, independently interpreting results and communicating results to the patient/family/caregiver, or care coordinator.   Please be aware that this note has been generated with the assistance of MModal voice-to-text.  Please excuse any spelling or grammatical errors.

## 2022-11-02 NOTE — PATIENT INSTRUCTIONS
Can try sugar free lozenges with pectin ,  such as halls fruit breezers    Clearing your throat leads to more swelling in the voice box.  This will worsen your symptoms.  You should try to minimize throat clearing as much as possible.      Avoid mouthwash with alcohol such as listerine. You should use biotene mouth wash    Can sleep with bedside humidifier     You should aim to drink 2 to 3 liters of water daily if you are drinking one cup of coffee.  If you have trouble drinking water, you can cut back or cut out caffeine. ouble drinking water, you can cut back or cut out caffeine.       Information and instructions from your visit with me today:    Start using the following medication nasal sprays:   Fluticasone spray:    This medication is a steroid spray. It stays within the nose and does not have absorption into the body that leads to side effects that one has with oral steroid medication. Fluticasone nasal spray is the same as the Flonase brand nasal spray. Discuss with your pharmacist if the price is lower over the counter or with a prescription ( this varies depending on insurance). The medication that is over the counter is the same as the prescription medication. Use this medication as instructed on the prescription, 1-2 sprays on each side of your nose twice daily.     Azelastine  spray:  This medication is an antihistamine used to treat nasal symptoms of allergy, which works specifically in the nose unlike antihistamine pills which have more of an effect on the whole body. Use this medication as instructed on the prescription, 1 spray on each side of your nose twice daily.     Additional instructions for medication sprays  Place the tip of the medication bottle in your nose and aim slightly up and out on each side to get medication high and deep into your nose and sinuses, and not have it all deposit in the very front of your nose. Aim the tip of the nozzle towards the outer corner of your eye . You can  "imagine aiming towards the back of your eyeball on each side for this, as opposed to straight back to the center of your nose and head.     You need to use this medication every day regardless of symptoms, as it takes time ( a few weeks) to work and get the benefits. It does not work on an "as needed" basis like taking a decongestant. If your symptoms only occur in a particular season, then the medication can be used seasonally instead of year long. For seasonal symptoms, you should start using the spray twice daily a month before when you normally have symptoms ( for example, if symptoms start in August, should start at the end of June).     Start nasal irrigations with saline solution- you can either use a rinse or a mist spray:    SALINE SINUS RINSE ([employer] brand): You should do a full bottle, half on one side of your nose and half on the other, 1-2 times per day (or more if able to, you cannot do this too much). Follow the instructions on the box: mix the salt packet with clean water (bottle, previously boiled, distilled, etc -- not tap water) to the line on the bottle to make the irrigation.         NASAL SALINE SPRAY ( simply saline and arm and hammer are examples) There are several different brands found in the cold and flu aisle of the pharmacy. You can use any brand of saline spray - this will deliver the saline by a gentle mist ( if you have difficulty or discomfort with nasal rinse/ a lot of fluid in the nose, this will be more comfortable).       Always rinse your nose with saline prior to using medication sprays and wait a couple of hours before using again. You can use the saline throughout the day to help with stuffy nose or dry nose.    Do not use nasal decongestant sprays such as Afrin or similar products long term ( over 3 days) .  This can cause long term physical nasal addiction. Afrin should only be used if having nose bleeds, severe nasal congestion , or severe ear pain/fullness and should " not be used for more than 2-3 days in a row . It is a not a medication that should be used for a long period of time.     It was nice meeting you today, and I look forward to helping you feel better soon. Please don't hesitate to call if you have any other questions or concerns, or if I can be of any assistance in the meantime.      Bethany Macdonald MD    [facility]     Phone  [phone]    Fax      [phone]        Bethany Macdonald MD  Otorhinolaryngology

## 2022-11-02 NOTE — PROGRESS NOTES
C3 nurse attempted to contact patient for a TCC post hospital discharge follow-up call. The patient declined call at this time.       Pt states is comfortable with all information, has reviewed with SW, has seen ENT today and upcoming appt with PCP. Pt voiced he would like to decline the call. Encouraged pt/spouse to contact PCP or [facility] for any future questions or concerns.

## 2022-11-05 ENCOUNTER — PES CALL (OUTPATIENT)
Dept: ADMINISTRATIVE | Facility: CLINIC | Age: 67
End: 2022-11-05
Payer: MEDICARE

## 2022-11-07 ENCOUNTER — LAB VISIT (OUTPATIENT)
Dept: LAB | Facility: HOSPITAL | Age: 67
End: 2022-11-07
Attending: INTERNAL MEDICINE
Payer: MEDICARE

## 2022-11-07 ENCOUNTER — PES CALL (OUTPATIENT)
Dept: ADMINISTRATIVE | Facility: CLINIC | Age: 67
End: 2022-11-07
Payer: MEDICARE

## 2022-11-07 ENCOUNTER — TELEPHONE (OUTPATIENT)
Dept: INTERNAL MEDICINE | Facility: CLINIC | Age: 67
End: 2022-11-07

## 2022-11-07 ENCOUNTER — PATIENT MESSAGE (OUTPATIENT)
Dept: INTERNAL MEDICINE | Facility: CLINIC | Age: 67
End: 2022-11-07

## 2022-11-07 ENCOUNTER — OFFICE VISIT (OUTPATIENT)
Dept: INTERNAL MEDICINE | Facility: CLINIC | Age: 67
End: 2022-11-07
Payer: MEDICARE

## 2022-11-07 DIAGNOSIS — N17.9 AKI (ACUTE KIDNEY INJURY): ICD-10-CM

## 2022-11-07 DIAGNOSIS — L02.91 ABSCESS: ICD-10-CM

## 2022-11-07 DIAGNOSIS — I48.91 ATRIAL FIBRILLATION, UNSPECIFIED TYPE: ICD-10-CM

## 2022-11-07 DIAGNOSIS — N17.9 AKI (ACUTE KIDNEY INJURY): Primary | ICD-10-CM

## 2022-11-07 LAB
ANION GAP SERPL CALC-SCNC: 10 MMOL/L (ref 8–16)
BUN SERPL-MCNC: 26 MG/DL (ref 8–23)
CALCIUM SERPL-MCNC: 9 MG/DL (ref 8.7–10.5)
CHLORIDE SERPL-SCNC: 105 MMOL/L (ref 95–110)
CO2 SERPL-SCNC: 27 MMOL/L (ref 23–29)
CREAT SERPL-MCNC: 1.6 MG/DL (ref 0.5–1.4)
EST. GFR  (NO RACE VARIABLE): 46.4 ML/MIN/1.73 M^2
GLUCOSE SERPL-MCNC: 77 MG/DL (ref 70–110)
POTASSIUM SERPL-SCNC: 4.7 MMOL/L (ref 3.5–5.1)
SODIUM SERPL-SCNC: 142 MMOL/L (ref 136–145)

## 2022-11-07 PROCEDURE — 99214 OFFICE O/P EST MOD 30 MIN: CPT | Mod: S$GLB,,, | Performed by: INTERNAL MEDICINE

## 2022-11-07 PROCEDURE — 1101F PT FALLS ASSESS-DOCD LE1/YR: CPT | Mod: CPTII,S$GLB,, | Performed by: INTERNAL MEDICINE

## 2022-11-07 PROCEDURE — 1111F DSCHRG MED/CURRENT MED MERGE: CPT | Mod: CPTII,S$GLB,, | Performed by: INTERNAL MEDICINE

## 2022-11-07 PROCEDURE — 1126F AMNT PAIN NOTED NONE PRSNT: CPT | Mod: CPTII,S$GLB,, | Performed by: INTERNAL MEDICINE

## 2022-11-07 PROCEDURE — 99999 PR PBB SHADOW E&M-EST. PATIENT-LVL V: CPT | Mod: PBBFAC,,, | Performed by: INTERNAL MEDICINE

## 2022-11-07 PROCEDURE — 1101F PR PT FALLS ASSESS DOC 0-1 FALLS W/OUT INJ PAST YR: ICD-10-PCS | Mod: CPTII,S$GLB,, | Performed by: INTERNAL MEDICINE

## 2022-11-07 PROCEDURE — 36415 COLL VENOUS BLD VENIPUNCTURE: CPT | Performed by: INTERNAL MEDICINE

## 2022-11-07 PROCEDURE — 1126F PR PAIN SEVERITY QUANTIFIED, NO PAIN PRESENT: ICD-10-PCS | Mod: CPTII,S$GLB,, | Performed by: INTERNAL MEDICINE

## 2022-11-07 PROCEDURE — 99214 PR OFFICE/OUTPT VISIT, EST, LEVL IV, 30-39 MIN: ICD-10-PCS | Mod: S$GLB,,, | Performed by: INTERNAL MEDICINE

## 2022-11-07 PROCEDURE — 1111F PR DISCHARGE MEDS RECONCILED W/ CURRENT OUTPATIENT MED LIST: ICD-10-PCS | Mod: CPTII,S$GLB,, | Performed by: INTERNAL MEDICINE

## 2022-11-07 PROCEDURE — 3079F DIAST BP 80-89 MM HG: CPT | Mod: CPTII,S$GLB,, | Performed by: INTERNAL MEDICINE

## 2022-11-07 PROCEDURE — 80048 BASIC METABOLIC PNL TOTAL CA: CPT | Performed by: INTERNAL MEDICINE

## 2022-11-07 PROCEDURE — 3075F SYST BP GE 130 - 139MM HG: CPT | Mod: CPTII,S$GLB,, | Performed by: INTERNAL MEDICINE

## 2022-11-07 PROCEDURE — 3288F PR FALLS RISK ASSESSMENT DOCUMENTED: ICD-10-PCS | Mod: CPTII,S$GLB,, | Performed by: INTERNAL MEDICINE

## 2022-11-07 PROCEDURE — 3075F PR MOST RECENT SYSTOLIC BLOOD PRESS GE 130-139MM HG: ICD-10-PCS | Mod: CPTII,S$GLB,, | Performed by: INTERNAL MEDICINE

## 2022-11-07 PROCEDURE — 3008F PR BODY MASS INDEX (BMI) DOCUMENTED: ICD-10-PCS | Mod: CPTII,S$GLB,, | Performed by: INTERNAL MEDICINE

## 2022-11-07 PROCEDURE — 3288F FALL RISK ASSESSMENT DOCD: CPT | Mod: CPTII,S$GLB,, | Performed by: INTERNAL MEDICINE

## 2022-11-07 PROCEDURE — 3008F BODY MASS INDEX DOCD: CPT | Mod: CPTII,S$GLB,, | Performed by: INTERNAL MEDICINE

## 2022-11-07 PROCEDURE — 99999 PR PBB SHADOW E&M-EST. PATIENT-LVL V: ICD-10-PCS | Mod: PBBFAC,,, | Performed by: INTERNAL MEDICINE

## 2022-11-07 PROCEDURE — 3079F PR MOST RECENT DIASTOLIC BLOOD PRESSURE 80-89 MM HG: ICD-10-PCS | Mod: CPTII,S$GLB,, | Performed by: INTERNAL MEDICINE

## 2022-11-07 NOTE — TELEPHONE ENCOUNTER
When he was in the office, I had requested a nephrology appt. Unfortunately, it was not scheduled. Please schedule nephrology appt

## 2022-11-09 NOTE — DISCHARGE SUMMARY
[facility] Medicine  Discharge Summary      Patient Name: Elías Gay  MRN: [id number]  Patient Class: IP- Inpatient  Admission Date: 10/24/2022  Hospital Length of Stay: 7 days  Discharge Date and Time: 10/31/2022  4:05 PM  Attending Physician: No att. providers found   Discharging Provider: Rodrigo Ramos MD  Primary Care Provider: Kameron Diaz MD      HPI:   This is a 67 year old male with a PMHx of BPH, TJ (on BiPAP) and obesity who presents with generalized weakness.     Patient reports developing worsening sinusitis over the last 3 weeks. He reports that his nasal discharge have been bloody mixed with mucous. About a week prior, his symptoms progressively became worse and 3 days prior to presentation he noticed left neck swelling. He tried to carry on with his ADLs but felt too weak and he collapsed on the bathroom floor, with no loss of consciousness. He reported having chills, and decreased po intake, but denied having cough, SOB or diarrhea. His wife is sick with similar symptoms.     In the ED, the patient was febrile (38.2), tachycardic (130s), tachypnic and requiring 2L of supplemental O2. Labs showed leukocytosis (38.9 - with neutrophilia), elevated creatinine (1.8 - baseline of 0.9), elevated LFTs (Tbili: 1.4, AST: 511, ALT: 122), elevated troponin (0.063), elevated BNP (142) and elevated procalcitonin (34.23). EKG showed SVT with PVCs. CXR showed mild increased interstitial attenuation (possible mild pulmonary edema). CT neck showed extensive left neck soft tissue swelling with inflammatory changes and edema with asymmetric heterogenous enlargement is seen of the left sternocleidomastoid & numerous enlarged cervical chain lymph nodes are seen throughout the region (cellulitis versus myositis or neoplastic process) without an abscess. He was given vancomycin, cefepime, sepsis bolus and 10 mg of diltiazem x2 and was started on a diltiazem drip. A transfer to [facility] for  ENT/OMFS evaluation was attempted, however, there were no bed availability. The patient was admitted to the ICU for further management.       Procedure(s) (LRB):  INCISION AND DRAINAGE,NECK (Left)      Hospital Course:   This is a 67 year old male with a PMHx of BPH, TJ (on BiPAP) and obesity who presents with generalized weakness.  He reported having chills, and decreased po intake, but denied having cough, SOB or diarrhea. His wife is sick with similar symptoms.   CT neck showed extensive left neck soft tissue swelling with inflammatory changes and edema with asymmetric heterogenous enlargement is seen of the left sternocleidomastoid & numerous enlarged cervical chain lymph nodes are seen throughout the region (cellulitis versus myositis or neoplastic process) without an abscess. He was given vancomycin, cefepime, he was septic and hypotensive,improved with IVF,did not required vasopressors.  Was in Afib with RVR,bolus and 10 mg of diltiazem x2 and was started on a diltiazem drip.converted to sinus.  [place] [facility] [place], however, there were no bed availability. The patient was admitted to the ICU for further management.our ENT is back, saw patient today,possible intervention in AM.keep NPO past MN.  No sign of dental abscess,going frequently to dentist,  Hypotension  is resolved with IVF,BP is stable at this time  Afib is resolved with Cardizem gtt and  patiens in sinus  and stable,echo show preserved EF,per cardiology only on ASA and follow up as out patient for Holter monitoring.  Has rhabdomyolysis on IVF,will monitor.CPK is improving.  On IVF for ARF.casillas is in place.did not improved,consulted nephrology.  Cc is neck swelling.  Patient went for I and D of neck abscess on 10/26. Cultures sent. ENT continued to follow.     ENT cleared patient for discharge as did nephrology as VANGIE and CPK continued to improve however still not completely at baseline. He will  follow up with PCP for repeat bloodwork within one week. ID consulted for abx recommendations on discharge and per recs, was discharged home on cefadroxil PO x 21 days with outpatient follow up.        Goals of Care Treatment Preferences:  Code Status: Full Code      Consults:   Consults (From admission, onward)        Status Ordering Provider     Inpatient consult to Infectious Diseases  Once        Provider:  Angela Hicks MD    Completed KIKI HUTSON     Inpatient virtual consult to Hospital Medicine  Once        Provider:  (Not yet assigned)    Completed AUGUST NATHAN     Inpatient consult to Nephrology  Once        Provider:  Marck Wiggins MD    Completed GENOVEVA PITTMAN     Inpatient consult to Cardiology  Once        Provider:  Alana Flor MD    Completed GENOVEVA PITTMAN          No new Assessment & Plan notes have been filed under this hospital service since the last note was generated.  Service: Hospital Medicine    Final Active Diagnoses:    Diagnosis Date Noted POA    PRINCIPAL PROBLEM:  Sepsis [A41.9] 10/24/2022 Yes    Infection due to Streptococcus pyogenes [A49.1] 10/31/2022 Yes    Acute renal failure [N17.9] 10/25/2022 Yes    Non-traumatic rhabdomyolysis [M62.82] 10/25/2022 Yes    Paroxysmal atrial fibrillation [I48.0] 10/24/2022 Yes    Elevated LFTs [R79.89] 10/24/2022 Yes    Neck swelling [R22.1] 10/24/2022 Yes    Morbid obesity [E66.01] 12/10/2018 Yes    BPH with urinary obstruction [N40.1, N13.8] 10/31/2018 Yes     Chronic    Complex sleep apnea syndrome [G47.31]  Yes      Problems Resolved During this Admission:       Discharged Condition: stable    [place] [facility] [place]:   Follow-up Information     Kameron Diaz MD Follow up in 1 week(s).    Specialty: Internal Medicine  Contact information:  [address]  [facility] [address] [phone]             [facility]. Call.    Specialty: LALIT Provider  Why:  "Please contact [facility] with any question/concern with rolling walker or bedside commode.  Contact information:  [address]  [address]  [facility] [address]  [phone]             [facility]. Call.    Specialties: DME Provider, Home Health Services  Why: Please call for any question/concern with Home Health agency.  Contact information:  [address] [facility]  [address]  [place] [address]  [phone]             [facility] Follow up.    Why: Home Health  Contact information:  [address]  [place] [id number] [phone]                       Patient Instructions:      WALKER FOR HOME USE     Order Specific Question Answer Comments   Type of Walker: Adult (5'4"-6'6")    With wheels? Yes    Height: 5' 11" (1.803 m)    Weight: 133.6 kg (294 lb 8.6 oz)    Length of need (1-99 months): 99    Does patient have medical equipment at home? cane, straight    Does patient have medical equipment at home? CPAP    Please check all that apply: Patient's condition impairs ambulation.      COMMODE FOR HOME USE     Order Specific Question Answer Comments   Type: Standard    Height: 5' 11" (1.803 m)    Weight: 133.6 kg (294 lb 8.6 oz)    Does patient have medical equipment at home? cane, straight    Does patient have medical equipment at home? CPAP    Length of need (1-99 months): 99      Ambulatory referral/consult to Infectious Disease   Standing Status: Future   Referral Priority: Routine Referral Type: Consultation   Referral Reason: Specialty Services Required   Requested Specialty: Infectious Diseases   Number of Visits Requested: 1       Significant Diagnostic Studies: Microbiology:   Blood Culture   Lab Results   Component Value Date    LABBLOO No Growth after 4 days. 10/24/2022       Pending Diagnostic Studies:     None         Medications:  Reconciled Home Medications:      Medication List      START taking these medications    cefadroxil 500 MG Cap  Commonly known as: DURICEF  Take 1 " capsule (500 mg total) by mouth every 12 (twelve) hours. for 21 days        CONTINUE taking these medications    azelastine 137 mcg (0.1 %) nasal spray  Commonly known as: ASTELIN  1 spray (137 mcg total) by Nasal route 2 (two) times daily.     calcium-magnesium-zinc Tab  Take 2 tablets by mouth once daily.     ciclopirox 8 % Soln  Commonly known as: PENLAC  Apply daily to affected nail. Must remove and restart weekly     guaiFENesin 600 mg 12 hr tablet  Commonly known as: MUCINEX  Take 1,200 mg by mouth 2 (two) times daily.     ketoconazole 2 % cream  Commonly known as: NIZORAL  AAA bid to both feet x 3 weeks     MODERNA COVID BIVAL(6Y UP)(PF) 50 mcg/0.5 mL injection  Generic drug: sars-cov-2 (covid-19 omicron)  Inject into the muscle.     multivitamin capsule  Take 1 capsule by mouth once daily.     tamsulosin 0.4 mg Cap  Commonly known as: FLOMAX  TAKE 1 CAPSULE BY MOUTH EVERY DAY        STOP taking these medications    celecoxib 200 MG capsule  Commonly known as: CeleBREX            Indwelling Lines/Drains at time of discharge:   Lines/Drains/Airways     Peripherally Inserted Central Catheter Line  Duration           PICC Triple Lumen 10/25/22 1030 right basilic 14 days                Time spent on the discharge of patient: > 35 minutes         The attending portion of this evaluation, treatment, and documentation was performed per Rodrigo Ramos MD via Telemedicine AudioVisual using the secure [employer] software platform with 2 way audio/video. The provider was located off-site and the patient is located in the hospital. The aforementioned video software was utilized to document the relevant history and physical exam    Rodrigo Ramos MD  Department of Hospital Medicine  [facility]

## 2022-11-10 NOTE — PROGRESS NOTES
Should his EKG shows Jose Chart has been dictated using voice recognition software.  It is not been reviewed carefully for any transcriptional errors due to this technology.   Subjective:   Patient ID:  Elías Gay is a 67 y.o. male who presents for evaluation of Establish Care, Follow-up (F/u from hospital visit), and Atrial Fibrillation      HPI: Patient with obesity, malignant melanoma, TJ, history of non-traumatic rhabdomyolysis, and paroxysmal atrial fibrillation. Patient was hospitalized at [facility] for gangrene of neck muscle, sepsis, and rhabdomyolysis.  On the night prior to admission, he noted that his fitbit said his heart rate was greater than 160.  Patient had no dyspnea, palpitations or lightheadedness.  The following day he became weak in the legs and was hospitalized from problems as above.  Patient was in hospital for 6 hours when he converted back to sinus rhythm on a diltiazem drip.  Echo from that admission (25-Oct-2022) showed:  TDS secondary to body habitus.  The left ventricle is normal in size with normal systolic function.  The estimated ejection fraction is 60%.  Indeterminate left ventricular diastolic function.  Mild left atrial enlargement.  Normal right ventricular size with normal right ventricular systolic function.  Intermediate central venous pressure (8 mmHg).  The estimated PA systolic pressure is 27 mmHg.  There is no pulmonary hypertension.    Patient denies any chest discomfort on exertion or at rest.  Patient denies any dyspnea at rest or on exertion, orthopnea, PND, or edema.  Patient denies any palpitations, lightheadedness, or syncope.       Cardiac risk factors: obesity, sedentary lifestyle    Past Medical History:   Diagnosis Date    Acute renal failure 10/25/2022    Arthritis 1971    Bone Spurs in feet    Basal cell carcinoma 11/06/2018    left ear helix    Foreign body in conjunctival sac     Hernia, inguinal, right 2002    Joint pain 1971    Bones of feet     Keloid cicatrix 1958 2010 2018 2019    Hernia, Knee, Arm, Ear    Melanoma 09/14/2018    Right Arm 0.6mm    Severe sleep apnea        Past Surgical History:   Procedure Laterality Date    CLOSURE OF DEFECT OF MOHS PROCEDURE Left 1/3/2019    Procedure: CLOSURE, MOHS PROCEDURE DEFECT LEFT EAR;  Surgeon: Jeramy Meek MD;  Location: [facility] OR 2ND FLR;  Service: Plastics;  Laterality: Left;  plastics set    COLONOSCOPY N/A 6/30/2021    Procedure: COLONOSCOPY;  Surgeon: Juliano Santoyo MD;  Location: [facility] ENDO (2ND FLR);  Service: Endoscopy;  Laterality: N/A;  severe sleep apnea     fully vaccinated-GT    HERNIA REPAIR Left     3 years of age    HERNIA REPAIR N/A     Ventral wall at 3 year of age.    INCISION AND DRAINAGE, NECK Left 10/26/2022    Procedure: INCISION AND DRAINAGE,NECK;  Surgeon: Bethany Macdonald MD;  Location: [facility];  Service: ENT;  Laterality: Left;    KNEE SURGERY Right 1984    Arthroscopic    NASAL SEPTUM SURGERY N/A 2009    SKIN BIOPSY  several over time       Social History     Tobacco Use    Smoking status: Never    Smokeless tobacco: Never    Tobacco comments:     Second hand smoke from mother. Cigarrette smoke inflames my sinuses.   Substance Use Topics    Alcohol use: Not Currently     Alcohol/week: 1.0 standard drink     Types: 1 Glasses of wine per week     Comment: Rare    Drug use: No       Outpatient Medications Prior to Visit   Medication Sig Dispense Refill    azelastine (ASTELIN) 137 mcg (0.1 %) nasal spray 1 spray (137 mcg total) by Nasal route 2 (two) times daily. 30 mL 11    calcium-magnesium-zinc Tab Take 2 tablets by mouth once daily.      cefadroxil (DURICEF) 500 MG Cap Take 1 capsule (500 mg total) by mouth every 12 (twelve) hours. for 21 days 42 capsule 0    ciclopirox (PENLAC) 8 % Soln Apply daily to affected nail. Must remove and restart weekly 1 each 5    fluticasone propionate (FLONASE) 50 mcg/actuation nasal spray 2 sprays (100 mcg total) by Each Nostril route 2  "(two) times daily. 18.2 mL 3    guaiFENesin (MUCINEX) 600 mg 12 hr tablet Take 1,200 mg by mouth 2 (two) times daily.      ketoconazole (NIZORAL) 2 % cream AAA bid to both feet x 3 weeks 60 g 3    multivitamin capsule Take 1 capsule by mouth once daily.      tamsulosin (FLOMAX) 0.4 mg Cap TAKE 1 CAPSULE BY MOUTH EVERY DAY 90 capsule 1    sars-cov-2, covid-19 omicron, (MODERNA COVID-19) 50 mcg/0.5 mL injection Inject into the muscle. 0.5 mL 0     No facility-administered medications prior to visit.       Review of patient's allergies indicates:  No Known Allergies    Review of Systems   HENT:  Positive for nosebleeds.    Respiratory:  Positive for snoring (uses CPAP). Negative for hemoptysis.    Hematologic/Lymphatic: Negative for bleeding problem. Does not bruise/bleed easily.   Gastrointestinal:  Positive for hematochezia. Negative for hematemesis.   Genitourinary:  Negative for hematuria.   Neurological:  Positive for focal weakness (legs and hips bilaterally). Negative for weakness.    Objective:   Physical Exam  Constitutional:       General: He is awake.      Appearance: He is well-developed and overweight.      Comments: /74 (BP Location: Left arm, Patient Position: Sitting, BP Method: Large (Automatic))   Pulse (!) 56   Ht 5' 11" (1.803 m)   Wt 122.2 kg (269 lb 6.4 oz)   BMI 37.57 kg/m²      Neck:      Vascular: No carotid bruit or JVD.   Cardiovascular:      Rate and Rhythm: Normal rate and regular rhythm.      Pulses: Intact distal pulses.      Heart sounds: S1 normal and S2 normal. Murmur heard.   Medium-pitched scratchy crescendo-decrescendo midsystolic murmur is present with a grade of 2/6 at the apex.     No friction rub. No gallop.   Pulmonary:      Effort: Pulmonary effort is normal.      Breath sounds: Normal breath sounds. No wheezing or rales.   Abdominal:      General: Bowel sounds are normal. There is no abdominal bruit.      Palpations: Abdomen is soft. There is no hepatomegaly.      " Tenderness: There is no abdominal tenderness.   Musculoskeletal:      Cervical back: Neck supple.   Skin:     Nails: There is no clubbing.   Neurological:      Mental Status: He is alert and oriented to person, place, and time.   Psychiatric:         Behavior: Behavior is cooperative.       Lab Results   Component Value Date    WBC 9.27 10/30/2022    HGB 11.9 (L) 10/30/2022    HCT 34.5 (L) 10/30/2022    MCV 92 10/30/2022     10/30/2022       Lab Results   Component Value Date     11/07/2022    K 4.7 11/07/2022    BUN 26 (H) 11/07/2022    CREATININE 1.6 (H) 11/07/2022    GLU 77 11/07/2022    CHOL 156 07/26/2022    HDL 39 (L) 07/26/2022    LDLCALC 101.0 07/26/2022    TRIG 80 07/26/2022    CHOLHDL 25.0 07/26/2022    HGB 11.9 (L) 10/30/2022    HCT 34.5 (L) 10/30/2022     10/30/2022    INR 1.3 (H) 10/24/2022     [id number] score =  2  Assessment:     1. Paroxysmal atrial fibrillation    2. Obesity (BMI 30.0-34.9)    3. Complex sleep apnea syndrome    4. Non-traumatic rhabdomyolysis      Patient has no symptoms of cardiac ischemia, heart failure, or significant arrhythmias.  The patient has not had a recurrence of his atrial fibrillation, at least not by his Fitbit.  Patient will have a 30 day monitor placed to see if he is having recurrent episodes of atrial fibrillation.  Additionally, I will discuss with Cardiac electrophysiology whether the patient needs to be anticoagulated and whether he should be evaluated for pulmonary vein isolation (PVI).  No change will be made in his medications at this time.    All other cardiovascular HCC diagnoses not discussed above are currently stable and do not need a change in management at this time.  All non-cardiovascular HCC diagnoses are not contributing to any cardiovascular problem at this time, unless mentioned above in the HPI and/or Assessment, and will be managed by the primary and/or appropriate specialty care providers.     Unless there are  intervening problems, patient should return for re-evaluation in 3 months.   Plan:     Elías was seen today for establish care, follow-up and atrial fibrillation.    Diagnoses and all orders for this visit:    Paroxysmal atrial fibrillation  -     Ambulatory referral/consult to Cardiology  -     Cardiac event monitor; Future; Expected date: 11/11/2022  -     EKG 12-lead; Future    Obesity (BMI 30.0-34.9)    Complex sleep apnea syndrome    Non-traumatic rhabdomyolysis        Reynaldo Way MD  Consultative Cardiology

## 2022-11-11 ENCOUNTER — OFFICE VISIT (OUTPATIENT)
Dept: INFECTIOUS DISEASES | Facility: CLINIC | Age: 67
End: 2022-11-11
Payer: MEDICARE

## 2022-11-11 ENCOUNTER — OFFICE VISIT (OUTPATIENT)
Dept: CARDIOLOGY | Facility: CLINIC | Age: 67
End: 2022-11-11
Payer: MEDICARE

## 2022-11-11 VITALS
DIASTOLIC BLOOD PRESSURE: 74 MMHG | BODY MASS INDEX: 37.71 KG/M2 | HEART RATE: 56 BPM | WEIGHT: 269.38 LBS | SYSTOLIC BLOOD PRESSURE: 136 MMHG | HEIGHT: 71 IN

## 2022-11-11 VITALS
TEMPERATURE: 99 F | DIASTOLIC BLOOD PRESSURE: 88 MMHG | BODY MASS INDEX: 38.22 KG/M2 | OXYGEN SATURATION: 98 % | HEIGHT: 71 IN | HEART RATE: 68 BPM | SYSTOLIC BLOOD PRESSURE: 138 MMHG | WEIGHT: 273 LBS

## 2022-11-11 VITALS
HEART RATE: 63 BPM | WEIGHT: 248.25 LBS | BODY MASS INDEX: 34.75 KG/M2 | SYSTOLIC BLOOD PRESSURE: 150 MMHG | DIASTOLIC BLOOD PRESSURE: 79 MMHG | HEIGHT: 71 IN | TEMPERATURE: 99 F

## 2022-11-11 DIAGNOSIS — I48.0 PAROXYSMAL ATRIAL FIBRILLATION: Primary | ICD-10-CM

## 2022-11-11 DIAGNOSIS — E66.9 OBESITY (BMI 30.0-34.9): ICD-10-CM

## 2022-11-11 DIAGNOSIS — G47.31 COMPLEX SLEEP APNEA SYNDROME: ICD-10-CM

## 2022-11-11 DIAGNOSIS — A49.1 INFECTION DUE TO STREPTOCOCCUS PYOGENES: Primary | ICD-10-CM

## 2022-11-11 DIAGNOSIS — M62.82 NON-TRAUMATIC RHABDOMYOLYSIS: ICD-10-CM

## 2022-11-11 PROBLEM — E66.811 OBESITY (BMI 30.0-34.9): Status: ACTIVE | Noted: 2018-12-10

## 2022-11-11 PROCEDURE — 99999 PR PBB SHADOW E&M-EST. PATIENT-LVL V: CPT | Mod: PBBFAC,,, | Performed by: INTERNAL MEDICINE

## 2022-11-11 PROCEDURE — 99213 PR OFFICE/OUTPT VISIT, EST, LEVL III, 20-29 MIN: ICD-10-PCS | Mod: S$GLB,,, | Performed by: INTERNAL MEDICINE

## 2022-11-11 PROCEDURE — 1101F PR PT FALLS ASSESS DOC 0-1 FALLS W/OUT INJ PAST YR: ICD-10-PCS | Mod: CPTII,S$GLB,, | Performed by: INTERNAL MEDICINE

## 2022-11-11 PROCEDURE — 3288F FALL RISK ASSESSMENT DOCD: CPT | Mod: CPTII,S$GLB,, | Performed by: INTERNAL MEDICINE

## 2022-11-11 PROCEDURE — 1111F DSCHRG MED/CURRENT MED MERGE: CPT | Mod: CPTII,S$GLB,, | Performed by: INTERNAL MEDICINE

## 2022-11-11 PROCEDURE — 3078F DIAST BP <80 MM HG: CPT | Mod: CPTII,S$GLB,, | Performed by: INTERNAL MEDICINE

## 2022-11-11 PROCEDURE — 3008F BODY MASS INDEX DOCD: CPT | Mod: CPTII,S$GLB,, | Performed by: INTERNAL MEDICINE

## 2022-11-11 PROCEDURE — 3075F PR MOST RECENT SYSTOLIC BLOOD PRESS GE 130-139MM HG: ICD-10-PCS | Mod: CPTII,S$GLB,, | Performed by: INTERNAL MEDICINE

## 2022-11-11 PROCEDURE — 3008F PR BODY MASS INDEX (BMI) DOCUMENTED: ICD-10-PCS | Mod: CPTII,S$GLB,, | Performed by: INTERNAL MEDICINE

## 2022-11-11 PROCEDURE — 3077F SYST BP >= 140 MM HG: CPT | Mod: CPTII,S$GLB,, | Performed by: INTERNAL MEDICINE

## 2022-11-11 PROCEDURE — 1159F MED LIST DOCD IN RCRD: CPT | Mod: CPTII,S$GLB,, | Performed by: INTERNAL MEDICINE

## 2022-11-11 PROCEDURE — 1126F AMNT PAIN NOTED NONE PRSNT: CPT | Mod: CPTII,S$GLB,, | Performed by: INTERNAL MEDICINE

## 2022-11-11 PROCEDURE — 1159F PR MEDICATION LIST DOCUMENTED IN MEDICAL RECORD: ICD-10-PCS | Mod: CPTII,S$GLB,, | Performed by: INTERNAL MEDICINE

## 2022-11-11 PROCEDURE — 1111F PR DISCHARGE MEDS RECONCILED W/ CURRENT OUTPATIENT MED LIST: ICD-10-PCS | Mod: CPTII,S$GLB,, | Performed by: INTERNAL MEDICINE

## 2022-11-11 PROCEDURE — 99213 OFFICE O/P EST LOW 20 MIN: CPT | Mod: S$GLB,,, | Performed by: INTERNAL MEDICINE

## 2022-11-11 PROCEDURE — 99499 UNLISTED E&M SERVICE: CPT | Mod: S$GLB,,, | Performed by: INTERNAL MEDICINE

## 2022-11-11 PROCEDURE — 1126F PR PAIN SEVERITY QUANTIFIED, NO PAIN PRESENT: ICD-10-PCS | Mod: CPTII,S$GLB,, | Performed by: INTERNAL MEDICINE

## 2022-11-11 PROCEDURE — 1160F PR REVIEW ALL MEDS BY PRESCRIBER/CLIN PHARMACIST DOCUMENTED: ICD-10-PCS | Mod: CPTII,S$GLB,, | Performed by: INTERNAL MEDICINE

## 2022-11-11 PROCEDURE — 99214 PR OFFICE/OUTPT VISIT, EST, LEVL IV, 30-39 MIN: ICD-10-PCS | Mod: S$GLB,,, | Performed by: INTERNAL MEDICINE

## 2022-11-11 PROCEDURE — 99214 OFFICE O/P EST MOD 30 MIN: CPT | Mod: S$GLB,,, | Performed by: INTERNAL MEDICINE

## 2022-11-11 PROCEDURE — 99999 PR PBB SHADOW E&M-EST. PATIENT-LVL V: ICD-10-PCS | Mod: PBBFAC,,, | Performed by: INTERNAL MEDICINE

## 2022-11-11 PROCEDURE — 99999 PR PBB SHADOW E&M-EST. PATIENT-LVL III: CPT | Mod: PBBFAC,,, | Performed by: INTERNAL MEDICINE

## 2022-11-11 PROCEDURE — 99999 PR PBB SHADOW E&M-EST. PATIENT-LVL III: ICD-10-PCS | Mod: PBBFAC,,, | Performed by: INTERNAL MEDICINE

## 2022-11-11 PROCEDURE — 3288F PR FALLS RISK ASSESSMENT DOCUMENTED: ICD-10-PCS | Mod: CPTII,S$GLB,, | Performed by: INTERNAL MEDICINE

## 2022-11-11 PROCEDURE — 1101F PT FALLS ASSESS-DOCD LE1/YR: CPT | Mod: CPTII,S$GLB,, | Performed by: INTERNAL MEDICINE

## 2022-11-11 PROCEDURE — 3077F PR MOST RECENT SYSTOLIC BLOOD PRESSURE >= 140 MM HG: ICD-10-PCS | Mod: CPTII,S$GLB,, | Performed by: INTERNAL MEDICINE

## 2022-11-11 PROCEDURE — 3078F PR MOST RECENT DIASTOLIC BLOOD PRESSURE < 80 MM HG: ICD-10-PCS | Mod: CPTII,S$GLB,, | Performed by: INTERNAL MEDICINE

## 2022-11-11 PROCEDURE — 99499 RISK ADDL DX/OHS AUDIT: ICD-10-PCS | Mod: S$GLB,,, | Performed by: INTERNAL MEDICINE

## 2022-11-11 PROCEDURE — 3075F SYST BP GE 130 - 139MM HG: CPT | Mod: CPTII,S$GLB,, | Performed by: INTERNAL MEDICINE

## 2022-11-11 PROCEDURE — 1160F RVW MEDS BY RX/DR IN RCRD: CPT | Mod: CPTII,S$GLB,, | Performed by: INTERNAL MEDICINE

## 2022-11-11 NOTE — PROGRESS NOTES
"    INFECTIOUS DISEASE CLINIC  11/11/2022 1:16 PM    Subjective:      Chief Complaint:   Chief Complaint   Patient presents with    Hospital Follow Up       History of Present Illness:    Patient Elías Gay is a 67 y.o. male h/o TJ  And obesity (BMI 41) who presents today for hospital follow up. Was  admitted 10/24 with left sided neck swelling and generalized weakness.  Found to have strep pyogenes L sideded neck abscess s/p OR drainage 10/26. Bcx 10/24 NGTD x 2. 2d echo without vegetations. Clinically improved with drainage and Day 7 cefepime/clinda/vanc. ID consulted for "neck abscess" and recommended transitioning to po cefadroxil x 2-3 weeks from washout    Reports compliance with abx. Denies side effects. Neck wound/incision site healed. Denies drainage or pain or swelling. Denies f/c       Review of Systems   Constitutional: Positive for weight loss. Negative for chills, decreased appetite, fever, malaise/fatigue, night sweats and weight gain.   HENT:  Negative for congestion, ear pain, hearing loss, hoarse voice, sore throat and tinnitus.    Eyes:  Negative for blurred vision, redness and visual disturbance.   Cardiovascular:  Positive for leg swelling. Negative for chest pain and palpitations.   Respiratory:  Negative for cough, hemoptysis, shortness of breath, sputum production and wheezing.    Hematologic/Lymphatic: Negative for adenopathy. Does not bruise/bleed easily.   Skin:  Negative for dry skin, itching, rash and suspicious lesions.   Musculoskeletal:  Negative for back pain, joint pain, myalgias and neck pain.   Gastrointestinal:  Negative for abdominal pain, constipation, diarrhea, heartburn, nausea and vomiting.   Genitourinary:  Positive for frequency. Negative for dysuria, flank pain, hematuria, hesitancy and urgency.   Neurological:  Negative for dizziness, headaches, numbness, paresthesias and weakness.   Psychiatric/Behavioral:  Negative for depression and memory loss. The patient does " not have insomnia and is not nervous/anxious.      Past Medical History:   Diagnosis Date    Acute renal failure 10/25/2022    Arthritis 1971    Bone Spurs in feet    Basal cell carcinoma 11/06/2018    left ear helix    Foreign body in conjunctival sac     Hernia, inguinal, right 2002    Joint pain 1971    Bones of feet    Keloid cicatrix 1958 2010 2018 2019    Hernia, Knee, Arm, Ear    Melanoma 09/14/2018    Right Arm 0.6mm    Severe sleep apnea        Past Surgical History:   Procedure Laterality Date    CLOSURE OF DEFECT OF MOHS PROCEDURE Left 1/3/2019    Procedure: CLOSURE, MOHS PROCEDURE DEFECT LEFT EAR;  Surgeon: Jeramy Meek MD;  Location: [address];  Service: Plastics;  Laterality: Left;  plastics set    COLONOSCOPY N/A 6/30/2021    Procedure: COLONOSCOPY;  Surgeon: Juliano Santoyo MD;  Location: [facility] ENDO ([address] [place] [address]);  Service: Endoscopy;  Laterality: N/A;  severe sleep apnea     fully vaccinated-GT    HERNIA REPAIR Left     3 years of age    HERNIA REPAIR N/A     Ventral wall at 3 year of age.    INCISION AND DRAINAGE, NECK Left 10/26/2022    Procedure: INCISION AND DRAINAGE,NECK;  Surgeon: Bethany Macdonald MD;  Location: [facility];  Service: ENT;  Laterality: Left;    KNEE SURGERY Right 1984    Arthroscopic    NASAL SEPTUM SURGERY N/A 2009    SKIN BIOPSY  several over time       Family History   Problem Relation Age of Onset    Hypertension Mother     Stroke Mother     Aneurysm Mother     Cancer Father 72        Prostate and liver    No Known Problems Daughter     No Known Problems Son     Gout Brother     Eczema Brother     Psoriasis Brother     No Known Problems Daughter     No Known Problems Son     Cataracts Neg Hx     Glaucoma Neg Hx     Macular degeneration Neg Hx     Melanoma Neg Hx     Eczema Neg Hx     Lupus Neg Hx     Psoriasis Neg Hx        Social History     Socioeconomic History    Marital status:    Tobacco Use    Smoking status: Never    Smokeless tobacco: Never    Tobacco  comments:     Second hand smoke from mother. Cigarrette smoke inflames my sinuses.   Substance and Sexual Activity    Alcohol use: Not Currently     Alcohol/week: 1.0 standard drink     Types: 1 Glasses of wine per week     Comment: Rare    Drug use: No    Sexual activity: Yes     Partners: Female     Birth control/protection: None     Social Determinants of Health     Financial Resource Strain: Low Risk     Difficulty of Paying Living Expenses: Not hard at all   Food Insecurity: No Food Insecurity    Worried About Running Out of Food in the Last Year: Never true    Ran Out of Food in the Last Year: Never true   Transportation Needs: No Transportation Needs    Lack of Transportation (Medical): No    Lack of Transportation (Non-Medical): No   Physical Activity: Insufficiently Active    Days of Exercise per Week: 1 day    Minutes of Exercise per Session: 30 min   Stress: No Stress Concern Present    Feeling of Stress : Not at all   Social Connections: Unknown    Frequency of Communication with Friends and Family: More than three times a week    Frequency of Social Gatherings with Friends and Family: More than three times a week    Active Member of Clubs or Organizations: No    Attends Club or Organization Meetings: Never    Marital Status:    Housing Stability: Low Risk     Unable to Pay for Housing in the Last Year: No    Number of Places Lived in the Last Year: 1    Unstable Housing in the Last Year: No       Review of patient's allergies indicates:  No Known Allergies      Objective:   VS (24h):   Vitals:    11/11/22 1252   BP: (!) 150/79   Pulse: 63   Temp: 98.5 °F (36.9 °C)     [unfilled]  General: Afebrile, alert, comfortable, no acute distress.   HEENT: FLAKITO. EOMI, no scleral icterus.   Pulmonary: Non labored,clear to auscultation A/P/L.   Cardiac: normal S1 & S2 w/o rubs/murmurs/gallops.   Abdominal: Non-tender, non-distended.  Extremities: Moves all extremities x 4. 1+ edema b/l  Skin: No jaundice,  rashes, or visible lesions.   Neurological:  Alert and oriented x 4.     Labs:    Glucose   Date Value Ref Range Status   11/07/2022 77 70 - 110 mg/dL Final   10/31/2022 92 70 - 110 mg/dL Final   10/30/2022 103 70 - 110 mg/dL Final       Calcium   Date Value Ref Range Status   11/07/2022 9.0 8.7 - 10.5 mg/dL Final   10/31/2022 7.4 (L) 8.7 - 10.5 mg/dL Final   10/30/2022 7.6 (L) 8.7 - 10.5 mg/dL Final       Albumin   Date Value Ref Range Status   10/30/2022 2.3 (L) 3.5 - 5.2 g/dL Final   10/29/2022 2.4 (L) 3.5 - 5.2 g/dL Final   10/27/2022 2.2 (L) 3.5 - 5.2 g/dL Final       Total Protein   Date Value Ref Range Status   10/30/2022 6.4 6.0 - 8.4 g/dL Final   10/29/2022 6.5 6.0 - 8.4 g/dL Final   10/27/2022 5.9 (L) 6.0 - 8.4 g/dL Final       Sodium   Date Value Ref Range Status   11/07/2022 142 136 - 145 mmol/L Final   10/31/2022 141 136 - 145 mmol/L Final   10/30/2022 142 136 - 145 mmol/L Final       Potassium   Date Value Ref Range Status   11/07/2022 4.7 3.5 - 5.1 mmol/L Final   10/31/2022 3.9 3.5 - 5.1 mmol/L Final   10/30/2022 4.0 3.5 - 5.1 mmol/L Final       CO2   Date Value Ref Range Status   11/07/2022 27 23 - 29 mmol/L Final   10/31/2022 30 (H) 23 - 29 mmol/L Final   10/30/2022 29 23 - 29 mmol/L Final       Chloride   Date Value Ref Range Status   11/07/2022 105 95 - 110 mmol/L Final   10/31/2022 102 95 - 110 mmol/L Final   10/30/2022 101 95 - 110 mmol/L Final       BUN   Date Value Ref Range Status   11/07/2022 26 (H) 8 - 23 mg/dL Final   10/31/2022 68 (H) 8 - 23 mg/dL Final   10/30/2022 71 (H) 8 - 23 mg/dL Final       Creatinine   Date Value Ref Range Status   11/07/2022 1.6 (H) 0.5 - 1.4 mg/dL Final   10/31/2022 3.6 (H) 0.5 - 1.4 mg/dL Final   10/30/2022 4.0 (H) 0.5 - 1.4 mg/dL Final       Alkaline Phosphatase   Date Value Ref Range Status   10/30/2022 117 55 - 135 U/L Final   10/29/2022 119 55 - 135 U/L Final   10/27/2022 93 55 - 135 U/L Final       ALT   Date Value Ref Range Status   10/30/2022 97 (H) 10 -  44 U/L Final   10/29/2022 132 (H) 10 - 44 U/L Final   10/27/2022 148 (H) 10 - 44 U/L Final       AST   Date Value Ref Range Status   10/30/2022 61 (H) 10 - 40 U/L Final   10/29/2022 105 (H) 10 - 40 U/L Final   10/27/2022 213 (H) 10 - 40 U/L Final       Total Bilirubin   Date Value Ref Range Status   10/30/2022 0.4 0.1 - 1.0 mg/dL Final     Comment:     For infants and newborns, interpretation of results should be based  on gestational age, weight and in agreement with clinical  observations.    Premature Infant recommended reference ranges:  Up to 24 hours.............<8.0 mg/dL  Up to 48 hours............<12.0 mg/dL  3-5 days..................<15.0 mg/dL  6-29 days.................<15.0 mg/dL     10/29/2022 0.7 0.1 - 1.0 mg/dL Final     Comment:     For infants and newborns, interpretation of results should be based  on gestational age, weight and in agreement with clinical  observations.    Premature Infant recommended reference ranges:  Up to 24 hours.............<8.0 mg/dL  Up to 48 hours............<12.0 mg/dL  3-5 days..................<15.0 mg/dL  6-29 days.................<15.0 mg/dL     10/27/2022 0.7 0.1 - 1.0 mg/dL Final     Comment:     For infants and newborns, interpretation of results should be based  on gestational age, weight and in agreement with clinical  observations.    Premature Infant recommended reference ranges:  Up to 24 hours.............<8.0 mg/dL  Up to 48 hours............<12.0 mg/dL  3-5 days..................<15.0 mg/dL  6-29 days.................<15.0 mg/dL         WBC   Date Value Ref Range Status   10/30/2022 9.27 3.90 - 12.70 K/uL Final   10/29/2022 10.72 3.90 - 12.70 K/uL Final   10/29/2022 10.72 3.90 - 12.70 K/uL Final       Hemoglobin   Date Value Ref Range Status   10/30/2022 11.9 (L) 14.0 - 18.0 g/dL Final   10/29/2022 12.3 (L) 14.0 - 18.0 g/dL Final   10/29/2022 12.3 (L) 14.0 - 18.0 g/dL Final       Hematocrit   Date Value Ref Range Status   10/30/2022 34.5 (L) 40.0 - 54.0 %  Final   10/29/2022 36.2 (L) 40.0 - 54.0 % Final   10/29/2022 36.2 (L) 40.0 - 54.0 % Final       MCV   Date Value Ref Range Status   10/30/2022 92 82 - 98 fL Final   10/29/2022 91 82 - 98 fL Final   10/29/2022 91 82 - 98 fL Final       Platelets   Date Value Ref Range Status   10/30/2022 297 150 - 450 K/uL Final   10/29/2022 293 150 - 450 K/uL Final   10/29/2022 293 150 - 450 K/uL Final       Lab Results   Component Value Date    CHOL 156 07/26/2022    CHOL 152 03/22/2021    CHOL 179 06/07/2019       Lab Results   Component Value Date    HDL 39 (L) 07/26/2022    HDL 36 (L) 03/22/2021    HDL 49 06/07/2019       Lab Results   Component Value Date    LDLCALC 101.0 07/26/2022    LDLCALC 101.0 03/22/2021    LDLCALC 115.2 06/07/2019       Lab Results   Component Value Date    TRIG 80 07/26/2022    TRIG 75 03/22/2021    TRIG 74 06/07/2019       Lab Results   Component Value Date    CHOLHDL 25.0 07/26/2022    CHOLHDL 23.7 03/22/2021    CHOLHDL 27.4 06/07/2019     No results found for: RPR  No results found for: QUANTIFERON    Medications:  Current Outpatient Medications on File Prior to Visit   Medication Sig Dispense Refill    azelastine (ASTELIN) 137 mcg (0.1 %) nasal spray 1 spray (137 mcg total) by Nasal route 2 (two) times daily. 30 mL 11    calcium-magnesium-zinc Tab Take 2 tablets by mouth once daily.      cefadroxil (DURICEF) 500 MG Cap Take 1 capsule (500 mg total) by mouth every 12 (twelve) hours. for 21 days 42 capsule 0    ciclopirox (PENLAC) 8 % Soln Apply daily to affected nail. Must remove and restart weekly 1 each 5    fluticasone propionate (FLONASE) 50 mcg/actuation nasal spray 2 sprays (100 mcg total) by Each Nostril route 2 (two) times daily. 18.2 mL 3    guaiFENesin (MUCINEX) 600 mg 12 hr tablet Take 1,200 mg by mouth 2 (two) times daily.      ketoconazole (NIZORAL) 2 % cream AAA bid to both feet x 3 weeks 60 g 3    multivitamin capsule Take 1 capsule by mouth once daily.      tamsulosin (FLOMAX) 0.4 mg  Cap TAKE 1 CAPSULE BY MOUTH EVERY DAY 90 capsule 1    sars-cov-2, covid-19 omicron, (MODERNA COVID-19) 50 mcg/0.5 mL injection Inject into the muscle. 0.5 mL 0     No current facility-administered medications on file prior to visit.       Antibiotics:   Antibiotics (From admission, onward)      None            HIV: No components found for: HIV 1/2 AG/AB  Hepatitis C IgG: No components found for: HEPATITIS C  Syphilis: No results found for: RPR    Hepatitis A IgG: No components found for: HEPATITIS A IGG  Hepatitis Bc IgG: No components found for: HEPATITIS B CORE IGG  Hepatitis Bs IgG:  Quantiferon: No results found for: QUANTIFERON  VZV IgG: No components found for: VARICELLA IGG    No components found for: SEDIMENTATION RATE  No components found for: C-REACTIVE PROTEIN      Microbiology x 7d:   Microbiology Results (last 7 days)       ** No results found for the last 168 hours. **            Immunization History   Administered Date(s) Administered    COVID-19 MRNA, LN-S PF (MODERNA HALF 0.25 ML DOSE) 04/07/2022    COVID-19, MRNA, LN-S, PF (MODERNA FULL 0.5 ML DOSE) 02/09/2021, 03/09/2021, 10/22/2021    COVID-19, mRNA, LNP-S, bivalent booster, PF (Moderna Omicron) 09/15/2022    Influenza 01/11/2017, 09/21/2017, 09/04/2020, 10/11/2021    Influenza (FLUAD) - Trivalent - Adjuvanted - PF (65+) 09/30/2019    Influenza - High Dose - PF (65 years and older) 10/17/2018    Influenza - Quadrivalent - High Dose - PF (65 years and older) 09/04/2020, 10/11/2021    Influenza - Quadrivalent - PF *Preferred* (6 months and older) 09/21/2017    Influenza - Trivalent (ADULT) 09/13/2013, 11/03/2014    Influenza - Trivalent - PF (ADULT) 01/11/2017    Influenza Split 09/13/2013, 11/03/2014    Pneumococcal Conjugate - 13 Valent 01/12/2017    Pneumococcal Polysaccharide - 23 Valent 11/03/2014, 10/09/2020    Tdap 01/11/2017    Zoster 11/03/2014    Zoster Recombinant 11/23/2020, 04/26/2021         Reviewed records today as well as relevant  labs, cultures, and imaging    Assessment:       Strep pyogenes neck abscess     admitted 10/24 with left sided neck swelling and generalized weakness.  Found to have strep pyogenes L sideded neck abscess s/p OR drainage 10/26. Bcx 10/24 NGTD x 2. 2d echo without vegetations. Clinically improved with drainage and >2 weeks antibiotics since washout.     Infection clinically resolved    No toxicities from antimicrobials    Plan:     Stop antibiotics    Advised against a close shave with razor 2/2 risk of micro-abrasions and increase risk of infection. Electric razor preferable    Reviewed vaccines, up to date    I spent a total of 20 minutes on the day of the visit. This includes face to face time and non-face to face time preparing to see the patient (eg, review of tests), obtaining and/or reviewing separately obtained history, documenting clinical information in the electronic or other health record, independently interpreting results, and communicating results to the patient/family/caregiver, or care coordination.      Angela Hicks MD, MPH  Infectious Disease

## 2022-11-11 NOTE — Clinical Note
Thank you for allowing me to follow-up with Elías Gay for paroxysmal atrial fibrillation. Please see my note for details of this encounter. If you have any questions, please contact me.  Thank you again for allowing to participate in the care of this patient.

## 2022-11-11 NOTE — PROGRESS NOTES
Subjective:       Patient ID: Elías Gay is a 67 y.o. male.    Chief Complaint: Follow-up    HPI  He is here for hospital follow-up appointment.  Please see hospitalization records.  He was admitted with a neck abscess.  Currently on antibiotics.  He developed atrial fibrillation.  He is had atrial fibrillation for less than a year.  Current treatment has included medications outlined medication list.  He denies pain located in his chest.  Denies shortness of breath.  He also developed acute kidney injury while hospitalized     Medications:  See med list     Social history:  Does not smoke, does not drink alcohol       Review of Systems   Constitutional:  Negative for chills, fatigue, fever and unexpected weight change.   Respiratory:  Negative for chest tightness and shortness of breath.    Cardiovascular:  Negative for chest pain and palpitations.   Gastrointestinal:  Negative for abdominal pain and blood in stool.   Neurological:  Negative for dizziness, syncope, numbness and headaches.     Objective:      Physical Exam  HENT:      Right Ear: External ear normal.      Left Ear: External ear normal.      Nose: Nose normal.      Mouth/Throat:      Mouth: Mucous membranes are moist.      Pharynx: Oropharynx is clear.   Eyes:      Pupils: Pupils are equal, round, and reactive to light.   Cardiovascular:      Rate and Rhythm: Normal rate and regular rhythm.      Heart sounds: No murmur heard.  Pulmonary:      Breath sounds: Normal breath sounds.   Abdominal:      General: There is no distension.      Palpations: There is no hepatomegaly.      Tenderness: There is no abdominal tenderness.   Musculoskeletal:      Cervical back: Normal range of motion.   Lymphadenopathy:      Cervical: No cervical adenopathy.      Upper Body:      Right upper body: No axillary adenopathy.      Left upper body: No axillary adenopathy.   Neurological:      Cranial Nerves: No cranial nerve deficit.      Sensory: No sensory deficit.       Motor: Motor function is intact.      Deep Tendon Reflexes: Reflexes are normal and symmetric.     Neck without tenderness or redness  Assessment/Plan       Assessment and plan:  1.  Neck abscess: Continue antibiotics.  Follow-up with Infectious Diseases.  2.  Acute kidney injury: Check BMP.  Follow-up with nephrology  3.  Atrial fibrillation:  Follow up with Cardiology  4. He was here for an urgent care only appointment.  Will follow-up with his primary care physician for a physical

## 2022-11-15 ENCOUNTER — PATIENT MESSAGE (OUTPATIENT)
Dept: INTERNAL MEDICINE | Facility: CLINIC | Age: 67
End: 2022-11-15
Payer: MEDICARE

## 2022-11-18 DIAGNOSIS — N17.9 ACUTE RENAL FAILURE, UNSPECIFIED ACUTE RENAL FAILURE TYPE: Primary | ICD-10-CM

## 2022-11-21 ENCOUNTER — CLINICAL SUPPORT (OUTPATIENT)
Dept: CARDIOLOGY | Facility: HOSPITAL | Age: 67
End: 2022-11-21
Attending: INTERNAL MEDICINE
Payer: MEDICARE

## 2022-11-21 DIAGNOSIS — I48.0 PAROXYSMAL ATRIAL FIBRILLATION: ICD-10-CM

## 2022-11-21 PROCEDURE — 93272 CARDIAC EVENT MONITOR (CUPID ONLY): ICD-10-PCS | Mod: ,,, | Performed by: INTERNAL MEDICINE

## 2022-11-21 PROCEDURE — 93270 REMOTE 30 DAY ECG REV/REPORT: CPT

## 2022-11-21 PROCEDURE — 93272 ECG/REVIEW INTERPRET ONLY: CPT | Mod: ,,, | Performed by: INTERNAL MEDICINE

## 2022-11-28 LAB — FUNGUS SPEC CULT: NORMAL

## 2022-12-02 ENCOUNTER — LAB VISIT (OUTPATIENT)
Dept: LAB | Facility: HOSPITAL | Age: 67
End: 2022-12-02
Attending: INTERNAL MEDICINE
Payer: MEDICARE

## 2022-12-02 DIAGNOSIS — N17.9 ACUTE RENAL FAILURE, UNSPECIFIED ACUTE RENAL FAILURE TYPE: ICD-10-CM

## 2022-12-02 LAB
ANION GAP SERPL CALC-SCNC: 9 MMOL/L (ref 8–16)
BASOPHILS # BLD AUTO: 0.04 K/UL (ref 0–0.2)
BASOPHILS NFR BLD: 0.6 % (ref 0–1.9)
BUN SERPL-MCNC: 19 MG/DL (ref 8–23)
CALCIUM SERPL-MCNC: 9.1 MG/DL (ref 8.7–10.5)
CHLORIDE SERPL-SCNC: 105 MMOL/L (ref 95–110)
CO2 SERPL-SCNC: 27 MMOL/L (ref 23–29)
CREAT SERPL-MCNC: 1.1 MG/DL (ref 0.5–1.4)
DIFFERENTIAL METHOD: ABNORMAL
EOSINOPHIL # BLD AUTO: 0.1 K/UL (ref 0–0.5)
EOSINOPHIL NFR BLD: 1.8 % (ref 0–8)
ERYTHROCYTE [DISTWIDTH] IN BLOOD BY AUTOMATED COUNT: 13.2 % (ref 11.5–14.5)
EST. GFR  (NO RACE VARIABLE): >60 ML/MIN/1.73 M^2
GLUCOSE SERPL-MCNC: 123 MG/DL (ref 70–110)
HCT VFR BLD AUTO: 37.6 % (ref 40–54)
HGB BLD-MCNC: 12.1 G/DL (ref 14–18)
IMM GRANULOCYTES # BLD AUTO: 0.02 K/UL (ref 0–0.04)
IMM GRANULOCYTES NFR BLD AUTO: 0.3 % (ref 0–0.5)
LYMPHOCYTES # BLD AUTO: 1.8 K/UL (ref 1–4.8)
LYMPHOCYTES NFR BLD: 25.4 % (ref 18–48)
MCH RBC QN AUTO: 30.4 PG (ref 27–31)
MCHC RBC AUTO-ENTMCNC: 32.2 G/DL (ref 32–36)
MCV RBC AUTO: 95 FL (ref 82–98)
MONOCYTES # BLD AUTO: 0.5 K/UL (ref 0.3–1)
MONOCYTES NFR BLD: 6.9 % (ref 4–15)
NEUTROPHILS # BLD AUTO: 4.7 K/UL (ref 1.8–7.7)
NEUTROPHILS NFR BLD: 65 % (ref 38–73)
NRBC BLD-RTO: 0 /100 WBC
PLATELET # BLD AUTO: 270 K/UL (ref 150–450)
PMV BLD AUTO: 9 FL (ref 9.2–12.9)
POTASSIUM SERPL-SCNC: 4.2 MMOL/L (ref 3.5–5.1)
RBC # BLD AUTO: 3.98 M/UL (ref 4.6–6.2)
SODIUM SERPL-SCNC: 141 MMOL/L (ref 136–145)
WBC # BLD AUTO: 7.21 K/UL (ref 3.9–12.7)

## 2022-12-02 PROCEDURE — 80048 BASIC METABOLIC PNL TOTAL CA: CPT | Performed by: INTERNAL MEDICINE

## 2022-12-02 PROCEDURE — 36415 COLL VENOUS BLD VENIPUNCTURE: CPT | Performed by: INTERNAL MEDICINE

## 2022-12-02 PROCEDURE — 85025 COMPLETE CBC W/AUTO DIFF WBC: CPT | Performed by: INTERNAL MEDICINE

## 2022-12-03 ENCOUNTER — EXTERNAL HOME HEALTH (OUTPATIENT)
Dept: HOME HEALTH SERVICES | Facility: HOSPITAL | Age: 67
End: 2022-12-03
Payer: MEDICARE

## 2022-12-07 ENCOUNTER — OFFICE VISIT (OUTPATIENT)
Dept: NEPHROLOGY | Facility: CLINIC | Age: 67
End: 2022-12-07
Payer: MEDICARE

## 2022-12-07 VITALS
WEIGHT: 278.25 LBS | DIASTOLIC BLOOD PRESSURE: 70 MMHG | BODY MASS INDEX: 38.95 KG/M2 | OXYGEN SATURATION: 96 % | HEART RATE: 76 BPM | SYSTOLIC BLOOD PRESSURE: 120 MMHG | HEIGHT: 71 IN

## 2022-12-07 DIAGNOSIS — N17.9 AKI (ACUTE KIDNEY INJURY): ICD-10-CM

## 2022-12-07 PROCEDURE — 3078F PR MOST RECENT DIASTOLIC BLOOD PRESSURE < 80 MM HG: ICD-10-PCS | Mod: CPTII,S$GLB,, | Performed by: INTERNAL MEDICINE

## 2022-12-07 PROCEDURE — 3288F PR FALLS RISK ASSESSMENT DOCUMENTED: ICD-10-PCS | Mod: CPTII,S$GLB,, | Performed by: INTERNAL MEDICINE

## 2022-12-07 PROCEDURE — 1125F AMNT PAIN NOTED PAIN PRSNT: CPT | Mod: CPTII,S$GLB,, | Performed by: INTERNAL MEDICINE

## 2022-12-07 PROCEDURE — 3074F PR MOST RECENT SYSTOLIC BLOOD PRESSURE < 130 MM HG: ICD-10-PCS | Mod: CPTII,S$GLB,, | Performed by: INTERNAL MEDICINE

## 2022-12-07 PROCEDURE — 1101F PT FALLS ASSESS-DOCD LE1/YR: CPT | Mod: CPTII,S$GLB,, | Performed by: INTERNAL MEDICINE

## 2022-12-07 PROCEDURE — 3008F PR BODY MASS INDEX (BMI) DOCUMENTED: ICD-10-PCS | Mod: CPTII,S$GLB,, | Performed by: INTERNAL MEDICINE

## 2022-12-07 PROCEDURE — 99203 PR OFFICE/OUTPT VISIT, NEW, LEVL III, 30-44 MIN: ICD-10-PCS | Mod: S$GLB,,, | Performed by: INTERNAL MEDICINE

## 2022-12-07 PROCEDURE — 3066F PR DOCUMENTATION OF TREATMENT FOR NEPHROPATHY: ICD-10-PCS | Mod: CPTII,S$GLB,, | Performed by: INTERNAL MEDICINE

## 2022-12-07 PROCEDURE — 99999 PR PBB SHADOW E&M-EST. PATIENT-LVL III: ICD-10-PCS | Mod: PBBFAC,,, | Performed by: INTERNAL MEDICINE

## 2022-12-07 PROCEDURE — 3066F NEPHROPATHY DOC TX: CPT | Mod: CPTII,S$GLB,, | Performed by: INTERNAL MEDICINE

## 2022-12-07 PROCEDURE — 1159F MED LIST DOCD IN RCRD: CPT | Mod: CPTII,S$GLB,, | Performed by: INTERNAL MEDICINE

## 2022-12-07 PROCEDURE — 99203 OFFICE O/P NEW LOW 30 MIN: CPT | Mod: S$GLB,,, | Performed by: INTERNAL MEDICINE

## 2022-12-07 PROCEDURE — 99999 PR PBB SHADOW E&M-EST. PATIENT-LVL III: CPT | Mod: PBBFAC,,, | Performed by: INTERNAL MEDICINE

## 2022-12-07 PROCEDURE — 1159F PR MEDICATION LIST DOCUMENTED IN MEDICAL RECORD: ICD-10-PCS | Mod: CPTII,S$GLB,, | Performed by: INTERNAL MEDICINE

## 2022-12-07 PROCEDURE — 3008F BODY MASS INDEX DOCD: CPT | Mod: CPTII,S$GLB,, | Performed by: INTERNAL MEDICINE

## 2022-12-07 PROCEDURE — 3074F SYST BP LT 130 MM HG: CPT | Mod: CPTII,S$GLB,, | Performed by: INTERNAL MEDICINE

## 2022-12-07 PROCEDURE — 1101F PR PT FALLS ASSESS DOC 0-1 FALLS W/OUT INJ PAST YR: ICD-10-PCS | Mod: CPTII,S$GLB,, | Performed by: INTERNAL MEDICINE

## 2022-12-07 PROCEDURE — 3288F FALL RISK ASSESSMENT DOCD: CPT | Mod: CPTII,S$GLB,, | Performed by: INTERNAL MEDICINE

## 2022-12-07 PROCEDURE — 1125F PR PAIN SEVERITY QUANTIFIED, PAIN PRESENT: ICD-10-PCS | Mod: CPTII,S$GLB,, | Performed by: INTERNAL MEDICINE

## 2022-12-07 PROCEDURE — 3078F DIAST BP <80 MM HG: CPT | Mod: CPTII,S$GLB,, | Performed by: INTERNAL MEDICINE

## 2022-12-07 NOTE — PROGRESS NOTES
REASON FOR CONSULT: VANGIE    REFERRING PHYSICIAN: Kameron Diaz MD      HISTORY OF PRESENT ILLNESS: 67 y.o. male who is new to me  has a past medical history of Acute renal failure (10/25/2022), Arthritis (1971), Basal cell carcinoma (11/06/2018), Foreign body in conjunctival sac, Hernia, inguinal, right (2002), Joint pain (1971), Keloid cicatrix (1958 2010 2018 2019), Melanoma (09/14/2018), and Severe sleep apnea. patient was referred here for abnormal renal function . Pt was admitted in October for sepsis and ATN     ROS:  General: negative for chills, or fatigue  ENT: No epistaxis or headaches  Hematological and Lymphatic: No bleeding problems or blood clots.  Endocrine: No skin changes or temperature intolerance  Respiratory: No cough, shortness of breath, or wheezing  Cardiovascular: No chest pain or dyspnea   Gastrointestinal: No abdominal pain, change in bowel habits  Genito-Urinary: No dysuria, trouble voiding, or hematuria  Musculoskeletal: ROS: negative for - joint pain, joint stiffness, joint swelling, muscle pain or muscular weakness  Neurological: No focal weakness, no numbness  Dermatological: No rash or ulcers.    PAST MEDICAL HISTORY:  Past Medical History:   Diagnosis Date    Acute renal failure 10/25/2022    Arthritis 1971    Bone Spurs in feet    Basal cell carcinoma 11/06/2018    left ear helix    Foreign body in conjunctival sac     Hernia, inguinal, right 2002    Joint pain 1971    Bones of feet    Keloid cicatrix 1958 2010 2018 2019    Hernia, Knee, Arm, Ear    Melanoma 09/14/2018    Right Arm 0.6mm    Severe sleep apnea        PAST SURGICAL HISTORY:  Past Surgical History:   Procedure Laterality Date    CLOSURE OF DEFECT OF MOHS PROCEDURE Left 1/3/2019    Procedure: CLOSURE, MOHS PROCEDURE DEFECT LEFT EAR;  Surgeon: Jeramy Meek MD;  Location: [facility] OR [address] [place] [address];  Service: Plastics;  Laterality: Left;  plastics set    COLONOSCOPY N/A 6/30/2021    Procedure: COLONOSCOPY;  Surgeon: Juliano CLAYTON  MD Natanael;  Location: [facility] ENDO (2ND FLR);  Service: Endoscopy;  Laterality: N/A;  severe sleep apnea     fully vaccinated-GT    HERNIA REPAIR Left     3 years of age    HERNIA REPAIR N/A     Ventral wall at 3 year of age.    INCISION AND DRAINAGE, NECK Left 10/26/2022    Procedure: INCISION AND DRAINAGE,NECK;  Surgeon: Bethany Macdonald MD;  Location: [facility];  Service: ENT;  Laterality: Left;    KNEE SURGERY Right 1984    Arthroscopic    NASAL SEPTUM SURGERY N/A 2009    SKIN BIOPSY  several over time       FAMILY HISTORY:   Family History   Problem Relation Age of Onset    Hypertension Mother     Stroke Mother     Aneurysm Mother     Cancer Father 72        Prostate and liver    No Known Problems Daughter     No Known Problems Son     Gout Brother     Eczema Brother     Psoriasis Brother     No Known Problems Daughter     No Known Problems Son     Cataracts Neg Hx     Glaucoma Neg Hx     Macular degeneration Neg Hx     Melanoma Neg Hx     Eczema Neg Hx     Lupus Neg Hx     Psoriasis Neg Hx        SOCIAL HISTORY:  Social History     Socioeconomic History    Marital status:    Tobacco Use    Smoking status: Never    Smokeless tobacco: Never    Tobacco comments:     Second hand smoke from mother. Cigarrette smoke inflames my sinuses.   Substance and Sexual Activity    Alcohol use: Not Currently     Alcohol/week: 1.0 standard drink     Types: 1 Glasses of wine per week     Comment: Rare    Drug use: No    Sexual activity: Yes     Partners: Female     Birth control/protection: None     Social Determinants of Health     Financial Resource Strain: Low Risk     Difficulty of Paying Living Expenses: Not hard at all   Food Insecurity: No Food Insecurity    Worried About Running Out of Food in the Last Year: Never true    Ran Out of Food in the Last Year: Never true   Transportation Needs: No Transportation Needs    Lack of Transportation (Medical): No    Lack of Transportation (Non-Medical): No   Physical Activity:  Insufficiently Active    Days of Exercise per Week: 1 day    Minutes of Exercise per Session: 30 min   Stress: No Stress Concern Present    Feeling of Stress : Not at all   Social Connections: Unknown    Frequency of Communication with Friends and Family: More than three times a week    Frequency of Social Gatherings with Friends and Family: More than three times a week    Active Member of Clubs or Organizations: No    Attends Club or Organization Meetings: Never    Marital Status:    Housing Stability: Low Risk     Unable to Pay for Housing in the Last Year: No    Number of Places Lived in the Last Year: 1    Unstable Housing in the Last Year: No       ALLERGIES:  Review of patient's allergies indicates:  No Known Allergies    MEDICATIONS:    Current Outpatient Medications:     azelastine (ASTELIN) 137 mcg (0.1 %) nasal spray, 1 spray (137 mcg total) by Nasal route 2 (two) times daily., Disp: 30 mL, Rfl: 11    calcium-magnesium-zinc Tab, Take 2 tablets by mouth once daily., Disp: , Rfl:     ciclopirox (PENLAC) 8 % Soln, Apply daily to affected nail. Must remove and restart weekly, Disp: 1 each, Rfl: 5    fluticasone propionate (FLONASE) 50 mcg/actuation nasal spray, 2 sprays (100 mcg total) by Each Nostril route 2 (two) times daily., Disp: 18.2 mL, Rfl: 3    guaiFENesin (MUCINEX) 600 mg 12 hr tablet, Take 1,200 mg by mouth 2 (two) times daily., Disp: , Rfl:     ketoconazole (NIZORAL) 2 % cream, AAA bid to both feet x 3 weeks, Disp: 60 g, Rfl: 3    multivitamin capsule, Take 1 capsule by mouth once daily., Disp: , Rfl:     tamsulosin (FLOMAX) 0.4 mg Cap, TAKE 1 CAPSULE BY MOUTH EVERY DAY, Disp: 90 capsule, Rfl: 1    sars-cov-2, covid-19 omicron, (MODERNA COVID-19) 50 mcg/0.5 mL injection, Inject into the muscle. (Patient not taking: Reported on 12/7/2022), Disp: 0.5 mL, Rfl: 0   Medication List with Changes/Refills   Current Medications    AZELASTINE (ASTELIN) 137 MCG (0.1 %) NASAL SPRAY    1 spray (137 mcg  "total) by Nasal route 2 (two) times daily.    CALCIUM-MAGNESIUM-ZINC TAB    Take 2 tablets by mouth once daily.    CICLOPIROX (PENLAC) 8 % SOLN    Apply daily to affected nail. Must remove and restart weekly    FLUTICASONE PROPIONATE (FLONASE) 50 MCG/ACTUATION NASAL SPRAY    2 sprays (100 mcg total) by Each Nostril route 2 (two) times daily.    GUAIFENESIN (MUCINEX) 600 MG 12 HR TABLET    Take 1,200 mg by mouth 2 (two) times daily.    KETOCONAZOLE (NIZORAL) 2 % CREAM    AAA bid to both feet x 3 weeks    MULTIVITAMIN CAPSULE    Take 1 capsule by mouth once daily.    SARS-COV-2, COVID-19 OMICRON, (MODERNA COVID-19) 50 MCG/0.5 ML INJECTION    Inject into the muscle.    TAMSULOSIN (FLOMAX) 0.4 MG CAP    TAKE 1 CAPSULE BY MOUTH EVERY DAY        PHYSICAL EXAM:  /70   Pulse 76   Ht 5' 11" (1.803 m)   Wt 126.2 kg (278 lb 3.5 oz)   SpO2 96%   BMI 38.80 kg/m²     General: No distress, No fever or chills  Head: Normocephalic,atraumatic  Eyes: conjunctivae/corneas clear. PERRL, EOM's intact.  Nose: Nares normal. Mucosa normal. No drainage or sinus tenderness.  Neck: No adenopathy,no carotid bruit,no JVD  Lungs:Clear to auscultation bilaterally, No Crackles  Heart: Regular rate and rhythm, no murmur, gallops or rubs  Abdomen: Soft, no tenderness, bowel sounds normal  Extremities: Atraumatic, no edema in LE  Skin: Turgor normal. No rashes or ulcers  Neurologic: No focal weakness, oriented.          LABS:  Lab Results   Component Value Date    CREATININE 1.1 12/02/2022       Prot/Creat Ratio, Urine   Date Value Ref Range Status   12/02/2022 Unable to calculate 0.00 - 0.20 Final       Lab Results   Component Value Date     12/02/2022    K 4.2 12/02/2022    CO2 27 12/02/2022       last PTH   Lab Results   Component Value Date    CALCIUM 9.1 12/02/2022    PHOS 4.6 (H) 10/25/2022       Lab Results   Component Value Date    HGB 12.1 (L) 12/02/2022        No results found for: HGBA1C    Lab Results   Component Value " Date    LDLCALC 101.0 07/26/2022           ASSESSMENT:    VANGIE 2/2 Rhabdo     Recovering well   Scr from 12/2 is back to normal 1.1  UA unremarkable   -Avoid NSAIDs intake          RTC as needed       Thanks for allowing me to participate in the care of this patient.     9:37 AM    SHANNAN MAN MD  NEPHROLOGY ATTENDING  .

## 2022-12-15 LAB
ACID FAST MOD KINY STN SPEC: NORMAL
MYCOBACTERIUM SPEC QL CULT: NORMAL

## 2022-12-16 ENCOUNTER — OFFICE VISIT (OUTPATIENT)
Dept: OTOLARYNGOLOGY | Facility: CLINIC | Age: 67
End: 2022-12-16
Payer: MEDICARE

## 2022-12-16 VITALS — HEIGHT: 71 IN | BODY MASS INDEX: 38.92 KG/M2 | WEIGHT: 278 LBS

## 2022-12-16 DIAGNOSIS — Z09 S/P NECK SURGERY, FOLLOW-UP EXAM: ICD-10-CM

## 2022-12-16 DIAGNOSIS — J30.2 SEASONAL ALLERGIC RHINITIS, UNSPECIFIED TRIGGER: Primary | ICD-10-CM

## 2022-12-16 PROCEDURE — 1126F AMNT PAIN NOTED NONE PRSNT: CPT | Mod: CPTII,S$GLB,, | Performed by: OTOLARYNGOLOGY

## 2022-12-16 PROCEDURE — 3288F PR FALLS RISK ASSESSMENT DOCUMENTED: ICD-10-PCS | Mod: CPTII,S$GLB,, | Performed by: OTOLARYNGOLOGY

## 2022-12-16 PROCEDURE — 1101F PR PT FALLS ASSESS DOC 0-1 FALLS W/OUT INJ PAST YR: ICD-10-PCS | Mod: CPTII,S$GLB,, | Performed by: OTOLARYNGOLOGY

## 2022-12-16 PROCEDURE — 99024 POSTOP FOLLOW-UP VISIT: CPT | Mod: S$GLB,,, | Performed by: OTOLARYNGOLOGY

## 2022-12-16 PROCEDURE — 3008F PR BODY MASS INDEX (BMI) DOCUMENTED: ICD-10-PCS | Mod: CPTII,S$GLB,, | Performed by: OTOLARYNGOLOGY

## 2022-12-16 PROCEDURE — 1159F MED LIST DOCD IN RCRD: CPT | Mod: CPTII,S$GLB,, | Performed by: OTOLARYNGOLOGY

## 2022-12-16 PROCEDURE — 3066F PR DOCUMENTATION OF TREATMENT FOR NEPHROPATHY: ICD-10-PCS | Mod: CPTII,S$GLB,, | Performed by: OTOLARYNGOLOGY

## 2022-12-16 PROCEDURE — 3288F FALL RISK ASSESSMENT DOCD: CPT | Mod: CPTII,S$GLB,, | Performed by: OTOLARYNGOLOGY

## 2022-12-16 PROCEDURE — 1126F PR PAIN SEVERITY QUANTIFIED, NO PAIN PRESENT: ICD-10-PCS | Mod: CPTII,S$GLB,, | Performed by: OTOLARYNGOLOGY

## 2022-12-16 PROCEDURE — 3066F NEPHROPATHY DOC TX: CPT | Mod: CPTII,S$GLB,, | Performed by: OTOLARYNGOLOGY

## 2022-12-16 PROCEDURE — 99024 PR POST-OP FOLLOW-UP VISIT: ICD-10-PCS | Mod: S$GLB,,, | Performed by: OTOLARYNGOLOGY

## 2022-12-16 PROCEDURE — 1101F PT FALLS ASSESS-DOCD LE1/YR: CPT | Mod: CPTII,S$GLB,, | Performed by: OTOLARYNGOLOGY

## 2022-12-16 PROCEDURE — 1159F PR MEDICATION LIST DOCUMENTED IN MEDICAL RECORD: ICD-10-PCS | Mod: CPTII,S$GLB,, | Performed by: OTOLARYNGOLOGY

## 2022-12-16 PROCEDURE — 3008F BODY MASS INDEX DOCD: CPT | Mod: CPTII,S$GLB,, | Performed by: OTOLARYNGOLOGY

## 2022-12-16 NOTE — PROGRESS NOTES
OTOLARYNGOLOGY CLINIC NOTE  Date:  12/16/2022     Chief complaint:  Chief Complaint   Patient presents with    Follow-up     6 week follow up to check neck, no pain       History of Present Illness  Elías Gay is a 67 y.o. male  presenting today for a followup.  Did not do Flonase  No throat pain anymore    Had septoplasty in past used to get sinuses drained out with suction   Had I+d that I did for neck abscess at his initial post hospital follow up had some throat pain and flex scope done. Had turbinate hypertrophy and I recommended nasal spray regimen .     No issues with neck.     Past Medical History  Past Medical History:   Diagnosis Date    Acute renal failure 10/25/2022    Arthritis 1971    Bone Spurs in feet    Basal cell carcinoma 11/06/2018    left ear helix    Foreign body in conjunctival sac     Hernia, inguinal, right 2002    Joint pain 1971    Bones of feet    Keloid cicatrix 1958 2010 2018 2019    Hernia, Knee, Arm, Ear    Melanoma 09/14/2018    Right Arm 0.6mm    Severe sleep apnea         Past Surgical History  Past Surgical History:   Procedure Laterality Date    CLOSURE OF DEFECT OF MOHS PROCEDURE Left 1/3/2019    Procedure: CLOSURE, MOHS PROCEDURE DEFECT LEFT EAR;  Surgeon: Jeramy Meek MD;  Location: [address];  Service: Plastics;  Laterality: Left;  plastics set    COLONOSCOPY N/A 6/30/2021    Procedure: COLONOSCOPY;  Surgeon: Juliano Santoyo MD;  Location: [facility] ([address] [place] [address]);  Service: Endoscopy;  Laterality: N/A;  severe sleep apnea     fully vaccinated-GT    HERNIA REPAIR Left     3 years of age    HERNIA REPAIR N/A     Ventral wall at 3 year of age.    INCISION AND DRAINAGE, NECK Left 10/26/2022    Procedure: INCISION AND DRAINAGE,NECK;  Surgeon: Bethany Macdonald MD;  Location: [facility];  Service: ENT;  Laterality: Left;    KNEE SURGERY Right 1984    Arthroscopic    NASAL SEPTUM SURGERY N/A 2009    SKIN BIOPSY  several over time        Medications  Current Outpatient  Medications on File Prior to Visit   Medication Sig Dispense Refill    azelastine (ASTELIN) 137 mcg (0.1 %) nasal spray 1 spray (137 mcg total) by Nasal route 2 (two) times daily. 30 mL 11    calcium-magnesium-zinc Tab Take 2 tablets by mouth once daily.      ciclopirox (PENLAC) 8 % Soln Apply daily to affected nail. Must remove and restart weekly 1 each 5    fluticasone propionate (FLONASE) 50 mcg/actuation nasal spray 2 sprays (100 mcg total) by Each Nostril route 2 (two) times daily. 18.2 mL 3    guaiFENesin (MUCINEX) 600 mg 12 hr tablet Take 1,200 mg by mouth 2 (two) times daily.      ketoconazole (NIZORAL) 2 % cream AAA bid to both feet x 3 weeks 60 g 3    multivitamin capsule Take 1 capsule by mouth once daily.      sars-cov-2, covid-19 omicron, (MODERNA COVID-19) 50 mcg/0.5 mL injection Inject into the muscle. 0.5 mL 0    tamsulosin (FLOMAX) 0.4 mg Cap TAKE 1 CAPSULE BY MOUTH EVERY DAY 90 capsule 1     No current facility-administered medications on file prior to visit.       Review of Systems  Review of Systems   Constitutional: Negative.    Cardiovascular: Negative.    Gastrointestinal: Negative.    Skin: Negative.    Neurological: Negative.    Psychiatric/Behavioral: Negative.        Answers submitted by the patient for this visit:  Review of Symptoms Questionnaire  (Submitted on 12/9/2022)  sinus pressure : Yes  Eye Drainage?: Yes  Sleep Apnea?: Yes  Urinating too frequently?: Yes  Muscle aches / pain?: Yes  Seasonal Allergies?: Yes  Social History   reports that he has never smoked. He has never used smokeless tobacco. He reports that he does not currently use alcohol after a past usage of about 1.0 standard drink per week. He reports that he does not use drugs.     Family History  Family History   Problem Relation Age of Onset    Hypertension Mother     Stroke Mother     Aneurysm Mother     Cancer Father 72        Prostate and liver    No Known Problems Daughter     No Known Problems Son     Gout  "Brother     Eczema Brother     Psoriasis Brother     No Known Problems Daughter     No Known Problems Son     Cataracts Neg Hx     Glaucoma Neg Hx     Macular degeneration Neg Hx     Melanoma Neg Hx     Eczema Neg Hx     Lupus Neg Hx     Psoriasis Neg Hx         Physical Exam   There were no vitals filed for this visit. Body mass index is 38.77 kg/m².  Weight: 126.1 kg (278 lb)   Height: 5' 11" (180.3 cm)     GENERAL: no acute distress.  HEAD: normocephalic.   EYES: No scleral icterus  EARS: external ear without lesion, normal pinna shape and position.    NOSE: external nose without significant bony abnormality  ORAL CAVITY/OROPHARYNX: tongue mobile.   NECK: trachea midline.   LYMPH NODES:No cervical lymphadenopathy. Incision well healed no swelling  RESPIRATORY: no stridor, no stertor. Voice normal. Respirations nonlabored.  NEURO: alert, responds to questions appropriately.    PSYCH:mood appropriate      Imaging:  The patient does not have any new imaging of the head and neck since last visit.     Labs:  CBC  Recent Labs   Lab 10/29/22  0404 10/30/22  1500 12/02/22  0952   WBC 10.72  10.72 9.27 7.21   Hemoglobin 12.3 L  12.3 L 11.9 L 12.1 L   Hematocrit 36.2 L  36.2 L 34.5 L 37.6 L   MCV 91  91 92 95   Platelets 293  293 297 270     BMP  Recent Labs   Lab 10/24/22  1909 10/25/22  0050 10/26/22  0304 10/31/22  0429 11/07/22  1149 12/02/22  0952   Glucose 93 125 H   < > 92 77 123 H   Sodium 136 135 L   < > 141 142 141   Potassium 3.5 3.7   < > 3.9 4.7 4.2   Chloride 102 101   < > 102 105 105   CO2 20 L 22 L   < > 30 H 27 27   BUN 23 29 H   < > 68 H 26 H 19   Creatinine 1.8 H 2.3 H   < > 3.6 H 1.6 H 1.1   Calcium 8.8 8.4 L   < > 7.4 L 9.0 9.1   Phosphorus 1.9 L 4.6 H  --   --   --   --    Magnesium 1.9 1.9  --   --   --   --     < > = values in this interval not displayed.     COAGS  Recent Labs   Lab 10/24/22  1929   INR 1.3 H       Assessment  1. Seasonal allergic rhinitis, unspecified trigger    2. S/P neck " surgery, follow-up exam       Plan:  Discussed plan of care with patient in detail and all questions answered. Patient reported understanding of plan of care.   Counseled on how to do nasal spray regimen prn for allergy flare ups  F/u prn     Please be aware that this note has been generated with the assistance of MModal voice-to-text.  Please excuse any spelling or grammatical errors.

## 2022-12-19 ENCOUNTER — OFFICE VISIT (OUTPATIENT)
Dept: SPORTS MEDICINE | Facility: CLINIC | Age: 67
End: 2022-12-19
Payer: MEDICARE

## 2022-12-19 VITALS
HEIGHT: 71 IN | BODY MASS INDEX: 39.57 KG/M2 | SYSTOLIC BLOOD PRESSURE: 142 MMHG | WEIGHT: 282.63 LBS | DIASTOLIC BLOOD PRESSURE: 84 MMHG

## 2022-12-19 DIAGNOSIS — G89.29 CHRONIC HIP PAIN, BILATERAL: Primary | ICD-10-CM

## 2022-12-19 DIAGNOSIS — M25.551 CHRONIC HIP PAIN, BILATERAL: Primary | ICD-10-CM

## 2022-12-19 DIAGNOSIS — M99.03 SOMATIC DYSFUNCTION OF LUMBAR REGION: ICD-10-CM

## 2022-12-19 DIAGNOSIS — M99.04 SACRAL REGION SOMATIC DYSFUNCTION: ICD-10-CM

## 2022-12-19 DIAGNOSIS — M79.10 MYALGIA: ICD-10-CM

## 2022-12-19 DIAGNOSIS — M25.552 CHRONIC HIP PAIN, BILATERAL: Primary | ICD-10-CM

## 2022-12-19 DIAGNOSIS — M99.06 SOMATIC DYSFUNCTION OF LOWER EXTREMITY: ICD-10-CM

## 2022-12-19 DIAGNOSIS — M99.05 SOMATIC DYSFUNCTION OF PELVIC REGION: ICD-10-CM

## 2022-12-19 DIAGNOSIS — M16.0 BILATERAL PRIMARY OSTEOARTHRITIS OF HIP: ICD-10-CM

## 2022-12-19 DIAGNOSIS — M99.02 SOMATIC DYSFUNCTION OF THORACIC REGION: ICD-10-CM

## 2022-12-19 DIAGNOSIS — R53.81 PHYSICAL DECONDITIONING: ICD-10-CM

## 2022-12-19 PROCEDURE — 99214 OFFICE O/P EST MOD 30 MIN: CPT | Mod: 25,S$GLB,, | Performed by: NEUROMUSCULOSKELETAL MEDICINE & OMM

## 2022-12-19 PROCEDURE — 3079F PR MOST RECENT DIASTOLIC BLOOD PRESSURE 80-89 MM HG: ICD-10-PCS | Mod: CPTII,S$GLB,, | Performed by: NEUROMUSCULOSKELETAL MEDICINE & OMM

## 2022-12-19 PROCEDURE — 1125F PR PAIN SEVERITY QUANTIFIED, PAIN PRESENT: ICD-10-PCS | Mod: CPTII,S$GLB,, | Performed by: NEUROMUSCULOSKELETAL MEDICINE & OMM

## 2022-12-19 PROCEDURE — 1159F PR MEDICATION LIST DOCUMENTED IN MEDICAL RECORD: ICD-10-PCS | Mod: CPTII,S$GLB,, | Performed by: NEUROMUSCULOSKELETAL MEDICINE & OMM

## 2022-12-19 PROCEDURE — 3008F PR BODY MASS INDEX (BMI) DOCUMENTED: ICD-10-PCS | Mod: CPTII,S$GLB,, | Performed by: NEUROMUSCULOSKELETAL MEDICINE & OMM

## 2022-12-19 PROCEDURE — 99999 PR PBB SHADOW E&M-EST. PATIENT-LVL III: CPT | Mod: PBBFAC,,, | Performed by: NEUROMUSCULOSKELETAL MEDICINE & OMM

## 2022-12-19 PROCEDURE — 3079F DIAST BP 80-89 MM HG: CPT | Mod: CPTII,S$GLB,, | Performed by: NEUROMUSCULOSKELETAL MEDICINE & OMM

## 2022-12-19 PROCEDURE — 99214 PR OFFICE/OUTPT VISIT, EST, LEVL IV, 30-39 MIN: ICD-10-PCS | Mod: 25,S$GLB,, | Performed by: NEUROMUSCULOSKELETAL MEDICINE & OMM

## 2022-12-19 PROCEDURE — 1160F PR REVIEW ALL MEDS BY PRESCRIBER/CLIN PHARMACIST DOCUMENTED: ICD-10-PCS | Mod: CPTII,S$GLB,, | Performed by: NEUROMUSCULOSKELETAL MEDICINE & OMM

## 2022-12-19 PROCEDURE — 1160F RVW MEDS BY RX/DR IN RCRD: CPT | Mod: CPTII,S$GLB,, | Performed by: NEUROMUSCULOSKELETAL MEDICINE & OMM

## 2022-12-19 PROCEDURE — 1159F MED LIST DOCD IN RCRD: CPT | Mod: CPTII,S$GLB,, | Performed by: NEUROMUSCULOSKELETAL MEDICINE & OMM

## 2022-12-19 PROCEDURE — 3077F PR MOST RECENT SYSTOLIC BLOOD PRESSURE >= 140 MM HG: ICD-10-PCS | Mod: CPTII,S$GLB,, | Performed by: NEUROMUSCULOSKELETAL MEDICINE & OMM

## 2022-12-19 PROCEDURE — 3008F BODY MASS INDEX DOCD: CPT | Mod: CPTII,S$GLB,, | Performed by: NEUROMUSCULOSKELETAL MEDICINE & OMM

## 2022-12-19 PROCEDURE — 97110 PR THERAPEUTIC EXERCISES: ICD-10-PCS | Mod: GP,S$GLB,, | Performed by: NEUROMUSCULOSKELETAL MEDICINE & OMM

## 2022-12-19 PROCEDURE — 99999 PR PBB SHADOW E&M-EST. PATIENT-LVL III: ICD-10-PCS | Mod: PBBFAC,,, | Performed by: NEUROMUSCULOSKELETAL MEDICINE & OMM

## 2022-12-19 PROCEDURE — 3066F PR DOCUMENTATION OF TREATMENT FOR NEPHROPATHY: ICD-10-PCS | Mod: CPTII,S$GLB,, | Performed by: NEUROMUSCULOSKELETAL MEDICINE & OMM

## 2022-12-19 PROCEDURE — 97110 THERAPEUTIC EXERCISES: CPT | Mod: GP,S$GLB,, | Performed by: NEUROMUSCULOSKELETAL MEDICINE & OMM

## 2022-12-19 PROCEDURE — 98927 OSTEOPATH MANJ 5-6 REGIONS: CPT | Mod: S$GLB,,, | Performed by: NEUROMUSCULOSKELETAL MEDICINE & OMM

## 2022-12-19 PROCEDURE — 1125F AMNT PAIN NOTED PAIN PRSNT: CPT | Mod: CPTII,S$GLB,, | Performed by: NEUROMUSCULOSKELETAL MEDICINE & OMM

## 2022-12-19 PROCEDURE — 98927 PR OSTEOPATHIC MANIP,5-6 BODY REGN: ICD-10-PCS | Mod: S$GLB,,, | Performed by: NEUROMUSCULOSKELETAL MEDICINE & OMM

## 2022-12-19 PROCEDURE — 3066F NEPHROPATHY DOC TX: CPT | Mod: CPTII,S$GLB,, | Performed by: NEUROMUSCULOSKELETAL MEDICINE & OMM

## 2022-12-19 PROCEDURE — 3077F SYST BP >= 140 MM HG: CPT | Mod: CPTII,S$GLB,, | Performed by: NEUROMUSCULOSKELETAL MEDICINE & OMM

## 2022-12-19 NOTE — PROGRESS NOTES
"Subjective:     Elías Gay     Chief Complaint   Patient presents with    Left Hip - Pain    Right Hip - Pain       Elías is a 67 y.o. male coming in today for left> right hip pain. Since last visit the pain in the "hip flexor" muscles has worsened, and continues to note pain with spreading his legs and stepping laterally. Pt ambulates with a standing walker today; pt states he has been going outside and walking more (trying to take longer strides). Pt. describes the pain as a 4/10 dull pain (left hip) and 2/10 dull pain (right hip) that does not radiate. He reports more pain on his left side.Pt notes recent sepsis / a-fib / rhabdo event in Oct '22 that resulted in a hospital stay (infection from shaving on neck). Pt. denies any new musculoskeletal complaints at this time.      Office note from 9/26/22 with Dr. Kris White reviewed     PAST MEDICAL HISTORY:   Past Medical History:   Diagnosis Date    Acute renal failure 10/25/2022    Arthritis 1971    Bone Spurs in feet    Basal cell carcinoma 11/06/2018    left ear helix    Foreign body in conjunctival sac     Hernia, inguinal, right 2002    Joint pain 1971    Bones of feet    Keloid cicatrix 1958 2010 2018 2019    Hernia, Knee, Arm, Ear    Melanoma 09/14/2018    Right Arm 0.6mm    Severe sleep apnea      PAST SURGICAL HISTORY:   Past Surgical History:   Procedure Laterality Date    CLOSURE OF DEFECT OF MOHS PROCEDURE Left 1/3/2019    Procedure: CLOSURE, MOHS PROCEDURE DEFECT LEFT EAR;  Surgeon: Jeramy Meek MD;  Location: [facility] OR [address] [place] [address];  Service: Plastics;  Laterality: Left;  plastics set    COLONOSCOPY N/A 6/30/2021    Procedure: COLONOSCOPY;  Surgeon: Juliano Santoyo MD;  Location: [facility] ENDO ([address] [place] [address]);  Service: Endoscopy;  Laterality: N/A;  severe sleep apnea     fully vaccinated-GT    HERNIA REPAIR Left     3 years of age    HERNIA REPAIR N/A     Ventral wall at 3 year of age.    INCISION AND DRAINAGE, NECK Left 10/26/2022    Procedure: INCISION " AND DRAINAGE,NECK;  Surgeon: Bethany Macdonald MD;  Location[facility];  Service: ENT;  Laterality: Left;    KNEE SURGERY Right 1984    Arthroscopic    NASAL SEPTUM SURGERY N/A 2009    SKIN BIOPSY  several over time     FAMILY HISTORY:   Family History   Problem Relation Age of Onset    Hypertension Mother     Stroke Mother     Aneurysm Mother     Cancer Father 72        Prostate and liver    No Known Problems Daughter     No Known Problems Son     Gout Brother     Eczema Brother     Psoriasis Brother     No Known Problems Daughter     No Known Problems Son     Cataracts Neg Hx     Glaucoma Neg Hx     Macular degeneration Neg Hx     Melanoma Neg Hx     Eczema Neg Hx     Lupus Neg Hx     Psoriasis Neg Hx      SOCIAL HISTORY:   Social History     Socioeconomic History    Marital status:    Tobacco Use    Smoking status: Never    Smokeless tobacco: Never    Tobacco comments:     Second hand smoke from mother. Cigarrette smoke inflames my sinuses.   Substance and Sexual Activity    Alcohol use: Not Currently     Alcohol/week: 1.0 standard drink     Types: 1 Glasses of wine per week     Comment: Rare    Drug use: No    Sexual activity: Yes     Partners: Female     Birth control/protection: None     Social Determinants of Health     Financial Resource Strain: Low Risk     Difficulty of Paying Living Expenses: Not hard at all   Food Insecurity: No Food Insecurity    Worried About Running Out of Food in the Last Year: Never true    Ran Out of Food in the Last Year: Never true   Transportation Needs: No Transportation Needs    Lack of Transportation (Medical): No    Lack of Transportation (Non-Medical): No   Physical Activity: Insufficiently Active    Days of Exercise per Week: 1 day    Minutes of Exercise per Session: 30 min   Stress: No Stress Concern Present    Feeling of Stress : Not at all   Social Connections: Unknown    Frequency of Communication with Friends and Family: More than three times a week     "Frequency of Social Gatherings with Friends and Family: More than three times a week    Active Member of Clubs or Organizations: No    Attends Club or Organization Meetings: Never    Marital Status:    Housing Stability: Low Risk     Unable to Pay for Housing in the Last Year: No    Number of Places Lived in the Last Year: 1    Unstable Housing in the Last Year: No       MEDICATIONS:   Current Outpatient Medications:     calcium-magnesium-zinc Tab, Take 2 tablets by mouth once daily., Disp: , Rfl:     ciclopirox (PENLAC) 8 % Soln, Apply daily to affected nail. Must remove and restart weekly, Disp: 1 each, Rfl: 5    guaiFENesin (MUCINEX) 600 mg 12 hr tablet, Take 1,200 mg by mouth 2 (two) times daily., Disp: , Rfl:     ketoconazole (NIZORAL) 2 % cream, AAA bid to both feet x 3 weeks, Disp: 60 g, Rfl: 3    multivitamin capsule, Take 1 capsule by mouth once daily., Disp: , Rfl:     tamsulosin (FLOMAX) 0.4 mg Cap, TAKE 1 CAPSULE BY MOUTH EVERY DAY, Disp: 90 capsule, Rfl: 1    azelastine (ASTELIN) 137 mcg (0.1 %) nasal spray, 1 spray (137 mcg total) by Nasal route 2 (two) times daily. (Patient not taking: Reported on 12/19/2022), Disp: 30 mL, Rfl: 11    fluticasone propionate (FLONASE) 50 mcg/actuation nasal spray, 2 sprays (100 mcg total) by Each Nostril route 2 (two) times daily. (Patient not taking: Reported on 12/19/2022), Disp: 18.2 mL, Rfl: 3    sars-cov-2, covid-19 omicron, (MODERNA COVID-19) 50 mcg/0.5 mL injection, Inject into the muscle. (Patient not taking: Reported on 12/19/2022), Disp: 0.5 mL, Rfl: 0  ALLERGIES: Review of patient's allergies indicates:  No Known Allergies       Objective:     VITAL SIGNS: BP (!) 142/84   Ht 5' 11" (1.803 m)   Wt 128.2 kg (282 lb 10.1 oz)   BMI 39.42 kg/m²    General    Vitals reviewed.  Constitutional: He is oriented to person, place, and time. He appears well-developed and well-nourished.   Neurological: He is alert and oriented to person, place, and time. "   Psychiatric: He has a normal mood and affect. His behavior is normal.           MSK Exam:  HIP: bilateral HIP  The affected hip is compared to the contralateral hip.    Observation:    There is no edema, erythema, or ecchymosis in the lumbosacral region.   There is no Trendelenburg sign on either side  No obvious pelvic obliquity while standing.    No thoracolumbar scoliosis observed.    No midline skin abnormalities.  No atrophy noted in the lower limbs.  Gait: Right antalgic , + pes planus    ROM (* = with pain):  Passive hip flexion to 110° on left and 110° on right*  Passive hip internal rotation to 25° on left* and 25° on right*  Passive hip external rotation to 35° on left and 35° on right  Passive hip abduction to 30° on left* and 30° on right*    Tenderness To Palpation:  No tenderness at the ASIS, AIIS, PSIS, PIIS, iliac crest, pubic bones, ischial tuberosity.  No tenderness throughout the lumbar spine, iliolumbar region, or posterior pelvis.  No tenderness throughout the sacrum or piriformis  No tenderness over the greater trochanteric bursa or greater/lesser trochanters.  No tenderness at the glut attachments on the greater trochanter  No tenderness over proximal IT band   + bilateral hip flexor musculature tenderness    Strength Testing (* = with pain):  Hip flexion - 5/5 on left and 5/5 on right  Hip extension - 5/5 on left and 5/5 on right  Hip adduction - 5/5 on left and 5/5 on right  Hip abduction - 5/5 on left and 5/5 on right  Knee flexion - 5/5 on left and 5/5 on right  Knee extension - 5/5 on left and 5/5 on right    Special Tests:  Standing Trendelenburg test - negative    Seated straight leg raise - negative  Supine straight leg raise - negative  Hamstring flexibility symmetric    Log roll - positive bilateral  VALENCIA - positive bilaterally  FADIR - positive bilaterally  No pain with posterior hip capsule compression    ASIS compression test - negative  SI drawer test - negative   Thigh thrust  test - negative     Piriformis test (Bonnet's) - negative  Ely's test - negative  Quadriceps flexibility symmetric.  Jaylon test - positive bilaterally  Jerry compression test - positive on right    Fulcrum test - negative    Leg lengths symmetric following OMT to the pelvis.     Neurovascular Exam:  2+ femoral, DP, and PT pulses BL.  No skin changes, no abnormal hair distribution.  Sensation intact to light touch throughout the obturator and medial/lateral/posterior femoral cutaneous nerves.  Capillary refill intact to <2 seconds in all lower limb digits.    Structural Exam:  TART (Tissue texture abnormality, Asymmetry,  Restriction of motion and/or Tenderness) changes:     Thoracic Spine   T1 Neutral   T2 Neutral   T3 Neutral   T4 Neutral   T5 Neutral   T6 Neutral   T7 Neutral   T8 Neutral   T9 Neutral   T10 Neutral   T11 NS-left,R-right   T12 NS-left,R-right     Rib cage: R11-12 TTA on right     Lumbar Spine   L1 NS-left,R-right   L2 Neutral   L3 Neutral   L4 FRS LEFT   L5 FRS LEFT     Pelvis:  Innominate:Left posterior rotation  Pubic bone:Left superior pubic shear    Sacrum:Right on Right sacral torsion     Lower extremity: left anterior talus    Key   F= Flexed   E = Extended   R = Rotated   S = Sidebent   TTA = tissue texture abnormality         Assessment:      Encounter Diagnoses   Name Primary?    Chronic hip pain, bilateral Yes    Bilateral primary osteoarthritis of hip     Myalgia     Somatic dysfunction of lumbar region     Sacral region somatic dysfunction     Somatic dysfunction of pelvic region     Somatic dysfunction of lower extremity     Somatic dysfunction of thoracic region               Plan:   1.  Chronic bilateral hip pain likely secondary to underlying DJD changes as noted on x-rays, left worse than right symptomatically today.  Deterioration of muscle strength with recent  - Referral to outpatient PT (Sommer sanchez) for increase pelvic stability and lower extremity  - Discussed with  patient option of diagnostic/therapeutic ultrasound-guided intra-articular CSI of right hip as next step.  Patient would like to hold off on this for now, continuing with conservative treatment with OMT and PT first. Plan for hip intra-articular CSI as next step if symptoms persist.  - encouraged continued weight loss efforts with diet and exercise  - OMT performed again today to address associated biomechanical restrictions  and HEP reviewed  -  continue shoe orthotic wear for bilateral pes planus   - X-ray images of right hip taken 08/15/2019 (AP pelvis and frogleg lateral  right views) showed mild to moderate DJD with hip joint space narrowing bilaterally.   -  X-ray images of bilateral hips taken 8/30/22 (AP pelvis and frogleg lateral  bilateral views) showed mild to moderate degenerative changes can be seen in both hips with some cystic change in both acetabulums. Slight progression on the right compared to 2019 films. Images were personally reviewed with patient.    2. OMT 5-6 regions. Oral consent obtained.  Reviewed benefits and potential side effects.   - OMT indicated today due to signs and symptoms as well as local and remote somatic dysfunction findings and their related neurokinetic, lymphatic, fascial and/or arteriovenous body connections.   - OMT techniques used: Myofascial Release, Muscle Energy, and Articulatory   - Treatment was tolerated well. Improvement noted in segmental mobility post-treatment in dysfunctional regions. There were no adverse events and no complications immediately following treatment.     3.  Reviewed with patient the following HEP:  restart:   A)    Pelvic clock exercises given to do from the 6-12 o'clock positions:10-15 reps, twice daily. Hand out of exercise also given.   B) IT band rolling: recommend using rolling stick or foam roller to roll out IT band tension bilaterally, 1-2 times a day.   C) Quadratus lumborum self-stretch while standing: hold stretch for 30 seconds,  repeating 2-3 times on each side. Do stretch twice daily. Hand-out also given.   D) Seated torso rotation exercise:  Gently rotate torso in the seated position, hold bind for 5-10 second, rotating further into stretch as tolerated with deep breath exhalation.  Repeat stretch bilaterally, 2-3 times a day.  Handout of exercise also given.    E) Pt. Given prone prop exercise to stretch psoas and anterior hip capsule. Hold stretch for 30 sec, repeat 2-3 times, twice daily. Handout given.   F) Bilateral Psoas lunge stretch: hold stretch for 30 seconds, repeat 2-3 times, twice daily    HOME EXERCISE PROGRAM (HEP):  The patient was taught a homegoing physical therapy regimen as described above. The patient demonstrated understanding of the exercises and proper technique of their execution. This interaction took 15 minutes.     4. Follow-up in 1 month for reevaluation    5. Patient agreeable to today's plan and all questions were answered    This note is dictated using the M*Modal Fluency Direct word recognition program. There are word recognition mistakes that are occasionally missed on review.      Total time spent face-to face with patient counseling or coordinating care including prognosis, differential diagnosis, risks and benefits of treatment, instructions, compliance risk reductions as well as non-face-to-face time personally spent reviewing medial record, medical documentation, and coordination of care.     EST MINUTES X   06612 10-19    [id number] 20-29    [id number] 30-39 x   [id number] 40-54    NEW     01224 15-29    [id number] 30-44    [id number] 45-59    06314 60-74    PHONE      5-10    [id number] 11-20    [id number] 21-30

## 2022-12-20 ENCOUNTER — PES CALL (OUTPATIENT)
Dept: ADMINISTRATIVE | Facility: CLINIC | Age: 67
End: 2022-12-20
Payer: MEDICARE

## 2023-01-03 ENCOUNTER — PATIENT MESSAGE (OUTPATIENT)
Dept: ADMINISTRATIVE | Facility: CLINIC | Age: 68
End: 2023-01-03
Payer: MEDICARE

## 2023-01-03 ENCOUNTER — TELEPHONE (OUTPATIENT)
Dept: ADMINISTRATIVE | Facility: CLINIC | Age: 68
End: 2023-01-03
Payer: MEDICARE

## 2023-01-04 PROBLEM — E66.01 MORBID OBESITY: Status: ACTIVE | Noted: 2023-01-04

## 2023-01-04 PROBLEM — I50.41 ACUTE COMBINED SYSTOLIC AND DIASTOLIC HEART FAILURE: Status: ACTIVE | Noted: 2023-01-04

## 2023-01-04 NOTE — PROGRESS NOTES
The patient location is: [place]  The chief complaint leading to consultation is: Medicare Wellness Visit       Visit type: audiovisual     Face to Face time with patient: 30    60 minutes of total time spent on the encounter, which includes face to face time and non-face to face time preparing to see the patient (eg, review of tests), Obtaining and/or reviewing separately obtained history, Documenting clinical information in the electronic or other health record, Independently interpreting results (not separately reported) and communicating results to the patient/family/caregiver, or Care coordination (not separately reported).            Each patient to whom he or she provides medical services by telemedicine is:  (1) informed of the relationship between the physician and patient and the respective role of any other health care provider with respect to management of the patient; and (2) notified that he or she may decline to receive medical services by telemedicine and may withdraw from such care at any time.      Elías Gay presented for a  Medicare AWV and comprehensive Health Risk Assessment today. The following components were reviewed and updated:    Medical history  Family History  Social history  Allergies and Current Medications  Health Risk Assessment  Health Maintenance  Care Team         ** See Completed Assessments for Annual Wellness Visit within the encounter summary.**         The following assessments were completed:  Living Situation  CAGE  Depression Screening  Timed Get Up and Go  Whisper Test  Cognitive Function Screening  Nutrition Screening  ADL Screening  PAQ Screening      Review for Opioid Screening: Pt does not have Rx for Opioids      Review for Substance Use Disorders: Patient does not use substance        Current Outpatient Medications:     calcium-magnesium-zinc Tab, Take 2 tablets by mouth once daily., Disp: , Rfl:     ciclopirox (PENLAC) 8 % Soln, Apply daily to affected nail.  "Must remove and restart weekly, Disp: 1 each, Rfl: 5    fluticasone propionate (FLONASE) 50 mcg/actuation nasal spray, 2 sprays (100 mcg total) by Each Nostril route 2 (two) times daily., Disp: 18.2 mL, Rfl: 3    guaiFENesin (MUCINEX) 600 mg 12 hr tablet, Take 1,200 mg by mouth 2 (two) times daily., Disp: , Rfl:     ketoconazole (NIZORAL) 2 % cream, AAA bid to both feet x 3 weeks, Disp: 60 g, Rfl: 3    multivitamin capsule, Take 1 capsule by mouth once daily., Disp: , Rfl:     omega-3 fatty acids/fish oil (FISH OIL-OMEGA-3 FATTY ACIDS) 300-1,000 mg capsule, Take 1 capsule by mouth 2 (two) times a day., Disp: , Rfl:     tamsulosin (FLOMAX) 0.4 mg Cap, TAKE 1 CAPSULE BY MOUTH EVERY DAY, Disp: 90 capsule, Rfl: 1    azelastine (ASTELIN) 137 mcg (0.1 %) nasal spray, 1 spray (137 mcg total) by Nasal route 2 (two) times daily. (Patient not taking: Reported on 1/5/2023), Disp: 30 mL, Rfl: 11          Vitals:    01/05/23 1314   Weight: 126.1 kg (278 lb)   Height: 5' 11" (1.803 m)   PainSc:   3   PainLoc: Hip      Physical Exam  Constitutional:       General: He is not in acute distress.     Appearance: Normal appearance. He is well-developed and well-groomed.   Skin:     Coloration: Skin is not pale.   Neurological:      Mental Status: He is alert and oriented to person, place, and time.   Psychiatric:         Mood and Affect: Mood normal.         Speech: Speech normal.         Behavior: Behavior normal. Behavior is cooperative.         Thought Content: Thought content normal.     Physical exam limited d/t virtual visit. Patient does not appear to be in any respiratory distress. Able to speak in complete sentences without becoming short of breath.         Diagnoses and health risks identified today and associated recommendations/orders:    1. Encounter for preventive health examination      2. Paroxysmal atrial fibrillation  Chronic; stable. Followed by PCP, and recently seen by Cardiology.     3. Acute combined systolic and " diastolic heart failure  Chronic; stable. Followed by Cardiology and PCP.    4. Morbid obesity  Discussed importance of eating a healthy diet and exercising often can help control or delay health issues, like high blood pressure and diabetes. Set short-term goals to achieve and maintain a healthy diet and exercise routine.  Make these tips a priority every day:  Try to be physically active for at least 30 minutes on most or all days of the week.  Eat plenty of fruits and vegetables.  Choose foods that are low in added sugars, saturated fats, and sodium.  Pick whole grains and lean sources of protein and dairy products.     5. Malignant melanoma of upper arm, right  Chronic; stable. Followed by PCP.     6. Complex sleep apnea syndrome  Chronic; stable. Followed by PCP. Uses C-PAP regularly.      Provided Elías with a 5-10 year written screening schedule and personal prevention plan. Recommendations were developed using the USPSTF age appropriate recommendations. Education, counseling, and referrals were provided as needed. After Visit Summary printed and given to patient which includes a list of additional screenings\tests needed.    Follow up in about 1 year (around 1/5/2024).    Tamara Flowers NP    Advance Care Planning   I offered to discuss advanced care planning, including how to pick a person who would make decisions for you if you were unable to make them for yourself, called a health care power of , and what kind of decisions you might make such as use of life sustaining treatments such as ventilators and tube feeding when faced with a life limiting illness recorded on a living will that they will need to know. (How you want to be cared for as you near the end of your natural life)     X Patient is interested in learning more about how to make advanced directives.  I provided them paperwork and offered to discuss this with them.

## 2023-01-05 ENCOUNTER — OFFICE VISIT (OUTPATIENT)
Dept: FAMILY MEDICINE | Facility: CLINIC | Age: 68
End: 2023-01-05
Payer: MEDICARE

## 2023-01-05 ENCOUNTER — TELEPHONE (OUTPATIENT)
Dept: ADMINISTRATIVE | Facility: CLINIC | Age: 68
End: 2023-01-05
Payer: MEDICARE

## 2023-01-05 VITALS — WEIGHT: 278 LBS | BODY MASS INDEX: 38.92 KG/M2 | HEIGHT: 71 IN

## 2023-01-05 DIAGNOSIS — C43.61: ICD-10-CM

## 2023-01-05 DIAGNOSIS — I48.0 PAROXYSMAL ATRIAL FIBRILLATION: ICD-10-CM

## 2023-01-05 DIAGNOSIS — E66.01 MORBID OBESITY: ICD-10-CM

## 2023-01-05 DIAGNOSIS — I50.41 ACUTE COMBINED SYSTOLIC AND DIASTOLIC HEART FAILURE: ICD-10-CM

## 2023-01-05 DIAGNOSIS — Z00.00 ENCOUNTER FOR PREVENTIVE HEALTH EXAMINATION: Primary | ICD-10-CM

## 2023-01-05 DIAGNOSIS — G47.31 COMPLEX SLEEP APNEA SYNDROME: ICD-10-CM

## 2023-01-05 PROCEDURE — 1159F PR MEDICATION LIST DOCUMENTED IN MEDICAL RECORD: ICD-10-PCS | Mod: CPTII,95,,

## 2023-01-05 PROCEDURE — 1170F PR FUNCTIONAL STATUS ASSESSED: ICD-10-PCS | Mod: CPTII,95,,

## 2023-01-05 PROCEDURE — 3288F FALL RISK ASSESSMENT DOCD: CPT | Mod: CPTII,95,,

## 2023-01-05 PROCEDURE — 1125F AMNT PAIN NOTED PAIN PRSNT: CPT | Mod: CPTII,95,,

## 2023-01-05 PROCEDURE — 1160F PR REVIEW ALL MEDS BY PRESCRIBER/CLIN PHARMACIST DOCUMENTED: ICD-10-PCS | Mod: CPTII,95,,

## 2023-01-05 PROCEDURE — 1170F FXNL STATUS ASSESSED: CPT | Mod: CPTII,95,,

## 2023-01-05 PROCEDURE — 1101F PR PT FALLS ASSESS DOC 0-1 FALLS W/OUT INJ PAST YR: ICD-10-PCS | Mod: CPTII,95,,

## 2023-01-05 PROCEDURE — 1125F PR PAIN SEVERITY QUANTIFIED, PAIN PRESENT: ICD-10-PCS | Mod: CPTII,95,,

## 2023-01-05 PROCEDURE — 1159F MED LIST DOCD IN RCRD: CPT | Mod: CPTII,95,,

## 2023-01-05 PROCEDURE — G0439 PPPS, SUBSEQ VISIT: HCPCS | Mod: 95,,,

## 2023-01-05 PROCEDURE — 1160F RVW MEDS BY RX/DR IN RCRD: CPT | Mod: CPTII,95,,

## 2023-01-05 PROCEDURE — G0439 PR MEDICARE ANNUAL WELLNESS SUBSEQUENT VISIT: ICD-10-PCS | Mod: 95,,,

## 2023-01-05 PROCEDURE — 3008F PR BODY MASS INDEX (BMI) DOCUMENTED: ICD-10-PCS | Mod: CPTII,95,,

## 2023-01-05 PROCEDURE — 1101F PT FALLS ASSESS-DOCD LE1/YR: CPT | Mod: CPTII,95,,

## 2023-01-05 PROCEDURE — 3288F PR FALLS RISK ASSESSMENT DOCUMENTED: ICD-10-PCS | Mod: CPTII,95,,

## 2023-01-05 PROCEDURE — 3008F BODY MASS INDEX DOCD: CPT | Mod: CPTII,95,,

## 2023-01-05 RX ORDER — AMOXICILLIN 500 MG
1 CAPSULE ORAL 2 TIMES DAILY
COMMUNITY
End: 2024-03-06

## 2023-01-05 NOTE — PATIENT INSTRUCTIONS
Counseling and Referral of Other Preventative  (Italic type indicates deductible and co-insurance are waived)    Patient Name: Elías Gay  Today's Date: 1/5/2023    Health Maintenance       Date Due Completion Date    Hemoglobin A1c (Diabetic Prevention Screening) Never done ---    Colorectal Cancer Screening 06/30/2024 6/30/2021    TETANUS VACCINE 01/11/2027 1/11/2017    Lipid Panel 07/26/2027 7/26/2022        No orders of the defined types were placed in this encounter.      The following information is provided to all patients.  This information is to help you find resources for any of the problems found today that may be affecting your health:                Living healthy guide: www.[facility].[place].[facility]      Understanding Diabetes: www.diabetes.org      Eating healthy: www.cdc.gov/healthyweight      CDC home safety checklist: www.cdc.gov/steadi/patient.html      Agency on Aging: www.goea.[place].[facility]      Alcoholics anonymous (AA): www.aa.org      Physical Activity: www.joaquín.nih.gov/pk1jzzi      Tobacco use: www.quitwithusla.org

## 2023-01-05 NOTE — TELEPHONE ENCOUNTER
Called pt; no answer; left message informing patient I was calling to confirm his virtual EAWV today at 1:00pm and to see if he needed any help with setting up for virtual appt and to login 15 minutes prior to scheduled appt; left my name & number for pt to return my call if he has any questions or concerns;

## 2023-01-11 ENCOUNTER — PATIENT MESSAGE (OUTPATIENT)
Dept: CARDIOLOGY | Facility: CLINIC | Age: 68
End: 2023-01-11
Payer: MEDICARE

## 2023-01-17 NOTE — PROGRESS NOTES
Subjective:     Elías Gay     Chief Complaint   Patient presents with    Follow-up       Elías is a 67 y.o. male coming in today for left> right hip pain. Since last visit pt notes increased pain in his anterior hips after doing leg raises in bed at home. Pt ambulates with a standing walker today. He reports he has not started PT because he was in too much pain. Pt. describes the pain as a 3/10 stiff/dull that does not radiate. He reports more pain on his left side. Pt. denies any new musculoskeletal complaints at this time.      Office note from 12/19/22 reviewed     PAST MEDICAL HISTORY:   Past Medical History:   Diagnosis Date    Acute renal failure 10/25/2022    Arthritis 1971    Bone Spurs in feet    Basal cell carcinoma 11/06/2018    left ear helix    Foreign body in conjunctival sac     Hernia, inguinal, right 2002    Joint pain 1971    Bones of feet    Keloid cicatrix 1958 2010 2018 2019    Hernia, Knee, Arm, Ear    Melanoma 09/14/2018    Right Arm 0.6mm    Severe sleep apnea      PAST SURGICAL HISTORY:   Past Surgical History:   Procedure Laterality Date    CLOSURE OF DEFECT OF MOHS PROCEDURE Left 1/3/2019    Procedure: CLOSURE, MOHS PROCEDURE DEFECT LEFT EAR;  Surgeon: Jeramy Meek MD;  Location: [facility] OR [address] [place] [address];  Service: Plastics;  Laterality: Left;  plastics set    COLONOSCOPY N/A 6/30/2021    Procedure: COLONOSCOPY;  Surgeon: Juliano Santoyo MD;  Location: [facility] ([address] [place] [address]);  Service: Endoscopy;  Laterality: N/A;  severe sleep apnea     fully vaccinated-GT    HERNIA REPAIR Left     3 years of age    HERNIA REPAIR N/A     Ventral wall at 3 year of age.    INCISION AND DRAINAGE, NECK Left 10/26/2022    Procedure: INCISION AND DRAINAGE,NECK;  Surgeon: Bethany Macdonald MD;  Location: [facility] OR;  Service: ENT;  Laterality: Left;    KNEE SURGERY Right 1984    Arthroscopic    NASAL SEPTUM SURGERY N/A 2009    SKIN BIOPSY  several over time     FAMILY HISTORY:   Family History   Problem Relation  Age of Onset    Hypertension Mother     Stroke Mother     Aneurysm Mother     Cancer Father 72        Prostate and liver    Gout Brother     Eczema Brother     Psoriasis Brother     No Known Problems Daughter     No Known Problems Daughter     Kidney disease Son     Lung disease Son     No Known Problems Son     Cataracts Neg Hx     Glaucoma Neg Hx     Macular degeneration Neg Hx     Melanoma Neg Hx     Eczema Neg Hx     Lupus Neg Hx     Psoriasis Neg Hx      SOCIAL HISTORY:   Social History     Socioeconomic History    Marital status:    Tobacco Use    Smoking status: Never    Smokeless tobacco: Never    Tobacco comments:     Second hand smoke from mother. Cigarrette smoke inflames my sinuses.   Substance and Sexual Activity    Alcohol use: Not Currently    Drug use: No    Sexual activity: Yes     Partners: Female     Birth control/protection: None     Social Determinants of Health     Financial Resource Strain: Low Risk     Difficulty of Paying Living Expenses: Not hard at all   Food Insecurity: No Food Insecurity    Worried About Running Out of Food in the Last Year: Never true    Ran Out of Food in the Last Year: Never true   Transportation Needs: No Transportation Needs    Lack of Transportation (Medical): No    Lack of Transportation (Non-Medical): No   Physical Activity: Insufficiently Active    Days of Exercise per Week: 2 days    Minutes of Exercise per Session: 30 min   Stress: No Stress Concern Present    Feeling of Stress : Not at all   Social Connections: Socially Integrated    Frequency of Communication with Friends and Family: More than three times a week    Frequency of Social Gatherings with Friends and Family: Twice a week    Attends Scientologist Services: 1 to 4 times per year    Active Member of Clubs or Organizations: Yes    Attends Club or Organization Meetings: More than 4 times per year    Marital Status:    Housing Stability: Low Risk     Unable to Pay for Housing in the Last  "Year: No    Number of Places Lived in the Last Year: 1    Unstable Housing in the Last Year: No       MEDICATIONS:   Current Outpatient Medications:     azelastine (ASTELIN) 137 mcg (0.1 %) nasal spray, 1 spray (137 mcg total) by Nasal route 2 (two) times daily., Disp: 30 mL, Rfl: 11    calcium-magnesium-zinc Tab, Take 2 tablets by mouth once daily., Disp: , Rfl:     ciclopirox (PENLAC) 8 % Soln, Apply daily to affected nail. Must remove and restart weekly, Disp: 1 each, Rfl: 5    fluticasone propionate (FLONASE) 50 mcg/actuation nasal spray, 2 sprays (100 mcg total) by Each Nostril route 2 (two) times daily., Disp: 18.2 mL, Rfl: 3    guaiFENesin (MUCINEX) 600 mg 12 hr tablet, Take 1,200 mg by mouth 2 (two) times daily., Disp: , Rfl:     ketoconazole (NIZORAL) 2 % cream, AAA bid to both feet x 3 weeks, Disp: 60 g, Rfl: 3    multivitamin capsule, Take 1 capsule by mouth once daily., Disp: , Rfl:     omega 3-dha-epa-fish oil (FISH OIL) 1,000 mg (120 mg-180 mg) Cap, Take by mouth., Disp: , Rfl:     omega-3 fatty acids/fish oil (FISH OIL-OMEGA-3 FATTY ACIDS) 300-1,000 mg capsule, Take 1 capsule by mouth 2 (two) times a day., Disp: , Rfl:     tamsulosin (FLOMAX) 0.4 mg Cap, TAKE 1 CAPSULE BY MOUTH EVERY DAY, Disp: 90 capsule, Rfl: 1  ALLERGIES: Review of patient's allergies indicates:  No Known Allergies       Objective:     VITAL SIGNS: /84   Ht 5' 11" (1.803 m)   Wt 126.1 kg (278 lb)   BMI 38.77 kg/m²    General    Vitals reviewed.  Constitutional: He is oriented to person, place, and time. He appears well-developed and well-nourished.   Neurological: He is alert and oriented to person, place, and time.   Psychiatric: He has a normal mood and affect. His behavior is normal.           MSK Exam:  HIP: bilateral HIP  The affected hip is compared to the contralateral hip.    Observation:    There is no edema, erythema, or ecchymosis in the lumbosacral region.   There is no Trendelenburg sign on either side  No " obvious pelvic obliquity while standing.    No thoracolumbar scoliosis observed.    No midline skin abnormalities.  No atrophy noted in the lower limbs.  Gait: Right antalgic , + pes planus    ROM (* = with pain):  Passive hip flexion to 100° on left* and 110° on right  Passive hip internal rotation to 5° on left* and 25° on right*  Passive hip external rotation to 25° on left* and 35° on right  Passive hip abduction to 10° on left* and 25° on right    Tenderness To Palpation:  No tenderness at the ASIS, AIIS, PSIS, PIIS, iliac crest, pubic bones, ischial tuberosity.  No tenderness throughout the lumbar spine, iliolumbar region, or posterior pelvis.  No tenderness throughout the sacrum or piriformis  No tenderness over the greater trochanteric bursa or greater/lesser trochanters.  No tenderness at the glut attachments on the greater trochanter  No tenderness over proximal IT band   + bilateral hip flexor musculature tenderness    Strength Testing (* = with pain):  Hip flexion - 5/5 on left and 5/5 on right  Hip extension - 5/5 on left and 5/5 on right  Hip adduction - 5/5 on left and 5/5 on right  Hip abduction - 5/5 on left and 5/5 on right  Knee flexion - 5/5 on left and 5/5 on right  Knee extension - 5/5 on left and 5/5 on right    Special Tests:  Standing Trendelenburg test - negative    Seated straight leg raise - negative  Supine straight leg raise - negative  Hamstring flexibility symmetric    VALENCIA - positive bilaterally, left worse than right  FADIR - positive bilaterally, left worse than right  No pain with posterior hip capsule compression    ASIS compression test - negative  SI drawer test - negative   Thigh thrust test - negative     Piriformis test (Bonnet's) - negative  Ely's test - negative  Quadriceps flexibility symmetric.  Jaylon test - positive bilaterally    Fulcrum test - negative    Neurovascular Exam:  2+ femoral, DP, and PT pulses BL.  No skin changes, no abnormal hair distribution.  Sensation  intact to light touch throughout the obturator and medial/lateral/posterior femoral cutaneous nerves.  Capillary refill intact to <2 seconds in all lower limb digits.      Assessment:      Encounter Diagnoses   Name Primary?    Chronic hip pain, bilateral Yes    Bilateral primary osteoarthritis of hip                 Plan:   1.  Chronic bilateral hip pain likely secondary to underlying DJD changes as noted on x-rays, left worse than right symptomatically today.  Deterioration of muscle strength with recent hospitalization - imp[roving with HEP and increased activity at home.   - Discussed with patient option of diagnostic/therapeutic ultrasound-guided intra-articular CSI of right hip as next step. Pt. Would like to start with the left hip for this. Consider repeat outpatient PT ([facility]) for increase pelvic stability and lower extremity if pain improved with CSI vs. Referral to orthopedic surgeon of [facility] surigcal consultation.   - encouraged continued weight loss efforts with diet and exercise  -  continue shoe orthotic wear for bilateral pes planus   - X-ray images of right hip taken 08/15/2019 (AP pelvis and frogleg lateral  right views) showed mild to moderate DJD with hip joint space narrowing bilaterally.   -  X-ray images of bilateral hips taken 8/30/22 (AP pelvis and frogleg lateral  bilateral views) showed mild to moderate degenerative changes can be seen in both hips with some cystic change in both acetabulums. Slight progression on the right compared to 2019 films. Images were personally reviewed with patient.    2. Reviewed with patient the following HEP:  continue:   A)    Pelvic clock exercises given to do from the 6-12 o'clock positions:10-15 reps, twice daily. Hand out of exercise also given.   B) IT band rolling: recommend using rolling stick or foam roller to roll out IT band tension bilaterally, 1-2 times a day.   C) Quadratus lumborum self-stretch while standing: hold stretch for 30  seconds, repeating 2-3 times on each side. Do stretch twice daily. Hand-out also given.   D) Seated torso rotation exercise:  Gently rotate torso in the seated position, hold bind for 5-10 second, rotating further into stretch as tolerated with deep breath exhalation.  Repeat stretch bilaterally, 2-3 times a day.  Handout of exercise also given.    E) Pt. Given prone prop exercise to stretch psoas and anterior hip capsule. Hold stretch for 30 sec, repeat 2-3 times, twice daily. Handout given.   F) Bilateral Psoas lunge stretch: hold stretch for 30 seconds, repeat 2-3 times, twice daily    The patient was taught a homegoing physical therapy regimen as described above. The patient demonstrated understanding of the exercises and proper technique of their execution.     3. Follow-up in 1 week for left hip US guided CSI.     4. Patient agreeable to today's plan and all questions were answered    This note is dictated using the M*Modal Fluency Direct word recognition program. There are word recognition mistakes that are occasionally missed on review.

## 2023-01-18 ENCOUNTER — OFFICE VISIT (OUTPATIENT)
Dept: SPORTS MEDICINE | Facility: CLINIC | Age: 68
End: 2023-01-18
Payer: MEDICARE

## 2023-01-18 VITALS
BODY MASS INDEX: 38.92 KG/M2 | SYSTOLIC BLOOD PRESSURE: 138 MMHG | DIASTOLIC BLOOD PRESSURE: 84 MMHG | HEIGHT: 71 IN | WEIGHT: 278 LBS

## 2023-01-18 DIAGNOSIS — M25.551 CHRONIC HIP PAIN, BILATERAL: Primary | ICD-10-CM

## 2023-01-18 DIAGNOSIS — M16.0 BILATERAL PRIMARY OSTEOARTHRITIS OF HIP: ICD-10-CM

## 2023-01-18 DIAGNOSIS — M25.552 CHRONIC HIP PAIN, BILATERAL: Primary | ICD-10-CM

## 2023-01-18 DIAGNOSIS — G89.29 CHRONIC HIP PAIN, BILATERAL: Primary | ICD-10-CM

## 2023-01-18 PROCEDURE — 99214 PR OFFICE/OUTPT VISIT, EST, LEVL IV, 30-39 MIN: ICD-10-PCS | Mod: S$GLB,,, | Performed by: NEUROMUSCULOSKELETAL MEDICINE & OMM

## 2023-01-18 PROCEDURE — 3075F PR MOST RECENT SYSTOLIC BLOOD PRESS GE 130-139MM HG: ICD-10-PCS | Mod: CPTII,S$GLB,, | Performed by: NEUROMUSCULOSKELETAL MEDICINE & OMM

## 2023-01-18 PROCEDURE — 1101F PR PT FALLS ASSESS DOC 0-1 FALLS W/OUT INJ PAST YR: ICD-10-PCS | Mod: CPTII,S$GLB,, | Performed by: NEUROMUSCULOSKELETAL MEDICINE & OMM

## 2023-01-18 PROCEDURE — 1160F RVW MEDS BY RX/DR IN RCRD: CPT | Mod: CPTII,S$GLB,, | Performed by: NEUROMUSCULOSKELETAL MEDICINE & OMM

## 2023-01-18 PROCEDURE — 3288F FALL RISK ASSESSMENT DOCD: CPT | Mod: CPTII,S$GLB,, | Performed by: NEUROMUSCULOSKELETAL MEDICINE & OMM

## 2023-01-18 PROCEDURE — 3079F PR MOST RECENT DIASTOLIC BLOOD PRESSURE 80-89 MM HG: ICD-10-PCS | Mod: CPTII,S$GLB,, | Performed by: NEUROMUSCULOSKELETAL MEDICINE & OMM

## 2023-01-18 PROCEDURE — 99214 OFFICE O/P EST MOD 30 MIN: CPT | Mod: S$GLB,,, | Performed by: NEUROMUSCULOSKELETAL MEDICINE & OMM

## 2023-01-18 PROCEDURE — 3079F DIAST BP 80-89 MM HG: CPT | Mod: CPTII,S$GLB,, | Performed by: NEUROMUSCULOSKELETAL MEDICINE & OMM

## 2023-01-18 PROCEDURE — 3008F BODY MASS INDEX DOCD: CPT | Mod: CPTII,S$GLB,, | Performed by: NEUROMUSCULOSKELETAL MEDICINE & OMM

## 2023-01-18 PROCEDURE — 99999 PR PBB SHADOW E&M-EST. PATIENT-LVL II: ICD-10-PCS | Mod: PBBFAC,,, | Performed by: NEUROMUSCULOSKELETAL MEDICINE & OMM

## 2023-01-18 PROCEDURE — 3008F PR BODY MASS INDEX (BMI) DOCUMENTED: ICD-10-PCS | Mod: CPTII,S$GLB,, | Performed by: NEUROMUSCULOSKELETAL MEDICINE & OMM

## 2023-01-18 PROCEDURE — 99999 PR PBB SHADOW E&M-EST. PATIENT-LVL II: CPT | Mod: PBBFAC,,, | Performed by: NEUROMUSCULOSKELETAL MEDICINE & OMM

## 2023-01-18 PROCEDURE — 1159F MED LIST DOCD IN RCRD: CPT | Mod: CPTII,S$GLB,, | Performed by: NEUROMUSCULOSKELETAL MEDICINE & OMM

## 2023-01-18 PROCEDURE — 3075F SYST BP GE 130 - 139MM HG: CPT | Mod: CPTII,S$GLB,, | Performed by: NEUROMUSCULOSKELETAL MEDICINE & OMM

## 2023-01-18 PROCEDURE — 3288F PR FALLS RISK ASSESSMENT DOCUMENTED: ICD-10-PCS | Mod: CPTII,S$GLB,, | Performed by: NEUROMUSCULOSKELETAL MEDICINE & OMM

## 2023-01-18 PROCEDURE — 1160F PR REVIEW ALL MEDS BY PRESCRIBER/CLIN PHARMACIST DOCUMENTED: ICD-10-PCS | Mod: CPTII,S$GLB,, | Performed by: NEUROMUSCULOSKELETAL MEDICINE & OMM

## 2023-01-18 PROCEDURE — 1101F PT FALLS ASSESS-DOCD LE1/YR: CPT | Mod: CPTII,S$GLB,, | Performed by: NEUROMUSCULOSKELETAL MEDICINE & OMM

## 2023-01-18 PROCEDURE — 1159F PR MEDICATION LIST DOCUMENTED IN MEDICAL RECORD: ICD-10-PCS | Mod: CPTII,S$GLB,, | Performed by: NEUROMUSCULOSKELETAL MEDICINE & OMM

## 2023-01-18 RX ORDER — GLUCOSAM/CHONDRO/HERB 149/HYAL 750-100 MG
TABLET ORAL
COMMUNITY
End: 2023-03-09

## 2023-01-26 NOTE — PROGRESS NOTES
"Subjective:     Eílas Gay     Chief Complaint   Patient presents with    Left Hip - Pain       Elías is a 67 y.o. male coming in today for a diagnostic/therapeutic ultrasound guided left intraarticular hip injection as discussed at last visit.   Objective:     VITAL SIGNS: /80   Pulse 76   Ht 5' 11" (1.803 m)   Wt 126.1 kg (278 lb)   BMI 38.77 kg/m²      Large Joint Aspiration/Injection  Hip joint, left    Performed by: MARTHA STEVENS  Authorized by: MARTHA STEVENS  Consent Done?: Yes (Verbal)  Indications: Pain  Site marked: The procedure site was marked   Timeout: Prior to procedure the correct patient, procedure, and site was verified     Location: Hip joint, left  Prep: Patient was prepped with Chlorhexidine and alcohol.  Skin anesthetic: Ethyl Chloride spray was used prior to skin puncture.  Ultrasound Guidance for needle placement: yes  Needle size: 22 G, 6  Approach: Anterior  Procedure: After skin anesthetic was applied, the 22G, 6 needle was used to enter the left hip joint capsule under US guidance. A 3 cc mixture of 1 cc of 40 mg/ml triamcinolone acetonide and 2 cc of 0.2% Naropin was injected into the left hip joint.   Medications: 40 mg triamcinolone acetonide 40 mg/mL  Patient tolerance: Patient tolerated the procedure well with no immediate complications    Description of ultrasound utilization for needle guidance:    Ultrasound guidance was used for needle localization with SonNivelate Edge 2, C1-5 MHz probe(s). Images were saved and stored for documentation. The  left hip joint was visualized. Dynamic visualization of the 22g 6" needle(s) was continuous throughout the procedure and maintained good position and correct needle placement.      Triamcinolone:  NDC: [address]-9  LOT: [id number]  EXP: 08/2024        Assessment:      Encounter Diagnosis   Name Primary?    Primary osteoarthritis of left hip Yes          Plan:     1. US guided Intra-articular corticosteroid injection into " the left hip joint performed today (see details above).  Patient instructed to keep pain diary over the next 2 weeks to determine the amount (if any) of pain relief received from today's injection.   2. Follow-up as needed if pain deteriorates or new issue arises  3. Patient agreeable to today's plan and all questions were answered     This note is dictated using the M*Modal Fluency Direct word recognition program. There are word recognition mistakes that are occasionally missed on review.

## 2023-01-27 ENCOUNTER — OFFICE VISIT (OUTPATIENT)
Dept: SPORTS MEDICINE | Facility: CLINIC | Age: 68
End: 2023-01-27
Payer: MEDICARE

## 2023-01-27 VITALS
WEIGHT: 278 LBS | SYSTOLIC BLOOD PRESSURE: 136 MMHG | BODY MASS INDEX: 38.92 KG/M2 | HEART RATE: 76 BPM | DIASTOLIC BLOOD PRESSURE: 80 MMHG | HEIGHT: 71 IN

## 2023-01-27 DIAGNOSIS — M16.12 PRIMARY OSTEOARTHRITIS OF LEFT HIP: Primary | ICD-10-CM

## 2023-01-27 PROCEDURE — 20611 PR DRAIN/ASP/INJECT MAJOR JOINT/BURSA W/US GUIDANCE: ICD-10-PCS | Mod: LT,S$GLB,, | Performed by: NEUROMUSCULOSKELETAL MEDICINE & OMM

## 2023-01-27 PROCEDURE — 1160F RVW MEDS BY RX/DR IN RCRD: CPT | Mod: CPTII,S$GLB,, | Performed by: NEUROMUSCULOSKELETAL MEDICINE & OMM

## 2023-01-27 PROCEDURE — 3075F PR MOST RECENT SYSTOLIC BLOOD PRESS GE 130-139MM HG: ICD-10-PCS | Mod: CPTII,S$GLB,, | Performed by: NEUROMUSCULOSKELETAL MEDICINE & OMM

## 2023-01-27 PROCEDURE — 3075F SYST BP GE 130 - 139MM HG: CPT | Mod: CPTII,S$GLB,, | Performed by: NEUROMUSCULOSKELETAL MEDICINE & OMM

## 2023-01-27 PROCEDURE — 1159F PR MEDICATION LIST DOCUMENTED IN MEDICAL RECORD: ICD-10-PCS | Mod: CPTII,S$GLB,, | Performed by: NEUROMUSCULOSKELETAL MEDICINE & OMM

## 2023-01-27 PROCEDURE — 99499 NO LOS: ICD-10-PCS | Mod: S$GLB,,, | Performed by: NEUROMUSCULOSKELETAL MEDICINE & OMM

## 2023-01-27 PROCEDURE — 99999 PR PBB SHADOW E&M-EST. PATIENT-LVL III: ICD-10-PCS | Mod: PBBFAC,,, | Performed by: NEUROMUSCULOSKELETAL MEDICINE & OMM

## 2023-01-27 PROCEDURE — 1101F PT FALLS ASSESS-DOCD LE1/YR: CPT | Mod: CPTII,S$GLB,, | Performed by: NEUROMUSCULOSKELETAL MEDICINE & OMM

## 2023-01-27 PROCEDURE — 3008F BODY MASS INDEX DOCD: CPT | Mod: CPTII,S$GLB,, | Performed by: NEUROMUSCULOSKELETAL MEDICINE & OMM

## 2023-01-27 PROCEDURE — 1101F PR PT FALLS ASSESS DOC 0-1 FALLS W/OUT INJ PAST YR: ICD-10-PCS | Mod: CPTII,S$GLB,, | Performed by: NEUROMUSCULOSKELETAL MEDICINE & OMM

## 2023-01-27 PROCEDURE — 3288F PR FALLS RISK ASSESSMENT DOCUMENTED: ICD-10-PCS | Mod: CPTII,S$GLB,, | Performed by: NEUROMUSCULOSKELETAL MEDICINE & OMM

## 2023-01-27 PROCEDURE — 1125F AMNT PAIN NOTED PAIN PRSNT: CPT | Mod: CPTII,S$GLB,, | Performed by: NEUROMUSCULOSKELETAL MEDICINE & OMM

## 2023-01-27 PROCEDURE — 99499 UNLISTED E&M SERVICE: CPT | Mod: S$GLB,,, | Performed by: NEUROMUSCULOSKELETAL MEDICINE & OMM

## 2023-01-27 PROCEDURE — 3079F PR MOST RECENT DIASTOLIC BLOOD PRESSURE 80-89 MM HG: ICD-10-PCS | Mod: CPTII,S$GLB,, | Performed by: NEUROMUSCULOSKELETAL MEDICINE & OMM

## 2023-01-27 PROCEDURE — 1159F MED LIST DOCD IN RCRD: CPT | Mod: CPTII,S$GLB,, | Performed by: NEUROMUSCULOSKELETAL MEDICINE & OMM

## 2023-01-27 PROCEDURE — 1125F PR PAIN SEVERITY QUANTIFIED, PAIN PRESENT: ICD-10-PCS | Mod: CPTII,S$GLB,, | Performed by: NEUROMUSCULOSKELETAL MEDICINE & OMM

## 2023-01-27 PROCEDURE — 3008F PR BODY MASS INDEX (BMI) DOCUMENTED: ICD-10-PCS | Mod: CPTII,S$GLB,, | Performed by: NEUROMUSCULOSKELETAL MEDICINE & OMM

## 2023-01-27 PROCEDURE — 20611 DRAIN/INJ JOINT/BURSA W/US: CPT | Mod: LT,S$GLB,, | Performed by: NEUROMUSCULOSKELETAL MEDICINE & OMM

## 2023-01-27 PROCEDURE — 1160F PR REVIEW ALL MEDS BY PRESCRIBER/CLIN PHARMACIST DOCUMENTED: ICD-10-PCS | Mod: CPTII,S$GLB,, | Performed by: NEUROMUSCULOSKELETAL MEDICINE & OMM

## 2023-01-27 PROCEDURE — 3079F DIAST BP 80-89 MM HG: CPT | Mod: CPTII,S$GLB,, | Performed by: NEUROMUSCULOSKELETAL MEDICINE & OMM

## 2023-01-27 PROCEDURE — 99999 PR PBB SHADOW E&M-EST. PATIENT-LVL III: CPT | Mod: PBBFAC,,, | Performed by: NEUROMUSCULOSKELETAL MEDICINE & OMM

## 2023-01-27 PROCEDURE — 3288F FALL RISK ASSESSMENT DOCD: CPT | Mod: CPTII,S$GLB,, | Performed by: NEUROMUSCULOSKELETAL MEDICINE & OMM

## 2023-01-27 RX ORDER — TRIAMCINOLONE ACETONIDE 40 MG/ML
40 INJECTION, SUSPENSION INTRA-ARTICULAR; INTRAMUSCULAR
Status: COMPLETED | OUTPATIENT
Start: 2023-01-27 | End: 2023-01-27

## 2023-01-27 RX ADMIN — TRIAMCINOLONE ACETONIDE 40 MG: 40 INJECTION, SUSPENSION INTRA-ARTICULAR; INTRAMUSCULAR at 08:01

## 2023-01-30 PROBLEM — N17.9 ACUTE RENAL FAILURE: Status: RESOLVED | Noted: 2022-10-25 | Resolved: 2023-01-30

## 2023-01-30 PROBLEM — A41.9 SEPSIS: Status: RESOLVED | Noted: 2022-10-24 | Resolved: 2023-01-30

## 2023-02-13 ENCOUNTER — PATIENT MESSAGE (OUTPATIENT)
Dept: OTOLARYNGOLOGY | Facility: CLINIC | Age: 68
End: 2023-02-13
Payer: MEDICARE

## 2023-02-13 ENCOUNTER — TELEPHONE (OUTPATIENT)
Dept: OTOLARYNGOLOGY | Facility: CLINIC | Age: 68
End: 2023-02-13
Payer: MEDICARE

## 2023-02-13 NOTE — TELEPHONE ENCOUNTER
Called and spoke to pt----developed a small puss pocket along the surgical line--popped up a few days ago. Pt thinks it is an ingrown, infected hair. Pt will send a picture through his MyChart.

## 2023-02-13 NOTE — TELEPHONE ENCOUNTER
----- Message from Irlanda Andrew sent at 2/13/2023  9:35 AM CST -----  Regarding: [id number]  Type: Patient Call Back    Who called:self    What is the request in detail: pt states he will like a call from the nurse to discuss a sooner post op appt, due to an infection along the scar line from a procedure the doctor previously done on him.    Can the clinic reply by [facility]?no    Would the patient rather a call back or a response via [facility]? Call back    Best call back number:[phone]      Additional Information: pt states that this is a urgent matter.

## 2023-02-27 ENCOUNTER — HOSPITAL ENCOUNTER (OUTPATIENT)
Dept: RADIOLOGY | Facility: HOSPITAL | Age: 68
Discharge: HOME | End: 2023-02-27
Attending: INTERNAL MEDICINE
Payer: MEDICARE

## 2023-02-27 ENCOUNTER — OFFICE VISIT (OUTPATIENT)
Dept: INTERNAL MEDICINE | Facility: CLINIC | Age: 68
End: 2023-02-27
Payer: MEDICARE

## 2023-02-27 VITALS
OXYGEN SATURATION: 95 % | WEIGHT: 289.44 LBS | SYSTOLIC BLOOD PRESSURE: 130 MMHG | HEIGHT: 71 IN | HEART RATE: 81 BPM | BODY MASS INDEX: 40.52 KG/M2 | DIASTOLIC BLOOD PRESSURE: 72 MMHG

## 2023-02-27 DIAGNOSIS — I48.91 ATRIAL FIBRILLATION, UNSPECIFIED TYPE: ICD-10-CM

## 2023-02-27 DIAGNOSIS — E04.1 THYROID NODULE: ICD-10-CM

## 2023-02-27 DIAGNOSIS — R73.09 ELEVATED GLUCOSE: ICD-10-CM

## 2023-02-27 DIAGNOSIS — L02.91 ABSCESS: ICD-10-CM

## 2023-02-27 DIAGNOSIS — N17.9 AKI (ACUTE KIDNEY INJURY): Primary | ICD-10-CM

## 2023-02-27 PROCEDURE — 3075F SYST BP GE 130 - 139MM HG: CPT | Mod: CPTII,S$GLB,, | Performed by: INTERNAL MEDICINE

## 2023-02-27 PROCEDURE — 1159F PR MEDICATION LIST DOCUMENTED IN MEDICAL RECORD: ICD-10-PCS | Mod: CPTII,S$GLB,, | Performed by: INTERNAL MEDICINE

## 2023-02-27 PROCEDURE — 1101F PT FALLS ASSESS-DOCD LE1/YR: CPT | Mod: CPTII,S$GLB,, | Performed by: INTERNAL MEDICINE

## 2023-02-27 PROCEDURE — 76536 US EXAM OF HEAD AND NECK: CPT | Mod: 26,,, | Performed by: RADIOLOGY

## 2023-02-27 PROCEDURE — 1125F AMNT PAIN NOTED PAIN PRSNT: CPT | Mod: CPTII,S$GLB,, | Performed by: INTERNAL MEDICINE

## 2023-02-27 PROCEDURE — 99215 PR OFFICE/OUTPT VISIT, EST, LEVL V, 40-54 MIN: ICD-10-PCS | Mod: S$GLB,,, | Performed by: INTERNAL MEDICINE

## 2023-02-27 PROCEDURE — 3075F PR MOST RECENT SYSTOLIC BLOOD PRESS GE 130-139MM HG: ICD-10-PCS | Mod: CPTII,S$GLB,, | Performed by: INTERNAL MEDICINE

## 2023-02-27 PROCEDURE — 3008F BODY MASS INDEX DOCD: CPT | Mod: CPTII,S$GLB,, | Performed by: INTERNAL MEDICINE

## 2023-02-27 PROCEDURE — 1159F MED LIST DOCD IN RCRD: CPT | Mod: CPTII,S$GLB,, | Performed by: INTERNAL MEDICINE

## 2023-02-27 PROCEDURE — 3288F PR FALLS RISK ASSESSMENT DOCUMENTED: ICD-10-PCS | Mod: CPTII,S$GLB,, | Performed by: INTERNAL MEDICINE

## 2023-02-27 PROCEDURE — 99999 PR PBB SHADOW E&M-EST. PATIENT-LVL IV: CPT | Mod: PBBFAC,,, | Performed by: INTERNAL MEDICINE

## 2023-02-27 PROCEDURE — 3078F DIAST BP <80 MM HG: CPT | Mod: CPTII,S$GLB,, | Performed by: INTERNAL MEDICINE

## 2023-02-27 PROCEDURE — 99215 OFFICE O/P EST HI 40 MIN: CPT | Mod: S$GLB,,, | Performed by: INTERNAL MEDICINE

## 2023-02-27 PROCEDURE — 3078F PR MOST RECENT DIASTOLIC BLOOD PRESSURE < 80 MM HG: ICD-10-PCS | Mod: CPTII,S$GLB,, | Performed by: INTERNAL MEDICINE

## 2023-02-27 PROCEDURE — 3288F FALL RISK ASSESSMENT DOCD: CPT | Mod: CPTII,S$GLB,, | Performed by: INTERNAL MEDICINE

## 2023-02-27 PROCEDURE — 1101F PR PT FALLS ASSESS DOC 0-1 FALLS W/OUT INJ PAST YR: ICD-10-PCS | Mod: CPTII,S$GLB,, | Performed by: INTERNAL MEDICINE

## 2023-02-27 PROCEDURE — 76536 US SOFT TISSUE HEAD NECK THYROID: ICD-10-PCS | Mod: 26,,, | Performed by: RADIOLOGY

## 2023-02-27 PROCEDURE — 1125F PR PAIN SEVERITY QUANTIFIED, PAIN PRESENT: ICD-10-PCS | Mod: CPTII,S$GLB,, | Performed by: INTERNAL MEDICINE

## 2023-02-27 PROCEDURE — 76536 US EXAM OF HEAD AND NECK: CPT | Mod: TC

## 2023-02-27 PROCEDURE — 99999 PR PBB SHADOW E&M-EST. PATIENT-LVL IV: ICD-10-PCS | Mod: PBBFAC,,, | Performed by: INTERNAL MEDICINE

## 2023-02-27 PROCEDURE — 3008F PR BODY MASS INDEX (BMI) DOCUMENTED: ICD-10-PCS | Mod: CPTII,S$GLB,, | Performed by: INTERNAL MEDICINE

## 2023-02-28 ENCOUNTER — PATIENT MESSAGE (OUTPATIENT)
Dept: OTOLARYNGOLOGY | Facility: CLINIC | Age: 68
End: 2023-02-28
Payer: MEDICARE

## 2023-02-28 DIAGNOSIS — E04.1 THYROID NODULE: Primary | ICD-10-CM

## 2023-02-28 NOTE — PROGRESS NOTES
CC:  Follow-up     HPI:  The patient is a 67-year-old male with obstructive sleep apnea, BPH, history of melanoma, and colon polyps who presents today as a hospital follow-up for an abscess involving the left side of his neck with strep pyogenes.  The patient was treated with clindamycin and vancomycin.  This was changed to cefadroxil.  He followed up with Infectious diseases on November 11th.  The patient also went to acute renal failure rhabdomyolysis.  The patient was seen by Nephrology.  His creatinine is back to baseline.  The patient also developed PAF.  He did a 30 day event monitor.  It showed normal sinus rhythm.    ROS: Patient he is trying to go to the gym at least once or twice a week.  He reports that he is there for 2-1/2 hours and does a full body workout.  No problems with fever chills.  No chest pain.  No shortness of breath.  No nausea vomiting.  No abdominal pain.  He does have bilateral hip pain.  He does complain of loss of muscle mass and strength.    Physical exam:   General appearance:  No acute distress  HEENT: Conjunctiva is clear.  Pupils equal.  TMs are clear.  Nasal septum is midline without discharge.  Oropharynx without erythema.  Trachea is midline without JVD or thyromegaly.    Pulmonary:  Good inspiratory, expiratory breath sounds are heard.  Lungs are clear auscultation.    Cardiovascular:  S1-S2, rhythm is normal.  Extremities with trace to 1+ ankle edema.    GI: Abdomen was nontender, nondistended.  I can not appreciate hepatosplenomegaly secondary to abdominal girth  Comments:  His CT of the neck showed a right thyroid nodule    Assessment:  1. Status post incision and drainage for left neck abscess   2.  Acute renal failure currently resolved  3.  PAF currently resolved  4.  Right thyroid nodule   5. Elevated BMI  6. Elevated glucose review of labs     Plan:  1. Will schedule thyroid ultrasound    2. Will schedule a CBC, CMP, A1c, TSH, T4

## 2023-03-01 ENCOUNTER — TELEPHONE (OUTPATIENT)
Dept: INTERNAL MEDICINE | Facility: CLINIC | Age: 68
End: 2023-03-01
Payer: MEDICARE

## 2023-03-01 ENCOUNTER — PATIENT MESSAGE (OUTPATIENT)
Dept: INTERNAL MEDICINE | Facility: CLINIC | Age: 68
End: 2023-03-01
Payer: MEDICARE

## 2023-03-01 NOTE — TELEPHONE ENCOUNTER
----- Message from Kameron Diaz MD sent at 2/28/2023  6:17 PM CST -----  Please contact patient  His thyroid nodule appears to be benign. I would like for him to see an Endocrinologist for a second opinion. A referral is in.

## 2023-03-09 ENCOUNTER — OFFICE VISIT (OUTPATIENT)
Dept: DERMATOLOGY | Facility: CLINIC | Age: 68
End: 2023-03-09
Payer: MEDICARE

## 2023-03-09 DIAGNOSIS — Z85.820 HISTORY OF MALIGNANT MELANOMA: ICD-10-CM

## 2023-03-09 DIAGNOSIS — D22.9 MULTIPLE BENIGN NEVI: ICD-10-CM

## 2023-03-09 DIAGNOSIS — L73.8 SEBACEOUS GLAND HYPERPLASIA: ICD-10-CM

## 2023-03-09 DIAGNOSIS — L72.0 MILIA: ICD-10-CM

## 2023-03-09 DIAGNOSIS — L91.8 SKIN TAG: ICD-10-CM

## 2023-03-09 DIAGNOSIS — L82.1 SK (SEBORRHEIC KERATOSIS): Primary | ICD-10-CM

## 2023-03-09 PROCEDURE — 1125F PR PAIN SEVERITY QUANTIFIED, PAIN PRESENT: ICD-10-PCS | Mod: CPTII,S$GLB,, | Performed by: DERMATOLOGY

## 2023-03-09 PROCEDURE — 1125F AMNT PAIN NOTED PAIN PRSNT: CPT | Mod: CPTII,S$GLB,, | Performed by: DERMATOLOGY

## 2023-03-09 PROCEDURE — 1101F PR PT FALLS ASSESS DOC 0-1 FALLS W/OUT INJ PAST YR: ICD-10-PCS | Mod: CPTII,S$GLB,, | Performed by: DERMATOLOGY

## 2023-03-09 PROCEDURE — 3288F PR FALLS RISK ASSESSMENT DOCUMENTED: ICD-10-PCS | Mod: CPTII,S$GLB,, | Performed by: DERMATOLOGY

## 2023-03-09 PROCEDURE — 1101F PT FALLS ASSESS-DOCD LE1/YR: CPT | Mod: CPTII,S$GLB,, | Performed by: DERMATOLOGY

## 2023-03-09 PROCEDURE — 3044F HG A1C LEVEL LT 7.0%: CPT | Mod: CPTII,S$GLB,, | Performed by: DERMATOLOGY

## 2023-03-09 PROCEDURE — 3044F PR MOST RECENT HEMOGLOBIN A1C LEVEL <7.0%: ICD-10-PCS | Mod: CPTII,S$GLB,, | Performed by: DERMATOLOGY

## 2023-03-09 PROCEDURE — 3288F FALL RISK ASSESSMENT DOCD: CPT | Mod: CPTII,S$GLB,, | Performed by: DERMATOLOGY

## 2023-03-09 PROCEDURE — 1160F PR REVIEW ALL MEDS BY PRESCRIBER/CLIN PHARMACIST DOCUMENTED: ICD-10-PCS | Mod: CPTII,S$GLB,, | Performed by: DERMATOLOGY

## 2023-03-09 PROCEDURE — 99999 PR PBB SHADOW E&M-EST. PATIENT-LVL III: CPT | Mod: PBBFAC,,, | Performed by: DERMATOLOGY

## 2023-03-09 PROCEDURE — 1159F PR MEDICATION LIST DOCUMENTED IN MEDICAL RECORD: ICD-10-PCS | Mod: CPTII,S$GLB,, | Performed by: DERMATOLOGY

## 2023-03-09 PROCEDURE — 99999 PR PBB SHADOW E&M-EST. PATIENT-LVL III: ICD-10-PCS | Mod: PBBFAC,,, | Performed by: DERMATOLOGY

## 2023-03-09 PROCEDURE — 99213 PR OFFICE/OUTPT VISIT, EST, LEVL III, 20-29 MIN: ICD-10-PCS | Mod: S$GLB,,, | Performed by: DERMATOLOGY

## 2023-03-09 PROCEDURE — 99213 OFFICE O/P EST LOW 20 MIN: CPT | Mod: S$GLB,,, | Performed by: DERMATOLOGY

## 2023-03-09 PROCEDURE — 1159F MED LIST DOCD IN RCRD: CPT | Mod: CPTII,S$GLB,, | Performed by: DERMATOLOGY

## 2023-03-09 PROCEDURE — 1160F RVW MEDS BY RX/DR IN RCRD: CPT | Mod: CPTII,S$GLB,, | Performed by: DERMATOLOGY

## 2023-03-09 NOTE — PROGRESS NOTES
Subjective:       Patient ID:  Elías Gay is a 67 y.o. male who presents for   Chief Complaint   Patient presents with    Skin Check     TBSE    Lesion     L-temple      History of Present Illness: The patient presents for follow up of skin check.    The patient was last seen on: 11/24/21 by MANDO for TBSE ([facility])    H/o MM 7/16/18 0.6mm right upper arm neg LN and h/o nmsc    This is a high risk patient here to check for the development of new lesions.    Other skin complaints:   Patient complains of lesion(s)  Location: L- temple   Duration: 3 days    Symptoms: growing   Relieving factors/Previous treatments: none        Review of Systems   Constitutional:  Positive for weight gain (57# (now has lost 15 of that 57#) ). Negative for fever, chills, weight loss, fatigue, night sweats and malaise.   HENT:  Negative for headaches.    Respiratory:  Positive for shortness of breath. Negative for cough (due to sinuses).    Gastrointestinal:  Negative for indigestion.   Skin:  Positive for activity-related sunscreen use and wears hat ([employer] work). Negative for daily sunscreen use and recent sunburn.   Neurological:  Negative for headaches.   Hematologic/Lymphatic: Negative for adenopathy. Does not bruise/bleed easily.      Objective:    Physical Exam   Constitutional: He appears well-developed and well-nourished. He is obese.  No distress.   Genitourinary:         Lymphadenopathy: No supraclavicular adenopathy is present.     He has no cervical adenopathy.     He has no axillary adenopathy.   Neurological: He is alert and oriented to person, place, and time. He is not disoriented.   Psychiatric: He has a normal mood and affect.   Skin:   Areas Examined (abnormalities noted in diagram):   Scalp / Hair Palpated and Inspected  Head / Face Inspection Performed  Neck Inspection Performed  Chest / Axilla Inspection Performed  Abdomen Inspection Performed  Genitals / Buttocks / Groin Inspection Performed  Back Inspection  Performed  RUE Inspected  LUE Inspection Performed  RLE Inspected  LLE Inspection Performed  Nails and Digits Inspection Performed                         Diagram Legend     Erythematous scaling macule/papule c/w actinic keratosis       Vascular papule c/w angioma      Pigmented verrucoid papule/plaque c/w seborrheic keratosis      Yellow umbilicated papule c/w sebaceous hyperplasia      Irregularly shaped tan macule c/w lentigo     1-2 mm smooth white papules consistent with Milia      Movable subcutaneous cyst with punctum c/w epidermal inclusion cyst      Subcutaneous movable cyst c/w pilar cyst      Firm pink to brown papule c/w dermatofibroma      Pedunculated fleshy papule(s) c/w skin tag(s)      Evenly pigmented macule c/w junctional nevus     Mildly variegated pigmented, slightly irregular-bordered macule c/w mildly atypical nevus      Flesh colored to evenly pigmented papule c/w intradermal nevus       Pink pearly papule/plaque c/w basal cell carcinoma      Erythematous hyperkeratotic cursted plaque c/w SCC      Surgical scar with no sign of skin cancer recurrence      Open and closed comedones      Inflammatory papules and pustules      Verrucoid papule consistent consistent with wart     Erythematous eczematous patches and plaques     Dystrophic onycholytic nail with subungual debris c/w onychomycosis     Umbilicated papule    Erythematous-base heme-crusted tan verrucoid plaque consistent with inflamed seborrheic keratosis     Erythematous Silvery Scaling Plaque c/w Psoriasis     See annotation      Assessment / Plan:        SK (seborrheic keratosis)   - minor problem and chronic.   Reassurance given to patient. No treatment necessary.     Sebaceous gland hyperplasia  Reassurance given to patient. No treatment is necessary.     Skin tags   - minor problem and chronic.   Reassurance given to patient. No treatment necessary.     Multiple benign nevi   - minor problem and chronic.   Reassurance given to  patient. No treatment necessary.     Milia - scrotum   - minor problem and chronic.   Reassurance given to patient. No treatment necessary.     History of malignant melanoma  Area of previous melanoma and nmsc examined. Site well healed with no signs of recurrence.    Total body skin examination performed today including at least 12 points as noted in physical examination. No lesions suspicious for malignancy noted.    Recommend daily sun protection/avoidance, use of at least SPF 30, broad spectrum sunscreen (OTC drug), skin self examinations, and routine physician surveillance to optimize early detection               No follow-ups on file.

## 2023-04-25 ENCOUNTER — IMMUNIZATION (OUTPATIENT)
Dept: PHARMACY | Facility: CLINIC | Age: 68
End: 2023-04-25
Payer: MEDICARE

## 2023-04-25 DIAGNOSIS — Z23 NEED FOR VACCINATION: Primary | ICD-10-CM

## 2023-04-27 DIAGNOSIS — N13.8 BPH WITH URINARY OBSTRUCTION: Chronic | ICD-10-CM

## 2023-04-27 DIAGNOSIS — N40.1 BPH WITH URINARY OBSTRUCTION: Chronic | ICD-10-CM

## 2023-04-27 RX ORDER — TAMSULOSIN HYDROCHLORIDE 0.4 MG/1
1 CAPSULE ORAL DAILY
Qty: 90 CAPSULE | Refills: 3 | Status: SHIPPED | OUTPATIENT
Start: 2023-04-27 | End: 2023-08-15

## 2023-04-27 NOTE — TELEPHONE ENCOUNTER
Refill Decision Note   Elías Gay  is requesting a refill authorization.  Brief Assessment and Rationale for Refill:  Approve     Medication Therapy Plan:         Comments:     Note composed:11:27 AM 04/27/2023             Appointments     Last Visit   2/27/2023 Kameron Diaz MD   Next Visit   Visit date not found Kameron Diaz MD

## 2023-04-27 NOTE — TELEPHONE ENCOUNTER
No new care gaps identified.  [facility] Embedded Care Gaps. Reference number: [id number]. 4/27/2023   8:40:47 AM GAILT

## 2023-05-03 ENCOUNTER — PATIENT MESSAGE (OUTPATIENT)
Dept: ENDOCRINOLOGY | Facility: CLINIC | Age: 68
End: 2023-05-03

## 2023-05-03 ENCOUNTER — OFFICE VISIT (OUTPATIENT)
Dept: ENDOCRINOLOGY | Facility: CLINIC | Age: 68
End: 2023-05-03
Payer: MEDICARE

## 2023-05-03 ENCOUNTER — TELEPHONE (OUTPATIENT)
Dept: ENDOCRINOLOGY | Facility: CLINIC | Age: 68
End: 2023-05-03

## 2023-05-03 VITALS
DIASTOLIC BLOOD PRESSURE: 82 MMHG | WEIGHT: 300.38 LBS | BODY MASS INDEX: 41.9 KG/M2 | HEART RATE: 83 BPM | TEMPERATURE: 98 F | SYSTOLIC BLOOD PRESSURE: 132 MMHG

## 2023-05-03 DIAGNOSIS — E04.1 THYROID NODULE: ICD-10-CM

## 2023-05-03 DIAGNOSIS — E66.01 CLASS 3 SEVERE OBESITY DUE TO EXCESS CALORIES WITHOUT SERIOUS COMORBIDITY WITH BODY MASS INDEX (BMI) OF 40.0 TO 44.9 IN ADULT: Primary | ICD-10-CM

## 2023-05-03 PROBLEM — E66.813 CLASS 3 SEVERE OBESITY DUE TO EXCESS CALORIES WITHOUT SERIOUS COMORBIDITY IN ADULT: Status: ACTIVE | Noted: 2018-12-10

## 2023-05-03 PROCEDURE — 99999 PR PBB SHADOW E&M-EST. PATIENT-LVL IV: CPT | Mod: PBBFAC,,, | Performed by: HOSPITALIST

## 2023-05-03 PROCEDURE — 3044F PR MOST RECENT HEMOGLOBIN A1C LEVEL <7.0%: ICD-10-PCS | Mod: CPTII,S$GLB,, | Performed by: HOSPITALIST

## 2023-05-03 PROCEDURE — 3008F PR BODY MASS INDEX (BMI) DOCUMENTED: ICD-10-PCS | Mod: CPTII,S$GLB,, | Performed by: HOSPITALIST

## 2023-05-03 PROCEDURE — 3008F BODY MASS INDEX DOCD: CPT | Mod: CPTII,S$GLB,, | Performed by: HOSPITALIST

## 2023-05-03 PROCEDURE — 3075F SYST BP GE 130 - 139MM HG: CPT | Mod: CPTII,S$GLB,, | Performed by: HOSPITALIST

## 2023-05-03 PROCEDURE — 3079F DIAST BP 80-89 MM HG: CPT | Mod: CPTII,S$GLB,, | Performed by: HOSPITALIST

## 2023-05-03 PROCEDURE — 1101F PR PT FALLS ASSESS DOC 0-1 FALLS W/OUT INJ PAST YR: ICD-10-PCS | Mod: CPTII,S$GLB,, | Performed by: HOSPITALIST

## 2023-05-03 PROCEDURE — 1159F PR MEDICATION LIST DOCUMENTED IN MEDICAL RECORD: ICD-10-PCS | Mod: CPTII,S$GLB,, | Performed by: HOSPITALIST

## 2023-05-03 PROCEDURE — 3079F PR MOST RECENT DIASTOLIC BLOOD PRESSURE 80-89 MM HG: ICD-10-PCS | Mod: CPTII,S$GLB,, | Performed by: HOSPITALIST

## 2023-05-03 PROCEDURE — 1159F MED LIST DOCD IN RCRD: CPT | Mod: CPTII,S$GLB,, | Performed by: HOSPITALIST

## 2023-05-03 PROCEDURE — 3075F PR MOST RECENT SYSTOLIC BLOOD PRESS GE 130-139MM HG: ICD-10-PCS | Mod: CPTII,S$GLB,, | Performed by: HOSPITALIST

## 2023-05-03 PROCEDURE — 3044F HG A1C LEVEL LT 7.0%: CPT | Mod: CPTII,S$GLB,, | Performed by: HOSPITALIST

## 2023-05-03 PROCEDURE — 3288F PR FALLS RISK ASSESSMENT DOCUMENTED: ICD-10-PCS | Mod: CPTII,S$GLB,, | Performed by: HOSPITALIST

## 2023-05-03 PROCEDURE — 3288F FALL RISK ASSESSMENT DOCD: CPT | Mod: CPTII,S$GLB,, | Performed by: HOSPITALIST

## 2023-05-03 PROCEDURE — 99999 PR PBB SHADOW E&M-EST. PATIENT-LVL IV: ICD-10-PCS | Mod: PBBFAC,,, | Performed by: HOSPITALIST

## 2023-05-03 PROCEDURE — 99204 PR OFFICE/OUTPT VISIT, NEW, LEVL IV, 45-59 MIN: ICD-10-PCS | Mod: S$GLB,,, | Performed by: HOSPITALIST

## 2023-05-03 PROCEDURE — 99204 OFFICE O/P NEW MOD 45 MIN: CPT | Mod: S$GLB,,, | Performed by: HOSPITALIST

## 2023-05-03 PROCEDURE — 1101F PT FALLS ASSESS-DOCD LE1/YR: CPT | Mod: CPTII,S$GLB,, | Performed by: HOSPITALIST

## 2023-05-03 NOTE — TELEPHONE ENCOUNTER
----- Message from Michael Saunders MD sent at 5/3/2023  2:58 PM CDT -----  It looks more like a cystic nodule with a small isoechoic solid component. I would probably just monitor in this case.     -Michael   ----- Message -----  From: Venancio Francis MD  Sent: 5/3/2023   1:09 PM CDT  To: Michael Saunders MD    High, I was wondering if I can get your opinion on this patient thyroid nodule.    Patient has a 1.7 m hypoechoic possibly solid versus cystic thyroid nodule.    From looking at this I think it could be cystic, but do you recommend a biopsy of this nodule?  Thanks for looking it over

## 2023-05-03 NOTE — PROGRESS NOTES
Subjective:      Patient ID: Elías Gay is a 68 y.o. male presented to [facility] on 5/3/2023.  Chief Complaint:  Thyroid Nodule      History of Present Illness: Elías Gay is a 68 y.o. male here for  thyroid nodule  Other significant past medical history:  Obesity, TJ, melanoma      Here for evaluation Thyroid nodule:  - Patient with right thyroid nodule, incidentally noted on CT neck  10/2022: Right thyroid lobe 1.2 cm nodule is seen  - Diagnosed by ultrasound in: 2/2023  - Family history thyroid disorder: no  - Family history thyroid cancer: no  - Tobacco user: no    Symptoms:  No  Yes  [x]    [] Compressive symptoms  [x]    [] Anterior neck pressure  [x]    [] Dysphagia sometimes  [x]    [] Voice changes - comes and goes     Risk Factors:  No   Yes  [x]    [] Radiation to head or neck for any treatment of cancer or exposure to radiation  [x]    [] Personal history of cancer including:  colon or breast cancer    Last ultrasound:  2/27/2023  Right thyroid lobe measures 4.7 x 2.1 x 1.7 cm.  Measured nodule as follows: Solid-cystic hypoechoic nodule at the lower pole with mixed smooth and ill-defined margin measuring 1.7 x 1.4 x 1.3 cm.  Left thyroid lobe measures 4.0 x 1.5 x 1.2 cm with homogeneous echotexture.  Isthmus measures 0.5 cm.  No evidence for cervical adenopathy.    Impression:  Single measured thyroid nodule in the right lobe which meets criteria for continued follow-up.        Lab work reviewed  Lab Results   Component Value Date    TSH 3.629 02/27/2023    TSH 4.601 (H) 02/12/2016    FREET4 0.81 02/12/2016      Antibodies  No results found for: THYROIDAB, TSIMMGLBN, THYROIDSTIMI      Reviewed past surgical, medical, family, social history and updated as appropriate.  Review of Systems: see HPI above  Objective:   /82   Pulse 83   Temp 98.2 °F (36.8 °C) (Oral)   Wt (!) 136.3 kg (300 lb 6.4 oz)   BMI 41.90 kg/m²   Body mass index is 41.9 kg/m².  Vital signs  reviewed    Physical Exam  Vitals and nursing note reviewed.   Constitutional:       Appearance: Normal appearance. He is well-developed. He is obese. He is not ill-appearing.   Neck:      Thyroid: No thyromegaly.   Pulmonary:      Effort: Pulmonary effort is normal. No respiratory distress.   Musculoskeletal:         General: Normal range of motion.      Cervical back: Normal range of motion.   Neurological:      General: No focal deficit present.      Mental Status: He is alert. Mental status is at baseline.   Psychiatric:         Mood and Affect: Mood normal.         Behavior: Behavior normal.       Lab Reviewed:  See results in subjective  Lab Results   Component Value Date    HGBA1C 5.0 02/27/2023     Lab Results   Component Value Date    CHOL 156 07/26/2022    HDL 39 (L) 07/26/2022    LDLCALC 101.0 07/26/2022    TRIG 80 07/26/2022    CHOLHDL 25.0 07/26/2022     Lab Results   Component Value Date     02/27/2023    K 4.4 02/27/2023     02/27/2023    CO2 30 (H) 02/27/2023    GLU 79 02/27/2023    BUN 20 02/27/2023    CREATININE 1.0 02/27/2023    CALCIUM 10.1 02/27/2023    PHOS 4.6 (H) 10/25/2022    PROT 7.9 02/27/2023    ALBUMIN 4.0 02/27/2023    BILITOT 0.5 02/27/2023    ALKPHOS 96 02/27/2023    AST 21 02/27/2023    ALT 23 02/27/2023    ANIONGAP 8 02/27/2023    ESTGFRAFRICA >60.0 07/26/2022    EGFRNONAA >60.0 07/26/2022    TSH 3.629 02/27/2023     Assessment     1. Class 3 severe obesity due to excess calories without serious comorbidity with body mass index (BMI) of 40.0 to 44.9 in adult        2. Thyroid nodule  Ambulatory referral/consult to Endocrinology    US Soft Tissue Head Neck Thyroid         Plan     Thyroid nodule  - US reviewed in clinic and reviewed with patient, noted Solid-cystic hypoechoic nodule at the lower pole  1.7 x 1.4 x 1.3 cm.  - candidate for biopsy, will discuss case with [facility] endocrinologist  - the comfortable in pursuing biopsy if needed otherwise I recommend  a repeat thyroid ultrasound in 6 months.  - TSH reviewed: stable, reassuring  - FNA techniques were explained, Discussed possible pathology result and the each individual plans   - will discuss case with [facility] endocrinology, will determine the need for FNA at that time  - Follow up in 6 months      Class 3 severe obesity due to excess calories without serious comorbidity in adult  - Body mass index is 41.9 kg/m².       Advised patient to follow up with PCP for routine health maintenance care.   RTC in 6 months or sooner pending workup above    Venancio Francis M.D.  Endocrinology  [facility]  5/3/2023      Disclaimer: This note has been generated in part with the use of voice-recognition software. There may be typographical errors that have been missed during proof-reading.

## 2023-05-03 NOTE — ASSESSMENT & PLAN NOTE
- US reviewed in clinic and reviewed with patient, noted Solid-cystic hypoechoic nodule at the lower pole  1.7 x 1.4 x 1.3 cm.  - candidate for biopsy, will discuss case with [facility] endocrinologist  - the comfortable in pursuing biopsy if needed otherwise I recommend a repeat thyroid ultrasound in 6 months.  - TSH reviewed: stable, reassuring  - FNA techniques were explained, Discussed possible pathology result and the each individual plans   - will discuss case with [facility] end[facility]inology, will determine the need for FNA at that time  - Follow up in 6 months

## 2023-07-14 ENCOUNTER — PATIENT MESSAGE (OUTPATIENT)
Dept: INTERNAL MEDICINE | Facility: CLINIC | Age: 68
End: 2023-07-14
Payer: MEDICARE

## 2023-07-14 DIAGNOSIS — Z00.00 ANNUAL PHYSICAL EXAM: Primary | ICD-10-CM

## 2023-07-14 DIAGNOSIS — I10 ESSENTIAL HYPERTENSION: ICD-10-CM

## 2023-07-18 DIAGNOSIS — E78.5 HYPERLIPIDEMIA, UNSPECIFIED HYPERLIPIDEMIA TYPE: ICD-10-CM

## 2023-07-18 DIAGNOSIS — E55.9 VITAMIN D DEFICIENCY: ICD-10-CM

## 2023-07-18 DIAGNOSIS — R73.09 ELEVATED GLUCOSE: Primary | ICD-10-CM

## 2023-07-18 DIAGNOSIS — Z12.5 PROSTATE CANCER SCREENING: ICD-10-CM

## 2023-07-19 ENCOUNTER — LAB VISIT (OUTPATIENT)
Dept: LAB | Facility: HOSPITAL | Age: 68
End: 2023-07-19
Attending: INTERNAL MEDICINE
Payer: MEDICARE

## 2023-07-19 DIAGNOSIS — E78.5 HYPERLIPIDEMIA, UNSPECIFIED HYPERLIPIDEMIA TYPE: ICD-10-CM

## 2023-07-19 DIAGNOSIS — R73.09 ELEVATED GLUCOSE: ICD-10-CM

## 2023-07-19 DIAGNOSIS — I10 ESSENTIAL HYPERTENSION: ICD-10-CM

## 2023-07-19 DIAGNOSIS — Z00.00 ANNUAL PHYSICAL EXAM: ICD-10-CM

## 2023-07-19 DIAGNOSIS — Z12.5 PROSTATE CANCER SCREENING: ICD-10-CM

## 2023-07-19 DIAGNOSIS — E55.9 VITAMIN D DEFICIENCY: ICD-10-CM

## 2023-07-19 LAB
ALBUMIN SERPL BCP-MCNC: 4.1 G/DL (ref 3.5–5.2)
ALP SERPL-CCNC: 96 U/L (ref 55–135)
ALT SERPL W/O P-5'-P-CCNC: 27 U/L (ref 10–44)
ANION GAP SERPL CALC-SCNC: 8 MMOL/L (ref 8–16)
AST SERPL-CCNC: 23 U/L (ref 10–40)
BASOPHILS # BLD AUTO: 0.05 K/UL (ref 0–0.2)
BASOPHILS NFR BLD: 0.6 % (ref 0–1.9)
BILIRUB SERPL-MCNC: 0.6 MG/DL (ref 0.1–1)
BUN SERPL-MCNC: 17 MG/DL (ref 8–23)
CALCIUM SERPL-MCNC: 9.7 MG/DL (ref 8.7–10.5)
CHLORIDE SERPL-SCNC: 108 MMOL/L (ref 95–110)
CHOLEST SERPL-MCNC: 184 MG/DL (ref 120–199)
CHOLEST/HDLC SERPL: 4.3 {RATIO} (ref 2–5)
CO2 SERPL-SCNC: 26 MMOL/L (ref 23–29)
CREAT SERPL-MCNC: 1 MG/DL (ref 0.5–1.4)
DIFFERENTIAL METHOD: ABNORMAL
EOSINOPHIL # BLD AUTO: 0.1 K/UL (ref 0–0.5)
EOSINOPHIL NFR BLD: 1.8 % (ref 0–8)
ERYTHROCYTE [DISTWIDTH] IN BLOOD BY AUTOMATED COUNT: 13.2 % (ref 11.5–14.5)
EST. GFR  (NO RACE VARIABLE): >60 ML/MIN/1.73 M^2
GLUCOSE SERPL-MCNC: 93 MG/DL (ref 70–110)
HCT VFR BLD AUTO: 46.5 % (ref 40–54)
HDLC SERPL-MCNC: 43 MG/DL (ref 40–75)
HDLC SERPL: 23.4 % (ref 20–50)
HGB BLD-MCNC: 15.2 G/DL (ref 14–18)
IMM GRANULOCYTES # BLD AUTO: 0.02 K/UL (ref 0–0.04)
IMM GRANULOCYTES NFR BLD AUTO: 0.3 % (ref 0–0.5)
LDLC SERPL CALC-MCNC: 127.2 MG/DL (ref 63–159)
LYMPHOCYTES # BLD AUTO: 1.9 K/UL (ref 1–4.8)
LYMPHOCYTES NFR BLD: 24.2 % (ref 18–48)
MCH RBC QN AUTO: 31 PG (ref 27–31)
MCHC RBC AUTO-ENTMCNC: 32.7 G/DL (ref 32–36)
MCV RBC AUTO: 95 FL (ref 82–98)
MONOCYTES # BLD AUTO: 0.6 K/UL (ref 0.3–1)
MONOCYTES NFR BLD: 7.5 % (ref 4–15)
NEUTROPHILS # BLD AUTO: 5.1 K/UL (ref 1.8–7.7)
NEUTROPHILS NFR BLD: 65.6 % (ref 38–73)
NONHDLC SERPL-MCNC: 141 MG/DL
NRBC BLD-RTO: 0 /100 WBC
PLATELET # BLD AUTO: 286 K/UL (ref 150–450)
PMV BLD AUTO: 9 FL (ref 9.2–12.9)
POTASSIUM SERPL-SCNC: 4.5 MMOL/L (ref 3.5–5.1)
PROT SERPL-MCNC: 7.8 G/DL (ref 6–8.4)
RBC # BLD AUTO: 4.91 M/UL (ref 4.6–6.2)
SODIUM SERPL-SCNC: 142 MMOL/L (ref 136–145)
TRIGL SERPL-MCNC: 69 MG/DL (ref 30–150)
WBC # BLD AUTO: 7.76 K/UL (ref 3.9–12.7)

## 2023-07-19 PROCEDURE — 85025 COMPLETE CBC W/AUTO DIFF WBC: CPT | Performed by: INTERNAL MEDICINE

## 2023-07-19 PROCEDURE — 82306 VITAMIN D 25 HYDROXY: CPT | Performed by: INTERNAL MEDICINE

## 2023-07-19 PROCEDURE — 80061 LIPID PANEL: CPT | Performed by: INTERNAL MEDICINE

## 2023-07-19 PROCEDURE — 80053 COMPREHEN METABOLIC PANEL: CPT | Performed by: INTERNAL MEDICINE

## 2023-07-19 PROCEDURE — 36415 COLL VENOUS BLD VENIPUNCTURE: CPT | Performed by: INTERNAL MEDICINE

## 2023-07-19 PROCEDURE — 84153 ASSAY OF PSA TOTAL: CPT | Performed by: INTERNAL MEDICINE

## 2023-07-19 PROCEDURE — 83036 HEMOGLOBIN GLYCOSYLATED A1C: CPT | Performed by: INTERNAL MEDICINE

## 2023-07-20 LAB
25(OH)D3+25(OH)D2 SERPL-MCNC: 46 NG/ML (ref 30–96)
COMPLEXED PSA SERPL-MCNC: 2.9 NG/ML (ref 0–4)
ESTIMATED AVG GLUCOSE: 100 MG/DL (ref 68–131)
HBA1C MFR BLD: 5.1 % (ref 4–5.6)

## 2023-08-08 ENCOUNTER — OFFICE VISIT (OUTPATIENT)
Dept: INTERNAL MEDICINE | Facility: CLINIC | Age: 68
End: 2023-08-08
Payer: MEDICARE

## 2023-08-08 ENCOUNTER — DOCUMENTATION ONLY (OUTPATIENT)
Dept: INTERNAL MEDICINE | Facility: CLINIC | Age: 68
End: 2023-08-08
Payer: MEDICARE

## 2023-08-08 VITALS
WEIGHT: 302.25 LBS | OXYGEN SATURATION: 96 % | SYSTOLIC BLOOD PRESSURE: 134 MMHG | DIASTOLIC BLOOD PRESSURE: 86 MMHG | HEIGHT: 71 IN | BODY MASS INDEX: 42.31 KG/M2 | HEART RATE: 67 BPM

## 2023-08-08 DIAGNOSIS — E04.1 THYROID NODULE: ICD-10-CM

## 2023-08-08 DIAGNOSIS — G47.33 OSA (OBSTRUCTIVE SLEEP APNEA): ICD-10-CM

## 2023-08-08 DIAGNOSIS — Z00.00 ANNUAL PHYSICAL EXAM: Primary | ICD-10-CM

## 2023-08-08 DIAGNOSIS — I10 ESSENTIAL HYPERTENSION: ICD-10-CM

## 2023-08-08 DIAGNOSIS — E78.5 HYPERLIPIDEMIA, UNSPECIFIED HYPERLIPIDEMIA TYPE: ICD-10-CM

## 2023-08-08 DIAGNOSIS — Z12.11 COLON CANCER SCREENING: ICD-10-CM

## 2023-08-08 DIAGNOSIS — I48.91 ATRIAL FIBRILLATION, UNSPECIFIED TYPE: ICD-10-CM

## 2023-08-08 PROCEDURE — 99999 PR PBB SHADOW E&M-EST. PATIENT-LVL IV: ICD-10-PCS | Mod: PBBFAC,,, | Performed by: INTERNAL MEDICINE

## 2023-08-08 PROCEDURE — 1125F AMNT PAIN NOTED PAIN PRSNT: CPT | Mod: CPTII,S$GLB,, | Performed by: INTERNAL MEDICINE

## 2023-08-08 PROCEDURE — 3079F DIAST BP 80-89 MM HG: CPT | Mod: CPTII,S$GLB,, | Performed by: INTERNAL MEDICINE

## 2023-08-08 PROCEDURE — 99397 PER PM REEVAL EST PAT 65+ YR: CPT | Mod: GZ,S$GLB,, | Performed by: INTERNAL MEDICINE

## 2023-08-08 PROCEDURE — 3008F BODY MASS INDEX DOCD: CPT | Mod: CPTII,S$GLB,, | Performed by: INTERNAL MEDICINE

## 2023-08-08 PROCEDURE — 99397 PR PREVENTIVE VISIT,EST,65 & OVER: ICD-10-PCS | Mod: GZ,S$GLB,, | Performed by: INTERNAL MEDICINE

## 2023-08-08 PROCEDURE — 3079F PR MOST RECENT DIASTOLIC BLOOD PRESSURE 80-89 MM HG: ICD-10-PCS | Mod: CPTII,S$GLB,, | Performed by: INTERNAL MEDICINE

## 2023-08-08 PROCEDURE — 3044F PR MOST RECENT HEMOGLOBIN A1C LEVEL <7.0%: ICD-10-PCS | Mod: CPTII,S$GLB,, | Performed by: INTERNAL MEDICINE

## 2023-08-08 PROCEDURE — 3008F PR BODY MASS INDEX (BMI) DOCUMENTED: ICD-10-PCS | Mod: CPTII,S$GLB,, | Performed by: INTERNAL MEDICINE

## 2023-08-08 PROCEDURE — 1125F PR PAIN SEVERITY QUANTIFIED, PAIN PRESENT: ICD-10-PCS | Mod: CPTII,S$GLB,, | Performed by: INTERNAL MEDICINE

## 2023-08-08 PROCEDURE — 3288F FALL RISK ASSESSMENT DOCD: CPT | Mod: CPTII,S$GLB,, | Performed by: INTERNAL MEDICINE

## 2023-08-08 PROCEDURE — 1101F PT FALLS ASSESS-DOCD LE1/YR: CPT | Mod: CPTII,S$GLB,, | Performed by: INTERNAL MEDICINE

## 2023-08-08 PROCEDURE — 1159F MED LIST DOCD IN RCRD: CPT | Mod: CPTII,S$GLB,, | Performed by: INTERNAL MEDICINE

## 2023-08-08 PROCEDURE — 3288F PR FALLS RISK ASSESSMENT DOCUMENTED: ICD-10-PCS | Mod: CPTII,S$GLB,, | Performed by: INTERNAL MEDICINE

## 2023-08-08 PROCEDURE — 99999 PR PBB SHADOW E&M-EST. PATIENT-LVL IV: CPT | Mod: PBBFAC,,, | Performed by: INTERNAL MEDICINE

## 2023-08-08 PROCEDURE — 1101F PR PT FALLS ASSESS DOC 0-1 FALLS W/OUT INJ PAST YR: ICD-10-PCS | Mod: CPTII,S$GLB,, | Performed by: INTERNAL MEDICINE

## 2023-08-08 PROCEDURE — 3044F HG A1C LEVEL LT 7.0%: CPT | Mod: CPTII,S$GLB,, | Performed by: INTERNAL MEDICINE

## 2023-08-08 PROCEDURE — 1159F PR MEDICATION LIST DOCUMENTED IN MEDICAL RECORD: ICD-10-PCS | Mod: CPTII,S$GLB,, | Performed by: INTERNAL MEDICINE

## 2023-08-08 PROCEDURE — 3075F PR MOST RECENT SYSTOLIC BLOOD PRESS GE 130-139MM HG: ICD-10-PCS | Mod: CPTII,S$GLB,, | Performed by: INTERNAL MEDICINE

## 2023-08-08 PROCEDURE — 3075F SYST BP GE 130 - 139MM HG: CPT | Mod: CPTII,S$GLB,, | Performed by: INTERNAL MEDICINE

## 2023-08-08 NOTE — PROGRESS NOTES
CC:  Annual exam     HPI:  The patient is a 68-year-old male with obstructive sleep apnea, BPH, history of melanoma, colon polyps, thyroid nodule and PAF who presents today for exam.    Answers submitted by the patient for this visit:  Review of Systems Questionnaire (Submitted on 7/24/2023)  activity change: No  unexpected weight change: No  neck pain: No  hearing loss: No  rhinorrhea: No  trouble swallowing: No  eye discharge: No  visual disturbance: No  chest tightness: No  wheezing: No  chest pain: No  palpitations: No  blood in stool: No  constipation: No  vomiting: No  diarrhea: No  polydipsia: No  polyuria: No  difficulty urinating: No  urgency: No  hematuria: No  joint swelling: No  arthralgias: Yes  headaches: No  weakness: No  confusion: No  dysphoric mood: No  Patient reports his weight went down to 248 but now is back up to 302.  He has not been going to the gym consistently.  He does complain of bilateral hip pain which she feels is related to tight hip flexors.  He is an appointment to see physical medicine rehab the near future.  His last colonoscopy was in 2021.      Physical exam:  General appearance:  No acute distress  HEENT: Conjunctiva is clear.  Pupils equal.  TMs are clear.  Nasal septum is midline without discharge.  Oropharynx is without erythema.  Trachea is midline without JVD or thyromegaly.    Pulmonary:  Good inspiratory, expiratory breath sounds are heard.  Lungs are clear auscultation.    Cardiovascular:  S1-S2, rhythm is regular.  2+ carotid pulse of bruits.  Extremities with trace to 1+ edema.  GI: Abdomen is nontender, nondistended without hepatosplenomegaly  : Digital rectal exam was performed.  The rectum was normal.  The patient had a hemorrhoid at 3:00 a.m. in position.  Stools brown heme-negative.  Prostate was without masses nodules.  Penis and testes were normal.    Lymphatics:  No cervical, axillary or inguinal adenopathy     Assessment:  1. Annual exam  2.  Hypertension    3.  Hyperlipidemia  4. PAF  5. Obstructive sleep apnea   6.  Thyroid nodule  7. Colon polyps  8.  History of melanoma     Plan:   1. Patient had a skin check in March this year  2. He had a thyroid ultrasound in February of 2023   3. Will schedule a fit kit  4.  The patient already had fasting blood test done prior to today's visit.  These were reviewed with him.

## 2023-08-08 NOTE — MEDICAL/APP STUDENT
Subjective:       Patient ID: Elías Gay is a 68 y.o. male.    Chief Complaint: Annual Exam    Elías Gay is a 67 yo with PmHx of obstructive sleep apnea, BPH, history of melanoma, and colon polyps who presents for annual check up. He complains of tight hip flexor muscles that is interfering with his quality of life. He states he goes to the gym 1-2 times a week for 3 hours to try to increase his muscle mass. He keep a gym diary and is calorie and protein counting. He states he feels as if he has to have a high protein intake due to muscle loss. He has stagnated in his weight loss goal. He consumes approximately 2000 calories a day and 135g protein. He does not go to PT for his pain. He has a PM&R appointment in November. He states he is having difficulty standing and uses a walker due to decreased muscle mass in his legs. Has difficulty walking 100m due to instability in legs. He denies SOB, abdominal pain, chest pain, palpitations. He endorses skin changes on the lower half of his legs.    Review of Systems   Constitutional:  Positive for appetite change. Negative for fever and unexpected weight change.   Respiratory:  Negative for shortness of breath.    Cardiovascular:  Negative for chest pain and palpitations.   Musculoskeletal:  Positive for back pain and gait problem.   Skin:  Positive for color change.   Neurological:  Negative for headaches.       Objective:      Physical Exam  Vitals and nursing note reviewed.   Constitutional:       General: He is not in acute distress.     Appearance: He is obese. He is not ill-appearing.   HENT:      Head: Normocephalic and atraumatic.      Nose: Nose normal.   Eyes:      Extraocular Movements: Extraocular movements intact.      Pupils: Pupils are equal, round, and reactive to light.   Cardiovascular:      Rate and Rhythm: Normal rate and regular rhythm.   Pulmonary:      Effort: Pulmonary effort is normal.      Breath sounds: Normal breath sounds.   Abdominal:       General: Bowel sounds are normal.      Palpations: Abdomen is soft.   Musculoskeletal:      Cervical back: Normal range of motion.   Lymphadenopathy:      Cervical: No cervical adenopathy.   Skin:     Comments: Red-brown skin pigmentation of bilateral lower legs   Neurological:      General: No focal deficit present.      Mental Status: He is alert and oriented to person, place, and time.   Psychiatric:         Mood and Affect: Mood normal.         Behavior: Behavior normal.           Assessment and Plan    Chronic Venous Insufficiency  - recommend compression therapy  - lifestyle modifications    Annual Exam  - CBC, CMP, lipid panel, A1C, vitamin D unremarkable

## 2023-08-15 ENCOUNTER — OFFICE VISIT (OUTPATIENT)
Dept: FAMILY MEDICINE | Facility: CLINIC | Age: 68
End: 2023-08-15
Payer: MEDICARE

## 2023-08-15 VITALS
HEIGHT: 71 IN | DIASTOLIC BLOOD PRESSURE: 60 MMHG | RESPIRATION RATE: 16 BRPM | SYSTOLIC BLOOD PRESSURE: 126 MMHG | TEMPERATURE: 99 F | BODY MASS INDEX: 42.35 KG/M2 | HEART RATE: 101 BPM | WEIGHT: 302.5 LBS | OXYGEN SATURATION: 93 %

## 2023-08-15 DIAGNOSIS — N30.01 ACUTE CYSTITIS WITH HEMATURIA: Primary | ICD-10-CM

## 2023-08-15 DIAGNOSIS — N40.1 BPH WITH URINARY OBSTRUCTION: Chronic | ICD-10-CM

## 2023-08-15 DIAGNOSIS — N13.8 BPH WITH URINARY OBSTRUCTION: Chronic | ICD-10-CM

## 2023-08-15 PROBLEM — Z12.11 COLON CANCER SCREENING: Status: RESOLVED | Noted: 2021-06-30 | Resolved: 2023-08-15

## 2023-08-15 LAB
BACTERIA #/AREA URNS HPF: ABNORMAL /HPF
BILIRUB SERPL-MCNC: NEGATIVE MG/DL
BILIRUB UR QL STRIP: NEGATIVE
BLOOD URINE, POC: 50
CLARITY UR: ABNORMAL
CLARITY, POC UA: CLEAR
COLOR UR: YELLOW
COLOR, POC UA: YELLOW
GLUCOSE UR QL STRIP: NEGATIVE
GLUCOSE UR QL STRIP: NORMAL
HGB UR QL STRIP: ABNORMAL
HYALINE CASTS #/AREA URNS LPF: 0 /LPF
KETONES UR QL STRIP: ABNORMAL
KETONES UR QL STRIP: ABNORMAL
LEUKOCYTE ESTERASE UR QL STRIP: ABNORMAL
LEUKOCYTE ESTERASE URINE, POC: ABNORMAL
MICROSCOPIC COMMENT: ABNORMAL
NITRITE UR QL STRIP: POSITIVE
NITRITE, POC UA: POSITIVE
PH UR STRIP: 6 [PH] (ref 5–8)
PH, POC UA: 5
PROT UR QL STRIP: ABNORMAL
PROTEIN, POC: ABNORMAL
RBC #/AREA URNS HPF: 3 /HPF (ref 0–4)
SP GR UR STRIP: 1.01 (ref 1–1.03)
SPECIFIC GRAVITY, POC UA: 1
URN SPEC COLLECT METH UR: ABNORMAL
UROBILINOGEN UR STRIP-ACNC: NEGATIVE EU/DL
UROBILINOGEN, POC UA: NORMAL
WBC #/AREA URNS HPF: >100 /HPF (ref 0–5)
WBC CLUMPS URNS QL MICRO: ABNORMAL

## 2023-08-15 PROCEDURE — 99213 OFFICE O/P EST LOW 20 MIN: CPT | Mod: S$GLB,,, | Performed by: NURSE PRACTITIONER

## 2023-08-15 PROCEDURE — 3008F BODY MASS INDEX DOCD: CPT | Mod: CPTII,S$GLB,, | Performed by: NURSE PRACTITIONER

## 2023-08-15 PROCEDURE — 81000 URINALYSIS NONAUTO W/SCOPE: CPT | Performed by: NURSE PRACTITIONER

## 2023-08-15 PROCEDURE — 3044F PR MOST RECENT HEMOGLOBIN A1C LEVEL <7.0%: ICD-10-PCS | Mod: CPTII,S$GLB,, | Performed by: NURSE PRACTITIONER

## 2023-08-15 PROCEDURE — 3288F FALL RISK ASSESSMENT DOCD: CPT | Mod: CPTII,S$GLB,, | Performed by: NURSE PRACTITIONER

## 2023-08-15 PROCEDURE — 1159F PR MEDICATION LIST DOCUMENTED IN MEDICAL RECORD: ICD-10-PCS | Mod: CPTII,S$GLB,, | Performed by: NURSE PRACTITIONER

## 2023-08-15 PROCEDURE — 87077 CULTURE AEROBIC IDENTIFY: CPT | Performed by: NURSE PRACTITIONER

## 2023-08-15 PROCEDURE — 3078F PR MOST RECENT DIASTOLIC BLOOD PRESSURE < 80 MM HG: ICD-10-PCS | Mod: CPTII,S$GLB,, | Performed by: NURSE PRACTITIONER

## 2023-08-15 PROCEDURE — 87088 URINE BACTERIA CULTURE: CPT | Performed by: NURSE PRACTITIONER

## 2023-08-15 PROCEDURE — 3288F PR FALLS RISK ASSESSMENT DOCUMENTED: ICD-10-PCS | Mod: CPTII,S$GLB,, | Performed by: NURSE PRACTITIONER

## 2023-08-15 PROCEDURE — 87186 SC STD MICRODIL/AGAR DIL: CPT | Performed by: NURSE PRACTITIONER

## 2023-08-15 PROCEDURE — 99999 PR PBB SHADOW E&M-EST. PATIENT-LVL IV: CPT | Mod: PBBFAC,,, | Performed by: NURSE PRACTITIONER

## 2023-08-15 PROCEDURE — 3074F PR MOST RECENT SYSTOLIC BLOOD PRESSURE < 130 MM HG: ICD-10-PCS | Mod: CPTII,S$GLB,, | Performed by: NURSE PRACTITIONER

## 2023-08-15 PROCEDURE — 81002 URINALYSIS NONAUTO W/O SCOPE: CPT | Mod: S$GLB,,, | Performed by: NURSE PRACTITIONER

## 2023-08-15 PROCEDURE — 1159F MED LIST DOCD IN RCRD: CPT | Mod: CPTII,S$GLB,, | Performed by: NURSE PRACTITIONER

## 2023-08-15 PROCEDURE — 99999 PR PBB SHADOW E&M-EST. PATIENT-LVL IV: ICD-10-PCS | Mod: PBBFAC,,, | Performed by: NURSE PRACTITIONER

## 2023-08-15 PROCEDURE — 3044F HG A1C LEVEL LT 7.0%: CPT | Mod: CPTII,S$GLB,, | Performed by: NURSE PRACTITIONER

## 2023-08-15 PROCEDURE — 1125F PR PAIN SEVERITY QUANTIFIED, PAIN PRESENT: ICD-10-PCS | Mod: CPTII,S$GLB,, | Performed by: NURSE PRACTITIONER

## 2023-08-15 PROCEDURE — 1101F PT FALLS ASSESS-DOCD LE1/YR: CPT | Mod: CPTII,S$GLB,, | Performed by: NURSE PRACTITIONER

## 2023-08-15 PROCEDURE — 3008F PR BODY MASS INDEX (BMI) DOCUMENTED: ICD-10-PCS | Mod: CPTII,S$GLB,, | Performed by: NURSE PRACTITIONER

## 2023-08-15 PROCEDURE — 99213 PR OFFICE/OUTPT VISIT, EST, LEVL III, 20-29 MIN: ICD-10-PCS | Mod: S$GLB,,, | Performed by: NURSE PRACTITIONER

## 2023-08-15 PROCEDURE — 1125F AMNT PAIN NOTED PAIN PRSNT: CPT | Mod: CPTII,S$GLB,, | Performed by: NURSE PRACTITIONER

## 2023-08-15 PROCEDURE — 3074F SYST BP LT 130 MM HG: CPT | Mod: CPTII,S$GLB,, | Performed by: NURSE PRACTITIONER

## 2023-08-15 PROCEDURE — 1101F PR PT FALLS ASSESS DOC 0-1 FALLS W/OUT INJ PAST YR: ICD-10-PCS | Mod: CPTII,S$GLB,, | Performed by: NURSE PRACTITIONER

## 2023-08-15 PROCEDURE — 81002 POCT URINE DIPSTICK WITHOUT MICROSCOPE: ICD-10-PCS | Mod: S$GLB,,, | Performed by: NURSE PRACTITIONER

## 2023-08-15 PROCEDURE — 87086 URINE CULTURE/COLONY COUNT: CPT | Performed by: NURSE PRACTITIONER

## 2023-08-15 PROCEDURE — 3078F DIAST BP <80 MM HG: CPT | Mod: CPTII,S$GLB,, | Performed by: NURSE PRACTITIONER

## 2023-08-15 RX ORDER — SULFAMETHOXAZOLE AND TRIMETHOPRIM 800; 160 MG/1; MG/1
1 TABLET ORAL 2 TIMES DAILY
Qty: 14 TABLET | Refills: 0 | Status: SHIPPED | OUTPATIENT
Start: 2023-08-15 | End: 2023-10-30

## 2023-08-15 NOTE — PROGRESS NOTES
Routine Office Visit    Patient Name: Elías Gay    : 1955  MRN: [id number]    Chief Complaint:  Dysuria    Subjective:  Elías is a 68 y.o. male who presents today for:    1. Dysuria - patient is new to me with a history of obesity, heart failure, BPH reports today for evaluation.  Last week at his PCPs appointment he did have a prostate exam.  The last couple of days he has noticed dysuria with urinary urgency or frequency.  Denies any fevers or chills but does feel somewhat weak.  He has dealt with UTIs in the past, most recently in July of last year.  He was on Flomax for BPH but he states this caused potentially urinary frequency so he stopped taking this.  He does not have any significant chronic urinary symptoms.    Past Medical History  Past Medical History:   Diagnosis Date    Acute renal failure 10/25/2022    Arthritis 1971    Bone Spurs in feet    Basal cell carcinoma 2018    left ear helix    Foreign body in conjunctival sac     Hernia, inguinal, right 2002    Joint pain 1971    Bones of feet    Keloid cicatrix 1958    Hernia, Knee, Arm, Ear    Melanoma 2018    Right Arm 0.6mm    Severe sleep apnea     Thyroid nodule 5/3/2023       Past Surgical History  Past Surgical History:   Procedure Laterality Date    CLOSURE OF DEFECT OF MOHS PROCEDURE Left 2019    Procedure: CLOSURE, MOHS PROCEDURE DEFECT LEFT EAR;  Surgeon: Jeramy Meek MD;  Location: [address];  Service: Plastics;  Laterality: Left;  plastics set    COLONOSCOPY N/A 2021    Procedure: COLONOSCOPY;  Surgeon: Juliano Santoyo MD;  Location: [facility] ENDO ([address] [place] [address]);  Service: Endoscopy;  Laterality: N/A;  severe sleep apnea     fully vaccinated-GT    HERNIA REPAIR Left     3 years of age    HERNIA REPAIR N/A     Ventral wall at 3 year of age.    INCISION AND DRAINAGE, NECK Left 10/26/2022    Procedure: INCISION AND DRAINAGE,NECK;  Surgeon: Bethany Macdonald MD;  Location: [facility];  Service:  ENT;  Laterality: Left;    KNEE SURGERY Right 1984    Arthroscopic    NASAL SEPTUM SURGERY N/A 2009    SKIN BIOPSY  several over time       Family History  Family History   Problem Relation Age of Onset    Hypertension Mother     Stroke Mother     Aneurysm Mother     Miscarriages / Stillbirths Mother     Cancer Father 72        Prostate and liver    Gout Brother     Eczema Brother     Psoriasis Brother     No Known Problems Daughter     No Known Problems Daughter     Kidney disease Son     Lung disease Son     Learning disabilities Son         Dyslexcia/Dysgraphia    No Known Problems Son     Kidney disease Son     Cataracts Neg Hx     Glaucoma Neg Hx     Macular degeneration Neg Hx     Melanoma Neg Hx     Eczema Neg Hx     Lupus Neg Hx     Psoriasis Neg Hx        Social History  Social History     Socioeconomic History    Marital status:    Tobacco Use    Smoking status: Never    Smokeless tobacco: Never    Tobacco comments:     Second hand smoke from mother. Cigarrette smoke inflames my sinuses.   Substance and Sexual Activity    Alcohol use: Not Currently     Alcohol/week: 1.0 standard drink of alcohol     Types: 1 Glasses of wine per week    Drug use: No    Sexual activity: Yes     Partners: Female     Birth control/protection: None     Social Determinants of Health     Financial Resource Strain: Low Risk  (8/14/2023)    Overall Financial Resource Strain (CARDIA)     Difficulty of Paying Living Expenses: Not hard at all   Food Insecurity: No Food Insecurity (8/14/2023)    Hunger Vital Sign     Worried About Running Out of Food in the Last Year: Never true     Ran Out of Food in the Last Year: Never true   Transportation Needs: No Transportation Needs (8/14/2023)    PRAPARE - Transportation     Lack of Transportation (Medical): No     Lack of Transportation (Non-Medical): No   Physical Activity: Insufficiently Active (8/14/2023)    Exercise Vital Sign     Days of Exercise per Week: 2 days     Minutes of  Exercise per Session: 20 min   Stress: No Stress Concern Present (8/14/2023)    Citizen of [place] of Occupational Health - Occupational Stress Questionnaire     Feeling of Stress : Not at all   Social Connections: Socially Integrated (8/14/2023)    Social Connection and Isolation Panel [NHANES]     Frequency of Communication with Friends and Family: Three times a week     Frequency of Social Gatherings with Friends and Family: Once a week     Attends Sabianist Services: 1 to 4 times per year     Active Member of Clubs or Organizations: Yes     Attends Club or Organization Meetings: 1 to 4 times per year     Marital Status:    Housing Stability: Low Risk  (8/14/2023)    Housing Stability Vital Sign     Unable to Pay for Housing in the Last Year: No     Number of Places Lived in the Last Year: 1     Unstable Housing in the Last Year: No       Current Medications  Current Outpatient Medications on File Prior to Visit   Medication Sig Dispense Refill    ciclopirox (PENLAC) 8 % Soln Apply daily to affected nail. Must remove and restart weekly 1 each 5    fluticasone propionate (FLONASE) 50 mcg/actuation nasal spray SPRAY 2 SPRAYS IN EACH NOSTRIL 2 TIMES DAILY. 48 mL 11    guaiFENesin (MUCINEX) 600 mg 12 hr tablet Take 1,200 mg by mouth 2 (two) times daily.      ketoconazole (NIZORAL) 2 % cream AAA bid to both feet x 3 weeks 60 g 3    multivitamin capsule Take 1 capsule by mouth once daily.      omega-3 fatty acids/fish oil (FISH OIL-OMEGA-3 FATTY ACIDS) 300-1,000 mg capsule Take 1 capsule by mouth 2 (two) times a day.      [DISCONTINUED] azelastine (ASTELIN) 137 mcg (0.1 %) nasal spray 1 spray (137 mcg total) by Nasal route 2 (two) times daily. 30 mL 11    [DISCONTINUED] calcium-magnesium-zinc Tab Take 2 tablets by mouth once daily.      [DISCONTINUED] sars-cov-2, covid-19 omicron, (MODERNA COVID BIVAL,6Y UP,,PF,) 50 mcg/0.5 mL injection Inject into the muscle. (Patient not taking: Reported on 8/15/2023) 0.5 mL 0  "   [DISCONTINUED] tamsulosin (FLOMAX) 0.4 mg Cap Take 1 capsule (0.4 mg total) by mouth once daily. 90 capsule 3     No current facility-administered medications on file prior to visit.       Allergies   Review of patient's allergies indicates:  No Known Allergies    Review of Systems (Pertinent positives)  Review of Systems   Constitutional:  Negative for chills, diaphoresis, fever, malaise/fatigue and weight loss.   HENT: Negative.     Eyes: Negative.    Respiratory: Negative.     Cardiovascular: Negative.    Gastrointestinal: Negative.    Genitourinary:  Positive for dysuria, frequency and urgency. Negative for flank pain and hematuria.   Musculoskeletal: Negative.    Skin: Negative.    Neurological: Negative.    Endo/Heme/Allergies: Negative.    Psychiatric/Behavioral: Negative.         /60 (BP Location: Right arm, Patient Position: Sitting, BP Method: X-Large (Manual))   Pulse 101   Temp 98.7 °F (37.1 °C) (Oral)   Resp 16   Ht 5' 11" (1.803 m)   Wt (!) 137.2 kg (302 lb 7.5 oz)   SpO2 (!) 93%   BMI 42.19 kg/m²     Physical Exam  Vitals reviewed.   Constitutional:       General: He is not in acute distress.     Appearance: Normal appearance. He is not ill-appearing, toxic-appearing or diaphoretic.   HENT:      Head: Normocephalic and atraumatic.   Cardiovascular:      Rate and Rhythm: Normal rate and regular rhythm.      Pulses: Normal pulses.      Heart sounds: Normal heart sounds.   Pulmonary:      Effort: Pulmonary effort is normal. No respiratory distress.      Breath sounds: Normal breath sounds. No wheezing.   Abdominal:      General: Bowel sounds are normal. There is no distension.      Palpations: Abdomen is soft.      Tenderness: There is no abdominal tenderness.   Musculoskeletal:         General: No swelling, tenderness or deformity. Normal range of motion.   Skin:     General: Skin is warm and dry.      Capillary Refill: Capillary refill takes less than 2 seconds.   Neurological:      " General: No focal deficit present.      Mental Status: He is alert and oriented to person, place, and time.   Psychiatric:         Mood and Affect: Mood normal.         Behavior: Behavior normal.          Assessment/Plan:  Elías Gay is a 68 y.o. male who presents today for :    Elías was seen today for urinary tract infection.    Diagnoses and all orders for this visit:    Acute cystitis with hematuria  -     POCT URINE DIPSTICK WITHOUT MICROSCOPE  -     Urine culture; Future  -     Urinalysis; Future  -     Urinalysis  -     Urine culture  -     sulfamethoxazole-trimethoprim 800-160mg (BACTRIM DS) 800-160 mg Tab; Take 1 tablet by mouth 2 (two) times daily.    Urine dipstick positive for infection.  Treat with Bactrim.  Reviewed previous positive urine culture.    BPH with urinary obstruction  -     Ambulatory referral/consult to Urology; Fut[place]\    Will refer to Urology given patient's history of BPH and urinary tract infections.  May need urodynamic studies to evaluate for bladder emptying.        This office note has been dictated.  This dictation has been generated using M-Modal Fluency Direct dictation; some phonetic errors may occur.   My collaborating physician is Dr. pA Akers.

## 2023-08-17 LAB — BACTERIA UR CULT: ABNORMAL

## 2023-08-30 ENCOUNTER — OFFICE VISIT (OUTPATIENT)
Dept: UROLOGY | Facility: CLINIC | Age: 68
End: 2023-08-30
Payer: MEDICARE

## 2023-08-30 VITALS
HEART RATE: 80 BPM | HEIGHT: 71 IN | WEIGHT: 302 LBS | BODY MASS INDEX: 42.28 KG/M2 | SYSTOLIC BLOOD PRESSURE: 125 MMHG | DIASTOLIC BLOOD PRESSURE: 70 MMHG

## 2023-08-30 DIAGNOSIS — N13.8 BPH WITH URINARY OBSTRUCTION: Primary | Chronic | ICD-10-CM

## 2023-08-30 DIAGNOSIS — N40.1 BPH WITH URINARY OBSTRUCTION: Primary | Chronic | ICD-10-CM

## 2023-08-30 DIAGNOSIS — N30.00 ACUTE CYSTITIS WITHOUT HEMATURIA: ICD-10-CM

## 2023-08-30 PROBLEM — A49.1 INFECTION DUE TO STREPTOCOCCUS PYOGENES: Status: RESOLVED | Noted: 2022-10-31 | Resolved: 2023-08-30

## 2023-08-30 PROCEDURE — 99214 OFFICE O/P EST MOD 30 MIN: CPT | Mod: S$GLB,,, | Performed by: UROLOGY

## 2023-08-30 PROCEDURE — 1125F AMNT PAIN NOTED PAIN PRSNT: CPT | Mod: CPTII,S$GLB,, | Performed by: UROLOGY

## 2023-08-30 PROCEDURE — 3288F PR FALLS RISK ASSESSMENT DOCUMENTED: ICD-10-PCS | Mod: CPTII,S$GLB,, | Performed by: UROLOGY

## 2023-08-30 PROCEDURE — 3078F DIAST BP <80 MM HG: CPT | Mod: CPTII,S$GLB,, | Performed by: UROLOGY

## 2023-08-30 PROCEDURE — 1159F PR MEDICATION LIST DOCUMENTED IN MEDICAL RECORD: ICD-10-PCS | Mod: CPTII,S$GLB,, | Performed by: UROLOGY

## 2023-08-30 PROCEDURE — 3074F SYST BP LT 130 MM HG: CPT | Mod: CPTII,S$GLB,, | Performed by: UROLOGY

## 2023-08-30 PROCEDURE — 1101F PR PT FALLS ASSESS DOC 0-1 FALLS W/OUT INJ PAST YR: ICD-10-PCS | Mod: CPTII,S$GLB,, | Performed by: UROLOGY

## 2023-08-30 PROCEDURE — 87086 URINE CULTURE/COLONY COUNT: CPT | Performed by: UROLOGY

## 2023-08-30 PROCEDURE — 1160F PR REVIEW ALL MEDS BY PRESCRIBER/CLIN PHARMACIST DOCUMENTED: ICD-10-PCS | Mod: CPTII,S$GLB,, | Performed by: UROLOGY

## 2023-08-30 PROCEDURE — 3008F BODY MASS INDEX DOCD: CPT | Mod: CPTII,S$GLB,, | Performed by: UROLOGY

## 2023-08-30 PROCEDURE — 99214 PR OFFICE/OUTPT VISIT, EST, LEVL IV, 30-39 MIN: ICD-10-PCS | Mod: S$GLB,,, | Performed by: UROLOGY

## 2023-08-30 PROCEDURE — 1101F PT FALLS ASSESS-DOCD LE1/YR: CPT | Mod: CPTII,S$GLB,, | Performed by: UROLOGY

## 2023-08-30 PROCEDURE — 87186 SC STD MICRODIL/AGAR DIL: CPT | Performed by: UROLOGY

## 2023-08-30 PROCEDURE — 3078F PR MOST RECENT DIASTOLIC BLOOD PRESSURE < 80 MM HG: ICD-10-PCS | Mod: CPTII,S$GLB,, | Performed by: UROLOGY

## 2023-08-30 PROCEDURE — 1160F RVW MEDS BY RX/DR IN RCRD: CPT | Mod: CPTII,S$GLB,, | Performed by: UROLOGY

## 2023-08-30 PROCEDURE — 3008F PR BODY MASS INDEX (BMI) DOCUMENTED: ICD-10-PCS | Mod: CPTII,S$GLB,, | Performed by: UROLOGY

## 2023-08-30 PROCEDURE — 3044F HG A1C LEVEL LT 7.0%: CPT | Mod: CPTII,S$GLB,, | Performed by: UROLOGY

## 2023-08-30 PROCEDURE — 3044F PR MOST RECENT HEMOGLOBIN A1C LEVEL <7.0%: ICD-10-PCS | Mod: CPTII,S$GLB,, | Performed by: UROLOGY

## 2023-08-30 PROCEDURE — 1125F PR PAIN SEVERITY QUANTIFIED, PAIN PRESENT: ICD-10-PCS | Mod: CPTII,S$GLB,, | Performed by: UROLOGY

## 2023-08-30 PROCEDURE — 99999 PR PBB SHADOW E&M-EST. PATIENT-LVL IV: ICD-10-PCS | Mod: PBBFAC,,, | Performed by: UROLOGY

## 2023-08-30 PROCEDURE — 99999 PR PBB SHADOW E&M-EST. PATIENT-LVL IV: CPT | Mod: PBBFAC,,, | Performed by: UROLOGY

## 2023-08-30 PROCEDURE — 1159F MED LIST DOCD IN RCRD: CPT | Mod: CPTII,S$GLB,, | Performed by: UROLOGY

## 2023-08-30 PROCEDURE — 3074F PR MOST RECENT SYSTOLIC BLOOD PRESSURE < 130 MM HG: ICD-10-PCS | Mod: CPTII,S$GLB,, | Performed by: UROLOGY

## 2023-08-30 PROCEDURE — 87088 URINE BACTERIA CULTURE: CPT | Performed by: UROLOGY

## 2023-08-30 PROCEDURE — 3288F FALL RISK ASSESSMENT DOCD: CPT | Mod: CPTII,S$GLB,, | Performed by: UROLOGY

## 2023-08-30 PROCEDURE — 87077 CULTURE AEROBIC IDENTIFY: CPT | Performed by: UROLOGY

## 2023-08-30 RX ORDER — LIDOCAINE HYDROCHLORIDE 20 MG/ML
JELLY TOPICAL ONCE
Status: CANCELLED | OUTPATIENT
Start: 2023-08-30 | End: 2023-08-30

## 2023-08-30 RX ORDER — TAMSULOSIN HYDROCHLORIDE 0.4 MG/1
0.4 CAPSULE ORAL DAILY
Qty: 30 CAPSULE | Refills: 11 | Status: SHIPPED | OUTPATIENT
Start: 2023-08-30 | End: 2024-08-29

## 2023-08-30 RX ORDER — TAMSULOSIN HYDROCHLORIDE 0.4 MG/1
1 CAPSULE ORAL DAILY
COMMUNITY
Start: 2023-08-15 | End: 2023-08-30

## 2023-08-30 NOTE — PROGRESS NOTES
CHIEF COMPLAINT:    Mr. Gay is a 68 y.o. male with LUTS.    PRESENTING ILLNESS:    Elías Gay is a 68 y.o. male who c/o LUTS.  He was started on flomax in 2018, but he stopped it recently.  He then developed a UTI.    Was found to have an E. Coli UTI.    He denies ED.    No hematuria.  No dysuria.    REVIEW OF SYSTEMS:    Elías Gay denies headache, blurred vision, fever, nausea, vomiting, chills, abdominal pain, bleeding per rectum, cough, SOB, recent loss of consciousness, recent mental status changes, seizures, dizziness, or upper or lower extremity weakness.    NGUYEN  1. 3  2. 4  3. 4  4. 3  5. 4     PATIENT HISTORY:    Past Medical History:   Diagnosis Date    Acute renal failure 10/25/2022    Arthritis 1971    Bone Spurs in feet    Basal cell carcinoma 11/06/2018    left ear helix    Foreign body in conjunctival sac     Hernia, inguinal, right 2002    Joint pain 1971    Bones of feet    Keloid cicatrix 1958 2010 2018 2019    Hernia, Knee, Arm, Ear    Melanoma 09/14/2018    Right Arm 0.6mm    Severe sleep apnea     Thyroid nodule 5/3/2023       Past Surgical History:   Procedure Laterality Date    CLOSURE OF DEFECT OF MOHS PROCEDURE Left 01/03/2019    Procedure: CLOSURE, MOHS PROCEDURE DEFECT LEFT EAR;  Surgeon: Jeramy Meek MD;  Location: [address];  Service: Plastics;  Laterality: Left;  plastics set    COLONOSCOPY N/A 06/30/2021    Procedure: COLONOSCOPY;  Surgeon: Juliano Santoyo MD;  Location: [facility] END[facility] ([address] [place] [address]);  Service: Endoscopy;  Laterality: N/A;  severe sleep apnea     fully vaccinated-GT    HERNIA REPAIR Left     3 years of age    HERNIA REPAIR N/A     Ventral wall at 3 year of age.    INCISION AND DRAINAGE, NECK Left 10/26/2022    Procedure: INCISION AND DRAINAGE,NECK;  Surgeon: Bethany Macdonald MD;  Location: [facility] OR;  Service: ENT;  Laterality: Left;    KNEE SURGERY Right 1984    Arthroscopic    NASAL SEPTUM SURGERY N/A 2009    SKIN BIOPSY  several over time       Family  History   Problem Relation Age of Onset    Hypertension Mother     Stroke Mother     Aneurysm Mother     Miscarriages / Stillbirths Mother     Cancer Father 72        Prostate and liver    Gout Brother     Eczema Brother     Psoriasis Brother     No Known Problems Daughter     No Known Problems Daughter     Kidney disease Son     Lung disease Son     Learning disabilities Son         Dyslexcia/Dysgraphia    No Known Problems Son     Kidney disease Son     Cataracts Neg Hx     Glaucoma Neg Hx     Macular degeneration Neg Hx     Melanoma Neg Hx     Eczema Neg Hx     Lupus Neg Hx     Psoriasis Neg Hx        Social History     Socioeconomic History    Marital status:    Tobacco Use    Smoking status: Never    Smokeless tobacco: Never    Tobacco comments:     Second hand smoke from mother. Cigarrette smoke inflames my sinuses.   Substance and Sexual Activity    Alcohol use: Not Currently     Alcohol/week: 1.0 standard drink of alcohol     Types: 1 Glasses of wine per week    Drug use: No    Sexual activity: Yes     Partners: Female     Birth control/protection: None     Social Determinants of Health     Financial Resource Strain: Low Risk  (8/29/2023)    Overall Financial Resource Strain (CARDIA)     Difficulty of Paying Living Expenses: Not hard at all   Food Insecurity: No Food Insecurity (8/29/2023)    Hunger Vital Sign     Worried About Running Out of Food in the Last Year: Never true     Ran Out of Food in the Last Year: Never true   Transportation Needs: No Transportation Needs (8/29/2023)    PRAPARE - Transportation     Lack of Transportation (Medical): No     Lack of Transportation (Non-Medical): No   Physical Activity: Insufficiently Active (8/29/2023)    Exercise Vital Sign     Days of Exercise per Week: 1 day     Minutes of Exercise per Session: 10 min   Stress: No Stress Concern Present (8/29/2023)    [employer] [place] [employer] - Occupational Stress Questionnaire     Feeling of Stress :  Not at all   Social Connections: Moderately Integrated (8/29/2023)    Social Connection and Isolation Panel [NHANES]     Frequency of Communication with Friends and Family: Three times a week     Frequency of Social Gatherings with Friends and Family: Once a week     Attends [employer] Services: 1 to 4 times per year     Active Member of Clubs or Organizations: No     Attends Club or Organization Meetings: Never     Marital Status:    Housing Stability: Low Risk  (8/29/2023)    Housing Stability Vital Sign     Unable to Pay for Housing in the Last Year: No     Number of Places Lived in the Last Year: 1     Unstable Housing in the Last Year: No       Allergies:  Patient has no known allergies.    Medications:    Current Outpatient Medications:     ciclopirox (PENLAC) 8 % Soln, Apply daily to affected nail. Must remove and restart weekly, Disp: 1 each, Rfl: 5    fluticasone propionate (FLONASE) 50 mcg/actuation nasal spray, SPRAY 2 SPRAYS IN EACH NOSTRIL 2 TIMES DAILY., Disp: 48 mL, Rfl: 11    guaiFENesin (MUCINEX) 600 mg 12 hr tablet, Take 1,200 mg by mouth 2 (two) times daily., Disp: , Rfl:     ketoconazole (NIZORAL) 2 % cream, AAA bid to both feet x 3 weeks, Disp: 60 g, Rfl: 3    multivitamin capsule, Take 1 capsule by mouth once daily., Disp: , Rfl:     omega-3 fatty acids/fish oil (FISH OIL-OMEGA-3 FATTY ACIDS) 300-1,000 mg capsule, Take 1 capsule by mouth 2 (two) times a day., Disp: , Rfl:     tamsulosin (FLOMAX) 0.4 mg Cap, Take 1 tablet by mouth once daily., Disp: , Rfl:     sulfamethoxazole-trimethoprim 800-160mg (BACTRIM DS) 800-160 mg Tab, Take 1 tablet by mouth 2 (two) times daily. (Patient not taking: Reported on 8/30/2023), Disp: 14 tablet, Rfl: 0    PHYSICAL EXAMINATION:    The patient generally appears in good health, is appropriately interactive, and is in no apparent distress.     Eyes: anicteric sclerae, moist conjunctivae; no lid-lag; PERRLA     HENT: Atraumatic; oropharynx clear with moist  mucous membranes and no mucosal ulcerations;normal hard and soft palate.  No evidence of lymphadenopathy.    Neck: Trachea midline.  No thyromegaly.    Musculoskeletal: No abnormal gait.    Skin: No lesions.    Mental: Cooperative with normal affect.  Is oriented to time, place, and person.    Neuro: Grossly intact.    Chest: Normal inspiratory effort.   No accessory muscles.  No audible wheezes.  Respirations symmetric on inspiration and expiration.    Heart: Regular rhythm.      Abdomen:  Soft, non-tender. No masses or organomegaly. Bladder is not palpable. No evidence of flank discomfort. No evidence of inguinal hernia.    Genitourinary: The penis is not circumcised and has no evidence of plaques or induration. The urethral meatus is normal. The testes, epididymides, and cord structures are normal in size and contour bilaterally. The scrotum is normal in size and contour.    Normal anal sphincter tone. No rectal mass.    The prostate is 50 g. Normal landmarks. Lateral sulci. Median furrow intact.  No nodularity or induration. Seminal vesicles are normal.    Extremities: No clubbing, cyanosis, or edema      LABS:    UA dipped nitrite positive.    Lab Results   Component Value Date    PSA 2.9 07/19/2023    PSA 2.4 07/26/2022    PSA 1.7 03/22/2021       IMPRESSION:    Encounter Diagnoses   Name Primary?    BPH with urinary obstruction          PLAN:    1. Restart flomax.Side effects discussed.  A new Rx was given  2. Will workup the UTI with a renal u/s and cysto.  3. Will send his urine for culture and will call in abx based off it.    Copy to:

## 2023-09-01 LAB — BACTERIA UR CULT: ABNORMAL

## 2023-09-02 ENCOUNTER — NURSE TRIAGE (OUTPATIENT)
Dept: ADMINISTRATIVE | Facility: CLINIC | Age: 68
End: 2023-09-02
Payer: MEDICARE

## 2023-09-02 RX ORDER — CIPROFLOXACIN 500 MG/1
500 TABLET ORAL 2 TIMES DAILY
Qty: 14 TABLET | Refills: 0 | Status: SHIPPED | OUTPATIENT
Start: 2023-09-02 | End: 2023-09-09

## 2023-09-02 NOTE — TELEPHONE ENCOUNTER
Reason for Disposition   [1] Caller has URGENT medicine question about med that PCP or specialist prescribed AND [2] triager unable to answer question    Additional Information   Negative: [1] Intentional drug overdose AND [2] suicidal thoughts or ideas   Negative: MORE THAN A DOUBLE DOSE of a prescription or over-the-counter (OTC) drug   Negative: [1] DOUBLE DOSE (an extra dose or lesser amount) of prescription drug AND [2] any symptoms (e.g., dizziness, nausea, pain, sleepiness)   Negative: [1] DOUBLE DOSE (an extra dose or lesser amount) of over-the-counter (OTC) drug AND [2] any symptoms (e.g., dizziness, nausea, pain, sleepiness)   Negative: Took another person's prescription drug   Negative: [1] DOUBLE DOSE (an extra dose or lesser amount) of prescription drug AND [2] NO symptoms  (Exception: A double dose of antibiotics.)   Negative: Diabetes drug error or overdose (e.g., took wrong type of insulin or took extra dose)   Negative: [1] Prescription not at pharmacy AND [2] was prescribed by PCP recently (Exception: Triager has access to EMR and prescription is recorded there. Go to Home Care and confirm for pharmacy.)   Negative: [1] Pharmacy calling with prescription question AND [2] triager unable to answer question    Protocols used: Medication Question Call-A-  Spoke with pt who reports that he received urine culture results which showed e. Coli. States that he would like something prescribed. States that he was prescribed Bactrim 8/15 as well for same matter. Advised will reach out to on call provider.    Spoke with Dr. Mckeon, who states will look into matter, and will follow up with pt.     Pt informed, and verbalized understanding.

## 2023-09-02 NOTE — PROGRESS NOTES
Urine culture resulted pan sensitive E Coli. Recent treatment with bactrim not successful. Complaining of frequency and urgency symptoms. Sending 500mg cipro BID x7 days to pharmacy.     LIZ Mckeon MD  Urology PGY-2

## 2023-09-05 ENCOUNTER — TELEPHONE (OUTPATIENT)
Dept: UROLOGY | Facility: CLINIC | Age: 68
End: 2023-09-05
Payer: MEDICARE

## 2023-09-07 ENCOUNTER — TELEPHONE (OUTPATIENT)
Dept: UROLOGY | Facility: CLINIC | Age: 68
End: 2023-09-07
Payer: MEDICARE

## 2023-09-07 NOTE — TELEPHONE ENCOUNTER
Received portal message that patient was confused on what time his procedure was. Call placed to patient. Informed that time on [facility] was incorrect. Arrival time given. Patient was unhappy that his procedure time did not match what was on his portal. Explained to patient that the clinic nurse places patients on the schedule and the procedure team makes final changes the day before. Patient remains unhappy with the situation but agreed to come at time given. Location provided and all questions answered.

## 2023-09-08 ENCOUNTER — PROCEDURE VISIT (OUTPATIENT)
Dept: UROLOGY | Facility: CLINIC | Age: 68
End: 2023-09-08
Payer: MEDICARE

## 2023-09-08 VITALS
DIASTOLIC BLOOD PRESSURE: 80 MMHG | HEART RATE: 69 BPM | RESPIRATION RATE: 16 BRPM | SYSTOLIC BLOOD PRESSURE: 152 MMHG | WEIGHT: 298.06 LBS | HEIGHT: 71 IN | BODY MASS INDEX: 41.73 KG/M2

## 2023-09-08 DIAGNOSIS — N40.1 BPH WITH URINARY OBSTRUCTION: Chronic | ICD-10-CM

## 2023-09-08 DIAGNOSIS — N13.8 BPH WITH URINARY OBSTRUCTION: Chronic | ICD-10-CM

## 2023-09-08 PROCEDURE — 52000 CYSTOURETHROSCOPY: CPT | Mod: S$GLB,,, | Performed by: UROLOGY

## 2023-09-08 PROCEDURE — 52000 PR CYSTOURETHROSCOPY: ICD-10-PCS | Mod: S$GLB,,, | Performed by: UROLOGY

## 2023-09-08 RX ORDER — LIDOCAINE HYDROCHLORIDE 20 MG/ML
JELLY TOPICAL ONCE
Status: COMPLETED | OUTPATIENT
Start: 2023-09-08 | End: 2023-09-08

## 2023-09-08 RX ADMIN — LIDOCAINE HYDROCHLORIDE: 20 JELLY TOPICAL at 09:09

## 2023-09-08 NOTE — PATIENT INSTRUCTIONS
What to Expect After a Cystoscopy  For the next 24-48 hours, you may feel a mild burning when you urinate. This burning is normal and expected. Drink plenty of water to dilute the urine to help relieve the burning sensation. You may also see a small amount of blood in your urine after the procedure.    Unless you are already taking antibiotics, you may be given an antibiotic after the test to prevent infection.    Signs and Symptoms to Report  Call the [facility] at [phone] if you develop any of the following:  Fever of 101 degrees or higher  Chills or persistent bleeding  Inability to urinate

## 2023-09-08 NOTE — PROCEDURES
Procedures Date: 09/08/2023     Pre procedure diagnosis:  Encounter Diagnosis   Name Primary?    BPH with urinary obstruction         Post procedure diagnosis:same    Surgeon: Miguel    Assistant: None    Procedure performed: cystoscopy    Blood loss: None    Specimen: None    Procedure in detail: Consent was obtained.  A time out was performed.  Using standard sterile technique, the flexible cystoscope was assembled and passed into the patient's bladder.  Cystoscopic evaluation of the bladder revealed no abnormalities.  The estimated prostatic length was 3.5 cm.

## 2023-09-20 ENCOUNTER — HOSPITAL ENCOUNTER (OUTPATIENT)
Dept: RADIOLOGY | Facility: HOSPITAL | Age: 68
Discharge: HOME | End: 2023-09-20
Attending: UROLOGY
Payer: MEDICARE

## 2023-09-20 DIAGNOSIS — N30.00 ACUTE CYSTITIS WITHOUT HEMATURIA: ICD-10-CM

## 2023-09-20 PROCEDURE — 76770 US EXAM ABDO BACK WALL COMP: CPT | Mod: TC

## 2023-09-20 PROCEDURE — 76770 US KIDNEY: ICD-10-PCS | Mod: 26,,, | Performed by: STUDENT IN AN ORGANIZED HEALTH CARE EDUCATION/TRAINING PROGRAM

## 2023-09-20 PROCEDURE — 76770 US EXAM ABDO BACK WALL COMP: CPT | Mod: 26,,, | Performed by: STUDENT IN AN ORGANIZED HEALTH CARE EDUCATION/TRAINING PROGRAM

## 2023-10-29 NOTE — PROGRESS NOTES
Subjective:   Patient ID: Elías Gay is a 68 y.o. male    Chief Complaint: No chief complaint on file.      HPI  Elías Gay was diagnosed with Complex Sleep Apnea in the past. Previously seen by Dottie Alvarez NP, last in 2019.     Initial triggers for sleep study include  snoring, daytime hypersomnia.      Today, the patient presents to inquire about getting a new ASV device. His current device was issued in 2018 (> 5 years ago). He had registered for a new device through [employer], but he never received a new device. Hs is faithfully using the device. However, it appears that the device may no longer be functioning correctly beyond repair.    There is record available of prior study.      Elías Gay underwent a in-lab polysomnography with split study on 5/11/2016 where the AHI was 123.2, oxygen diane was 77%. No effective pressure was seen on CPAP nor BiPAP with emergence of central sleep apnea. Effective pressure undetermined with CPAP and bipap. With ASV, symptoms improved. ASV titration performed on 6/6/18.    The patient has an ASV.    Patient Durable Medical Equipment (DME) company is [facility] phone number- [phone]    Device: ASV  Setting: EPAP min 7.5, EPAP max 9.5, max Pressure 15, min PS 0 max PS 2)    The interface is the nasal and it is  comfortable.     According to the patient, the compliance with PAP is 6-7 hours per night, 7 nights per week.      Currently, with PAP  use:  Elías Gay does feel refreshed upon awakening.   An assessment of daytime sleep tendency reveals that he does not have episodes of inadvertent dozing even when inactive or being sedentary.   Additionally, he does not have any difficulty maintaining alertness while driving. He has not had any motor vehicle accidents related to any inadvertent dozing while driving.     PAP objective data downloaded from PAP device shows (CPT 86462):  Compliance:  30 days data  Days used:  30/30  Days used >  4hrs: 30/30  Average use (hr): 6:18  Residual AHI:  <5    [place] Sleepiness Scale:      10/29/2023     6:27 AM   EPWORTH SLEEPINESS SCALE   Sitting and reading 1   Watching TV 1   Sitting, inactive in a public place (e.g. a theatre or a meeting) 1   As a passenger in a car for an hour without a break 1   Lying down to rest in the afternoon when circumstances permit 3   Sitting and talking to someone 1   Sitting quietly after a lunch without alcohol 1   In a car, while stopped for a few minutes in traffic 0   Total score 9             10/29/2023     6:39 AM   Sleep Clinic New Patient   What time do you go to bed on a week day? (Give a range) between 7pm and 2am   What time do you go to bed on a day off? (Give a range) between 10pm and 3am   How long does it take you to fall asleep? (Give a range) 10 minutes   On average, how many times per night do you wake up? Wake up about every 1-1/2 to 3-1/2 hours. 3-5 times a night.   How long does it take you to fall back into sleep? (Give a range) I get out of the bed for about 30-40 minutes   What time do you wake up to start your day on a week day? (Give a range) 5:30 am   What time do you wake up to start your day on a day off? (Give a range) 10:00 am   What time do you get out of bed? (Give a range) 5:30 am   On average, how many hours do you sleep? 6 1/2 - 7   On average, how many naps do you take per day? 1   Rate your sleep quality from 0 to 5 (0-poor, 5-great). 2   Have you experienced:  Weight loss?   How much weight have you lost or gained (in lbs.) in the last year? lost 10 lb.   On average, how many times per night do you go to the bathroom?  I do not wake up to go to the bathroom, I wake up because of pain in my hips. But when I do wake up I go to the bathroom because I am up.   Have you ever had a sleep study/CPAP machine/surgery for sleep apnea? Yes   Have you ever had a CPAP machine for sleep apnea? Yes   Have you ever had surgery for sleep apnea? Yes          10/29/2023     6:39 AM   Sleep Clinic ROS    Difficulty breathing through the nose?  Sometimes   Sore throat or dry mouth in the morning? Sometimes   Irregular or very fast heart beat?  No   Shortness of breath?  No   Acid reflux? No   Body aches and pains?  Yes   Morning headaches? No   Dizziness? No   Mood changes?  No   Do you exercise?  Yes   Do you feel like moving your legs a lot?  No            Objective:   Vitals reviewed   Physical Exam    Assessment:     Problem List Items Addressed This Visit          Endocrine    Class 3 severe obesity due to excess calories without serious comorbidity in adult    Overview     BMI 43            Other    Complex sleep apnea syndrome - Primary    Relevant Orders    BIPAP FOR HOME USE         Plan:       Complex Sleep apnea    Patient is on PAP therapy at this time  Excellent Compliance  Excellent Efficacy    2018 (> 5 years ago). It appears that the device is no longer be functioning correctly beyond repair.  A prescription for a new device and prior sleep studies were given to the patient today.    - Teaching in regards to PAP use and mask adjustment was given to the patient during the visit today.  - Use PAP >4hours per night for 7 nights per week  - Use PAP for any daytime sleeping  - Engage in a weight loss program through diet and exercise plan  - Prescription for PAP device supplies will be send to the [facility] today. Filter, mask, tube with climate line and humidification system.      Patient suffers from a complex medical condition and if sleep disordered breathing is not properly treated it will increase his morbidity and mortality and patient is aware that has to be optimally compliant with therapy. Sleep disordered breathing has been associated with uncontrolled hypertension, insulin resistance, pulmonary hypertension, dementia, glaucoma, cardiac arrhythmias, cerebro vascular accident and multiple other conditions when not treated appropriately. Patient  benefits from PAP therapy.          I will follow up the patient closely.    RTC annually, earlier if needdd

## 2023-10-30 ENCOUNTER — OFFICE VISIT (OUTPATIENT)
Dept: SLEEP MEDICINE | Facility: CLINIC | Age: 68
End: 2023-10-30
Payer: MEDICARE

## 2023-10-30 VITALS
DIASTOLIC BLOOD PRESSURE: 82 MMHG | HEART RATE: 68 BPM | HEIGHT: 71 IN | BODY MASS INDEX: 41.72 KG/M2 | WEIGHT: 298 LBS | SYSTOLIC BLOOD PRESSURE: 136 MMHG

## 2023-10-30 DIAGNOSIS — E66.01 CLASS 3 SEVERE OBESITY DUE TO EXCESS CALORIES WITHOUT SERIOUS COMORBIDITY WITH BODY MASS INDEX (BMI) OF 40.0 TO 44.9 IN ADULT: ICD-10-CM

## 2023-10-30 DIAGNOSIS — G47.31 COMPLEX SLEEP APNEA SYNDROME: Primary | ICD-10-CM

## 2023-10-30 PROCEDURE — 99204 OFFICE O/P NEW MOD 45 MIN: CPT | Mod: S$GLB,,, | Performed by: INTERNAL MEDICINE

## 2023-10-30 PROCEDURE — 99999 PR PBB SHADOW E&M-EST. PATIENT-LVL III: CPT | Mod: PBBFAC,,, | Performed by: INTERNAL MEDICINE

## 2023-10-30 PROCEDURE — 3079F DIAST BP 80-89 MM HG: CPT | Mod: CPTII,S$GLB,, | Performed by: INTERNAL MEDICINE

## 2023-10-30 PROCEDURE — 99204 PR OFFICE/OUTPT VISIT, NEW, LEVL IV, 45-59 MIN: ICD-10-PCS | Mod: S$GLB,,, | Performed by: INTERNAL MEDICINE

## 2023-10-30 PROCEDURE — 3079F PR MOST RECENT DIASTOLIC BLOOD PRESSURE 80-89 MM HG: ICD-10-PCS | Mod: CPTII,S$GLB,, | Performed by: INTERNAL MEDICINE

## 2023-10-30 PROCEDURE — 3044F PR MOST RECENT HEMOGLOBIN A1C LEVEL <7.0%: ICD-10-PCS | Mod: CPTII,S$GLB,, | Performed by: INTERNAL MEDICINE

## 2023-10-30 PROCEDURE — 1125F PR PAIN SEVERITY QUANTIFIED, PAIN PRESENT: ICD-10-PCS | Mod: CPTII,S$GLB,, | Performed by: INTERNAL MEDICINE

## 2023-10-30 PROCEDURE — 3075F SYST BP GE 130 - 139MM HG: CPT | Mod: CPTII,S$GLB,, | Performed by: INTERNAL MEDICINE

## 2023-10-30 PROCEDURE — 1159F PR MEDICATION LIST DOCUMENTED IN MEDICAL RECORD: ICD-10-PCS | Mod: CPTII,S$GLB,, | Performed by: INTERNAL MEDICINE

## 2023-10-30 PROCEDURE — 1160F PR REVIEW ALL MEDS BY PRESCRIBER/CLIN PHARMACIST DOCUMENTED: ICD-10-PCS | Mod: CPTII,S$GLB,, | Performed by: INTERNAL MEDICINE

## 2023-10-30 PROCEDURE — 3008F BODY MASS INDEX DOCD: CPT | Mod: CPTII,S$GLB,, | Performed by: INTERNAL MEDICINE

## 2023-10-30 PROCEDURE — 1159F MED LIST DOCD IN RCRD: CPT | Mod: CPTII,S$GLB,, | Performed by: INTERNAL MEDICINE

## 2023-10-30 PROCEDURE — 1160F RVW MEDS BY RX/DR IN RCRD: CPT | Mod: CPTII,S$GLB,, | Performed by: INTERNAL MEDICINE

## 2023-10-30 PROCEDURE — 1125F AMNT PAIN NOTED PAIN PRSNT: CPT | Mod: CPTII,S$GLB,, | Performed by: INTERNAL MEDICINE

## 2023-10-30 PROCEDURE — 3008F PR BODY MASS INDEX (BMI) DOCUMENTED: ICD-10-PCS | Mod: CPTII,S$GLB,, | Performed by: INTERNAL MEDICINE

## 2023-10-30 PROCEDURE — 99999 PR PBB SHADOW E&M-EST. PATIENT-LVL III: ICD-10-PCS | Mod: PBBFAC,,, | Performed by: INTERNAL MEDICINE

## 2023-10-30 PROCEDURE — 3044F HG A1C LEVEL LT 7.0%: CPT | Mod: CPTII,S$GLB,, | Performed by: INTERNAL MEDICINE

## 2023-10-30 PROCEDURE — 3075F PR MOST RECENT SYSTOLIC BLOOD PRESS GE 130-139MM HG: ICD-10-PCS | Mod: CPTII,S$GLB,, | Performed by: INTERNAL MEDICINE

## 2023-10-31 ENCOUNTER — OFFICE VISIT (OUTPATIENT)
Dept: INTERNAL MEDICINE | Facility: CLINIC | Age: 68
End: 2023-10-31
Payer: MEDICARE

## 2023-10-31 VITALS
HEART RATE: 74 BPM | BODY MASS INDEX: 41.39 KG/M2 | HEIGHT: 71 IN | SYSTOLIC BLOOD PRESSURE: 134 MMHG | OXYGEN SATURATION: 97 % | WEIGHT: 295.63 LBS | DIASTOLIC BLOOD PRESSURE: 74 MMHG

## 2023-10-31 DIAGNOSIS — I48.0 PAF (PAROXYSMAL ATRIAL FIBRILLATION): ICD-10-CM

## 2023-10-31 DIAGNOSIS — E04.1 THYROID NODULE: ICD-10-CM

## 2023-10-31 DIAGNOSIS — G47.33 OSA (OBSTRUCTIVE SLEEP APNEA): ICD-10-CM

## 2023-10-31 DIAGNOSIS — N40.0 BENIGN PROSTATIC HYPERPLASIA, UNSPECIFIED WHETHER LOWER URINARY TRACT SYMPTOMS PRESENT: ICD-10-CM

## 2023-10-31 DIAGNOSIS — Z01.818 PRE-OP EXAM: Primary | ICD-10-CM

## 2023-10-31 PROCEDURE — 3075F PR MOST RECENT SYSTOLIC BLOOD PRESS GE 130-139MM HG: ICD-10-PCS | Mod: CPTII,S$GLB,, | Performed by: INTERNAL MEDICINE

## 2023-10-31 PROCEDURE — 3075F SYST BP GE 130 - 139MM HG: CPT | Mod: CPTII,S$GLB,, | Performed by: INTERNAL MEDICINE

## 2023-10-31 PROCEDURE — 3044F HG A1C LEVEL LT 7.0%: CPT | Mod: CPTII,S$GLB,, | Performed by: INTERNAL MEDICINE

## 2023-10-31 PROCEDURE — 1101F PR PT FALLS ASSESS DOC 0-1 FALLS W/OUT INJ PAST YR: ICD-10-PCS | Mod: CPTII,S$GLB,, | Performed by: INTERNAL MEDICINE

## 2023-10-31 PROCEDURE — 3008F BODY MASS INDEX DOCD: CPT | Mod: CPTII,S$GLB,, | Performed by: INTERNAL MEDICINE

## 2023-10-31 PROCEDURE — 99397 PR PREVENTIVE VISIT,EST,65 & OVER: ICD-10-PCS | Mod: GZ,S$GLB,, | Performed by: INTERNAL MEDICINE

## 2023-10-31 PROCEDURE — 1101F PT FALLS ASSESS-DOCD LE1/YR: CPT | Mod: CPTII,S$GLB,, | Performed by: INTERNAL MEDICINE

## 2023-10-31 PROCEDURE — 3288F FALL RISK ASSESSMENT DOCD: CPT | Mod: CPTII,S$GLB,, | Performed by: INTERNAL MEDICINE

## 2023-10-31 PROCEDURE — 1159F PR MEDICATION LIST DOCUMENTED IN MEDICAL RECORD: ICD-10-PCS | Mod: CPTII,S$GLB,, | Performed by: INTERNAL MEDICINE

## 2023-10-31 PROCEDURE — 3008F PR BODY MASS INDEX (BMI) DOCUMENTED: ICD-10-PCS | Mod: CPTII,S$GLB,, | Performed by: INTERNAL MEDICINE

## 2023-10-31 PROCEDURE — 99999 PR PBB SHADOW E&M-EST. PATIENT-LVL IV: CPT | Mod: PBBFAC,,, | Performed by: INTERNAL MEDICINE

## 2023-10-31 PROCEDURE — 1159F MED LIST DOCD IN RCRD: CPT | Mod: CPTII,S$GLB,, | Performed by: INTERNAL MEDICINE

## 2023-10-31 PROCEDURE — 1126F AMNT PAIN NOTED NONE PRSNT: CPT | Mod: CPTII,S$GLB,, | Performed by: INTERNAL MEDICINE

## 2023-10-31 PROCEDURE — 3044F PR MOST RECENT HEMOGLOBIN A1C LEVEL <7.0%: ICD-10-PCS | Mod: CPTII,S$GLB,, | Performed by: INTERNAL MEDICINE

## 2023-10-31 PROCEDURE — 3078F DIAST BP <80 MM HG: CPT | Mod: CPTII,S$GLB,, | Performed by: INTERNAL MEDICINE

## 2023-10-31 PROCEDURE — 3288F PR FALLS RISK ASSESSMENT DOCUMENTED: ICD-10-PCS | Mod: CPTII,S$GLB,, | Performed by: INTERNAL MEDICINE

## 2023-10-31 PROCEDURE — 99397 PER PM REEVAL EST PAT 65+ YR: CPT | Mod: GZ,S$GLB,, | Performed by: INTERNAL MEDICINE

## 2023-10-31 PROCEDURE — 99999 PR PBB SHADOW E&M-EST. PATIENT-LVL IV: ICD-10-PCS | Mod: PBBFAC,,, | Performed by: INTERNAL MEDICINE

## 2023-10-31 PROCEDURE — 1126F PR PAIN SEVERITY QUANTIFIED, NO PAIN PRESENT: ICD-10-PCS | Mod: CPTII,S$GLB,, | Performed by: INTERNAL MEDICINE

## 2023-10-31 PROCEDURE — 3078F PR MOST RECENT DIASTOLIC BLOOD PRESSURE < 80 MM HG: ICD-10-PCS | Mod: CPTII,S$GLB,, | Performed by: INTERNAL MEDICINE

## 2023-10-31 NOTE — PROGRESS NOTES
CC:  Preoperative clearance     HPI:  The patient is a 68-year-old male with obstructive sleep apnea, BPH, history of melanoma, colon polyps, thyroid nodule, PAF and osteoarthritis of both hips presents today for preoperative clearance for left hip replacement to take place on November 21st.  The patient reports having general anesthesia before.  He was told that it affects his blood pressure.  He can not recall if it made his pressure go upper down.    ROS:  Answers submitted by the patient for this visit:  Review of Systems Questionnaire (Submitted on 10/26/2023)  activity change: No  unexpected weight change: No  neck pain: No  hearing loss: No  rhinorrhea: No  trouble swallowing: No  eye discharge: No  visual disturbance: No  chest tightness: No  wheezing: No  chest pain: No  palpitations: No  blood in stool: No  constipation: No  vomiting: No  diarrhea: No  polydipsia: No  polyuria: No  difficulty urinating: No  urgency: No  hematuria: No  joint swelling: No  arthralgias: Yes  headaches: No  weakness: No  confusion: No  dysphoric mood: No  In addition, the patient reports feeling well.  No fever chills.  No acute visual changes.  He is not exercising currently.  He has a bowel movement daily.  No trouble urinating.  He does have pain in both hips.  No numbness or tingling arms or legs.  The patient is on CPAP.  He uses his mask faithfully.    Physical exam:   General appearance:  No acute distress   HEENT: Conjunctiva is clear.  Pupils equal.  He is bilateral cataracts.  TMs are clear.  Nasal septum is midline without discharge.  Oropharynx without erythema.  Trachea is midline without JVD or thyromegaly.  Pulmonary:  Good inspiratory, expiratory breath sounds are heard.  Lungs are clear to auscultation.    Cardiovascular:  S1-S2, rhythm is regular.  2+ carotid pulse of bruits.  Extremities with trace edema.    GI:  Abdomen was protuberant.  I can not appreciate any hepatosplenomegaly.  Comments:  The patient  had blood test done.  He had a BMP, CBC, PT INR, PTT, UA, EKG which showed a right bundle-branch block normal sinus rhythm.  He also had a chest x-ray performed.  His blood test were compared to those done on 07/19/2023.    Assessment:  1. Preoperative clearance for hip replacement surgery  2. PAF currently stable  3.  Obstructive sleep apnea currently on CPAP   4. Thyroid nodule currently followed by endocrinology  5.  BPH currently on Flomax UA negative.  6. Elevated BMI.      Plan:   1. The patient is maximally medically managed and is cleared for surgery and anesthesia.

## 2023-11-20 DIAGNOSIS — G47.31 COMPLEX SLEEP APNEA SYNDROME: Primary | ICD-10-CM

## 2024-01-29 ENCOUNTER — HOSPITAL ENCOUNTER (OUTPATIENT)
Dept: RADIOLOGY | Facility: HOSPITAL | Age: 69
Discharge: HOME | End: 2024-01-29
Attending: HOSPITALIST
Payer: MEDICARE

## 2024-01-29 DIAGNOSIS — E04.1 THYROID NODULE: ICD-10-CM

## 2024-01-29 PROCEDURE — 76536 US EXAM OF HEAD AND NECK: CPT | Mod: 26,,, | Performed by: RADIOLOGY

## 2024-01-29 PROCEDURE — 76536 US EXAM OF HEAD AND NECK: CPT | Mod: TC

## 2024-01-30 DIAGNOSIS — Z00.00 ENCOUNTER FOR MEDICARE ANNUAL WELLNESS EXAM: ICD-10-CM

## 2024-01-31 ENCOUNTER — OFFICE VISIT (OUTPATIENT)
Dept: FAMILY MEDICINE | Facility: CLINIC | Age: 69
End: 2024-01-31
Payer: MEDICARE

## 2024-01-31 VITALS
TEMPERATURE: 99 F | RESPIRATION RATE: 15 BRPM | SYSTOLIC BLOOD PRESSURE: 128 MMHG | DIASTOLIC BLOOD PRESSURE: 76 MMHG | HEART RATE: 87 BPM | HEIGHT: 71 IN | BODY MASS INDEX: 42.87 KG/M2 | WEIGHT: 306.25 LBS | OXYGEN SATURATION: 97 %

## 2024-01-31 DIAGNOSIS — J06.9 VIRAL URI WITH COUGH: Primary | ICD-10-CM

## 2024-01-31 LAB
CTP QC/QA: YES
POC MOLECULAR INFLUENZA A AGN: NEGATIVE
POC MOLECULAR INFLUENZA B AGN: NEGATIVE

## 2024-01-31 PROCEDURE — 87635 SARS-COV-2 COVID-19 AMP PRB: CPT | Performed by: NURSE PRACTITIONER

## 2024-01-31 PROCEDURE — 99999 PR PBB SHADOW E&M-EST. PATIENT-LVL IV: CPT | Mod: PBBFAC,,, | Performed by: NURSE PRACTITIONER

## 2024-01-31 PROCEDURE — 87502 INFLUENZA DNA AMP PROBE: CPT | Mod: QW,S$GLB,, | Performed by: NURSE PRACTITIONER

## 2024-01-31 PROCEDURE — 99213 OFFICE O/P EST LOW 20 MIN: CPT | Mod: S$GLB,,, | Performed by: NURSE PRACTITIONER

## 2024-01-31 RX ORDER — FLUTICASONE PROPIONATE 50 MCG
2 SPRAY, SUSPENSION (ML) NASAL 2 TIMES DAILY
Qty: 48 ML | Refills: 11 | Status: SHIPPED | OUTPATIENT
Start: 2024-01-31

## 2024-01-31 RX ORDER — BENZONATATE 200 MG/1
200 CAPSULE ORAL 3 TIMES DAILY PRN
Qty: 30 CAPSULE | Refills: 0 | Status: SHIPPED | OUTPATIENT
Start: 2024-01-31 | End: 2024-02-10 | Stop reason: SDUPTHER

## 2024-01-31 NOTE — PROGRESS NOTES
Routine Office Visit    Patient Name: Elías Gay    : 1955  MRN: [id number]    Chief Complaint:  Congestion    Subjective:  Elías is a 68 y.o. male who presents today for:    Congestion - Wife has been sick with similar symptoms. Reports cough and nasal congestion for 2 days.  Has taken Mucinex and tessalon with some benefit. Denies any fevers or chills. +chest congestion but denies any shortness of breath.  No other acute concerns.    Past Medical History  Past Medical History:   Diagnosis Date    Acute renal failure 10/25/2022    Arthritis 1971    Bone Spurs in feet    Basal cell carcinoma 2018    left ear helix    Foreign body in conjunctival sac     Hernia, inguinal, right 2002    Joint pain 1971    Bones of feet    Keloid cicatrix 1958    Hernia, Knee, Arm, Ear    Melanoma 2018    Right Arm 0.6mm    Severe sleep apnea     Thyroid nodule 5/3/2023       Family History  Family History   Problem Relation Age of Onset    Hypertension Mother     Stroke Mother     Aneurysm Mother     Miscarriages / Stillbirths Mother     Cancer Father 72        Prostate and liver    Gout Brother     Eczema Brother     Psoriasis Brother     No Known Problems Daughter     No Known Problems Daughter     Kidney disease Son     Lung disease Son     Learning disabilities Son         Dyslexcia/Dysgraphia    No Known Problems Son     Kidney disease Son     Cataracts Neg Hx     Glaucoma Neg Hx     Macular degeneration Neg Hx     Melanoma Neg Hx     Eczema Neg Hx     Lupus Neg Hx     Psoriasis Neg Hx        Current Medications  Current Outpatient Medications on File Prior to Visit   Medication Sig Dispense Refill    ciclopirox (PENLAC) 8 % Soln Apply daily to affected nail. Must remove and restart weekly 1 each 5    fluticasone propionate (FLONASE) 50 mcg/actuation nasal spray SPRAY 2 SPRAYS IN EACH NOSTRIL 2 TIMES DAILY. 48 mL 11    guaiFENesin (MUCINEX) 600 mg 12 hr tablet Take 1,200 mg by mouth 2 (two)  "times daily.      ketoconazole (NIZORAL) 2 % cream AAA bid to both feet x 3 weeks 60 g 3    multivitamin capsule Take 1 capsule by mouth once daily.      tamsulosin (FLOMAX) 0.4 mg Cap Take 1 capsule (0.4 mg total) by mouth once daily. 30 capsule 11    omega-3 fatty acids/fish oil (FISH OIL-OMEGA-3 FATTY ACIDS) 300-1,000 mg capsule Take 1 capsule by mouth 2 (two) times a day.       No current facility-administered medications on file prior to visit.       Allergies   Review of patient's allergies indicates:  No Known Allergies    Review of Systems (Pertinent positives)  Review of Systems   Constitutional:  Negative for chills and fever.   HENT:  Positive for congestion. Negative for sinus pain.    Eyes: Negative.    Respiratory:  Positive for cough and sputum production. Negative for hemoptysis, shortness of breath, wheezing and stridor.    Cardiovascular: Negative.    Skin: Negative.        /76 (BP Location: Left arm, Patient Position: Sitting, BP Method: X-Large (Manual))   Pulse 87   Temp 99 °F (37.2 °C) (Oral)   Resp 15   Ht 5' 11" (1.803 m)   Wt (!) 138.9 kg (306 lb 3.5 oz)   SpO2 97%   BMI 42.71 kg/m²     Physical Exam  Vitals reviewed.   Constitutional:       General: He is not in acute distress.     Appearance: Normal appearance. He is not ill-appearing, toxic-appearing or diaphoretic.   HENT:      Head: Normocephalic and atraumatic.      Nose: Congestion and rhinorrhea present.      Mouth/Throat:      Pharynx: Oropharynx is clear. No oropharyngeal exudate or posterior oropharyngeal erythema.   Cardiovascular:      Rate and Rhythm: Normal rate and regular rhythm.      Pulses: Normal pulses.      Heart sounds: Normal heart sounds.   Pulmonary:      Effort: Pulmonary effort is normal. No respiratory distress.      Breath sounds: Normal breath sounds. No wheezing.   Abdominal:      Tenderness: There is no abdominal tenderness.   Musculoskeletal:         General: No swelling, tenderness or " deformity.   Skin:     General: Skin is warm and dry.      Capillary Refill: Capillary refill takes less than 2 seconds.   Neurological:      General: No focal deficit present.      Mental Status: He is alert and oriented to person, place, and time.   Psychiatric:         Mood and Affect: Mood normal.         Behavior: Behavior normal.          Assessment/Plan:  Elías Gay is a 68 y.o. male who presents today for :    Elías was seen today for cough and nasal congestion.    Diagnoses and all orders for this visit:    Viral URI with cough  -     POCT Influenza A/B Molecular  -     COVID-19 Routine Screening  -     benzonatate (TESSALON) 200 MG capsule; Take 1 capsule (200 mg total) by mouth 3 (three) times daily as needed for Cough.    Flu test negative.  Will check COVID swab.  Likely viral URI.  Sent Tessalon for patient.  Home care measures discussed.  Continue Flonase and nasal saline sprays.  Follow-up for any worsening        This office note has been dictated.  This dictation has been generated using M-Modal Fluency Direct dictation; some phonetic errors may occur.

## 2024-02-01 DIAGNOSIS — U07.1 COVID-19 VIRUS DETECTED: ICD-10-CM

## 2024-02-01 LAB — SARS-COV-2 RNA RESP QL NAA+PROBE: DETECTED

## 2024-02-02 ENCOUNTER — TELEPHONE (OUTPATIENT)
Dept: FAMILY MEDICINE | Facility: CLINIC | Age: 69
End: 2024-02-02
Payer: MEDICARE

## 2024-02-02 ENCOUNTER — PATIENT MESSAGE (OUTPATIENT)
Dept: FAMILY MEDICINE | Facility: CLINIC | Age: 69
End: 2024-02-02
Payer: MEDICARE

## 2024-02-02 ENCOUNTER — TELEPHONE (OUTPATIENT)
Dept: FAMILY MEDICINE | Facility: CLINIC | Age: 69
End: 2024-02-02

## 2024-02-02 NOTE — TELEPHONE ENCOUNTER
Patient to discuss COVID positive results.  No answer.  Left voice message to return call to clinic

## 2024-02-02 NOTE — TELEPHONE ENCOUNTER
----- Message from Nancy Blanc sent at 2/2/2024  8:00 AM CST -----  Regarding: self [phone]  Type:  Patient Returning Call    Who Called:  self     Who Left Message for Patient:  MAXIMO Hernandez     Does the patient know what this is regarding?: NA    Would the patient rather a call back or a response via [facility]? Call back     Best Call Back Number: [phone]

## 2024-02-08 ENCOUNTER — PATIENT MESSAGE (OUTPATIENT)
Dept: ENDOCRINOLOGY | Facility: CLINIC | Age: 69
End: 2024-02-08
Payer: MEDICARE

## 2024-02-08 DIAGNOSIS — E04.1 THYROID NODULE: Primary | ICD-10-CM

## 2024-02-10 DIAGNOSIS — B35.1 ONYCHOMYCOSIS: ICD-10-CM

## 2024-02-10 DIAGNOSIS — J06.9 VIRAL URI WITH COUGH: ICD-10-CM

## 2024-02-10 DIAGNOSIS — B35.3 TINEA PEDIS OF BOTH FEET: ICD-10-CM

## 2024-02-12 RX ORDER — CICLOPIROX 80 MG/ML
SOLUTION TOPICAL
Qty: 1 EACH | Refills: 5 | Status: SHIPPED | OUTPATIENT
Start: 2024-02-12

## 2024-02-12 RX ORDER — KETOCONAZOLE 20 MG/G
CREAM TOPICAL
Qty: 60 G | Refills: 3 | Status: SHIPPED | OUTPATIENT
Start: 2024-02-12

## 2024-02-12 NOTE — TELEPHONE ENCOUNTER
Refill Routing Note   Medication(s) are not appropriate for processing by [facility] for the following reason(s):        Outside of protocol    [facility] action(s):  Route               Appointments  past 12m or future 3m with PCP    Date Provider   Last Visit   10/31/2023 Kameron Diaz MD   Next Visit   Visit date not found Kameron Diaz MD   ED visits in past 90 days: 0        Note composed:7:07 AM 02/12/2024

## 2024-02-14 ENCOUNTER — TELEPHONE (OUTPATIENT)
Dept: INTERVENTIONAL RADIOLOGY/VASCULAR | Facility: HOSPITAL | Age: 69
End: 2024-02-14
Payer: MEDICARE

## 2024-02-14 RX ORDER — CICLOPIROX 80 MG/ML
SOLUTION TOPICAL
Qty: 1 EACH | Refills: 5 | OUTPATIENT
Start: 2024-02-14

## 2024-02-14 RX ORDER — BENZONATATE 200 MG/1
200 CAPSULE ORAL 3 TIMES DAILY PRN
Qty: 30 CAPSULE | Refills: 0 | Status: SHIPPED | OUTPATIENT
Start: 2024-02-14 | End: 2024-02-24

## 2024-02-14 RX ORDER — KETOCONAZOLE 20 MG/G
CREAM TOPICAL
Qty: 60 G | Refills: 3 | OUTPATIENT
Start: 2024-02-14

## 2024-02-14 NOTE — TELEPHONE ENCOUNTER
Attempted to call patient to schedule IR procedure. Unable to reach patient, a voicemail was left with our contact information ([phone]).

## 2024-02-22 ENCOUNTER — OFFICE VISIT (OUTPATIENT)
Dept: PULMONOLOGY | Facility: CLINIC | Age: 69
End: 2024-02-22
Payer: MEDICARE

## 2024-02-22 VITALS
SYSTOLIC BLOOD PRESSURE: 136 MMHG | DIASTOLIC BLOOD PRESSURE: 85 MMHG | WEIGHT: 299.69 LBS | BODY MASS INDEX: 41.96 KG/M2 | HEIGHT: 71 IN | HEART RATE: 82 BPM | OXYGEN SATURATION: 95 %

## 2024-02-22 DIAGNOSIS — E66.01 CLASS 3 SEVERE OBESITY DUE TO EXCESS CALORIES WITHOUT SERIOUS COMORBIDITY WITH BODY MASS INDEX (BMI) OF 40.0 TO 44.9 IN ADULT: ICD-10-CM

## 2024-02-22 DIAGNOSIS — G47.31 COMPLEX SLEEP APNEA SYNDROME: Primary | ICD-10-CM

## 2024-02-22 PROCEDURE — 99214 OFFICE O/P EST MOD 30 MIN: CPT | Mod: S$GLB,,, | Performed by: INTERNAL MEDICINE

## 2024-02-22 PROCEDURE — 99999 PR PBB SHADOW E&M-EST. PATIENT-LVL III: CPT | Mod: PBBFAC,,, | Performed by: INTERNAL MEDICINE

## 2024-02-22 NOTE — PROGRESS NOTES
Subjective:   Patient ID: Elías Gay is a 68 y.o. male    Chief Complaint:   Chief Complaint   Patient presents with    Apnea       HPI  Elías Gay was diagnosed with Complex Sleep Apnea in the past. Previously seen by Dottie Alvarez NP and was most recently seen by me in October 2023.     Initial triggers for sleep study include  snoring, daytime hypersomnia.    Elías Gay underwent a in-lab polysomnography with split study on 5/11/2016 where the AHI was 123.2, oxygen diane was 77%. No effective pressure was seen on CPAP nor BiPAP with emergence of central sleep apnea. Effective pressure undetermined with CPAP and bipap. With ASV, symptoms improved. ASV titration performed on 6/6/18.          On last visit, a new device was ordered and he has since received it. It is an AirCurve. His prior Respironics ASV device has stopped working properly. He still has it for backup. He is doing well, no diurnal fatigue. He had left hip surgery and has been able to walk more. He had Covid-19 in January manifested by worsening sinus congestion, which is starting to clear up.    AirCurve 10 ASV  Serial number [id number]  Added 11/16/2023  Min EPAP (cmH2O) 7.6  Max EPAP (cmH2O) 9.6  Min PS (cmH2O) 3.6  Max PS (cmH2O) 12.0     Patient prefers these settings          The interface is the under nose ffm and it is  comfortable.     According to the patient, the compliance with PAP is 6-7 hours per night, 7 nights per week.    Compliance:  Days used:   90/90  Days used > 4hrs:  82/90  Average use (hr:min):  6:20  Pressure:   95 th pressure of 9.2 EPAP 16.3 IPAP  Leak:    12.8  Residual AHI:   2.9      Currently, with PAP  use:  Elías Gay does feel refreshed upon awakening.   An assessment of daytime sleep tendency reveals that he does not have episodes of inadvertent dozing even when inactive or being sedentary.   Additionally, he does not have any difficulty maintaining alertness while driving. He has not had any  motor vehicle accidents related to any inadvertent dozing while driving.       [place] Sleepiness Scale:      10/29/2023     6:27 AM   EPWORTH SLEEPINESS SCALE   Sitting and reading 1   Watching TV 1   Sitting, inactive in a public place (e.g. a theatre or a meeting) 1   As a passenger in a car for an hour without a break 1   Lying down to rest in the afternoon when circumstances permit 3   Sitting and talking to someone 1   Sitting quietly after a lunch without alcohol 1   In a car, while stopped for a few minutes in traffic 0   Total score 9         2/22/2024     2:31 PM   EPWORTH SLEEPINESS SCALE   Sitting and reading 0   Watching TV 0   Sitting, inactive in a public place (e.g. a theatre or a meeting) 0   As a passenger in a car for an hour without a break 0   Lying down to rest in the afternoon when circumstances permit 0   Sitting and talking to someone 0   Sitting quietly after a lunch without alcohol 0   In a car, while stopped for a few minutes in traffic 0   Total score 0             10/29/2023     6:39 AM   Sleep Clinic New Patient   What time do you go to bed on a week day? (Give a range) between 7pm and 2am   What time do you go to bed on a day off? (Give a range) between 10pm and 3am   How long does it take you to fall asleep? (Give a range) 10 minutes   On average, how many times per night do you wake up? Wake up about every 1-1/2 to 3-1/2 hours. 3-5 times a night.   How long does it take you to fall back into sleep? (Give a range) I get out of the bed for about 30-40 minutes   What time do you wake up to start your day on a week day? (Give a range) 5:30 am   What time do you wake up to start your day on a day off? (Give a range) 10:00 am   What time do you get out of bed? (Give a range) 5:30 am   On average, how many hours do you sleep? 6 1/2 - 7   On average, how many naps do you take per day? 1   Rate your sleep quality from 0 to 5 (0-poor, 5-great). 2   Have you experienced:  Weight loss?   How  much weight have you lost or gained (in lbs.) in the last year? lost 10 lb.   On average, how many times per night do you go to the bathroom?  I do not wake up to go to the bathroom, I wake up because of pain in my hips. But when I do wake up I go to the bathroom because I am up.   Have you ever had a sleep study/CPAP machine/surgery for sleep apnea? Yes   Have you ever had a CPAP machine for sleep apnea? Yes   Have you ever had surgery for sleep apnea? Yes         10/29/2023     6:39 AM   Sleep Clinic ROS    Difficulty breathing through the nose?  Sometimes   Sore throat or dry mouth in the morning? Sometimes   Irregular or very fast heart beat?  No   Shortness of breath?  No   Acid reflux? No   Body aches and pains?  Yes   Morning headaches? No   Dizziness? No   Mood changes?  No   Do you exercise?  Yes   Do you feel like moving your legs a lot?  No            Objective:   Vitals reviewed   Physical Exam  Constitutional:       Appearance: Normal appearance. He is obese.   HENT:      Head: Normocephalic.   Cardiovascular:      Rate and Rhythm: Normal rate and regular rhythm.   Pulmonary:      Effort: Pulmonary effort is normal. No respiratory distress.      Breath sounds: No wheezing or rales.   Musculoskeletal:      Cervical back: Normal range of motion and neck supple.   Neurological:      General: No focal deficit present.      Mental Status: He is alert and oriented to person, place, and time.   Psychiatric:         Mood and Affect: Mood normal.         Behavior: Behavior normal.         Assessment:     Problem List Items Addressed This Visit          Endocrine    Class 3 severe obesity due to excess calories without serious comorbidity in adult    Overview     BMI 43            Other    Complex sleep apnea syndrome - Primary         Plan:       Patient is on PAP therapy at this time  Excellent Compliance  Excellent Efficacy    No changes needed  - Teaching in regards to PAP use and mask adjustment was given to  the patient during the visit today.  - Use PAP >4hours per night for 7 nights per week  - Use PAP for any daytime sleeping  - Engage in a weight loss program through diet and exercise plan  - Prescription for PAP device supplies will be send to the [facility] today. Filter, mask, tube with climate line and humidification system.  - hopefully weight loss will be achievable after he corrects his right hip    Patient suffers from a complex medical condition and if sleep disordered breathing is not properly treated it will increase his morbidity and mortality and patient is aware that has to be optimally compliant with therapy. Sleep disordered breathing has been associated with uncontrolled hypertension, insulin resistance, pulmonary hypertension, dementia, glaucoma, cardiac arrhythmias, cerebro vascular accident and multiple other conditions when not treated appropriately. Patient benefits from PAP therapy.    RTC annually, earlier if needdd

## 2024-03-05 ENCOUNTER — TELEPHONE (OUTPATIENT)
Dept: INTERVENTIONAL RADIOLOGY/VASCULAR | Facility: HOSPITAL | Age: 69
End: 2024-03-05
Payer: MEDICARE

## 2024-03-05 NOTE — H&P
Radiology History & Physical      SUBJECTIVE:     Chief Complaint: suspicious right lower pole thyroid nodule    History of Present Illness:  Elías Gay is a 68 y.o. male with TJ, BPH, h/o melanoma, HTN, HLD and paroxsymal a fib.  Thyroid US on 1/29/24 showed 1 x 1.1 x 1.8 cm nodule in right lower pole, TI-RADS 4 which meets criteria for FNA.   Past Medical History:   Diagnosis Date    Acute renal failure 10/25/2022    Arthritis 1971    Bone Spurs in feet    Basal cell carcinoma 11/06/2018    left ear helix    Foreign body in conjunctival sac     Hernia, inguinal, right 2002    Joint pain 1971    Bones of feet    Keloid cicatrix 1958 2010 2018 2019    Hernia, Knee, Arm, Ear    Melanoma 09/14/2018    Right Arm 0.6mm    Severe sleep apnea     Thyroid nodule 5/3/2023     Past Surgical History:   Procedure Laterality Date    CLOSURE OF DEFECT OF MOHS PROCEDURE Left 01/03/2019    Procedure: CLOSURE, MOHS PROCEDURE DEFECT LEFT EAR;  Surgeon: Jeramy Meek MD;  Location: [address];  Service: Plastics;  Laterality: Left;  plastics set    COLONOSCOPY N/A 06/30/2021    Procedure: COLONOSCOPY;  Surgeon: Juliano Santoyo MD;  Location: [facility] ENDO ([address] [place] [address]);  Service: Endoscopy;  Laterality: N/A;  severe sleep apnea     fully vaccinated-GT    CYSTOSCOPY      HERNIA REPAIR Left     3 years of age    HERNIA REPAIR N/A     Ventral wall at 3 year of age.    INCISION AND DRAINAGE, NECK Left 10/26/2022    Procedure: INCISION AND DRAINAGE,NECK;  Surgeon: Bethany Macdonald MD;  Location: [facility];  Service: ENT;  Laterality: Left;    KNEE SURGERY Right 1984    Arthroscopic    NASAL SEPTUM SURGERY N/A 2009    SKIN BIOPSY  several over time       Home Meds:   Prior to Admission medications    Medication Sig Start Date End Date Taking? Authorizing Provider   ciclopirox (PENLAC) 8 % Soln Apply daily to affected nail. Must remove and restart weekly 2/12/24   Kameron Diaz MD   fluticasone propionate (FLONASE) 50  mcg/actuation nasal spray 2 sprays (100 mcg total) by Each Nostril route 2 (two) times a day. 1/31/24   Bethany Macdonald MD   guaiFENesin (MUCINEX) 600 mg 12 hr tablet Take 1,200 mg by mouth 2 (two) times daily.    Provider, Historical   ketoconazole (NIZORAL) 2 % cream AAA bid to both feet x 3 weeks 2/12/24   Kameron Diaz MD   multivitamin capsule Take 1 capsule by mouth once daily.    Provider, Historical   omega-3 fatty acids/fish oil (FISH OIL-OMEGA-3 FATTY ACIDS) 300-1,000 mg capsule Take 1 capsule by mouth 2 (two) times a day.    Provider, Historical   tamsulosin (FLOMAX) 0.4 mg Cap Take 1 capsule (0.4 mg total) by mouth once daily. 8/30/23 8/29/24  Aleksandr Rivera MD     Anticoagulants/Antiplatelets: no anticoagulation    Allergies: Review of patient's allergies indicates:  No Known Allergies  Sedation History:  no adverse reactions    Review of Systems:   Hematological: no known coagulopathies  Respiratory: no shortness of breath  Cardiovascular: no chest pain  Gastrointestinal: no abdominal pain  Genito-Urinary: no dysuria  Musculoskeletal: negative  Neurological: no TIA or stroke symptoms         OBJECTIVE:     Vital Signs (Most Recent)       Physical Exam:  ASA: II  Mallampati: n/a    General: no acute distress  Mental Status: alert and oriented to person, place and time  HEENT: normocephalic, atraumatic  Chest: unlabored breathing  Heart: regular heart rate  Abdomen: nondistended  Extremity: moves all extremities    Laboratory  Lab Results   Component Value Date    INR 1.3 (H) 10/24/2022       Lab Results   Component Value Date    WBC 7.76 07/19/2023    HGB 15.2 07/19/2023    HCT 46.5 07/19/2023    MCV 95 07/19/2023     07/19/2023      Lab Results   Component Value Date    GLU 93 07/19/2023     07/19/2023    K 4.5 07/19/2023     07/19/2023    CO2 26 07/19/2023    BUN 17 07/19/2023    CREATININE 1.0 07/19/2023    CALCIUM 9.7 07/19/2023    MG 1.9 10/25/2022    ALT 27 07/19/2023     AST 23 07/19/2023    ALBUMIN 4.1 07/19/2023    BILITOT 0.6 07/19/2023    BILIDIR 0.6 (H) 10/25/2022       ASSESSMENT/PLAN:   67 yo M with 1 x 1.1 x 1.8 cm nodule in right lower pole, TI-RADS 4 which meets criteria for FNA.     Suspicious right lower pole thyroid nodule - Will attempt US-guided percutaneous 25-gauge FNA of the suspicious right lower pole thyroid nodule with local anesthetic only.  Procedure, risks/benefits discussed with patient who wishes to proceed.     Elissa Meza, MAXIMO  Interventional Radiology

## 2024-03-06 ENCOUNTER — OFFICE VISIT (OUTPATIENT)
Dept: INTERNAL MEDICINE | Facility: CLINIC | Age: 69
End: 2024-03-06
Payer: MEDICARE

## 2024-03-06 ENCOUNTER — HOSPITAL ENCOUNTER (OUTPATIENT)
Dept: INTERVENTIONAL RADIOLOGY/VASCULAR | Facility: HOSPITAL | Age: 69
Discharge: HOME | End: 2024-03-06
Attending: HOSPITALIST
Payer: MEDICARE

## 2024-03-06 VITALS
DIASTOLIC BLOOD PRESSURE: 68 MMHG | OXYGEN SATURATION: 94 % | BODY MASS INDEX: 41.85 KG/M2 | SYSTOLIC BLOOD PRESSURE: 120 MMHG | WEIGHT: 298.94 LBS | HEIGHT: 71 IN | HEART RATE: 81 BPM

## 2024-03-06 VITALS
SYSTOLIC BLOOD PRESSURE: 142 MMHG | OXYGEN SATURATION: 92 % | RESPIRATION RATE: 18 BRPM | DIASTOLIC BLOOD PRESSURE: 79 MMHG | HEART RATE: 75 BPM

## 2024-03-06 DIAGNOSIS — N40.0 BENIGN PROSTATIC HYPERPLASIA, UNSPECIFIED WHETHER LOWER URINARY TRACT SYMPTOMS PRESENT: ICD-10-CM

## 2024-03-06 DIAGNOSIS — E04.1 THYROID NODULE: ICD-10-CM

## 2024-03-06 DIAGNOSIS — I48.0 PAF (PAROXYSMAL ATRIAL FIBRILLATION): ICD-10-CM

## 2024-03-06 DIAGNOSIS — Z01.818 PRE-OP EXAM: Primary | ICD-10-CM

## 2024-03-06 DIAGNOSIS — G47.33 OSA (OBSTRUCTIVE SLEEP APNEA): ICD-10-CM

## 2024-03-06 PROCEDURE — 10005 FNA BX W/US GDN 1ST LES: CPT | Performed by: RADIOLOGY

## 2024-03-06 PROCEDURE — 25000003 PHARM REV CODE 250: Performed by: RADIOLOGY

## 2024-03-06 PROCEDURE — 88177 CYTP FNA EVAL EA ADDL: CPT | Performed by: PATHOLOGY

## 2024-03-06 PROCEDURE — A4215 STERILE NEEDLE: HCPCS

## 2024-03-06 PROCEDURE — 88172 CYTP DX EVAL FNA 1ST EA SITE: CPT | Performed by: PATHOLOGY

## 2024-03-06 PROCEDURE — 99215 OFFICE O/P EST HI 40 MIN: CPT | Mod: S$GLB,,, | Performed by: INTERNAL MEDICINE

## 2024-03-06 PROCEDURE — 88172 CYTP DX EVAL FNA 1ST EA SITE: CPT | Mod: 26,,, | Performed by: PATHOLOGY

## 2024-03-06 PROCEDURE — 88177 CYTP FNA EVAL EA ADDL: CPT | Mod: 26,,, | Performed by: PATHOLOGY

## 2024-03-06 PROCEDURE — 88173 CYTOPATH EVAL FNA REPORT: CPT | Mod: 26,,, | Performed by: PATHOLOGY

## 2024-03-06 PROCEDURE — 99999 PR PBB SHADOW E&M-EST. PATIENT-LVL III: CPT | Mod: PBBFAC,,, | Performed by: INTERNAL MEDICINE

## 2024-03-06 PROCEDURE — 88173 CYTOPATH EVAL FNA REPORT: CPT | Performed by: PATHOLOGY

## 2024-03-06 PROCEDURE — 99212 OFFICE O/P EST SF 10 MIN: CPT | Mod: ,,, | Performed by: NURSE PRACTITIONER

## 2024-03-06 RX ORDER — LIDOCAINE HYDROCHLORIDE 10 MG/ML
INJECTION INFILTRATION; PERINEURAL
Status: COMPLETED | OUTPATIENT
Start: 2024-03-06 | End: 2024-03-06

## 2024-03-06 RX ADMIN — LIDOCAINE HYDROCHLORIDE 10 ML: 10 INJECTION, SOLUTION INFILTRATION; PERINEURAL at 11:03

## 2024-03-06 NOTE — BRIEF OP NOTE
Radiology Post-Procedure Note    Pre Op Diagnosis: 1. Suspicious RLP thyroid nodule  Post Op Diagnosis: Same    Procedure: 1. US-guided percutaneous 25-gauge FNA of the suspicious RLP thyroid nodule     Procedure performed by: Crow Gilbert MD    Written Informed Consent Obtained: Yes  Specimen Removed: YES, 25-G FNA x 4 passes  Estimated Blood Loss: none    Findings:   S/p US-guided percutaneous 25-gauge FNA of the suspicious RLP thyroid nodule with local anesthetic and moderate conscious sedation. Patient tolerated the procedure well. No immediate post-procedural complications noted.     Procedure was very difficult 2/2 small size of nodule, location of nodule in the posterior aspect and inferior tip of they thyroid in retro-clavicular location and patient body habitus. Therefore, path adequacy could not be confirmed.    No post-op activity, diet or medication restrictions.    Thank you for considering IR for the care of your patient.     Crow Giblert MD  Interventional Radiology

## 2024-03-06 NOTE — DISCHARGE SUMMARY
Radiology Discharge Summary      Hospital Course: No complications    Admit Date: 3/6/2024  Discharge Date: 03/06/2024     Instructions Given to Patient: Yes    Diet: Resume prior diet    Activity: activity as tolerated    Description of Condition on Discharge: Stable    Vital Signs (Most Recent): Pulse: 75 (03/06/24 1213)  Resp: 18 (03/06/24 1213)  BP: (!) 142/79 (03/06/24 1213)  SpO2: (!) 92 % (03/06/24 1213)    Discharge Disposition: Home    Discharge Diagnosis:  69 y/o M with with pertinent PMHx of 1.1 x 1.1 x 1.8-cm nodule within the lower Rt thyroid lobe that demonstrates suspicious sonographic imaging features meeting criteria for recommendations for tissue-sampling.     Patient is now s/p US-guided percutaneous 25-gauge FNA of the suspicious RLP thyroid nodule with local anesthetic and moderate conscious sedation. Patient tolerated the procedure well. No immediate post-procedural complications noted.     Procedure was very difficult 2/2 small size of nodule, location of nodule in the posterior aspect and inferior tip of they thyroid in retro-clavicular location and patient body habitus. Therefore, path adequacy could not be confirmed.    No post-op activity, diet or medication restrictions.    Thank you for considering IR for the care of your patient.     Crow Gilbert MD  Interventional Radiology

## 2024-03-06 NOTE — Clinical Note
Bilateral: Neck.   Scrubbed with Chlorhexidine/Alcohol.    Hair: N/A.  Skin prep dry before draping.  Prepped by: Elissa Meza NP 3/6/2024 11:24 AM.
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
## 2024-03-13 LAB
ADEQUACY: ABNORMAL
FINAL PATHOLOGIC DIAGNOSIS: ABNORMAL
Lab: ABNORMAL

## 2024-03-14 ENCOUNTER — OFFICE VISIT (OUTPATIENT)
Dept: ENDOCRINOLOGY | Facility: CLINIC | Age: 69
End: 2024-03-14
Payer: MEDICARE

## 2024-03-14 VITALS
WEIGHT: 296 LBS | DIASTOLIC BLOOD PRESSURE: 68 MMHG | HEART RATE: 86 BPM | SYSTOLIC BLOOD PRESSURE: 114 MMHG | BODY MASS INDEX: 41.28 KG/M2

## 2024-03-14 DIAGNOSIS — E66.01 CLASS 3 SEVERE OBESITY DUE TO EXCESS CALORIES WITHOUT SERIOUS COMORBIDITY WITH BODY MASS INDEX (BMI) OF 40.0 TO 44.9 IN ADULT: ICD-10-CM

## 2024-03-14 DIAGNOSIS — E04.1 THYROID NODULE: Primary | ICD-10-CM

## 2024-03-14 DIAGNOSIS — M25.652 DECREASED RANGE OF LEFT HIP MOVEMENT: ICD-10-CM

## 2024-03-14 PROCEDURE — 99214 OFFICE O/P EST MOD 30 MIN: CPT | Mod: S$GLB,,, | Performed by: HOSPITALIST

## 2024-03-14 PROCEDURE — 99999 PR PBB SHADOW E&M-EST. PATIENT-LVL III: CPT | Mod: PBBFAC,,, | Performed by: HOSPITALIST

## 2024-03-14 NOTE — PROGRESS NOTES
Subjective:      Patient ID: Elías Gay is a 69 y.o. male presented to [facility] on 3/14/2024.  Chief Complaint:  Results (biopsy)      History of Present Illness: Elías Gay is a 69 y.o. male here for  thyroid nodule  Other significant past medical history:  Obesity, TJ, melanoma    Interval history:  Patient is here for follow-up to discuss about his recent biopsy:  Right 1.8 cm nodule nondiagnostic  Patient denies any issue after biopsy.  Tolerated biopsy well.  No issues.    Plan for hip replacement in the future, denies any compressive symptom of thyroid nodule.    Patient requests repeat biopsy      Right Thyroid nodule:  - Patient with right thyroid nodule, incidentally noted on CT neck  10/2022: Right thyroid lobe 1.2 cm nodule is seen  - Diagnosed by ultrasound in: 2/2023  - Family history thyroid disorder: no  - Family history thyroid cancer: no  - Tobacco user: no  - FNA done 3/6/2024: Right thyroid nodule 1.8 cm.  Nondiagnostic.      Symptoms:  No  Yes  [x]    [] Compressive symptoms  [x]    [] Anterior neck pressure  [x]    [] Dysphagia sometimes  [x]    [] Voice changes - comes and goes     Risk Factors:  No   Yes  [x]    [] Radiation to head or neck for any treatment of cancer or exposure to radiation  [x]    [] Personal history of cancer including:  colon or breast cancer    Last ultrasound:  01/30/2024  Right lobe measures 4.8 x 1.9 x 2 cm.  Left lobe measures 3.9 x 1.9 x 1.7 cm.  Overall, parenchyma is homogeneous.  No cervical lymph node enlargement on provided images.     Nodule #1 Size: 1.8 cm  Location: Right lobe lower pole  Composition: solid/almost completely solid (2)  Echogenicity: hypoechoic (2)  Shape: wider-than-tall (0)  Margins: ill-defined (0)  Echogenic foci: none (0)  Change: No  TI-RADS category: TR4 (4 points) - follow up is recommended at 1, 2, 3, and 5 years if 1 cm or greater; FNA is recommended if 1.5 cm or greater.    "  Impression:  Stable single nodule on the right meets criteria for FNA.       2/27/2023  Right thyroid lobe measures 4.7 x 2.1 x 1.7 cm.  Measured nodule as follows: Solid-cystic hypoechoic nodule at the lower pole with mixed smooth and ill-defined margin measuring 1.7 x 1.4 x 1.3 cm.  Left thyroid lobe measures 4.0 x 1.5 x 1.2 cm with homogeneous echotexture.  Isthmus measures 0.5 cm.  No evidence for cervical adenopathy.    Impression:  Single measured thyroid nodule in the right lobe which meets criteria for continued follow-up.        Lab work reviewed  Lab Results   Component Value Date    TSH 3.629 02/27/2023    TSH 4.601 (H) 02/12/2016    FREET4 0.81 02/12/2016      Antibodies  No results found for: "THYROIDAB", "TSIMMGLBN", "THYROIDSTIMI", "THYROTROPINR"      Reviewed past surgical, medical, family, social history and updated as appropriate.  Review of Systems: see HPI above  Objective:   /68   Pulse 86   Wt 134.3 kg (296 lb)   BMI 41.28 kg/m²   Body mass index is 41.28 kg/m².  Vital signs reviewed    Physical Exam  Vitals and nursing note reviewed.   Constitutional:       Appearance: Normal appearance. He is well-developed. He is obese. He is not ill-appearing.   Neck:      Thyroid: No thyromegaly.   Pulmonary:      Effort: Pulmonary effort is normal. No respiratory distress.   Musculoskeletal:         General: Normal range of motion.      Cervical back: Normal range of motion.   Neurological:      General: No focal deficit present.      Mental Status: He is alert. Mental status is at baseline.   Psychiatric:         Mood and Affect: Mood normal.         Behavior: Behavior normal.         Lab Reviewed:  See results in subjective  Lab Results   Component Value Date    HGBA1C 5.1 07/19/2023     Lab Results   Component Value Date    CHOL 184 07/19/2023    HDL 43 07/19/2023    LDLCALC 127.2 07/19/2023    TRIG 69 07/19/2023    CHOLHDL 23.4 07/19/2023     Lab Results   Component Value Date     " 07/19/2023    K 4.5 07/19/2023     07/19/2023    CO2 26 07/19/2023    GLU 93 07/19/2023    BUN 17 07/19/2023    CREATININE 1.0 07/19/2023    CALCIUM 9.7 07/19/2023    PHOS 4.6 (H) 10/25/2022    PROT 7.8 07/19/2023    ALBUMIN 4.1 07/19/2023    BILITOT 0.6 07/19/2023    ALKPHOS 96 07/19/2023    AST 23 07/19/2023    ALT 27 07/19/2023    ANIONGAP 8 07/19/2023    ESTGFRAFRICA >60.0 07/26/2022    EGFRNONAA >60.0 07/26/2022    TSH 3.629 02/27/2023    [id number] 46 07/19/2023     Assessment     1. Thyroid nodule  IR FNA with Ultrasound      2. Class 3 severe obesity due to excess calories without serious comorbidity with body mass index (BMI) of 40.0 to 44.9 in adult        3. Decreased range of left hip movement          Plan     Thyroid nodule  - US reviewed in clinic and reviewed with patient, noted Solid-cystic hypoechoic nodule at the lower pole  1.7 x 1.4 x 1.3 cm.  - Patient with right thyroid nodule, incidentally noted on CT neck  10/2022: Right thyroid lobe 1.2 cm nodule is seen  - Diagnosed by ultrasound in: 2/2023, increase in size when ultrasound in 2024  - FNA done 3/6/2024: Right thyroid nodule 1.8 cm.  Nondiagnostic.  - Given diagnosis, I recommend either we repeat ultrasound in 6 months or repeat biopsy.  Patient agrees and wants to get a repeat biopsy in 3 months  - Discussed possible pathology result and the each individual plans, handout given  - Order placed for IR thyroid biopsy  - Follow up in 6 months      Class 3 severe obesity due to excess calories without serious comorbidity in adult  - Body mass index is 41.28 kg/m².    Decreased range of motion of hip  - upcoming hip replacement surgery      Advised patient to follow up with PCP for routine health maintenance care.   RTC in 6 months or sooner pending workup above    Venancio Francis M.D.  Endocrinology  [facility]  3/14/2024      Disclaimer: This note has been generated in part with the use of voice-recognition  software. There may be typographical errors that have been missed during proof-reading.

## 2024-03-14 NOTE — ASSESSMENT & PLAN NOTE
- US reviewed in clinic and reviewed with patient, noted Solid-cystic hypoechoic nodule at the lower pole  1.7 x 1.4 x 1.3 cm.  - Patient with right thyroid nodule, incidentally noted on CT neck  10/2022: Right thyroid lobe 1.2 cm nodule is seen  - Diagnosed by ultrasound in: 2/2023, increase in size when ultrasound in 2024  - FNA done 3/6/2024: Right thyroid nodule 1.8 cm.  Nondiagnostic.  - Given diagnosis, I recommend either we repeat ultrasound in 6 months or repeat biopsy.  Patient agrees and wants to get a repeat biopsy in 3 months  - Discussed possible pathology result and the each individual plans, handout given  - Order placed for IR thyroid biopsy  - Follow up in 6 months

## 2024-03-28 ENCOUNTER — PATIENT MESSAGE (OUTPATIENT)
Dept: INTERNAL MEDICINE | Facility: CLINIC | Age: 69
End: 2024-03-28
Payer: MEDICARE

## 2024-03-28 ENCOUNTER — TELEPHONE (OUTPATIENT)
Dept: ENDOCRINOLOGY | Facility: CLINIC | Age: 69
End: 2024-03-28

## 2024-03-28 ENCOUNTER — PATIENT MESSAGE (OUTPATIENT)
Dept: ENDOCRINOLOGY | Facility: CLINIC | Age: 69
End: 2024-03-28
Payer: MEDICARE

## 2024-03-28 DIAGNOSIS — E04.1 THYROID NODULE: Primary | ICD-10-CM

## 2024-03-28 NOTE — TELEPHONE ENCOUNTER
Contacted by Radiology team, due to difficulty in getting FNA previously due to the location of the nodule.  And with the Unstat finding   They prefer to monitor with ultrasound rather than repeating an FNA.  Will communicate finding to patient.  Repeat ultrasound

## 2024-06-10 ENCOUNTER — PATIENT MESSAGE (OUTPATIENT)
Dept: INTERNAL MEDICINE | Facility: CLINIC | Age: 69
End: 2024-06-10
Payer: MEDICARE

## 2024-06-12 NOTE — PROGRESS NOTES
SHAYE/CM Discharge Plan  Informed patient is ready for discharge.  SHAYE talked w/Starla w/[facility] and she let Mi know to have them send out the meds to the home w/start of care tomorrow. Per Mi she let HH know as well as SW let them know via Careport that pt will dc home today with start of care tomorrow AM. SHAYE updated RN as well and pt getting PICC placed then will have one does and leave. Patient to be picked up by family.      Patient’s discharge destination is Home with services/support.    Selected Continued Care - Admitted Since 6/5/2024       Home Medical Care Coordination complete.      Service Provider Selected Services Address Phone Fax    [facility] -  Home Health Services [address] [facility] [address] [phone] --              Dialysis Infusion Coordination complete.      Service Provider Selected Services Address Phone Fax    [facility] [address] [address] [phone] --                     .   Subjective:     Elías Gay     Chief Complaint   Patient presents with    Follow-up       Follow-up  Associated symptoms include myalgias. Pertinent negatives include no chills, fever, headaches, neck pain or weakness.         Elías is a 67 y.o. male coming in today for back pain. Since last visit the pain has Improved then deteriorated. Pt reports he was overall feeling well until he moved a piano about 1.5 weeks ago. Had increased pain yesterday and today. The pain is better with rest, HEP, OMT and worse with activity, sitting. Pt. describes the pain as a 6/10 dull pain that does not radiate. There has not been any new a fall/injury/ or traumas since last visit. Pt. denies any new musculoskeletal complaints at this time.     Office note from 3/31/21 reviewed    Review of Systems   Constitutional: Negative for chills and fever.   Musculoskeletal: Positive for back pain and myalgias. Negative for falls, joint pain and neck pain.   Neurological: Negative for dizziness, tingling, focal weakness, weakness and headaches.       PAST MEDICAL HISTORY:   Past Medical History:   Diagnosis Date    Arthritis 1971    Bone Spurs in feet    Basal cell carcinoma 11/06/2018    left ear helix    Foreign body in conjunctival sac     Hernia, inguinal, right 2002    Joint pain 1971    Bones of feet    Keloid cicatrix 1958 2010 2018 2019    Hernia, Knee, Arm, Ear    Melanoma 09/14/2018    Right Arm 0.6mm    Severe sleep apnea      PAST SURGICAL HISTORY:   Past Surgical History:   Procedure Laterality Date    CLOSURE OF DEFECT OF MOHS PROCEDURE Left 1/3/2019    Procedure: CLOSURE, MOHS PROCEDURE DEFECT LEFT EAR;  Surgeon: Jeramy Meek MD;  Location: [facility] OR [address] [place] [address];  Service: Plastics;  Laterality: Left;  plastics set    COLONOSCOPY N/A 6/30/2021    Procedure: COLONOSCOPY;  Surgeon: Juliano Santoyo MD;  Location: [facility] ENDO ([address] [place] [address]);  Service: Endoscopy;  Laterality: N/A;  severe sleep apnea     fully vaccinated-GT     HERNIA REPAIR Left     3 years of age    HERNIA REPAIR N/A     Ventral wall at 3 year of age.    KNEE SURGERY Right 1984    Arthroscopic    NASAL SEPTUM SURGERY N/A 2009    SKIN BIOPSY  several over time     FAMILY HISTORY:   Family History   Problem Relation Age of Onset    Hypertension Mother     Stroke Mother     Aneurysm Mother     Cancer Father 72        Prostate and liver    No Known Problems Daughter     No Known Problems Son     Gout Brother     Eczema Brother     Psoriasis Brother     No Known Problems Daughter     No Known Problems Son     Cataracts Neg Hx     Glaucoma Neg Hx     Macular degeneration Neg Hx     Melanoma Neg Hx     Eczema Neg Hx     Lupus Neg Hx     Psoriasis Neg Hx      SOCIAL HISTORY:   Social History     Socioeconomic History    Marital status:    Tobacco Use    Smoking status: Never Smoker    Smokeless tobacco: Never Used    Tobacco comment: Second hand smoke from mother. Cigarrette smoke inflames my sinuses.   Substance and Sexual Activity    Alcohol use: Not Currently     Alcohol/week: 1.0 standard drink     Types: 1 Glasses of wine per week     Comment: Rare    Drug use: No    Sexual activity: Yes     Partners: Female     Birth control/protection: None     Social Determinants of Health     Financial Resource Strain: Unknown    Difficulty of Paying Living Expenses: Patient refused   Food Insecurity: Unknown    Worried About Running Out of Food in the Last Year: Patient refused    Ran Out of Food in the Last Year: Patient refused   Transportation Needs: Unknown    Lack of Transportation (Medical): Patient refused    Lack of Transportation (Non-Medical): Patient refused   Physical Activity: Unknown    Days of Exercise per Week: Patient refused    Minutes of Exercise per Session: Patient refused   Stress: Unknown    Feeling of Stress : Patient refused   Social Connections: Unknown    Frequency of Communication with Friends and Family:  "Patient refused    Frequency of Social Gatherings with Friends and Family: Patient refused    Active Member of Clubs or Organizations: Patient refused    Attends Club or Organization Meetings: Patient refused    Marital Status: Patient refused   Housing Stability: Unknown    Unable to Pay for Housing in the Last Year: Patient refused    Number of Places Lived in the Last Year: 1    Unstable Housing in the Last Year: Patient refused       MEDICATIONS:   Current Outpatient Medications:     azelastine (ASTELIN) 137 mcg (0.1 %) nasal spray, 1 spray (137 mcg total) by Nasal route 2 (two) times daily., Disp: 30 mL, Rfl: 11    ciclopirox (PENLAC) 8 % Soln, Apply daily to affected nail. Must remove and restart weekly, Disp: 1 Bottle, Rfl: 5    guaiFENesin (MUCINEX) 600 mg 12 hr tablet, Take 1,200 mg by mouth 2 (two) times daily., Disp: , Rfl:     ketoconazole (NIZORAL) 2 % cream, AAA bid to both feet x 3 weeks, Disp: 60 g, Rfl: 3    tamsulosin (FLOMAX) 0.4 mg Cap, Take 1 capsule (0.4 mg total) by mouth once daily., Disp: 90 capsule, Rfl: 1  ALLERGIES: Review of patient's allergies indicates:  No Known Allergies     IMAGIN. X-ray ordered due to right hip pain. (AP pelvis and frogleg lateral  right views) taken 8/15/19.   2. X-ray images were reviewed personally by me.  3. FINDINGS: X-ray images obtained demonstrate Mild DJD with hip joint space narrowing bilaterally.  No fracture or dislocation.  No bone destruction identified.  4. IMPRESSION: Mild DJD with hip joint space narrowing bilaterally.    Objective:     VITAL SIGNS: BP (!) 148/83   Ht 5' 11" (1.803 m)   Wt (!) 138.3 kg (305 lb)   BMI 42.54 kg/m²    General    Vitals reviewed.  Constitutional: He is oriented to person, place, and time. He appears well-developed and well-nourished.   Neurological: He is alert and oriented to person, place, and time.   Psychiatric: He has a normal mood and affect. His behavior is normal.             MSK " Exam:  Lumbar Spine: bilateral lumbar region    Observation:    Normal cervicothoracolumbar curves.    No obvious pelvic obliquity while standing.    No edema, erythema, or ecchymosis noted in lumbosacral region.    No midline skin abnormalities.    No atrophy of lower limb musculature.  Leg lengths symmetric.  Posture:  Posterior pelvis tilt with loss of lumbar lordosis    Tenderness:  + tenderness throughout the lumbar spine, iliolumbar region, posterior pelvis.  + right QL tenderness  No tenderness over the sacrum, piriformis, greater/lesser trochanters.  No bony deformities or step-offs palpated.     Range of Motion (* = with pain):  Active flexion to 60°.  Active extension to 25°.   Active rotation to 30° on left and 30° on right.  Active sidebending to 25° on left and 25° on right.   Passive hip flexion to 135° on left and 135° on right.    Passive hip internal rotation to 25° on left and 25° on right.   Passive hip external rotation to 45° on left and 45° on right.     Strength Testing (* = with pain):  Hip flexion - 5/5 on left and 5/5 on right  Hip extension - 5/5 on left and 5/5 on right  Knee flexion - 5/5 on left and 5/5 on right  Knee extension - 5/5 on left and 5/5 on right  Dorsiflexion - 5/5 on left and 5/5 on right  Plantarflexion - 5/5 on left and 5/5 on right  Great toe extension - 5/5 on left and 5/5 on right    Special Tests:  Seated straight leg raise - negative on left and negative on right  Supine straight leg raise - negative on left and negative on right   Slump test - negative on left and negative on right  Provocation maneuvers exhibit no worsening of symptoms.    VALENCIA test - negative  FADIR test - negative  Log roll test - negative    Jaylon's test- positive bilaterally    Structural Exam:  TART (Tissue texture abnormality, Asymmetry,  Restriction of motion and/or Tenderness) changes:     Thoracic Spine   T1 Neutral   T2 Neutral   T3 Neutral   T4 Neutral   T5 Neutral   T6 Neutral   T7  Neutral   T8 Neutral   T9 Neutral   T10 Neutral   T11 Neutral   T12 TTA RIGHT   + right QL TTA    Rib cage: neutral     Lumbar Spine   L1 TTA RIGHT   L2 Neutral   L3 FRS LEFT   L4 FRS LEFT   L5 FRS LEFT     Pelvis:  · Innominate:Left posterior rotation  · Pubic bone:Left superior pubic shear    Sacrum:Right on Left sacral torsion     Key   F= Flexed   E = Extended   R = Rotated   S = Sidebent   TTA = tissue texture abnormality       Neurovascular Exam:  Reflexes +2/4 and symmetric at the L4 and S1 dermatomes.    Sensation intact to light touch in the L2-S2 dermatomes bilaterally.   No pretibial edema or abnormal hair pattern of the shin.    Intact and symmetric DP and PT pulses bilaterally.  Normal gait without trendelenberg, heel walking, toe walking, and tandem walking.      Assessment:      Encounter Diagnoses   Name Primary?    Mechanical low back pain Yes    Myalgia     Morbid obesity     BMI 40.0-44.9, adult     Somatic dysfunction of lumbar region     Sacral region somatic dysfunction     Somatic dysfunction of pelvic region           Plan:   1.  Acute flair of chronic bilateral low back pain likely secondary to poor pelvic stability and compensatory muscle firing patterns.  Associated bilateral pes planus, underlying mild hip DJD, and obesity also contributing to biomechanical restrictions of the lower kinetic chain.   - OMT performed again today to address associated biomechanical restrictions  and HEP restarted.   - Recommend OTC NSAIDs prn for pain control  - Recommend restarting low load aerobic exercise with stationary biking  -  continue OTC medial arch support recommended for bilateral pes planus   - encouraged continued weight loss with diet and low load exercise.  Referral placed to bariatric clinic for further evaluation.  - discussed proper lifting mechanics  -  Discussed option of formal physical therapy referral if symptoms persist  - X-ray images of right hip taken 08/15/2019 (AP pelvis  and frogleg lateral  right views) showed mild DJD with hip joint space narrowing bilaterally.     2. OMT 3-4 regions. Oral consent obtained.  Reviewed benefits and potential side effects.   - OMT indicated today due to signs and symptoms as well as local and remote somatic dysfunction findings and their related neurokinetic, lymphatic, fascial and/or arteriovenous body connections.   - OMT techniques used: Myofascial Release and Muscle Energy   - Treatment was tolerated well. Improvement noted in segmental mobility post-treatment in dysfunctional regions. There were no adverse events and no complications immediately following treatment.     3.  Reviewed with patient the following HEP:  Restart:  A)    Pelvic clock exercises given to do from the 6-12 o'clock positions:10-15 reps, twice daily. Hand out of exercise also given.   B) IT band rolling: recommend using rolling stick or foam roller to roll out IT band tension bilaterally, 1-2 times a day.   ADD  C) Quadratus lumborum self-stretch on all fours: hold stretch for 30 seconds, repeating 2-3 times on each side. Do stretch twice daily. Hand-out also given.     [address]16 HOME EXERCISE PROGRAM (HEP):  The patient was taught a homegoing physical therapy regimen as described above. The patient demonstrated understanding of the exercises and proper technique of their execution. This interaction took 15 minutes.     4. Follow-up in 2 weeks for reevaluation    5. Patient agreeable to today's plan and all questions were answered    This note is dictated using the M*Modal Fluency Direct word recognition program. There are word recognition mistakes that are occasionally missed on review.    Total time spent face-to face with patient counseling or coordinating care including prognosis, differential diagnosis, risks and benefits of treatment, instructions, compliance risk reductions as well as non-face-to-face time personally spent reviewing medial record, medical documentation,  and coordination of care.     EST MINUTES X   05906 10-19    97253 20-29    31298 30-39 x   99215 40-54    NEW     35946 15-29    94459 30-44    38189 45-59    79028 60-74    PHONE      5-10    92476 11-20    20321 21-30

## 2024-06-28 DIAGNOSIS — B35.3 TINEA PEDIS OF BOTH FEET: ICD-10-CM

## 2024-06-28 RX ORDER — KETOCONAZOLE 20 MG/G
CREAM TOPICAL
Qty: 60 G | Refills: 3 | Status: SHIPPED | OUTPATIENT
Start: 2024-06-28

## 2024-06-28 NOTE — TELEPHONE ENCOUNTER
Refill Routing Note   Medication(s) are not appropriate for processing by [facility] for the following reason(s):        Outside of protocol    [facility] action(s):  Route               Appointments  past 12m or future 3m with PCP    Date Provider   Last Visit   3/6/2024 Kameron Diaz MD   Next Visit   Visit date not found Kameron Diaz MD   ED visits in past 90 days: 0        Note composed:6:50 AM 06/28/2024

## 2024-07-24 DIAGNOSIS — I10 ESSENTIAL HYPERTENSION: ICD-10-CM

## 2024-08-01 ENCOUNTER — LAB VISIT (OUTPATIENT)
Dept: LAB | Facility: HOSPITAL | Age: 69
End: 2024-08-01
Attending: INTERNAL MEDICINE
Payer: MEDICARE

## 2024-08-01 DIAGNOSIS — I10 ESSENTIAL HYPERTENSION: ICD-10-CM

## 2024-08-01 LAB
ALBUMIN SERPL BCP-MCNC: 3.8 G/DL (ref 3.5–5.2)
ALP SERPL-CCNC: 99 U/L (ref 55–135)
ALT SERPL W/O P-5'-P-CCNC: 19 U/L (ref 10–44)
ANION GAP SERPL CALC-SCNC: 8 MMOL/L (ref 8–16)
AST SERPL-CCNC: 21 U/L (ref 10–40)
BILIRUB SERPL-MCNC: 0.7 MG/DL (ref 0.1–1)
BUN SERPL-MCNC: 16 MG/DL (ref 8–23)
CALCIUM SERPL-MCNC: 9.5 MG/DL (ref 8.7–10.5)
CHLORIDE SERPL-SCNC: 108 MMOL/L (ref 95–110)
CO2 SERPL-SCNC: 26 MMOL/L (ref 23–29)
CREAT SERPL-MCNC: 1 MG/DL (ref 0.5–1.4)
EST. GFR  (NO RACE VARIABLE): >60 ML/MIN/1.73 M^2
GLUCOSE SERPL-MCNC: 89 MG/DL (ref 70–110)
POTASSIUM SERPL-SCNC: 3.7 MMOL/L (ref 3.5–5.1)
PROT SERPL-MCNC: 7.2 G/DL (ref 6–8.4)
SODIUM SERPL-SCNC: 142 MMOL/L (ref 136–145)

## 2024-08-01 PROCEDURE — 80053 COMPREHEN METABOLIC PANEL: CPT | Performed by: INTERNAL MEDICINE

## 2024-08-01 PROCEDURE — 36415 COLL VENOUS BLD VENIPUNCTURE: CPT | Performed by: INTERNAL MEDICINE

## 2024-08-07 ENCOUNTER — LAB VISIT (OUTPATIENT)
Dept: LAB | Facility: HOSPITAL | Age: 69
End: 2024-08-07
Attending: INTERNAL MEDICINE
Payer: MEDICARE

## 2024-08-07 ENCOUNTER — OFFICE VISIT (OUTPATIENT)
Dept: INTERNAL MEDICINE | Facility: CLINIC | Age: 69
End: 2024-08-07
Payer: MEDICARE

## 2024-08-07 VITALS
BODY MASS INDEX: 43.21 KG/M2 | DIASTOLIC BLOOD PRESSURE: 76 MMHG | HEIGHT: 71 IN | WEIGHT: 308.63 LBS | HEART RATE: 73 BPM | OXYGEN SATURATION: 94 % | SYSTOLIC BLOOD PRESSURE: 122 MMHG

## 2024-08-07 DIAGNOSIS — I10 ESSENTIAL HYPERTENSION: ICD-10-CM

## 2024-08-07 DIAGNOSIS — Z12.5 PROSTATE CANCER SCREENING: ICD-10-CM

## 2024-08-07 DIAGNOSIS — G47.33 OSA (OBSTRUCTIVE SLEEP APNEA): ICD-10-CM

## 2024-08-07 DIAGNOSIS — I48.0 PAF (PAROXYSMAL ATRIAL FIBRILLATION): Primary | ICD-10-CM

## 2024-08-07 DIAGNOSIS — E04.1 THYROID NODULE: ICD-10-CM

## 2024-08-07 DIAGNOSIS — E78.5 HYPERLIPIDEMIA, UNSPECIFIED HYPERLIPIDEMIA TYPE: ICD-10-CM

## 2024-08-07 LAB
BASOPHILS # BLD AUTO: 0.04 K/UL (ref 0–0.2)
BASOPHILS NFR BLD: 0.5 % (ref 0–1.9)
BILIRUB UR QL STRIP: NEGATIVE
CHOLEST SERPL-MCNC: 176 MG/DL (ref 120–199)
CHOLEST/HDLC SERPL: 3.8 {RATIO} (ref 2–5)
CLARITY UR REFRACT.AUTO: CLEAR
COLOR UR AUTO: YELLOW
COMPLEXED PSA SERPL-MCNC: 4.7 NG/ML (ref 0–4)
DIFFERENTIAL METHOD BLD: ABNORMAL
EOSINOPHIL # BLD AUTO: 0.2 K/UL (ref 0–0.5)
EOSINOPHIL NFR BLD: 2.6 % (ref 0–8)
ERYTHROCYTE [DISTWIDTH] IN BLOOD BY AUTOMATED COUNT: 14.3 % (ref 11.5–14.5)
GLUCOSE UR QL STRIP: NEGATIVE
HCT VFR BLD AUTO: 47.6 % (ref 40–54)
HDLC SERPL-MCNC: 46 MG/DL (ref 40–75)
HDLC SERPL: 26.1 % (ref 20–50)
HGB BLD-MCNC: 15.1 G/DL (ref 14–18)
HGB UR QL STRIP: NEGATIVE
IMM GRANULOCYTES # BLD AUTO: 0.02 K/UL (ref 0–0.04)
IMM GRANULOCYTES NFR BLD AUTO: 0.2 % (ref 0–0.5)
KETONES UR QL STRIP: NEGATIVE
LDLC SERPL CALC-MCNC: 113.6 MG/DL (ref 63–159)
LEUKOCYTE ESTERASE UR QL STRIP: NEGATIVE
LYMPHOCYTES # BLD AUTO: 1.9 K/UL (ref 1–4.8)
LYMPHOCYTES NFR BLD: 22.5 % (ref 18–48)
MCH RBC QN AUTO: 29.8 PG (ref 27–31)
MCHC RBC AUTO-ENTMCNC: 31.7 G/DL (ref 32–36)
MCV RBC AUTO: 94 FL (ref 82–98)
MICROSCOPIC COMMENT: NORMAL
MONOCYTES # BLD AUTO: 0.7 K/UL (ref 0.3–1)
MONOCYTES NFR BLD: 8.8 % (ref 4–15)
NEUTROPHILS # BLD AUTO: 5.5 K/UL (ref 1.8–7.7)
NEUTROPHILS NFR BLD: 65.4 % (ref 38–73)
NITRITE UR QL STRIP: NEGATIVE
NONHDLC SERPL-MCNC: 130 MG/DL
NRBC BLD-RTO: 0 /100 WBC
PH UR STRIP: 6 [PH] (ref 5–8)
PLATELET # BLD AUTO: 303 K/UL (ref 150–450)
PMV BLD AUTO: 9.9 FL (ref 9.2–12.9)
PROT UR QL STRIP: NEGATIVE
RBC # BLD AUTO: 5.06 M/UL (ref 4.6–6.2)
RBC #/AREA URNS AUTO: 1 /HPF (ref 0–4)
SP GR UR STRIP: 1.02 (ref 1–1.03)
SQUAMOUS #/AREA URNS AUTO: 1 /HPF
TRIGL SERPL-MCNC: 82 MG/DL (ref 30–150)
URN SPEC COLLECT METH UR: NORMAL
WBC # BLD AUTO: 8.39 K/UL (ref 3.9–12.7)
WBC #/AREA URNS AUTO: 0 /HPF (ref 0–5)

## 2024-08-07 PROCEDURE — 85025 COMPLETE CBC W/AUTO DIFF WBC: CPT | Performed by: INTERNAL MEDICINE

## 2024-08-07 PROCEDURE — 99999 PR PBB SHADOW E&M-EST. PATIENT-LVL III: CPT | Mod: PBBFAC,,, | Performed by: INTERNAL MEDICINE

## 2024-08-07 PROCEDURE — 1159F MED LIST DOCD IN RCRD: CPT | Mod: CPTII,S$GLB,, | Performed by: INTERNAL MEDICINE

## 2024-08-07 PROCEDURE — 80061 LIPID PANEL: CPT | Performed by: INTERNAL MEDICINE

## 2024-08-07 PROCEDURE — 36415 COLL VENOUS BLD VENIPUNCTURE: CPT | Performed by: INTERNAL MEDICINE

## 2024-08-07 PROCEDURE — 1101F PT FALLS ASSESS-DOCD LE1/YR: CPT | Mod: CPTII,S$GLB,, | Performed by: INTERNAL MEDICINE

## 2024-08-07 PROCEDURE — 84153 ASSAY OF PSA TOTAL: CPT | Performed by: INTERNAL MEDICINE

## 2024-08-07 PROCEDURE — 3078F DIAST BP <80 MM HG: CPT | Mod: CPTII,S$GLB,, | Performed by: INTERNAL MEDICINE

## 2024-08-07 PROCEDURE — 81001 URINALYSIS AUTO W/SCOPE: CPT | Performed by: INTERNAL MEDICINE

## 2024-08-07 PROCEDURE — 3288F FALL RISK ASSESSMENT DOCD: CPT | Mod: CPTII,S$GLB,, | Performed by: INTERNAL MEDICINE

## 2024-08-07 PROCEDURE — 1126F AMNT PAIN NOTED NONE PRSNT: CPT | Mod: CPTII,S$GLB,, | Performed by: INTERNAL MEDICINE

## 2024-08-07 PROCEDURE — 3008F BODY MASS INDEX DOCD: CPT | Mod: CPTII,S$GLB,, | Performed by: INTERNAL MEDICINE

## 2024-08-07 PROCEDURE — 3074F SYST BP LT 130 MM HG: CPT | Mod: CPTII,S$GLB,, | Performed by: INTERNAL MEDICINE

## 2024-08-07 PROCEDURE — 99214 OFFICE O/P EST MOD 30 MIN: CPT | Mod: S$GLB,,, | Performed by: INTERNAL MEDICINE

## 2024-08-12 ENCOUNTER — TELEPHONE (OUTPATIENT)
Dept: INTERNAL MEDICINE | Facility: CLINIC | Age: 69
End: 2024-08-12
Payer: MEDICARE

## 2024-08-12 NOTE — TELEPHONE ENCOUNTER
----- Message from Kameron Diaz MD sent at 8/12/2024  3:43 PM CDT -----  Please contact patient.  His PSA was elevated.  Please schedule a follow-up appointment with me for this.

## 2024-08-15 ENCOUNTER — OFFICE VISIT (OUTPATIENT)
Dept: DERMATOLOGY | Facility: CLINIC | Age: 69
End: 2024-08-15
Payer: MEDICARE

## 2024-08-15 ENCOUNTER — TELEPHONE (OUTPATIENT)
Dept: INTERNAL MEDICINE | Facility: CLINIC | Age: 69
End: 2024-08-15
Payer: MEDICARE

## 2024-08-15 DIAGNOSIS — B35.1 ONYCHOMYCOSIS: ICD-10-CM

## 2024-08-15 DIAGNOSIS — L57.0 AK (ACTINIC KERATOSIS): ICD-10-CM

## 2024-08-15 DIAGNOSIS — Z85.820 HISTORY OF MALIGNANT MELANOMA: ICD-10-CM

## 2024-08-15 DIAGNOSIS — D22.9 MULTIPLE BENIGN NEVI: ICD-10-CM

## 2024-08-15 DIAGNOSIS — L82.1 SK (SEBORRHEIC KERATOSIS): ICD-10-CM

## 2024-08-15 DIAGNOSIS — L73.8 SEBACEOUS GLAND HYPERPLASIA: ICD-10-CM

## 2024-08-15 DIAGNOSIS — L91.8 SKIN TAG: ICD-10-CM

## 2024-08-15 DIAGNOSIS — B35.3 TINEA PEDIS, UNSPECIFIED LATERALITY: ICD-10-CM

## 2024-08-15 DIAGNOSIS — L73.9 FOLLICULITIS: Primary | ICD-10-CM

## 2024-08-15 PROCEDURE — 17000 DESTRUCT PREMALG LESION: CPT | Mod: S$GLB,,, | Performed by: DERMATOLOGY

## 2024-08-15 PROCEDURE — 17003 DESTRUCT PREMALG LES 2-14: CPT | Mod: S$GLB,,, | Performed by: DERMATOLOGY

## 2024-08-15 PROCEDURE — 3288F FALL RISK ASSESSMENT DOCD: CPT | Mod: CPTII,S$GLB,, | Performed by: DERMATOLOGY

## 2024-08-15 PROCEDURE — 1126F AMNT PAIN NOTED NONE PRSNT: CPT | Mod: CPTII,S$GLB,, | Performed by: DERMATOLOGY

## 2024-08-15 PROCEDURE — 1159F MED LIST DOCD IN RCRD: CPT | Mod: CPTII,S$GLB,, | Performed by: DERMATOLOGY

## 2024-08-15 PROCEDURE — 1101F PT FALLS ASSESS-DOCD LE1/YR: CPT | Mod: CPTII,S$GLB,, | Performed by: DERMATOLOGY

## 2024-08-15 PROCEDURE — 99214 OFFICE O/P EST MOD 30 MIN: CPT | Mod: 25,S$GLB,, | Performed by: DERMATOLOGY

## 2024-08-15 PROCEDURE — 99999 PR PBB SHADOW E&M-EST. PATIENT-LVL III: CPT | Mod: PBBFAC,,, | Performed by: DERMATOLOGY

## 2024-08-15 PROCEDURE — 1160F RVW MEDS BY RX/DR IN RCRD: CPT | Mod: CPTII,S$GLB,, | Performed by: DERMATOLOGY

## 2024-08-15 RX ORDER — CICLOPIROX 80 MG/ML
SOLUTION TOPICAL
Qty: 6.6 ML | Refills: 11 | Status: SHIPPED | OUTPATIENT
Start: 2024-08-15

## 2024-08-15 RX ORDER — CLINDAMYCIN PHOSPHATE 11.9 MG/ML
SOLUTION TOPICAL
Qty: 60 ML | Refills: 3 | Status: SHIPPED | OUTPATIENT
Start: 2024-08-15

## 2024-08-15 RX ORDER — KETOCONAZOLE 20 MG/G
CREAM TOPICAL
Qty: 60 G | Refills: 3 | Status: SHIPPED | OUTPATIENT
Start: 2024-08-15

## 2024-08-15 NOTE — TELEPHONE ENCOUNTER
----- Message from Teresa Coker sent at 8/15/2024 12:10 PM CDT -----  Contact: Pankaj [phone]  Patient is returning a phone call.    Who left a message for the patient: --Rachel--    Does patient know what this is regarding:  --PSA was elevated--    Would you like a call back, or a response through your [facility] portal?:  --Call back--    Comments: I schedule the appointment the on 09/11. He would like to see if that okay or if he needs to be seen sooner then that? Please call to advise.

## 2024-08-15 NOTE — PROGRESS NOTES
Subjective:      Patient ID:  Elías Gay is a 69 y.o. male who presents for   Chief Complaint   Patient presents with    Skin Check     TBSE     History of Present Illness: The patient presents for TBSE skin check.    Patient with new area of concern:   Location: ears, scalp, L toe, arms   Previous treatments: none    Patient with new area of concern:   Location: lower legs and feet  Previous treatments: keto cream ; toenails still with fungus.  Could not treatr in the past bc of hip disease         The patient was last seen on: 3/9/2023 by LURDES   H/o MM 7/16/18 0.6mm right upper arm neg LN and h/o nmsc    This is a high risk patient here to check for the development of new lesions.    Date of biopsy: 7-16-18  Claxton test  n/a  Location: right upper arm   Depth: 0.6 mm  LN: neg  Date of excision: 9-14-18    Pt has a history of  significant sun exposure in the past.   Pt recalls several blistering sunburns in the past:  yes  Pt has history of tanning bed use: no  Pt has had atypical moles removed in the past: yes  Pt has personal history of breast cancer: no  Pt has history of melanoma in first degree relatives:  no  Pt has family history of pancreatic cancer: no  Pt is currently immunosuppressed: no    Swain Type: 1    Last dental exam: 08/2023  Last eye exam: 2/2021              Review of Systems   Constitutional:  Negative for fever and chills.   HENT:  Negative for headaches.    Respiratory:  Negative for cough and shortness of breath.    Gastrointestinal:  Negative for indigestion.   Skin:  Positive for activity-related sunscreen use and wears hat. Negative for daily sunscreen use and recent sunburn.   Neurological:  Negative for headaches.   Hematologic/Lymphatic: Negative for adenopathy. Does not bruise/bleed easily.       Objective:   Physical Exam   Constitutional: He appears well-developed and well-nourished. He is obese.  No distress.   Musculoskeletal: Lymphadenopathy:      Cervical: No  cervical adenopathy.      Upper Body:   No axillary adenopathy present.No supraclavicular adenopathy is present.     Lymphadenopathy: No supraclavicular adenopathy is present.     He has no cervical adenopathy.     He has no axillary adenopathy.   Neurological: He is alert and oriented to person, place, and time. He is not disoriented.   Psychiatric: He has a normal mood and affect.   Skin:   Areas Examined (abnormalities noted in diagram):   Scalp / Hair Palpated and Inspected  Head / Face Inspection Performed  Neck Inspection Performed  Chest / Axilla Inspection Performed  Abdomen Inspection Performed  Genitals / Buttocks / Groin Inspection Performed  Back Inspection Performed  RUE Inspected  LUE Inspection Performed  RLE Inspected  LLE Inspection Performed  Nails and Digits Inspection Performed                         Diagram Legend     Erythematous scaling macule/papule c/w actinic keratosis       Vascular papule c/w angioma      Pigmented verrucoid papule/plaque c/w seborrheic keratosis      Yellow umbilicated papule c/w sebaceous hyperplasia      Irregularly shaped tan macule c/w lentigo     1-2 mm smooth white papules consistent with Milia      Movable subcutaneous cyst with punctum c/w epidermal inclusion cyst      Subcutaneous movable cyst c/w pilar cyst      Firm pink to brown papule c/w dermatofibroma      Pedunculated fleshy papule(s) c/w skin tag(s)      Evenly pigmented macule c/w junctional nevus     Mildly variegated pigmented, slightly irregular-bordered macule c/w mildly atypical nevus      Flesh colored to evenly pigmented papule c/w intradermal nevus       Pink pearly papule/plaque c/w basal cell carcinoma      Erythematous hyperkeratotic cursted plaque c/w SCC      Surgical scar with no sign of skin cancer recurrence      Open and closed comedones      Inflammatory papules and pustules      Verrucoid papule consistent consistent with wart     Erythematous eczematous patches and plaques      Dystrophic onycholytic nail with subungual debris c/w onychomycosis     Umbilicated papule    Erythematous-base heme-crusted tan verrucoid plaque consistent with inflamed seborrheic keratosis     Erythematous Silvery Scaling Plaque c/w Psoriasis     See annotation      Assessment / Plan:        Folliculitis  -     clindamycin (CLEOCIN T) 1 % external solution; Aaa qd- bid prn flare to abdomen and groin  Dispense: 60 mL; Refill: 3  Abdomen- wash trunk and groin with Hibiclens wash which can be purchased over the counter  Apply clindamycin solution 2x per day     Tinea pedis, unspecified laterality  -     ketoconazole (NIZORAL) 2 % cream; aaa bid on  feet x 3 weeks  Dispense: 60 g; Refill: 3  Feet  Ketoconazole 2 % cream 2x per day for 3 weeks  Nails- ciclopirox nightly, remove after 1 week then repeat    Onychomycosis  -     ciclopirox (PENLAC) 8 % Soln; Apply to affected nails nightly; remove weekly;  Dispense: 6.6 mL; Refill: 11    SK (seborrheic keratosis)  These are benign inherited growths without a malignant potential. Reassurance given to patient. No treatment is necessary.     Skin tag  Reassurance given to patient. No treatment is necessary.   Treatment of benign, asymptomatic lesions may be considered cosmetic.    Sebaceous gland hyperplasia  This is a common condition representing benign enlargement of the sebaceous lobule. It typically occurs in adulthood. Reassurance given to patient.     Multiple benign nevi  Discussed ABCDE's of nevi.  Monitor for new mole or moles that are becoming bigger, darker, irritated, or developing irregular borders. Brochure provided. Instructed patient to observe lesion(s) for changes and follow up in clinic if changes are noted. Patient to monitor skin at home for new or changing lesions.     History of malignant melanoma  Area of previous melanoma examined. Site well healed with no signs of recurrence.    Total body skin examination performed today including at least 12  points as noted in physical examination. No lesions suspicious for malignancy noted.    Recommend daily sun protection/avoidance, use of at least SPF 30, broad spectrum sunscreen (OTC drug), skin self examinations, and routine physician surveillance to optimize early detection    AK (actinic keratosis)  Cryosurgery Procedure Note    Verbal consent from the patient is obtained including, but not limited to, risk of hypopigmentation/hyperpigmentation, scar, recurrence of lesion. The patient is aware of the precancerous quality and need for treatment of these lesions. Liquid nitrogen cryosurgery is applied to the 2 actinic keratoses, as detailed in the physical exam, to produce a freeze injury. The patient is aware that blisters may form and is instructed on wound care with gentle cleansing and use of vaseline ointment to keep moist until healed. The patient is supplied a handout on cryosurgery and is instructed to call if lesions do not completely resolve.             Follow up in about 1 year (around 8/15/2025) for TBSE.

## 2024-08-15 NOTE — PATIENT INSTRUCTIONS
Feet  Ketoconazole 2 % cream 2x per day for 3 weeks  Nails- ciclopirox nightly, remove after 1 week then repeat    Abdomen- wash trunk and groin with Hibiclens wash which can be purchased over the counter  Apply clindamycin solution 2x per day     We would like to see you back in the clinic in 12 months.  You will be able to schedule this appointment by calling or by using your [facility] portal 3 months before this time. Because our schedule fills so quickly, please set a reminder in your phone or on your calendar to schedule 3 months before you are due to come in so that we can see you in a timely fashion.  You should also receive a reminder from us in the mail. This will help us ensure we can continue to provide excellent healthcare for you. Thank you.

## 2024-09-03 ENCOUNTER — HOSPITAL ENCOUNTER (OUTPATIENT)
Dept: RADIOLOGY | Facility: HOSPITAL | Age: 69
Discharge: HOME | End: 2024-09-03
Attending: HOSPITALIST
Payer: MEDICARE

## 2024-09-03 DIAGNOSIS — E04.1 THYROID NODULE: ICD-10-CM

## 2024-09-03 PROCEDURE — 76536 US EXAM OF HEAD AND NECK: CPT | Mod: 26,,, | Performed by: RADIOLOGY

## 2024-09-03 PROCEDURE — 76536 US EXAM OF HEAD AND NECK: CPT | Mod: TC

## 2024-09-16 ENCOUNTER — OFFICE VISIT (OUTPATIENT)
Dept: ENDOCRINOLOGY | Facility: CLINIC | Age: 69
End: 2024-09-16
Payer: MEDICARE

## 2024-09-16 VITALS
SYSTOLIC BLOOD PRESSURE: 122 MMHG | HEIGHT: 71 IN | WEIGHT: 315 LBS | HEART RATE: 74 BPM | BODY MASS INDEX: 44.1 KG/M2 | DIASTOLIC BLOOD PRESSURE: 70 MMHG

## 2024-09-16 DIAGNOSIS — E66.01 CLASS 3 SEVERE OBESITY DUE TO EXCESS CALORIES WITHOUT SERIOUS COMORBIDITY WITH BODY MASS INDEX (BMI) OF 40.0 TO 44.9 IN ADULT: ICD-10-CM

## 2024-09-16 DIAGNOSIS — E04.1 THYROID NODULE: Primary | ICD-10-CM

## 2024-09-16 PROCEDURE — 1160F RVW MEDS BY RX/DR IN RCRD: CPT | Mod: CPTII,S$GLB,, | Performed by: HOSPITALIST

## 2024-09-16 PROCEDURE — 3288F FALL RISK ASSESSMENT DOCD: CPT | Mod: CPTII,S$GLB,, | Performed by: HOSPITALIST

## 2024-09-16 PROCEDURE — 3078F DIAST BP <80 MM HG: CPT | Mod: CPTII,S$GLB,, | Performed by: HOSPITALIST

## 2024-09-16 PROCEDURE — 3008F BODY MASS INDEX DOCD: CPT | Mod: CPTII,S$GLB,, | Performed by: HOSPITALIST

## 2024-09-16 PROCEDURE — 1101F PT FALLS ASSESS-DOCD LE1/YR: CPT | Mod: CPTII,S$GLB,, | Performed by: HOSPITALIST

## 2024-09-16 PROCEDURE — 1126F AMNT PAIN NOTED NONE PRSNT: CPT | Mod: CPTII,S$GLB,, | Performed by: HOSPITALIST

## 2024-09-16 PROCEDURE — 1159F MED LIST DOCD IN RCRD: CPT | Mod: CPTII,S$GLB,, | Performed by: HOSPITALIST

## 2024-09-16 PROCEDURE — 99214 OFFICE O/P EST MOD 30 MIN: CPT | Mod: S$GLB,,, | Performed by: HOSPITALIST

## 2024-09-16 PROCEDURE — 3074F SYST BP LT 130 MM HG: CPT | Mod: CPTII,S$GLB,, | Performed by: HOSPITALIST

## 2024-09-16 PROCEDURE — 99999 PR PBB SHADOW E&M-EST. PATIENT-LVL III: CPT | Mod: PBBFAC,,, | Performed by: HOSPITALIST

## 2024-09-16 NOTE — PROGRESS NOTES
Subjective:      Patient ID: Elías Gay is a 69 y.o. male presented to [facility] on 9/16/2024.  Chief Complaint:  No chief complaint on file.      History of Present Illness: Elías Gay is a 69 y.o. male here for  thyroid nodule  Other significant past medical history:  Obesity, TJ, melanoma    Interval history:  Patient is here for follow-up to discuss right thyroid nodule   Radiology team declined repeat biopsy 03/2024 due to difficulty in body habitus and location of nodule.  It was in agreement to repeat ultrasound   Patient did thyroid ultrasound 09/2024  Denies compressive symptoms.  Both hip replacement done since last office visit, tolerating it well.  Now more mobile        Right Thyroid nodule:  - Patient with right thyroid nodule, incidentally noted on CT neck  10/2022: Right thyroid lobe 1.2 cm nodule is seen  - Diagnosed by ultrasound in: 2/2023  - Family history thyroid disorder: no  - Family history thyroid cancer: no  - Tobacco user: no  - FNA done 3/6/2024: Right thyroid nodule 1.8 cm.  Nondiagnostic.  - Radiology team declined repeat biopsy 03/2024 due to difficulty in body habitus and location of nodule.  It was in agreement to repeat ultrasound   - Patient did thyroid ultrasound 09/2024    Symptoms:  No  Yes  [x]    [] Compressive symptoms  [x]    [] Anterior neck pressure  [x]    [] Dysphagia sometimes  [x]    [] Voice changes - comes and goes     Risk Factors:  No   Yes  [x]    [] Radiation to head or neck for any treatment of cancer or exposure to radiation  [x]    [] Personal history of cancer including:  colon or breast cancer    Last ultrasound:  9/03/2024  The thyroid is normal in size.  Right lobe of the thyroid measures 9.5 x 1.2 x 1.6 cm.  Left lobe of the thyroid measures 5.4 x 1.4 x 1.3 cm.  Normal thyroid parenchyma.     No cervical lymph nodes identified.     Nodule visualized in the right lower pole of the thyroid, measuring 1.6 x 1.0 x 1.0  "(previously 1.8 x 1.1 x 1.1 cm).  Lesion is solid, hypoechoic, wider than tall, smooth with smooth margins, and without echogenic foci.  TIRADS category: TR 4.     Impression:  Previously biopsied nodule of the lower pole of the right thyroid lobe without significant detrimental change.     01/30/2024  Right lobe measures 4.8 x 1.9 x 2 cm.  Left lobe measures 3.9 x 1.9 x 1.7 cm.  Overall, parenchyma is homogeneous.  No cervical lymph node enlargement on provided images.     Nodule #1 Size: 1.8 cm  Location: Right lobe lower pole  Composition: solid/almost completely solid (2)  Echogenicity: hypoechoic (2)  Shape: wider-than-tall (0)  Margins: ill-defined (0)  Echogenic foci: none (0)  Change: No  TI-RADS category: TR4 (4 points) - follow up is recommended at 1, 2, 3, and 5 years if 1 cm or greater; FNA is recommended if 1.5 cm or greater.     Impression:  Stable single nodule on the right meets criteria for FNA.       2/27/2023  Right thyroid lobe measures 4.7 x 2.1 x 1.7 cm.  Measured nodule as follows: Solid-cystic hypoechoic nodule at the lower pole with mixed smooth and ill-defined margin measuring 1.7 x 1.4 x 1.3 cm.  Left thyroid lobe measures 4.0 x 1.5 x 1.2 cm with homogeneous echotexture.  Isthmus measures 0.5 cm.  No evidence for cervical adenopathy.    Impression:  Single measured thyroid nodule in the right lobe which meets criteria for continued follow-up.    9/2024            Lab work reviewed  Lab Results   Component Value Date    TSH 3.629 02/27/2023    TSH 4.601 (H) 02/12/2016    FREET4 0.81 02/12/2016      Antibodies  No results found for: "THYROIDAB", "TSIMMGLBN", "THYROIDSTIMI", "THYROTROPINR"      Reviewed past surgical, medical, family, social history and updated as appropriate.  Review of Systems: see HPI above  Objective:   /70   Pulse 74   Ht 5' 11" (1.803 m)   Wt (!) 142.9 kg (315 lb)   BMI 43.93 kg/m²   Body mass index is 43.93 kg/m².  Vital signs reviewed    Physical " Exam  Vitals and nursing note reviewed.   Constitutional:       Appearance: Normal appearance. He is well-developed. He is obese. He is not ill-appearing.   Neck:      Thyroid: No thyromegaly.   Pulmonary:      Effort: Pulmonary effort is normal. No respiratory distress.   Musculoskeletal:         General: Normal range of motion.      Cervical back: Normal range of motion.   Neurological:      General: No focal deficit present.      Mental Status: He is alert. Mental status is at baseline.   Psychiatric:         Mood and Affect: Mood normal.         Behavior: Behavior normal.       Lab Reviewed:  See results in subjective  Lab Results   Component Value Date    HGBA1C 5.1 07/19/2023     Lab Results   Component Value Date    CHOL 176 08/07/2024    HDL 46 08/07/2024    LDLCALC 113.6 08/07/2024    TRIG 82 08/07/2024    CHOLHDL 26.1 08/07/2024     Lab Results   Component Value Date     08/01/2024    K 3.7 08/01/2024     08/01/2024    CO2 26 08/01/2024    GLU 89 08/01/2024    BUN 16 08/01/2024    CREATININE 1.0 08/01/2024    CALCIUM 9.5 08/01/2024    PHOS 4.6 (H) 10/25/2022    PROT 7.2 08/01/2024    ALBUMIN 3.8 08/01/2024    BILITOT 0.7 08/01/2024    ALKPHOS 99 08/01/2024    AST 21 08/01/2024    ALT 19 08/01/2024    ANIONGAP 8 08/01/2024    EGFRNORACEVR >60 08/01/2024    TSH 3.629 02/27/2023    [id number] 46 07/19/2023     Assessment     1. Thyroid nodule  US Thyroid      2. Class 3 severe obesity due to excess calories without serious comorbidity with body mass index (BMI) of 40.0 to 44.9 in adult          Plan     Thyroid nodule  - US reviewed in clinic and reviewed with patient, noted Solid-cystic hypoechoic nodule at the lower pole  1.7 x 1.4 x 1.3 cm.  - Patient with right thyroid nodule, incidentally noted on CT neck  10/2022: Right thyroid lobe 1.2 cm nodule is seen  - Diagnosed by ultrasound in: 2/2023, increase in size when ultrasound in 2024  - FNA done 3/6/2024: Right thyroid nodule 1.8 cm.   Nondiagnostic.  - Radiology team declined repeat biopsy 03/2024 due to difficulty in body habitus and location of nodule.  It was in agreement to repeat ultrasound   - Repeat ultrasound done for 2024: Right lower pole of the thyroid, measuring 1.6 x 1.0 x 1.0 (previously 1.8 x 1.1 x 1.1 cm)  - patient is in agreement to monitor, repeat ultrasound thyroid:  06/2025  - Follow up in 7/2025    Class 3 severe obesity due to excess calories without serious comorbidity in adult  - Body mass index is 43.93 kg/m².  - advised patient to increase in exercise to help with weight loss since total hip replacement surgery    Advised patient to follow up with PCP for routine health maintenance care.   RTC in 7/2025      Venancio Francis M.D.  Endocrinology  [facility]  9/16/2024      Disclaimer: This note has been generated in part with the use of voice-recognition software. There may be typographical errors that have been missed during proof-reading.

## 2024-09-16 NOTE — ASSESSMENT & PLAN NOTE
- US reviewed in clinic and reviewed with patient, noted Solid-cystic hypoechoic nodule at the lower pole  1.7 x 1.4 x 1.3 cm.  - Patient with right thyroid nodule, incidentally noted on CT neck  10/2022: Right thyroid lobe 1.2 cm nodule is seen  - Diagnosed by ultrasound in: 2/2023, increase in size when ultrasound in 2024  - FNA done 3/6/2024: Right thyroid nodule 1.8 cm.  Nondiagnostic.  - Radiology team declined repeat biopsy 03/2024 due to difficulty in body habitus and location of nodule.  It was in agreement to repeat ultrasound   - Repeat ultrasound done for 2024: Right lower pole of the thyroid, measuring 1.6 x 1.0 x 1.0 (previously 1.8 x 1.1 x 1.1 cm)  - patient is in agreement to monitor, repeat ultrasound thyroid:  06/2025  - Follow up in 7/2025

## 2024-09-16 NOTE — ASSESSMENT & PLAN NOTE
- Body mass index is 43.93 kg/m².  - advised patient to increase in exercise to help with weight loss since total hip replacement surgery

## 2024-10-21 ENCOUNTER — PATIENT MESSAGE (OUTPATIENT)
Dept: ADMINISTRATIVE | Facility: HOSPITAL | Age: 69
End: 2024-10-21
Payer: MEDICARE

## 2024-10-22 ENCOUNTER — PATIENT OUTREACH (OUTPATIENT)
Dept: ADMINISTRATIVE | Facility: HOSPITAL | Age: 69
End: 2024-10-22
Payer: MEDICARE

## 2024-10-22 DIAGNOSIS — Z12.11 ENCOUNTER FOR SCREENING FOR MALIGNANT NEOPLASM OF COLON: Primary | ICD-10-CM

## 2024-10-22 NOTE — PROGRESS NOTES
Health Maintenance Due   Topic Date Due    RSV Vaccine (Age 60+ and Pregnant patients) (1 - Risk 60-74 years 1-dose series) Never done    Colorectal Cancer Screening  06/30/2024     Chart reviewed and updated. Reconciled immunizations. Placed referral for colonoscopy      Sasha Lemus LPN   Clinical Care Coordinator  [facility]ness

## 2024-10-24 ENCOUNTER — PATIENT MESSAGE (OUTPATIENT)
Dept: UROLOGY | Facility: CLINIC | Age: 69
End: 2024-10-24
Payer: MEDICARE

## 2024-11-05 ENCOUNTER — TELEPHONE (OUTPATIENT)
Dept: UROLOGY | Facility: CLINIC | Age: 69
End: 2024-11-05
Payer: MEDICARE

## 2024-11-05 ENCOUNTER — PATIENT MESSAGE (OUTPATIENT)
Dept: UROLOGY | Facility: CLINIC | Age: 69
End: 2024-11-05
Payer: MEDICARE

## 2024-11-05 NOTE — TELEPHONE ENCOUNTER
Call was incoming patient was made aware to follow up with PCP Primo for his elevated PSA patient was made aware he had an appointment with PCP in September and cancelled was offered assistance with scheduling declined stated he would like for the appointment to be cancelled with .

## 2024-11-07 DIAGNOSIS — B35.3 TINEA PEDIS, UNSPECIFIED LATERALITY: ICD-10-CM

## 2024-11-07 RX ORDER — KETOCONAZOLE 20 MG/G
CREAM TOPICAL
Qty: 60 G | Refills: 3 | Status: SHIPPED | OUTPATIENT
Start: 2024-11-07

## 2024-11-07 NOTE — TELEPHONE ENCOUNTER
Please see the attached refill request.    Last seen 08/2024    Assessment / Plan:         Folliculitis  -     clindamycin (CLEOCIN T) 1 % external solution; Aaa qd- bid prn flare to abdomen and groin  Dispense: 60 mL; Refill: 3  Abdomen- wash trunk and groin with Hibiclens wash which can be purchased over the counter  Apply clindamycin solution 2x per day      Tinea pedis, unspecified laterality  -     ketoconazole (NIZORAL) 2 % cream; aaa bid on  feet x 3 weeks  Dispense: 60 g; Refill: 3  Feet  Ketoconazole 2 % cream 2x per day for 3 weeks  Nails- ciclopirox nightly, remove after 1 week then repeat     Onychomycosis  -     ciclopirox (PENLAC) 8 % Soln; Apply to affected nails nightly; remove weekly;  Dispense: 6.6 mL; Refill: 11     SK (seborrheic keratosis)  These are benign inherited growths without a malignant potential. Reassurance given to patient. No treatment is necessary.      Skin tag  Reassurance given to patient. No treatment is necessary.   Treatment of benign, asymptomatic lesions may be considered cosmetic.     Sebaceous gland hyperplasia  This is a common condition representing benign enlargement of the sebaceous lobule. It typically occurs in adulthood. Reassurance given to patient.      Multiple benign nevi  Discussed ABCDE's of nevi.  Monitor for new mole or moles that are becoming bigger, darker, irritated, or developing irregular borders. Brochure provided. Instructed patient to observe lesion(s) for changes and follow up in clinic if changes are noted. Patient to monitor skin at home for new or changing lesions.      History of malignant melanoma  Area of previous melanoma examined. Site well healed with no signs of recurrence.     Total body skin examination performed today including at least 12 points as noted in physical examination. No lesions suspicious for malignancy noted.     Recommend daily sun protection/avoidance, use of at least SPF 30, broad spectrum sunscreen (OTC drug), skin  self examinations, and routine physician surveillance to optimize early detection     AK (actinic keratosis)  Cryosurgery Procedure Note     Verbal consent from the patient is obtained including, but not limited to, risk of hypopigmentation/hyperpigmentation, scar, recurrence of lesion. The patient is aware of the precancerous quality and need for treatment of these lesions. Liquid nitrogen cryosurgery is applied to the 2 actinic keratoses, as detailed in the physical exam, to produce a freeze injury. The patient is aware that blisters may form and is instructed on wound care with gentle cleansing and use of vaseline ointment to keep moist until healed. The patient is supplied a handout on cryosurgery and is instructed to call if lesions do not completely resolve.              Follow up in about 1 year (around 8/15/2025) for TBSE.

## 2024-11-07 NOTE — PROGRESS NOTES
INTERNAL MEDICINE PROGRESS NOTE    CHIEF COMPLAINT     Follow up for abnormal lab.    HPI     Elías Gay is a 69 y.o. male with a history of BPH, obesity, TJ, melanoma, ARF, and BCC, who presents for a follow up visit today.     Pt presents with an elevated PSA. One year previously his PSA was in the normal range.Family history of prostrate cancer (father) and his brother was diagnosed with prostrate cancer last week.    Patient denies fever, nausea, vomiting, chills, pain in lower abdomen, penis, or scrotum, suprapubic or flank pain, penile discharge, reduced libido, painful ejaculation, bleeding per rectum, or urinary changes from baseline to include retention, hematuria, dysuria, urgency, or weak urine stream. Pt has a history of urge incontinence that is improving.    Patient is changing Urologists. He has an appointment scheduled with Dr.Richard Amaro at [facility] 11/27/24.    Elevated PSA-  PSA, Screen (ng/mL)   Date Value   08/07/2024 4.7 (H)        Past Medical History:  Past Medical History:   Diagnosis Date    Acute renal failure 10/25/2022    Arthritis 1971    Bone Spurs in feet    Basal cell carcinoma 11/06/2018    left ear helix    Foreign body in conjunctival sac     Hernia, inguinal, right 2002    Joint pain 1971    Bones of feet    Keloid cicatrix 1958 2010 2018 2019    Hernia, Knee, Arm, Ear    Melanoma 09/14/2018    Right Arm 0.6mm    Severe sleep apnea     Thyroid nodule 5/3/2023       Home Medications:  Prior to Admission medications    Medication Sig Start Date End Date Taking? Authorizing Provider   ciclopirox (PENLAC) 8 % Soln Apply daily to affected nail. Must remove and restart weekly 2/12/24   Kameron Diaz MD   ciclopirox (PENLAC) 8 % Soln Apply to affected nails nightly; remove weekly; 8/15/24   Alyson Moreland MD   clindamycin (CLEOCIN T) 1 % external solution Aaa qd- bid prn flare to abdomen and groin 8/15/24   Alyson Moreland MD   fluticasone propionate (FLONASE) 50  mcg/actuation nasal spray 2 sprays (100 mcg total) by Each Nostril route 2 (two) times a day. 1/31/24   Bethany Macdonald MD   guaiFENesin (MUCINEX) 600 mg 12 hr tablet Take 1,200 mg by mouth 2 (two) times daily.    Provider, Historical   ketoconazole (NIZORAL) 2 % cream APPLY TO AFFECTED AREA TO BOTH FEET TWICE A DAY FOR 3 WEEKS 6/28/24   Kameron Diaz MD   ketoconazole (NIZORAL) 2 % cream APPLY TO AFFECTED AREA TWICE A DAY ON FEET FOR 3 WEEKS 11/7/24   Alyson Moreland MD   multivitamin capsule Take 1 capsule by mouth once daily.    Provider, Historical   tamsulosin (FLOMAX) 0.4 mg Cap Take 1 capsule (0.4 mg total) by mouth once daily. 8/30/23 8/29/24  Aleksandr Rivera MD   ketoconazole (NIZORAL) 2 % cream aaa bid on  feet x 3 weeks 8/15/24 11/7/24  Alyson Moreland MD       Review of Systems:  Review of Systems   Constitutional:  Negative for activity change and unexpected weight change.   HENT:  Negative for hearing loss and trouble swallowing.    Eyes:  Negative for discharge and visual disturbance.   Respiratory:  Negative for chest tightness and wheezing.    Cardiovascular:  Negative for chest pain and palpitations.   Gastrointestinal:  Negative for abdominal pain, anal bleeding, blood in stool, constipation, diarrhea, nausea, rectal pain and vomiting.   Genitourinary:  Positive for urgency (urge). Negative for difficulty urinating, dysuria, flank pain, frequency, hematuria, penile discharge, penile pain, scrotal swelling and testicular pain.   Musculoskeletal:  Negative for arthralgias and joint swelling.   Neurological:  Negative for weakness and headaches.   Psychiatric/Behavioral:  Negative for confusion and dysphoric mood.        Health Maintainence:   Immunizations:  Health Maintenance         Date Due Completion Date    RSV Vaccine (Age 60+ and Pregnant patients) (1 - Risk 60-74 years 1-dose series) Never done ---    Colorectal Cancer Screening 06/30/2024 6/30/2021    TETANUS VACCINE 01/11/2027  1/11/2017    Lipid Panel 08/07/2029 8/7/2024             PHYSICAL EXAM       Physical Exam  Vitals reviewed.   Constitutional:       Appearance: He is well-developed.   HENT:      Head: Normocephalic.      Right Ear: External ear normal.      Left Ear: External ear normal.      Nose: Nose normal.      Mouth/Throat:      Pharynx: No oropharyngeal exudate.   Eyes:      Pupils: Pupils are equal, round, and reactive to light.   Neck:      Thyroid: No thyromegaly.      Vascular: No JVD.      Trachea: No tracheal deviation.   Cardiovascular:      Rate and Rhythm: Normal rate and regular rhythm.      Heart sounds: No murmur heard.     No gallop.   Pulmonary:      Effort: No respiratory distress.      Breath sounds: Normal breath sounds. No wheezing or rales.   Abdominal:      General: Bowel sounds are normal. There is no distension.      Palpations: Abdomen is soft.      Tenderness: There is no abdominal tenderness.   Genitourinary:     Penis: Normal.       Testes: Normal.      Prostate: Normal.      Rectum: Normal. Guaiac result negative.      Comments: FATEMEH performed by Dr. Diaz.   Findings include;   Perianal area without rashes, lesions or external hemorrhoids  Sphincter tone- normal  Rectal walls- smlloth  Prostrate -walnut size, firm and rubbery, symmetrical, without nodules, and median sulcus, No tenderness upon palpation.   No visible blood upon examination.  Musculoskeletal:         General: No tenderness. Normal range of motion.   Lymphadenopathy:      Cervical: No cervical adenopathy.   Skin:     General: Skin is warm and dry.      Findings: No rash.   Neurological:      Mental Status: He is alert and oriented to person, place, and time.   Psychiatric:         Behavior: Behavior normal.       LABS     Lab Results   Component Value Date    HGBA1C 5.1 07/19/2023     CMP  Sodium   Date Value Ref Range Status   08/01/2024 142 136 - 145 mmol/L Final     Potassium   Date Value Ref Range Status   08/01/2024 3.7 3.5 -  5.1 mmol/L Final     Chloride   Date Value Ref Range Status   08/01/2024 108 95 - 110 mmol/L Final     CO2   Date Value Ref Range Status   08/01/2024 26 23 - 29 mmol/L Final     Glucose   Date Value Ref Range Status   08/01/2024 89 70 - 110 mg/dL Final     BUN   Date Value Ref Range Status   08/01/2024 16 8 - 23 mg/dL Final     Creatinine   Date Value Ref Range Status   08/01/2024 1.0 0.5 - 1.4 mg/dL Final     Calcium   Date Value Ref Range Status   08/01/2024 9.5 8.7 - 10.5 mg/dL Final     Total Protein   Date Value Ref Range Status   08/01/2024 7.2 6.0 - 8.4 g/dL Final     Albumin   Date Value Ref Range Status   08/01/2024 3.8 3.5 - 5.2 g/dL Final     Total Bilirubin   Date Value Ref Range Status   08/01/2024 0.7 0.1 - 1.0 mg/dL Final     Comment:     For infants and newborns, interpretation of results should be based  on gestational age, weight and in agreement with clinical  observations.    Premature Infant recommended reference ranges:  Up to 24 hours.............<8.0 mg/dL  Up to 48 hours............<12.0 mg/dL  3-5 days..................<15.0 mg/dL  6-29 days.................<15.0 mg/dL       Alkaline Phosphatase   Date Value Ref Range Status   08/01/2024 99 55 - 135 U/L Final     AST   Date Value Ref Range Status   08/01/2024 21 10 - 40 U/L Final     ALT   Date Value Ref Range Status   08/01/2024 19 10 - 44 U/L Final     Anion Gap   Date Value Ref Range Status   08/01/2024 8 8 - 16 mmol/L Final     eGFR if    Date Value Ref Range Status   07/26/2022 >60.0 >60 mL/min/1.73 m^2 Final     eGFR if non    Date Value Ref Range Status   07/26/2022 >60.0 >60 mL/min/1.73 m^2 Final     Comment:     Calculation used to obtain the estimated glomerular filtration  rate (eGFR) is the CKD-EPI equation.        Lab Results   Component Value Date    WBC 8.39 08/07/2024    HGB 15.1 08/07/2024    HCT 47.6 08/07/2024    MCV 94 08/07/2024     08/07/2024     Lab Results   Component Value Date     CHOL 176 08/07/2024    CHOL 184 07/19/2023    CHOL 156 07/26/2022     Lab Results   Component Value Date    HDL 46 08/07/2024    HDL 43 07/19/2023    HDL 39 (L) 07/26/2022     Lab Results   Component Value Date    LDLCALC 113.6 08/07/2024    LDLCALC 127.2 07/19/2023    LDLCALC 101.0 07/26/2022     Lab Results   Component Value Date    TRIG 82 08/07/2024    TRIG 69 07/19/2023    TRIG 80 07/26/2022     Lab Results   Component Value Date    CHOLHDL 26.1 08/07/2024    CHOLHDL 23.4 07/19/2023    CHOLHDL 25.0 07/26/2022     Lab Results   Component Value Date    TSH 3.629 02/27/2023    [id number] 5.5 02/27/2023       ASSESSMENT/PLAN     Elías Gay is a 69 y.o. male     Elías was seen today for results and medication refill.    Diagnoses and all orders for this visit:    PSA elevation  -     CULTURE, URINE  -     PROSTATE SPECIFIC ANTIGEN, DIAGNOSTIC; Future  -     Urinalysis  -     Urinalysis Microscopic  - Follow up with Dr. Amaro in 2 wks.  - Follow up with PCP in 1 month    Prostatitis, unspecified prostatitis type  -     CULTURE, URINE  -     ciprofloxacin HCl (CIPRO) 500 MG tablet; Take 1 tablet (500 mg total) by mouth 2 (two) times daily.  -     PROSTATE SPECIFIC ANTIGEN, DIAGNOSTIC; Future  -     Urinalysis  -     Urinalysis Microscopic    BPH with urinary obstruction  -     CULTURE, URINE  -     PROSTATE SPECIFIC ANTIGEN, DIAGNOSTIC; Future  -     tamsulosin (FLOMAX) 0.4 mg Cap; Take 1 capsule (0.4 mg total) by mouth once daily.    Patient education provided from Samantha. Patient was counseled on when and how to seek emergent care.       Padmini Murrell MN, APRN, FNP-c   Department of Internal Medicine - [facility]  5:24 PM

## 2024-11-08 ENCOUNTER — OFFICE VISIT (OUTPATIENT)
Dept: INTERNAL MEDICINE | Facility: CLINIC | Age: 69
End: 2024-11-08
Payer: MEDICARE

## 2024-11-08 VITALS
BODY MASS INDEX: 43.92 KG/M2 | OXYGEN SATURATION: 96 % | SYSTOLIC BLOOD PRESSURE: 130 MMHG | HEART RATE: 78 BPM | WEIGHT: 313.69 LBS | HEIGHT: 71 IN | DIASTOLIC BLOOD PRESSURE: 78 MMHG

## 2024-11-08 DIAGNOSIS — N41.9 PROSTATITIS, UNSPECIFIED PROSTATITIS TYPE: ICD-10-CM

## 2024-11-08 DIAGNOSIS — N40.1 BPH WITH URINARY OBSTRUCTION: Chronic | ICD-10-CM

## 2024-11-08 DIAGNOSIS — R97.20 PSA ELEVATION: Primary | ICD-10-CM

## 2024-11-08 DIAGNOSIS — N13.8 BPH WITH URINARY OBSTRUCTION: Chronic | ICD-10-CM

## 2024-11-08 PROCEDURE — 87086 URINE CULTURE/COLONY COUNT: CPT

## 2024-11-08 PROCEDURE — 99999 PR PBB SHADOW E&M-EST. PATIENT-LVL IV: CPT | Mod: PBBFAC,,,

## 2024-11-08 RX ORDER — CIPROFLOXACIN 500 MG/1
500 TABLET ORAL 2 TIMES DAILY
Qty: 28 TABLET | Refills: 0 | Status: SHIPPED | OUTPATIENT
Start: 2024-11-08

## 2024-11-08 RX ORDER — TAMSULOSIN HYDROCHLORIDE 0.4 MG/1
0.4 CAPSULE ORAL DAILY
Qty: 30 CAPSULE | Refills: 11 | Status: SHIPPED | OUTPATIENT
Start: 2024-11-08 | End: 2025-11-08

## 2024-11-08 NOTE — PATIENT INSTRUCTIONS
Follow up with Dr. Diaz in 1 month  Go to lab 11/26/24 for PSA  Follow up with Urology 11/27/24  Call/message clinic if you develop any change in urination, abdominal pain, or any concerns.

## 2024-11-09 LAB — BACTERIA UR CULT: NO GROWTH

## 2024-11-14 ENCOUNTER — OFFICE VISIT (OUTPATIENT)
Dept: OTOLARYNGOLOGY | Facility: CLINIC | Age: 69
End: 2024-11-14
Payer: MEDICARE

## 2024-11-14 VITALS
DIASTOLIC BLOOD PRESSURE: 81 MMHG | SYSTOLIC BLOOD PRESSURE: 137 MMHG | HEIGHT: 71 IN | WEIGHT: 313.69 LBS | BODY MASS INDEX: 43.92 KG/M2

## 2024-11-14 DIAGNOSIS — G47.33 OSA (OBSTRUCTIVE SLEEP APNEA): Primary | ICD-10-CM

## 2024-11-14 DIAGNOSIS — Z85.828 HISTORY OF BASAL CELL CANCER: ICD-10-CM

## 2024-11-14 DIAGNOSIS — K13.79 ORAL PAIN: ICD-10-CM

## 2024-11-14 PROCEDURE — 3075F SYST BP GE 130 - 139MM HG: CPT | Mod: CPTII,S$GLB,, | Performed by: OTOLARYNGOLOGY

## 2024-11-14 PROCEDURE — 3008F BODY MASS INDEX DOCD: CPT | Mod: CPTII,S$GLB,, | Performed by: OTOLARYNGOLOGY

## 2024-11-14 PROCEDURE — 3288F FALL RISK ASSESSMENT DOCD: CPT | Mod: CPTII,S$GLB,, | Performed by: OTOLARYNGOLOGY

## 2024-11-14 PROCEDURE — 1159F MED LIST DOCD IN RCRD: CPT | Mod: CPTII,S$GLB,, | Performed by: OTOLARYNGOLOGY

## 2024-11-14 PROCEDURE — 1126F AMNT PAIN NOTED NONE PRSNT: CPT | Mod: CPTII,S$GLB,, | Performed by: OTOLARYNGOLOGY

## 2024-11-14 PROCEDURE — 3079F DIAST BP 80-89 MM HG: CPT | Mod: CPTII,S$GLB,, | Performed by: OTOLARYNGOLOGY

## 2024-11-14 PROCEDURE — 1101F PT FALLS ASSESS-DOCD LE1/YR: CPT | Mod: CPTII,S$GLB,, | Performed by: OTOLARYNGOLOGY

## 2024-11-14 PROCEDURE — 99213 OFFICE O/P EST LOW 20 MIN: CPT | Mod: S$GLB,,, | Performed by: OTOLARYNGOLOGY

## 2024-11-14 NOTE — PROGRESS NOTES
OTOLARYNGOLOGY CLINIC NOTE  Date:  11/14/2024     Chief complaint:  Chief Complaint   Patient presents with    Mouth Lesions     Mouth lesion on top left gum line       History of Present Illness  Elías Gay is a 69 y.o. male  presenting today for a followup.  Wears a cpap that goes under the nose and the straps had been getting tighter and was trying to order a different one. Has now obtained a new one. He also inquired about his ear. He has seen derm ( he has history of basal cell cancer)    He has had exam of mouth by dental provider and pcp. He noticed issue a few months ago but it has gotten smaller/better         I last saw the patient on 12-16-22. Below text is copied from  note on that date describing history of present illness at that time :  Did not do Flonase  No throat pain anymore     Had septoplasty in past used to get sinuses drained out with suction   Had I+d that I did for neck abscess at his initial post hospital follow up had some throat pain and flex scope done. Had turbinate hypertrophy and I recommended nasal spray regimen .      No issues with neck.     Past Medical History  Past Medical History:   Diagnosis Date    Acute renal failure 10/25/2022    Arthritis 1971    Bone Spurs in feet    Basal cell carcinoma 11/06/2018    left ear helix    Foreign body in conjunctival sac     Hernia, inguinal, right 2002    Joint pain 1971    Bones of feet    Keloid cicatrix 1958 2010 2018 2019    Hernia, Knee, Arm, Ear    Melanoma 09/14/2018    Right Arm 0.6mm    Severe sleep apnea     Thyroid nodule 5/3/2023        Past Surgical History  Past Surgical History:   Procedure Laterality Date    CLOSURE OF DEFECT OF MOHS PROCEDURE Left 01/03/2019    Procedure: CLOSURE, MOHS PROCEDURE DEFECT LEFT EAR;  Surgeon: Jeramy Meek MD;  Location: [facility] OR [address] [place] [address];  Service: Plastics;  Laterality: Left;  plastics set    COLONOSCOPY N/A 06/30/2021    Procedure: COLONOSCOPY;  Surgeon: Juliano Santoyo MD;   Location: [facility] (2ND FLR);  Service: Endoscopy;  Laterality: N/A;  severe sleep apnea     fully vaccinated-GT    CYSTOSCOPY      HERNIA REPAIR Left     3 years of age    HERNIA REPAIR N/A     Ventral wall at 3 year of age.    INCISION AND DRAINAGE, NECK Left 10/26/2022    Procedure: INCISION AND DRAINAGE,NECK;  Surgeon: Bethany Macdonald MD;  Location: [facility];  Service: ENT;  Laterality: Left;    KNEE SURGERY Right 1984    Arthroscopic    NASAL SEPTUM SURGERY N/A 2009    SKIN BIOPSY  several over time        Medications  Current Outpatient Medications on File Prior to Visit   Medication Sig Dispense Refill    ciclopirox (PENLAC) 8 % Soln Apply daily to affected nail. Must remove and restart weekly 1 each 5    ciclopirox (PENLAC) 8 % Soln Apply to affected nails nightly; remove weekly; 6.6 mL 11    ciprofloxacin HCl (CIPRO) 500 MG tablet Take 1 tablet (500 mg total) by mouth 2 (two) times daily. 28 tablet 0    clindamycin (CLEOCIN T) 1 % external solution Aaa qd- bid prn flare to abdomen and groin 60 mL 3    fluticasone propionate (FLONASE) 50 mcg/actuation nasal spray 2 sprays (100 mcg total) by Each Nostril route 2 (two) times a day. 48 mL 11    guaiFENesin (MUCINEX) 600 mg 12 hr tablet Take 1,200 mg by mouth 2 (two) times daily.      ketoconazole (NIZORAL) 2 % cream APPLY TO AFFECTED AREA TO BOTH FEET TWICE A DAY FOR 3 WEEKS 60 g 3    ketoconazole (NIZORAL) 2 % cream APPLY TO AFFECTED AREA TWICE A DAY ON FEET FOR 3 WEEKS 60 g 3    multivitamin capsule Take 1 capsule by mouth once daily.      tamsulosin (FLOMAX) 0.4 mg Cap Take 1 capsule (0.4 mg total) by mouth once daily. 30 capsule 11     No current facility-administered medications on file prior to visit.       Review of Systems  Review of Systems   Constitutional: Negative.    Gastrointestinal: Negative.    Skin: Negative.    Neurological: Negative.    Psychiatric/Behavioral: Negative.      Answers submitted by the patient for this visit:  Review of  "Symptoms Questionnaire  (Submitted on 11/8/2024)  sinus pressure : Yes  Eye Drainage?: Yes  eye itching: Yes  Sleep Apnea?: Yes  Foot swelling?: Yes  Urinating too frequently?: Yes  Muscle aches / pain?: Yes  Seasonal Allergies?: Yes    Social History   reports that he has never smoked. He has never used smokeless tobacco. He reports that he does not currently use alcohol after a past usage of about 1.0 standard drink of alcohol per week. He reports that he does not use drugs.     Family History  Family History   Problem Relation Name Age of Onset    Hypertension Mother RRF     Stroke Mother RRF     Aneurysm Mother RRF     Miscarriages / Stillbirths Mother RRF     Cancer Father [facility] 72        Prostate and liver    Prostate cancer Father [facility]     Prostate cancer Brother      Gout Brother Gabo     Eczema Brother Gabo     Psoriasis Brother Gabo     No Known Problems Daughter      No Known Problems Daughter      Kidney disease Son JAF     Lung disease Son JAF     Learning disabilities Son JAF         Dyslexcia/Dysgraphia    No Known Problems Son      Kidney disease Son DJF     Cataracts Neg Hx      Glaucoma Neg Hx      Macular degeneration Neg Hx      Melanoma Neg Hx      Eczema Neg Hx      Lupus Neg Hx      Psoriasis Neg Hx          Physical Exam   Vitals:    11/14/24 0903   BP: 137/81    Body mass index is 43.75 kg/m².  Weight: (!) 142.3 kg (313 lb 11.4 oz)   Height: 5' 11" (180.3 cm)     GENERAL: no acute distress.  HEAD: normocephalic.   EYES: No scleral icterus  EARS: external ear without lesion partial auriculectomy superior pinna, no cutaneous lesions   NOSE: external nose without significant bony abnormality  ORAL CAVITY/OROPHARYNX: tongue mobile. Dont see anything cancersou gumline slightly lighter in color across maxillary alveolar ridge but no white nor red lesion , no ulcer, no abnormality palpated   NECK: trachea midline. No parotid mass   LYMPH NODES:No cervical lymphadenopathy.  RESPIRATORY: no " stridor, no stertor. Voice normal. Respirations nonlabored.  NEURO: alert, responds to questions appropriately.    PSYCH:mood appropriate      Imaging:  The patient does not have any new imaging of the head and neck since last visit.     Labs:  CBC  Recent Labs   Lab 02/27/23  0925 07/19/23  1352 08/07/24  1128   WBC 10.75 7.76 8.39   Hemoglobin 14.6 15.2 15.1   Hematocrit 46.9 46.5 47.6   MCV 99 H 95 94   Platelets 356 286 303     BMP  Recent Labs   Lab 10/24/22  1909 10/25/22  0050 10/26/22  0304 02/27/23  0925 07/19/23  1352 08/01/24  0949   Glucose 93 125 H   < > 79 93 89   Sodium 136 135 L   < > 141 142 142   Potassium 3.5 3.7   < > 4.4 4.5 3.7   Chloride 102 101   < > 103 108 108   CO2 20 L 22 L   < > 30 H 26 26   BUN 23 29 H   < > 20 17 16   Creatinine 1.8 H 2.3 H   < > 1.0 1.0 1.0   Calcium 8.8 8.4 L   < > 10.1 9.7 9.5   Phosphorus 1.9 L 4.6 H  --   --   --   --    Magnesium 1.9 1.9  --   --   --   --     < > = values in this interval not displayed.     COAGS  Recent Labs   Lab 10/24/22  1929   INR 1.3 H       Assessment  1. TJ (obstructive sleep apnea)    2. Oral pain    3. History of basal cell cancer       Plan:  Discussed plan of care with patient in detail and all questions answered. Patient reported understanding of plan of care. I gave the patient the opportunity to ask questions and patient confirmed all questions answered to satisfaction.       He was understandably concerned about malignancy with recent elevation in PSA and wanted to make sure no concerning findings on head and neck exam . Do not see nor feel any abnormality or area to biopsy on exam today in oral cavity. Reassured no findings concerning for recurrent basal cell cancer of ear. No lymph node enlargement nor parotid mass; continue cpap for tx of tj- unclear if external pressure from strap would have caused issue but seems to have resolved with appropriate fitting equipment    Prefers scheduled f/u will see back in 4-6 months,  notify for immediate apptmt if worsening pain or any changes    I spent a total of 20 minutes on the day of the visit.  This includes face to face time and non-face to face time preparing to see the patient (eg, review of tests), obtaining and/or reviewing separately obtained history, documenting clinical information in the electronic or other health record, independently interpreting results and communicating results to the patient/family/caregiver, or care coordinator.   Please be aware that this note has been generated with the assistance of MModal voice-to-text.  Please excuse any spelling or grammatical errors.

## 2024-11-21 ENCOUNTER — LAB VISIT (OUTPATIENT)
Dept: LAB | Facility: HOSPITAL | Age: 69
End: 2024-11-21
Payer: MEDICARE

## 2024-11-21 DIAGNOSIS — N41.9 PROSTATITIS, UNSPECIFIED PROSTATITIS TYPE: ICD-10-CM

## 2024-11-21 DIAGNOSIS — R97.20 PSA ELEVATION: ICD-10-CM

## 2024-11-21 DIAGNOSIS — N40.1 BPH WITH URINARY OBSTRUCTION: Chronic | ICD-10-CM

## 2024-11-21 DIAGNOSIS — N13.8 BPH WITH URINARY OBSTRUCTION: Chronic | ICD-10-CM

## 2024-11-21 LAB — COMPLEXED PSA SERPL-MCNC: 6.9 NG/ML (ref 0–4)

## 2024-11-21 PROCEDURE — 84153 ASSAY OF PSA TOTAL: CPT

## 2024-11-21 PROCEDURE — 36415 COLL VENOUS BLD VENIPUNCTURE: CPT

## 2024-12-20 ENCOUNTER — OFFICE VISIT (OUTPATIENT)
Dept: INTERNAL MEDICINE | Facility: CLINIC | Age: 69
End: 2024-12-20
Payer: MEDICARE

## 2024-12-20 VITALS
OXYGEN SATURATION: 95 % | SYSTOLIC BLOOD PRESSURE: 132 MMHG | HEIGHT: 71 IN | BODY MASS INDEX: 44.1 KG/M2 | WEIGHT: 315 LBS | DIASTOLIC BLOOD PRESSURE: 80 MMHG | HEART RATE: 78 BPM

## 2024-12-20 DIAGNOSIS — G47.9 TROUBLE IN SLEEPING: Primary | ICD-10-CM

## 2024-12-20 DIAGNOSIS — G47.33 OSA (OBSTRUCTIVE SLEEP APNEA): ICD-10-CM

## 2024-12-20 DIAGNOSIS — R97.20 ELEVATED PSA: ICD-10-CM

## 2024-12-20 PROCEDURE — 99999 PR PBB SHADOW E&M-EST. PATIENT-LVL IV: CPT | Mod: PBBFAC,,, | Performed by: INTERNAL MEDICINE

## 2024-12-20 RX ORDER — TRAZODONE HYDROCHLORIDE 50 MG/1
50 TABLET ORAL NIGHTLY PRN
Qty: 30 TABLET | Refills: 11 | Status: SHIPPED | OUTPATIENT
Start: 2024-12-20 | End: 2025-12-20

## 2024-12-20 NOTE — PROGRESS NOTES
Trouble sleeping    HPI:  The patient is a 69 year old male with a obstructive sleep apnea on CPAP, BPH, history of melanoma, colon polyps, thyroid nodule, PAF, status post bilateral hip replacements and elevated PSA concerning prostate cancer who presents today with complaints of trouble sleeping.  He finds that if he gets less than 7-1/2 hours of sleep, he tends to put weight on.  He is averaging about 6-1/2 hours sleep night.  This has based off of his CPAP machine.    ROS: Patient reports his weight is up.  He does want to lose weight.  The patient does report he has trouble with pitting in and out of his car.  He is requesting a temporary handicap license plate form.  The patient is going to be scheduled for a prostate biopsy in January.  He had had MRI done which showed for lesions in the left base and peripheral zone.    Physical exam:   General appearance: No acute distress   HEENT: Conjunctiva is clear.  Pupils equal.  Nasal septum is midline without discharge.  Oropharynx shows a lot of redundant tissue in the oropharynx.  Pulmonary: Good inspiratory, expiratory breath sounds are heard.  Lungs are clear auscultation.    Cardiovascular:  S1-S2, extremities with trace to 1+ ankle edema.    GI:  Abdomen is protuberant.  I can not appreciate hepatosplenomegaly secondary to abdominal girth   Comments: Did discuss patient about medications for sleep will try trazodone    Assessment:   1. Trouble sleeping   2.  Elevated PSA  3.  Abnormal MRI of the prostate suggestive prostate cancer    Plan:  1. Will try trazodone 50 mg q.h.s.   2. Form filled now for handicap license Plate   3. Will await biopsy results.

## 2025-01-16 ENCOUNTER — PATIENT MESSAGE (OUTPATIENT)
Dept: INTERNAL MEDICINE | Facility: CLINIC | Age: 70
End: 2025-01-16
Payer: MEDICARE

## 2025-01-16 NOTE — TELEPHONE ENCOUNTER
LOV with Kameron Diaz MD , 12/20/2024   Pt providing an update regarding his elevated PSA/abnormal MRI

## 2025-01-22 ENCOUNTER — PATIENT MESSAGE (OUTPATIENT)
Dept: ENDOSCOPY | Facility: HOSPITAL | Age: 70
End: 2025-01-22
Payer: MEDICARE

## 2025-02-03 ENCOUNTER — OFFICE VISIT (OUTPATIENT)
Dept: INTERNAL MEDICINE | Facility: CLINIC | Age: 70
End: 2025-02-03
Payer: MEDICARE

## 2025-02-03 VITALS
SYSTOLIC BLOOD PRESSURE: 132 MMHG | DIASTOLIC BLOOD PRESSURE: 76 MMHG | BODY MASS INDEX: 44.34 KG/M2 | HEART RATE: 81 BPM | WEIGHT: 315 LBS | OXYGEN SATURATION: 96 %

## 2025-02-03 DIAGNOSIS — J06.9 VIRAL UPPER RESPIRATORY ILLNESS: ICD-10-CM

## 2025-02-03 DIAGNOSIS — C61 PROSTATIC ADENOCARCINOMA: Primary | ICD-10-CM

## 2025-02-03 PROCEDURE — 99999 PR PBB SHADOW E&M-EST. PATIENT-LVL III: CPT | Mod: PBBFAC,,,

## 2025-02-03 PROCEDURE — 3075F SYST BP GE 130 - 139MM HG: CPT | Mod: CPTII,S$GLB,,

## 2025-02-03 PROCEDURE — 1159F MED LIST DOCD IN RCRD: CPT | Mod: CPTII,S$GLB,,

## 2025-02-03 PROCEDURE — 99213 OFFICE O/P EST LOW 20 MIN: CPT | Mod: S$GLB,,,

## 2025-02-03 PROCEDURE — 3008F BODY MASS INDEX DOCD: CPT | Mod: CPTII,S$GLB,,

## 2025-02-03 PROCEDURE — 3078F DIAST BP <80 MM HG: CPT | Mod: CPTII,S$GLB,,

## 2025-02-03 PROCEDURE — 3288F FALL RISK ASSESSMENT DOCD: CPT | Mod: CPTII,S$GLB,,

## 2025-02-03 PROCEDURE — 1101F PT FALLS ASSESS-DOCD LE1/YR: CPT | Mod: CPTII,S$GLB,,

## 2025-02-03 RX ORDER — AZELASTINE 1 MG/ML
1 SPRAY, METERED NASAL 2 TIMES DAILY
COMMUNITY

## 2025-02-03 RX ORDER — BENZONATATE 100 MG/1
100 CAPSULE ORAL 3 TIMES DAILY PRN
Qty: 30 CAPSULE | Refills: 0 | Status: SHIPPED | OUTPATIENT
Start: 2025-02-03 | End: 2025-02-13

## 2025-02-03 RX ORDER — BENZONATATE 200 MG/1
200 CAPSULE ORAL 3 TIMES DAILY PRN
COMMUNITY
End: 2025-02-03

## 2025-02-03 NOTE — PROGRESS NOTES
INTERNAL MEDICINE URGENT CARE NOTE    CHIEF COMPLAINT     Elías presents today for follow up of prostate cancer and evaluation of cold symptoms.    HPI     Elías Gay is a 69 y.o. male with a obstructive sleep apnea on CPAP, BPH, history of melanoma, colon polyps, thyroid nodule, PAF, status post bilateral hip replacements and elevated PSA concerning prostate cancer who presents for an urgent care visit today.    UPPER RESPIRATORY INFECTION:  He developed acute illness on Tuesday, initially presenting with postnasal drip that progressed to cough. He experienced severe coughing episodes from Thursday through Saturday, with some improvement noted on Sunday. This morning showed the least amount of coughing in 11 days. He currently reports massive sinus congestion. He denies SOB, CP, abdominal pain, nausea, vomiting, and diarrhea. He notes muscle aches in the abdominal area attributed to forceful coughing. Reports symptoms have improved today.    PROSTATE CANCER:  He has active prostate cancer with a Corfu score of 9 on multiple samples. His PSA levels are reported to be low relative to the Corfu score.    MEDICAL HISTORY:  He has chronic sinus problems since age 15 with 3-4 sinus infections annually. He has history of septoplasty performed by Dr. Holley, prior sepsis, and bilateral hip operations with the second operation approximately one year ago.    CURRENT MEDICATIONS:  He takes Mucinex 1200mg extended-release twice daily for mucus control and Tesalon Pearls for cough management. Previous trial of Flonase provided no benefit.    Review of Systems:  General: -fever, -chills, -fatigue, -weight gain, -weight loss  Eyes: -vision changes, -redness, -discharge  ENT: -ear pain, +nasal congestion, -sore throat  Cardiovascular: -chest pain, -palpitations, -lower extremity edema  Respiratory: +cough, -shortness of breath  Gastrointestinal: -abdominal pain, -nausea, -vomiting, -diarrhea, -constipation, -blood in  stool  Genitourinary: -dysuria, -hematuria, -frequency  Musculoskeletal: -joint pain, +muscle pain  Skin: -rash, -lesion  Neurological: -headache, -dizziness, -numbness, -tingling  Psychiatric: -anxiety, -depression, -sleep difficulty        Follow up with Dr. Amaro     Past Medical History:  Past Medical History:   Diagnosis Date    Acute renal failure 10/25/2022    Arthritis 1971    Bone Spurs in feet    Basal cell carcinoma 11/06/2018    left ear helix    Foreign body in conjunctival sac     Hernia, inguinal, right 2002    Joint pain 1971    Bones of feet    Keloid cicatrix 1958 2010 2018 2019    Hernia, Knee, Arm, Ear    Melanoma 09/14/2018    Right Arm 0.6mm    Severe sleep apnea     Thyroid nodule 5/3/2023     Home Medications:  Prior to Admission medications    Medication Sig Start Date End Date Taking? Authorizing Provider   ciclopirox (PENLAC) 8 % Soln Apply to affected nails nightly; remove weekly; 8/15/24   Alyson Moreland MD   clindamycin (CLEOCIN T) 1 % external solution Aaa qd- bid prn flare to abdomen and groin 8/15/24   Alyson Moreland MD   fluticasone propionate (FLONASE) 50 mcg/actuation nasal spray 2 sprays (100 mcg total) by Each Nostril route 2 (two) times a day. 1/31/24   Bethany Macdonald MD   guaiFENesin (MUCINEX) 600 mg 12 hr tablet Take 1,200 mg by mouth 2 (two) times daily.    Provider, Historical   ketoconazole (NIZORAL) 2 % cream APPLY TO AFFECTED AREA TWICE A DAY ON FEET FOR 3 WEEKS 11/7/24   Alyson Moreland MD   multivitamin capsule Take 1 capsule by mouth once daily.    Provider, Historical   tamsulosin (FLOMAX) 0.4 mg Cap Take 1 capsule (0.4 mg total) by mouth once daily. 11/8/24 11/8/25  Padmini Murrell, NP-C   traZODone (DESYREL) 50 MG tablet Take 1 tablet (50 mg total) by mouth nightly as needed for Insomnia. 12/20/24 12/20/25  Kameron Diaz MD     PHYSICAL EXAM     General: No acute distress. Well-developed. Well-nourished.  Eyes: EOMI. Sclerae anicteric.  HENT:  Normocephalic. Atraumatic. Nares patent. Moist oral mucosa. Posterior oropharyngeal erythema. Oropharyngeal exudates.  Ears: Bilateral TMs clear. Bilateral EACs clear.  Cardiovascular: Regular rate. Regular rhythm. No murmurs. No rubs. No gallops. Normal S1, S2.  Respiratory: Normal respiratory effort. Clear to auscultation bilaterally. No rales. No rhonchi. No wheezing.  Abdomen: Soft. Non-tender. Non-distended. Normoactive bowel sounds.  Musculoskeletal: No  obvious deformity.  Extremities: No lower extremity edema.  Neurological: Alert & oriented x3. No slurred speech. Normal gait.  Psychiatric: Normal mood. Normal affect. Good insight. Good judgment.  Skin: Warm. Dry. No rash.        /76   Pulse 81   Wt (!) 144.2 kg (317 lb 14.5 oz)   SpO2 96%   BMI 44.34 kg/m²     ASSESSMENT/PLAN     IMPRESSION:  - Assessed viral upper respiratory syndrome, noting improvement in symptoms  - Evaluated prostate cancer diagnosis, confirming active cancer with Almena score of 9- paperwork scanned into computer  - Considered patient's request for testosterone testing prior to potential hormonal treatment for prostate cancer  - Reviewed chronic sinus problems and current management with daily Mucinex    VIRAL UPPER RESPIRATORY SYNDROME:  - Prescribed Tesalon Pearls for cough management.  - Recommend continuing current treatment regimen including saline spray, Flonase, Mucinex, and Azelastine.  - Recommend increasing fluid intake.    PROSTATE CANCER:  - Ordered total testosterone and PSA tests at patients request  - Treatment and testing managed by Dr. Amaro   - PET scan scheduled      Patient education provided from Samantha. Patient was counseled on when and how to seek emergent care.   This note was generated with the assistance of ambient listening technology. Verbal consent was obtained by the patient and accompanying visitor(s) for the recording of patient appointment to facilitate this note. I attest to having  reviewed and edited the generated note for accuracy, though some syntax or spelling errors may persist. Please contact the author of this note for any clarification.       Padmini ALMEIDA, APRN, FNP-c   Department of Internal Medicine - [facility]  8:22 AM    Answers submitted by the patient for this visit:  Cough Questionnaire (Submitted on 1/31/2025)  Chief Complaint: Cough  Chronicity: new  Onset: in the past 7 days  Progression since onset: waxing and waning  Frequency: hourly  Cough characteristics: non-productive  ear congestion: No  heartburn: No  hemoptysis: No  nasal congestion: Yes  Aggravated by: cold air, dust, lying down  asthma: No  bronchiectasis: No  bronchitis: Yes  COPD: No  emphysema: No  Treatments tried: OTC inhaler, body position changes  Improvement on treatment: mild

## 2025-02-04 ENCOUNTER — LAB VISIT (OUTPATIENT)
Dept: LAB | Facility: HOSPITAL | Age: 70
End: 2025-02-04
Payer: MEDICARE

## 2025-02-04 DIAGNOSIS — C61 PROSTATIC ADENOCARCINOMA: ICD-10-CM

## 2025-02-04 LAB
COMPLEXED PSA SERPL-MCNC: 9.8 NG/ML (ref 0–4)
TESTOST SERPL-MCNC: 466 NG/DL (ref 304–1227)

## 2025-02-04 PROCEDURE — 84403 ASSAY OF TOTAL TESTOSTERONE: CPT

## 2025-02-04 PROCEDURE — 36415 COLL VENOUS BLD VENIPUNCTURE: CPT

## 2025-02-04 PROCEDURE — 84153 ASSAY OF PSA TOTAL: CPT

## 2025-02-24 ENCOUNTER — PATIENT MESSAGE (OUTPATIENT)
Dept: INTERNAL MEDICINE | Facility: CLINIC | Age: 70
End: 2025-02-24
Payer: MEDICARE

## 2025-03-02 ENCOUNTER — PATIENT MESSAGE (OUTPATIENT)
Dept: INTERNAL MEDICINE | Facility: CLINIC | Age: 70
End: 2025-03-02
Payer: MEDICARE

## 2025-03-12 ENCOUNTER — PATIENT MESSAGE (OUTPATIENT)
Dept: INTERNAL MEDICINE | Facility: CLINIC | Age: 70
End: 2025-03-12
Payer: MEDICARE

## 2025-03-24 DIAGNOSIS — Z00.00 ENCOUNTER FOR MEDICARE ANNUAL WELLNESS EXAM: ICD-10-CM

## 2025-04-15 ENCOUNTER — TELEPHONE (OUTPATIENT)
Dept: ENDOSCOPY | Facility: HOSPITAL | Age: 70
End: 2025-04-15
Payer: MEDICARE

## 2025-04-15 NOTE — TELEPHONE ENCOUNTER
Contacted patient to schedule colonoscopy.  Patient declines scheduling at this time, due to recent prostatectomy.

## 2025-04-16 ENCOUNTER — TELEPHONE (OUTPATIENT)
Dept: ENDOSCOPY | Facility: HOSPITAL | Age: 70
End: 2025-04-16
Payer: MEDICARE

## 2025-04-16 NOTE — TELEPHONE ENCOUNTER
Contacted the patient to schedule a pre admit testing appointment (PAT) for an endoscopy procedure. The patient did not answer the call and left a voice message requesting a call back, direct line provided.    5

## 2025-05-13 RX ORDER — FLUTICASONE PROPIONATE 50 MCG
2 SPRAY, SUSPENSION (ML) NASAL 2 TIMES DAILY
Qty: 48 ML | Refills: 11 | Status: SHIPPED | OUTPATIENT
Start: 2025-05-13

## 2025-06-05 ENCOUNTER — OFFICE VISIT (OUTPATIENT)
Dept: SPORTS MEDICINE | Facility: CLINIC | Age: 70
End: 2025-06-05
Payer: MEDICARE

## 2025-06-05 ENCOUNTER — HOSPITAL ENCOUNTER (OUTPATIENT)
Dept: RADIOLOGY | Facility: HOSPITAL | Age: 70
Discharge: HOME | End: 2025-06-05
Attending: NEUROMUSCULOSKELETAL MEDICINE & OMM
Payer: MEDICARE

## 2025-06-05 VITALS — SYSTOLIC BLOOD PRESSURE: 147 MMHG | HEART RATE: 90 BPM | DIASTOLIC BLOOD PRESSURE: 84 MMHG

## 2025-06-05 DIAGNOSIS — M54.59 MECHANICAL LOW BACK PAIN: ICD-10-CM

## 2025-06-05 DIAGNOSIS — M99.04 SACRAL REGION SOMATIC DYSFUNCTION: ICD-10-CM

## 2025-06-05 DIAGNOSIS — M25.551 BILATERAL HIP PAIN: Primary | ICD-10-CM

## 2025-06-05 DIAGNOSIS — M99.05 SOMATIC DYSFUNCTION OF PELVIC REGION: ICD-10-CM

## 2025-06-05 DIAGNOSIS — M25.552 BILATERAL HIP PAIN: Primary | ICD-10-CM

## 2025-06-05 DIAGNOSIS — M25.552 BILATERAL HIP PAIN: ICD-10-CM

## 2025-06-05 DIAGNOSIS — M79.10 MYALGIA: ICD-10-CM

## 2025-06-05 DIAGNOSIS — M99.03 SOMATIC DYSFUNCTION OF LUMBAR REGION: ICD-10-CM

## 2025-06-05 DIAGNOSIS — M25.559 LATERAL PAIN OF HIP: Primary | ICD-10-CM

## 2025-06-05 DIAGNOSIS — M25.551 BILATERAL HIP PAIN: ICD-10-CM

## 2025-06-05 DIAGNOSIS — M70.62 GREATER TROCHANTERIC BURSITIS OF BOTH HIPS: ICD-10-CM

## 2025-06-05 DIAGNOSIS — M70.61 GREATER TROCHANTERIC BURSITIS OF BOTH HIPS: ICD-10-CM

## 2025-06-05 PROCEDURE — 1159F MED LIST DOCD IN RCRD: CPT | Mod: CPTII,S$GLB,, | Performed by: NEUROMUSCULOSKELETAL MEDICINE & OMM

## 2025-06-05 PROCEDURE — 73521 X-RAY EXAM HIPS BI 2 VIEWS: CPT | Mod: 26,,, | Performed by: RADIOLOGY

## 2025-06-05 PROCEDURE — 98926 OSTEOPATH MANJ 3-4 REGIONS: CPT | Mod: S$GLB,,, | Performed by: NEUROMUSCULOSKELETAL MEDICINE & OMM

## 2025-06-05 PROCEDURE — 3288F FALL RISK ASSESSMENT DOCD: CPT | Mod: CPTII,S$GLB,, | Performed by: NEUROMUSCULOSKELETAL MEDICINE & OMM

## 2025-06-05 PROCEDURE — 3079F DIAST BP 80-89 MM HG: CPT | Mod: CPTII,S$GLB,, | Performed by: NEUROMUSCULOSKELETAL MEDICINE & OMM

## 2025-06-05 PROCEDURE — 97110 THERAPEUTIC EXERCISES: CPT | Mod: GP,S$GLB,, | Performed by: NEUROMUSCULOSKELETAL MEDICINE & OMM

## 2025-06-05 PROCEDURE — 99999 PR PBB SHADOW E&M-EST. PATIENT-LVL III: CPT | Mod: PBBFAC,,, | Performed by: NEUROMUSCULOSKELETAL MEDICINE & OMM

## 2025-06-05 PROCEDURE — 1125F AMNT PAIN NOTED PAIN PRSNT: CPT | Mod: CPTII,S$GLB,, | Performed by: NEUROMUSCULOSKELETAL MEDICINE & OMM

## 2025-06-05 PROCEDURE — 73521 X-RAY EXAM HIPS BI 2 VIEWS: CPT | Mod: TC,PO

## 2025-06-05 PROCEDURE — 3077F SYST BP >= 140 MM HG: CPT | Mod: CPTII,S$GLB,, | Performed by: NEUROMUSCULOSKELETAL MEDICINE & OMM

## 2025-06-05 PROCEDURE — 99214 OFFICE O/P EST MOD 30 MIN: CPT | Mod: 25,S$GLB,, | Performed by: NEUROMUSCULOSKELETAL MEDICINE & OMM

## 2025-06-05 PROCEDURE — 1160F RVW MEDS BY RX/DR IN RCRD: CPT | Mod: CPTII,S$GLB,, | Performed by: NEUROMUSCULOSKELETAL MEDICINE & OMM

## 2025-06-05 PROCEDURE — 1101F PT FALLS ASSESS-DOCD LE1/YR: CPT | Mod: CPTII,S$GLB,, | Performed by: NEUROMUSCULOSKELETAL MEDICINE & OMM

## 2025-06-05 RX ORDER — MELOXICAM 15 MG/1
15 TABLET ORAL DAILY
Qty: 30 TABLET | Refills: 0 | Status: SHIPPED | OUTPATIENT
Start: 2025-06-05 | End: 2025-07-05

## 2025-06-05 NOTE — PROGRESS NOTES
Subjective:     Elías Gay     Chief Complaint   Patient presents with    Pain     Low back        Elías is a 67 y.o. male coming in today for left> right hip pain. Since last visit pt had B ANCELMO- L in Nov 2023 and R in March 2024 at [facility].  He had been doing very well and exercising at the gym. He also had a prostatectomy for prostate cancer 3 months ago. After the surgery he was doing exercises for core activation/bladder control, squats and bridges. Several weeks ago he was doing some work in his attic, bending and stepping over things. I week later started experiencing right lateral hip pain and difficulty walking. He walked with a walker and started experiencing left lateral hip and low back pain. Pt. describes the pain as a 2/10 stiff/dull pain that does radiate from his left lateral thigh to his back. He reports pain only on the left side currently. The pain is better with rest, heat, massager, muscle relaxer and worse with walking. Pt. denies any new musculoskeletal complaints at this time.      Office note from 1/27/23 reviewed     PAST MEDICAL HISTORY:   Past Medical History:   Diagnosis Date    Acute renal failure 10/25/2022    Arthritis 1971    Bone Spurs in feet    Basal cell carcinoma 11/06/2018    left ear helix    Foreign body in conjunctival sac     Hernia, inguinal, right 2002    Joint pain 1971    Bones of feet    Keloid cicatrix 1958 2010 2018 2019    Hernia, Knee, Arm, Ear    Melanoma 09/14/2018    Right Arm 0.6mm    Severe sleep apnea     Thyroid nodule 5/3/2023     PAST SURGICAL HISTORY:   Past Surgical History:   Procedure Laterality Date    CLOSURE OF DEFECT OF MOHS PROCEDURE Left 01/03/2019    Procedure: CLOSURE, MOHS PROCEDURE DEFECT LEFT EAR;  Surgeon: Jeramy Meek MD;  Location: [facility] OR [address] [place] [address];  Service: Plastics;  Laterality: Left;  plastics set    COLONOSCOPY N/A 06/30/2021    Procedure: COLONOSCOPY;  Surgeon: Juliano Santoyo MD;  Location: [facility] ([address] [place] [address]);  Service:  Endoscopy;  Laterality: N/A;  severe sleep apnea     fully vaccinated-GT    CYSTOSCOPY      HERNIA REPAIR Left     3 years of age    HERNIA REPAIR N/A     Ventral wall at 3 year of age.    INCISION AND DRAINAGE, NECK Left 10/26/2022    Procedure: INCISION AND DRAINAGE,NECK;  Surgeon: Bethany Macdonald MD;  Location: [facility] OR;  Service: ENT;  Laterality: Left;    KNEE SURGERY Right 1984    Arthroscopic    NASAL SEPTUM SURGERY N/A 2009    SKIN BIOPSY  several over time     FAMILY HISTORY:   Family History   Problem Relation Name Age of Onset    Hypertension Mother RRF     Stroke Mother RRF     Aneurysm Mother RRF     Miscarriages / Stillbirths Mother RRF     Cancer Father GCF 72        Prostate and liver    Prostate cancer Father GCF     Prostate cancer Brother      Gout Brother Gabo     Eczema Brother Gabo     Psoriasis Brother Gabo     No Known Problems Daughter      No Known Problems Daughter      Kidney disease Son JAF     Lung disease Son JAF     Learning disabilities Son JAF         Dyslexcia/Dysgraphia    No Known Problems Son      Kidney disease Son DJF     Cataracts Neg Hx      Glaucoma Neg Hx      Macular degeneration Neg Hx      Melanoma Neg Hx      Eczema Neg Hx      Lupus Neg Hx      Psoriasis Neg Hx       SOCIAL HISTORY:   Social History     Socioeconomic History    Marital status:    Tobacco Use    Smoking status: Never    Smokeless tobacco: Never    Tobacco comments:     Second hand smoke from mother. Cigarrette smoke inflames my sinuses.   Substance and Sexual Activity    Alcohol use: Not Currently     Alcohol/week: 1.0 standard drink of alcohol     Types: 1 Glasses of wine per week    Drug use: No    Sexual activity: Yes     Partners: Female     Birth control/protection: None     Social Drivers of Health     Financial Resource Strain: Low Risk  (1/29/2024)    Overall Financial Resource Strain (CARDIA)     Difficulty of Paying Living Expenses: Not hard at all   Food Insecurity: No Food  Insecurity (1/29/2024)    Hunger Vital Sign     Worried About Running Out of Food in the Last Year: Never true     Ran Out of Food in the Last Year: Never true   Transportation Needs: No Transportation Needs (1/29/2024)    PRAPARE - Transportation     Lack of Transportation (Medical): No     Lack of Transportation (Non-Medical): No   Physical Activity: Inactive (1/29/2024)    Exercise Vital Sign     Days of Exercise per Week: 0 days     Minutes of Exercise per Session: 0 min   Stress: No Stress Concern Present (1/29/2024)    [employer] [place] [employer] - Occupational Stress Questionnaire     Feeling of Stress : Not at all   Housing Stability: Low Risk  (1/29/2024)    Housing Stability Vital Sign     Unable to Pay for Housing in the Last Year: No     Number of Places Lived in the Last Year: 1     Unstable Housing in the Last Year: No       MEDICATIONS:   Current Outpatient Medications:     azelastine (ASTELIN) 137 mcg (0.1 %) nasal spray, 1 spray by Nasal route 2 (two) times daily., Disp: , Rfl:     ciclopirox (PENLAC) 8 % Soln, Apply to affected nails nightly; remove weekly;, Disp: 6.6 mL, Rfl: 11    clindamycin (CLEOCIN T) 1 % external solution, Aaa qd- bid prn flare to abdomen and groin, Disp: 60 mL, Rfl: 3    fluticasone propionate (FLONASE) 50 mcg/actuation nasal spray, 2 sprays (100 mcg total) by Each Nostril route 2 (two) times a day., Disp: 48 mL, Rfl: 11    guaiFENesin (MUCINEX) 600 mg 12 hr tablet, Take 1,200 mg by mouth 2 (two) times daily., Disp: , Rfl:     ketoconazole (NIZORAL) 2 % cream, APPLY TO AFFECTED AREA TWICE A DAY ON FEET FOR 3 WEEKS, Disp: 60 g, Rfl: 3    multivitamin capsule, Take 1 capsule by mouth once daily., Disp: , Rfl:     traZODone (DESYREL) 50 MG tablet, Take 1 tablet (50 mg total) by mouth nightly as needed for Insomnia., Disp: 30 tablet, Rfl: 11    meloxicam (MOBIC) 15 MG tablet, Take 1 tablet (15 mg total) by mouth once daily., Disp: 30 tablet, Rfl: 0    tamsulosin  (FLOMAX) 0.4 mg Cap, Take 1 capsule (0.4 mg total) by mouth once daily., Disp: 30 capsule, Rfl: 11  ALLERGIES: Review of patient's allergies indicates:  No Known Allergies       Objective:     VITAL SIGNS: BP (!) 147/84 (Patient Position: Sitting)   Pulse 90    General    Vitals reviewed.  Constitutional: He is oriented to person, place, and time. He appears well-developed and well-nourished.   Neurological: He is alert and oriented to person, place, and time.   Psychiatric: He has a normal mood and affect. His behavior is normal.             MSK Exam:  HIP: bilateral HIP  The affected hip is compared to the contralateral hip.    Observation:    There is no edema, erythema, or ecchymosis in the lumbosacral region.   There is no Trendelenburg sign on either side  No obvious pelvic obliquity while standing.    No thoracolumbar scoliosis observed.    No midline skin abnormalities.  No atrophy noted in the lower limbs.  Gait: Non-antalgic , + pes planus    ROM (* = with pain):  Passive hip flexion to 100° on left and 100° on right    Tenderness To Palpation:  No tenderness at the ASIS, AIIS, PSIS, PIIS, iliac crest, pubic bones, ischial tuberosity.  No tenderness throughout the lumbar spine, iliolumbar region, or posterior pelvis.  No tenderness throughout the sacrum or piriformis  + tenderness over the greater trochanteric bursae bilaterally  + tenderness at the glut attachments on the greater trochanters bilaterally  No tenderness over proximal IT band     Strength Testing (* = with pain):  Hip flexion - 5/5 on left and 5/5 on right  Hip extension - 5/5 on left and 5/5 on right  Hip adduction - 5/5 on left and 5/5 on right  Hip abduction - 5/5 on left and 5/5 on right  Knee flexion - 5/5 on left and 5/5 on right  Knee extension - 5/5 on left and 5/5 on right    Special Tests:  Standing Trendelenburg test - negative    Seated straight leg raise - negative  Supine straight leg raise - negative  Hamstring flexibility  symmetric    ASIS compression test - negative  SI drawer test - negative   Thigh thrust test - negative     Piriformis test (Bonnet's) - negative  Ely's test - negative  Quadriceps flexibility symmetric.  Jaylon test - positive bilaterally    Fulcrum test - negative    TART (Tissue texture abnormality, Asymmetry,  Restriction of motion and/or Tenderness) changes:     Lumbar Spine   L1 Neutral   L2 Neutral   L3 Neutral   L4 TTA RIGHT   L5 TTA RIGHT       Pelvis:  Innominate:Right anterior rotation  Pubic bone:Right inferior pubic shear    Sacrum:Left on Right sacral torsion, left sacrotuberous ligament TTA      Key   F= Flexed   E = Extended   R = Rotated   S = Sidebent   TTA = tissue texture abnormality       Neurovascular Exam:  2+ femoral, DP, and PT pulses BL.  No skin changes, no abnormal hair distribution.  Decreased sensation to light touch throughout the lateral femoral cutaneous nerves, bilaterally  Capillary refill intact to <2 seconds in all lower limb digits.    IMAGIN. X-ray ordered due to bilateral hips. (AP pelvis and frogleg lateral  bilateral views) taken today.   2. X-ray images were reviewed personally by me and then directly with patient.  3. FINDINGS: X-ray images obtained demonstrate bilateral hip arthroplasties. The alignment is satisfactory. The remainder of the visualized osseous structures and soft tissues appear within normal limits.   4. IMPRESSION:  Postoperative changes of the bilateral hip joints, no complications seen.    Assessment:      Encounter Diagnoses   Name Primary?    Lateral pain of hip Yes    Acute bilateral low back pain without sciatica             Plan:   1.  Bilateral lateral hip pain likely secondary greater trochanteric bursitis due to recent overuse and underlying poor pelvic stability.  Bilateral total hip arthroplasty hardware appears intact on today's bilateral hip x-rays.  - OMT performed today to address associated biomechanical restrictions  and HEP  started.   - Rx given for Meloxicam 15 mg po qday x 14 days then prn for pain control. Pt. Advised to avoid all other NSAIDS while on this medication.  - discussed option of formal physical therapy referral for increase pelvic stability, if symptoms persist  -  Recommend OTC medial arch support for bilateral pes planus. Handout given (orange insoles)  - X-ray images of right hip taken 08/15/2019 (AP pelvis and frogleg lateral  right views) showed mild to moderate DJD with hip joint space narrowing bilaterally.   -  X-ray images of bilateral hips taken 8/30/22 (AP pelvis and frogleg lateral  bilateral views) showed mild to moderate degenerative changes can be seen in both hips with some cystic change in both acetabulums. Slight progression on the right compared to 2019 films. Images were personally reviewed with patient.  -  X-ray images of bilateral hips taken today (AP pelvis and frogleg lateral  bilateral views) showed postoperative changes of the bilateral hip joints, no complications seen. Images were personally reviewed with patient.    2. OMT 3-4 regions. Oral consent obtained.  Reviewed benefits and potential side effects.   - OMT indicated today due to signs and symptoms as well as local and remote somatic dysfunction findings and their related neurokinetic, lymphatic, fascial and/or arteriovenous body connections.   - OMT techniques used: Myofascial Release, Muscle Energy, and Balanced Ligamentous Tension   - Treatment was tolerated well. Improvement noted in segmental mobility post-treatment in dysfunctional regions. There were no adverse events and no complications immediately following treatment.     3. Reviewed with patient the following HEP:  restart:   A)   Pelvic clock exercises given to do from the 6-12 o'clock positions:10-15 reps, twice daily. Hand out of exercise also given.   B) IT band rolling: recommend using rolling stick or foam roller to roll out IT band tension bilaterally, 1-2 times a  day.   Add  C) Supine lumbar rotation exercise with flexed knees:  Repeat 10 times, twice daily.  Handout given.    HOME EXERCISE PROGRAM (HEP):  The patient was taught a homegoing physical therapy regimen as described above. The patient demonstrated understanding of the exercises and proper technique of their execution. This interaction took 15 minutes.     4. Follow-up as needed if pain deteriorates or new issue arises    5. Patient agreeable to today's plan and all questions were answered    This note is dictated using the M*Modal Fluency Direct word recognition program. There are word recognition mistakes that are occasionally missed on review.

## 2025-07-02 ENCOUNTER — RESULTS FOLLOW-UP (OUTPATIENT)
Dept: INTERNAL MEDICINE | Facility: CLINIC | Age: 70
End: 2025-07-02

## 2025-07-02 ENCOUNTER — OFFICE VISIT (OUTPATIENT)
Dept: INTERNAL MEDICINE | Facility: CLINIC | Age: 70
End: 2025-07-02
Payer: MEDICARE

## 2025-07-02 VITALS
HEIGHT: 71 IN | OXYGEN SATURATION: 94 % | HEART RATE: 84 BPM | WEIGHT: 307.31 LBS | SYSTOLIC BLOOD PRESSURE: 128 MMHG | BODY MASS INDEX: 43.02 KG/M2 | DIASTOLIC BLOOD PRESSURE: 72 MMHG

## 2025-07-02 DIAGNOSIS — J06.9 VIRAL URI WITH COUGH: ICD-10-CM

## 2025-07-02 DIAGNOSIS — Z12.5 ENCOUNTER FOR SCREENING PROSTATE SPECIFIC ANTIGEN (PSA) MEASUREMENT: ICD-10-CM

## 2025-07-02 DIAGNOSIS — E78.5 HYPERLIPIDEMIA, UNSPECIFIED HYPERLIPIDEMIA TYPE: ICD-10-CM

## 2025-07-02 DIAGNOSIS — G47.33 OSA (OBSTRUCTIVE SLEEP APNEA): ICD-10-CM

## 2025-07-02 DIAGNOSIS — K59.00 CONSTIPATION, UNSPECIFIED CONSTIPATION TYPE: Primary | ICD-10-CM

## 2025-07-02 DIAGNOSIS — E66.813 CLASS 3 SEVERE OBESITY DUE TO EXCESS CALORIES WITHOUT SERIOUS COMORBIDITY WITH BODY MASS INDEX (BMI) OF 40.0 TO 44.9 IN ADULT: ICD-10-CM

## 2025-07-02 DIAGNOSIS — R10.2 SUPRAPUBIC PAIN: ICD-10-CM

## 2025-07-02 DIAGNOSIS — E66.01 CLASS 3 SEVERE OBESITY DUE TO EXCESS CALORIES WITHOUT SERIOUS COMORBIDITY WITH BODY MASS INDEX (BMI) OF 40.0 TO 44.9 IN ADULT: ICD-10-CM

## 2025-07-02 DIAGNOSIS — Z90.79 HISTORY OF PROSTATECTOMY: ICD-10-CM

## 2025-07-02 LAB
BILIRUB SERPL-MCNC: NORMAL MG/DL
BILIRUB UR QL STRIP.AUTO: NEGATIVE
BLOOD URINE, POC: NORMAL
CLARITY UR: CLEAR
CLARITY, POC UA: CLEAR
COLOR UR AUTO: YELLOW
COLOR, POC UA: NORMAL
CTP QC/QA: YES
CTP QC/QA: YES
GLUCOSE UR QL STRIP: NEGATIVE
GLUCOSE UR QL STRIP: NORMAL
HGB UR QL STRIP: ABNORMAL
KETONES UR QL STRIP: NEGATIVE
KETONES UR QL STRIP: NORMAL
LEUKOCYTE ESTERASE UR QL STRIP: NEGATIVE
LEUKOCYTE ESTERASE URINE, POC: NORMAL
NITRITE UR QL STRIP: NEGATIVE
NITRITE, POC UA: NORMAL
PH UR STRIP: 6 [PH]
PH, POC UA: 5.5
POC MOLECULAR INFLUENZA A AGN: NEGATIVE
POC MOLECULAR INFLUENZA B AGN: NEGATIVE
PROT UR QL STRIP: NEGATIVE
PROTEIN, POC: NORMAL
SARS-COV-2 RDRP RESP QL NAA+PROBE: NEGATIVE
SP GR UR STRIP: 1.01
SPECIFIC GRAVITY, POC UA: 1.01
UROBILINOGEN UR STRIP-ACNC: NEGATIVE EU/DL
UROBILINOGEN, POC UA: 0.2

## 2025-07-02 PROCEDURE — 99999 PR PBB SHADOW E&M-EST. PATIENT-LVL III: CPT | Mod: PBBFAC,,,

## 2025-07-02 PROCEDURE — 81003 URINALYSIS AUTO W/O SCOPE: CPT

## 2025-07-02 NOTE — PROGRESS NOTES
INTERNAL MEDICINE URGENT CARE NOTE    CHIEF COMPLAINT     Elías presents for constipation with mild abdominal discomfort.    HPI     Elías Gay is a 70 y.o. male with obstructive sleep apnea on CPAP, BPH, history of melanoma, colon polyps, thyroid nodule, PAF, status post bilateral hip replacements and prostatectomy for prostate cancer 3 months ago, who presents for an urgent care visit today.    GASTROINTESTINAL:  He reports constipation several days following bowel preparation for radiation simulation last week. Current bowel movements are small, infrequent, and difficult to pass, resembling small balls, with 2-3 movements on Sunday and Monday. He experiences intermittent mild abdominal discomfort  to the left lower abdomen with occasional sharp pain and feels his bowels are backing up. Relieved with bowel movement.  He denies severe abdominal pain or hardness. He takes Colace twice daily as a stool softener.  He reports decreased water consumption prior to constipation. He reports temperature ranging from 99°F to 101°F over the past week, managed with Tylenol. He denies suprapubic discomfort, dysuria, frequency, urgency, nausea, vomiting, chills, pain in lower abdomen, penis, or scrotum, suprapubic or flank pain, blood in urine, hematocheezia, weight loss, abd mass, penile discharge    GENITOURINARY:  He reports complete bladder incontinence with inability to maintain a full bladder.    ENT/RESPIRATORY:  He has chronic sinus problems with thick mucus for approximately 10 years. He takes Mucinex daily and uses Flonase and saline solution as needed for sinus congestion. Morning sinus drainage varies depending on sleep position. He uses saline solution first, followed by Flonase as recommended by ENT, though Flonase use is inconsistent during congestion episodes.    SLEEP:  He averages seven hours of sleep per night with Trazodone 50mg at bedtime. He experiences significant dry mouth and sinus irritation with  CPAP use due to mouth remaining open during sleep. A new mattress has improved sleep duration and positioning comfort.    CURRENT MEDICATIONS:  He takes Colace twice daily, Mucinex daily, Flonase as needed, Trazodone 50mg at bedtime.    ROS:  General: +fever, -chills, -weight gain, -weight loss, +loss of appetite, +decreased energy levels, +dry mouth  Eyes: -vision changes, -redness, -discharge  ENT: -ear pain, +nasal congestion, -sore throat, +post nasal drip  Cardiovascular: -chest pain, -palpitations, -lower extremity edema  Respiratory: +cough, -shortness of breath  Gastrointestinal: +abdominal discomfort, -nausea, -vomiting, -diarrhea, +constipation, -blood in stool  Genitourinary: -dysuria, -hematuria, -frequency, +urinary incontinence  Musculoskeletal: -joint pain, -muscle pain  Skin: -rash, -lesion  Neurological: -headache, -dizziness, -numbness, -tingling  Psychiatric: -anxiety, -depression, -sleep difficulty     Past Medical History:  Past Medical History:   Diagnosis Date    Acute renal failure 10/25/2022    Arthritis 1971    Bone Spurs in feet    Basal cell carcinoma 11/06/2018    left ear helix    Foreign body in conjunctival sac     Hernia, inguinal, right 2002    Joint pain 1971    Bones of feet    Keloid cicatrix 1958 2010 2018 2019    Hernia, Knee, Arm, Ear    Melanoma 09/14/2018    Right Arm 0.6mm    Severe sleep apnea     Thyroid nodule 5/3/2023     Home Medications:  Prior to Admission medications    Medication Sig Start Date End Date Taking? Authorizing Provider   azelastine (ASTELIN) 137 mcg (0.1 %) nasal spray 1 spray by Nasal route 2 (two) times daily.    Provider, Historical   ciclopirox (PENLAC) 8 % Soln Apply to affected nails nightly; remove weekly; 8/15/24   Alyson Moreland MD   clindamycin (CLEOCIN T) 1 % external solution Aaa qd- bid prn flare to abdomen and groin 8/15/24   Alyson Moreland MD   fluticasone propionate (FLONASE) 50 mcg/actuation nasal spray 2 sprays (100 mcg total) by  "Each Nostril route 2 (two) times a day. 5/13/25   Bethany Macdonald MD   guaiFENesin (MUCINEX) 600 mg 12 hr tablet Take 1,200 mg by mouth 2 (two) times daily.    Provider, Historical   ketoconazole (NIZORAL) 2 % cream APPLY TO AFFECTED AREA TWICE A DAY ON FEET FOR 3 WEEKS 11/7/24   Alyson Moreland MD   meloxicam (MOBIC) 15 MG tablet Take 1 tablet (15 mg total) by mouth once daily. 6/5/25 7/5/25  Abigail White DO   multivitamin capsule Take 1 capsule by mouth once daily.    Provider, Historical   traZODone (DESYREL) 50 MG tablet Take 1 tablet (50 mg total) by mouth nightly as needed for Insomnia. 12/20/24 12/20/25  Kameron Diaz MD     PHYSICAL EXAM     General: No acute distress. Well-developed. Obese  Eyes: EOMI. Sclerae anicteric.  HENT: Normocephalic. Atraumatic. Nares patent. Moist oral mucosa.  Cardiovascular: Regular rate. Regular rhythm. No murmurs. No rubs. No gallops. Normal S1, S2.  Respiratory: Normal respiratory effort. Clear to auscultation bilaterally. No rales. No rhonchi. No wheezing.  Abdomen: Soft. Non-distended. Normoactive bowel sounds     General: Bowel sounds are normal.      Palpations: There is no mass.      Tenderness: There is mild abdominal tenderness in the left lower quadrant. There is no right CVA tenderness, left CVA tenderness or rebound.   Musculoskeletal: No  obvious deformity.  Extremities: No lower extremity edema.  Neurological: Alert & oriented x3. No slurred speech. Normal gait.  Psychiatric: Normal mood. Normal affect. Good insight. Good judgment.  Skin: Warm. Dry. No rash.        /72 (BP Location: Left arm, Patient Position: Sitting)   Pulse 84   Ht 5' 11" (1.803 m)   Wt (!) 139.4 kg (307 lb 5.1 oz)   SpO2 (!) 94%   BMI 42.86 kg/m²     ASSESSMENT/PLAN     CONSTIPATION:  - Examined abdomen, which is soft  - Prescribed Miralax: 2 scoops today, 1 scoop tomorrow, then 1 scoop daily for a few more days as needed.  - Advised to continue Colace, increase water " intake, increase fiber intake and increase physical activity.    HISTORY OF PROSTATE SURGERY:  - Assessed patient's history of prostatectomy and related complications.  - Monitored PSA levels  - Ordered PSA test as requested by patient before starting radiation therapy.    ABDOMINAL PAIN  -     Urinalysis, Reflex to Urine Culture Urine, Clean Catch  -     POCT URINE DIPSTICK WITHOUT MICROSCOPE  -     CBC W/ AUTO DIFFERENTIAL; Future; Expected date: 07/02/2025    VIRAL URI  - Evaluated congestion, cough, and postnasal drip requiring daily Mucinex use.  - Prescribed Flonase nasal spray to be used after saline rinse for better sinus management.  - Recommend decreasing Mucinex dosage from current 1200 mg twice daily due to possible dehydration  -     POCT Influenza A/B Molecular- NEGATIVE  -     POCT COVID-19 Rapid Screening- NEGATIVE  -     CBC W/ AUTO DIFFERENTIAL; Future; Expected date: 07/02/2025  -     COMPREHENSIVE METABOLIC PANEL; Future; Expected date: 07/02/2025    OBSTRUCTIVE SLEEP APNEA:  - Monitored CPAP use, noting reports of tiredness, dryness, and discomfort.  - Assessed CPAP settings and recommended adjustments to improve comfort and reduce dryness.  - Instructed patient to continue CPAP use with adjusted humidity settings.    HYPERLIPIDEMIA  - Chronic, stable  - LIPID PANEL; Future; Expected date: 07/02/2025  - Followed by PCP    FOLLOW-UP:  - Follow up in 1-2 weeks if symptoms persist.  - Schedule annual check-up.          Patient education provided from Samantha. Patient was counseled on when and how to seek emergent care.   This note was generated with the assistance of ambient listening technology. Verbal consent was obtained by the patient and accompanying visitor(s) for the recording of patient appointment to facilitate this note. I attest to having reviewed and edited the generated note for accuracy, though some syntax or spelling errors may persist. Please contact the author of this note for any  clarification.       Padmini ALMEIDA, APRN, FNP-c   Department of Internal Medicine - [facility]  8:43 AM     Agitation

## 2025-07-03 ENCOUNTER — LAB VISIT (OUTPATIENT)
Dept: LAB | Facility: HOSPITAL | Age: 70
End: 2025-07-03
Payer: MEDICARE

## 2025-07-03 DIAGNOSIS — Z12.5 ENCOUNTER FOR SCREENING PROSTATE SPECIFIC ANTIGEN (PSA) MEASUREMENT: ICD-10-CM

## 2025-07-03 DIAGNOSIS — R10.2 SUPRAPUBIC PAIN: ICD-10-CM

## 2025-07-03 DIAGNOSIS — E78.5 HYPERLIPIDEMIA, UNSPECIFIED HYPERLIPIDEMIA TYPE: ICD-10-CM

## 2025-07-03 DIAGNOSIS — J06.9 VIRAL URI WITH COUGH: ICD-10-CM

## 2025-07-03 LAB
ABSOLUTE EOSINOPHIL (OHS): 0.07 K/UL
ABSOLUTE MONOCYTE (OHS): 0.97 K/UL (ref 0.3–1)
ABSOLUTE NEUTROPHIL COUNT (OHS): 8.82 K/UL (ref 1.8–7.7)
ALBUMIN SERPL BCP-MCNC: 2.9 G/DL (ref 3.5–5.2)
ALP SERPL-CCNC: 110 UNIT/L (ref 40–150)
ALT SERPL W/O P-5'-P-CCNC: 40 UNIT/L (ref 10–44)
ANION GAP (OHS): 12 MMOL/L (ref 8–16)
AST SERPL-CCNC: 37 UNIT/L (ref 11–45)
BASOPHILS # BLD AUTO: 0.04 K/UL
BASOPHILS NFR BLD AUTO: 0.4 %
BILIRUB SERPL-MCNC: 0.8 MG/DL (ref 0.1–1)
BUN SERPL-MCNC: 10 MG/DL (ref 8–23)
CALCIUM SERPL-MCNC: 8.9 MG/DL (ref 8.7–10.5)
CHLORIDE SERPL-SCNC: 102 MMOL/L (ref 95–110)
CHOLEST SERPL-MCNC: 127 MG/DL (ref 120–199)
CHOLEST/HDLC SERPL: 4.4 {RATIO} (ref 2–5)
CO2 SERPL-SCNC: 25 MMOL/L (ref 23–29)
CREAT SERPL-MCNC: 0.8 MG/DL (ref 0.5–1.4)
ERYTHROCYTE [DISTWIDTH] IN BLOOD BY AUTOMATED COUNT: 14.1 % (ref 11.5–14.5)
GFR SERPLBLD CREATININE-BSD FMLA CKD-EPI: >60 ML/MIN/1.73/M2
GLUCOSE SERPL-MCNC: 93 MG/DL (ref 70–110)
HCT VFR BLD AUTO: 40.6 % (ref 40–54)
HDLC SERPL-MCNC: 29 MG/DL (ref 40–75)
HDLC SERPL: 22.8 % (ref 20–50)
HGB BLD-MCNC: 12.8 GM/DL (ref 14–18)
HOLD SPECIMEN: NORMAL
IMM GRANULOCYTES # BLD AUTO: 0.06 K/UL (ref 0–0.04)
IMM GRANULOCYTES NFR BLD AUTO: 0.6 % (ref 0–0.5)
LDLC SERPL CALC-MCNC: 84.2 MG/DL (ref 63–159)
LYMPHOCYTES # BLD AUTO: 0.79 K/UL (ref 1–4.8)
MCH RBC QN AUTO: 28.4 PG (ref 27–31)
MCHC RBC AUTO-ENTMCNC: 31.5 G/DL (ref 32–36)
MCV RBC AUTO: 90 FL (ref 82–98)
NONHDLC SERPL-MCNC: 98 MG/DL
NUCLEATED RBC (/100WBC) (OHS): 0 /100 WBC
PLATELET # BLD AUTO: 326 K/UL (ref 150–450)
PMV BLD AUTO: 8.9 FL (ref 9.2–12.9)
POTASSIUM SERPL-SCNC: 4.1 MMOL/L (ref 3.5–5.1)
PROT SERPL-MCNC: 7.7 GM/DL (ref 6–8.4)
PSA SERPL-MCNC: 0.21 NG/ML
RBC # BLD AUTO: 4.51 M/UL (ref 4.6–6.2)
RELATIVE EOSINOPHIL (OHS): 0.7 %
RELATIVE LYMPHOCYTE (OHS): 7.3 % (ref 18–48)
RELATIVE MONOCYTE (OHS): 9 % (ref 4–15)
RELATIVE NEUTROPHIL (OHS): 82 % (ref 38–73)
SODIUM SERPL-SCNC: 139 MMOL/L (ref 136–145)
TRIGL SERPL-MCNC: 69 MG/DL (ref 30–150)
WBC # BLD AUTO: 10.75 K/UL (ref 3.9–12.7)

## 2025-07-03 PROCEDURE — 80053 COMPREHEN METABOLIC PANEL: CPT

## 2025-07-03 PROCEDURE — 85025 COMPLETE CBC W/AUTO DIFF WBC: CPT

## 2025-07-03 PROCEDURE — 80061 LIPID PANEL: CPT

## 2025-07-03 PROCEDURE — 36415 COLL VENOUS BLD VENIPUNCTURE: CPT

## 2025-07-03 PROCEDURE — 84153 ASSAY OF PSA TOTAL: CPT

## 2025-07-23 ENCOUNTER — PATIENT MESSAGE (OUTPATIENT)
Dept: INTERNAL MEDICINE | Facility: CLINIC | Age: 70
End: 2025-07-23
Payer: MEDICARE

## (undated) DEVICE — SPONGE GAUZE 16PLY 4X4

## (undated) DEVICE — GOWN SURGICAL X-LARGE

## (undated) DEVICE — CORD BIPOLAR 12 FOOT

## (undated) DEVICE — SPONGE KITTNER 1/4X 5/8 L STRL

## (undated) DEVICE — SEE MEDLINE ITEM 157110

## (undated) DEVICE — SEE MEDLINE ITEM 152622

## (undated) DEVICE — SUT 5/0 18IN COATED VICRYL

## (undated) DEVICE — GOWN SURG 2XL DISP TIE BACK

## (undated) DEVICE — PACK HEAD & NECK

## (undated) DEVICE — SUT 4-0 VICRYL / SH

## (undated) DEVICE — GAUZE SPONGE PEANUT STRL

## (undated) DEVICE — ELECTRODE BLADE INSULATED 1 IN

## (undated) DEVICE — SEE MEDLINE ITEM 157128

## (undated) DEVICE — GLOVE BIOGEL ECLIPSE SZ 6

## (undated) DEVICE — SUPPORT ULNA NERVE PROTECTOR

## (undated) DEVICE — TOWEL OR DISP STRL BLUE 4/PK

## (undated) DEVICE — COVER LIGHT HANDLE 80/CA

## (undated) DEVICE — DRAPE STERI INSTRUMENT 1018

## (undated) DEVICE — SUT VICRYL PLUS 3-0 SH 18IN

## (undated) DEVICE — GAUZE SPONGE 4X4 12PLY

## (undated) DEVICE — SEE L#120831

## (undated) DEVICE — BLANKET LOWER BODY 55.9X40.2IN

## (undated) DEVICE — GLOVE SURGICAL LATEX SZ 6.5

## (undated) DEVICE — CONTAINER SPECIMEN STRL 4OZ

## (undated) DEVICE — SHEET EENT SPLIT

## (undated) DEVICE — SEE MEDLINE ITEM 157194

## (undated) DEVICE — STAPLER SKIN ROTATING HEAD

## (undated) DEVICE — COVER OVERHEAD SURG LT BLUE

## (undated) DEVICE — TRAY MINOR GEN SURG

## (undated) DEVICE — SEE MEDLINE ITEM 154981

## (undated) DEVICE — NDL HYPO REG 25G X 1 1/2

## (undated) DEVICE — SUT VICRYL 4-0 RB1 27IN UD

## (undated) DEVICE — DRAIN PENROSE XRAY 18 X 1/4 ST

## (undated) DEVICE — ELECTRODE REM PLYHSV RETURN 9

## (undated) DEVICE — BLADE SURG #15 CARBON STEEL

## (undated) DEVICE — CANISTER SUCTION 2 LTR

## (undated) DEVICE — FORCEP SPTZLR-MALIS 1.5MM 8IN

## (undated) DEVICE — SKINMARKER & RULER REGULAR X-F

## (undated) DEVICE — Device

## (undated) DEVICE — SUT 4-0 CV RB-1 UND CR

## (undated) DEVICE — SEE MEDLINE ITEM 146372

## (undated) DEVICE — SUT 3-0 12-18IN SILK

## (undated) DEVICE — POSITIONER HEAD DONUT 9IN FOAM